# Patient Record
Sex: MALE | Race: WHITE | NOT HISPANIC OR LATINO
[De-identification: names, ages, dates, MRNs, and addresses within clinical notes are randomized per-mention and may not be internally consistent; named-entity substitution may affect disease eponyms.]

---

## 2018-04-06 PROBLEM — Z00.00 ENCOUNTER FOR PREVENTIVE HEALTH EXAMINATION: Status: ACTIVE | Noted: 2018-04-06

## 2018-04-16 ENCOUNTER — APPOINTMENT (OUTPATIENT)
Dept: ORTHOPEDIC SURGERY | Facility: CLINIC | Age: 83
End: 2018-04-16
Payer: MEDICARE

## 2018-04-16 VITALS
DIASTOLIC BLOOD PRESSURE: 91 MMHG | WEIGHT: 175 LBS | SYSTOLIC BLOOD PRESSURE: 190 MMHG | BODY MASS INDEX: 29.88 KG/M2 | HEIGHT: 64 IN | HEART RATE: 58 BPM

## 2018-04-16 PROCEDURE — 99203 OFFICE O/P NEW LOW 30 MIN: CPT

## 2018-04-16 PROCEDURE — 73564 X-RAY EXAM KNEE 4 OR MORE: CPT | Mod: RT

## 2019-04-09 ENCOUNTER — APPOINTMENT (OUTPATIENT)
Dept: ORTHOPEDIC SURGERY | Facility: CLINIC | Age: 84
End: 2019-04-09
Payer: MEDICARE

## 2019-04-09 VITALS — WEIGHT: 168 LBS | HEIGHT: 63 IN | BODY MASS INDEX: 29.77 KG/M2

## 2019-04-09 VITALS — DIASTOLIC BLOOD PRESSURE: 76 MMHG | HEART RATE: 50 BPM | SYSTOLIC BLOOD PRESSURE: 182 MMHG

## 2019-04-09 DIAGNOSIS — Z78.9 OTHER SPECIFIED HEALTH STATUS: ICD-10-CM

## 2019-04-09 DIAGNOSIS — M17.0 BILATERAL PRIMARY OSTEOARTHRITIS OF KNEE: ICD-10-CM

## 2019-04-09 PROCEDURE — 99214 OFFICE O/P EST MOD 30 MIN: CPT

## 2019-04-09 PROCEDURE — 73562 X-RAY EXAM OF KNEE 3: CPT | Mod: RT

## 2019-04-09 RX ORDER — ATENOLOL 50 MG/1
TABLET ORAL
Refills: 0 | Status: ACTIVE | COMMUNITY

## 2019-05-02 ENCOUNTER — OUTPATIENT (OUTPATIENT)
Dept: OUTPATIENT SERVICES | Facility: HOSPITAL | Age: 84
LOS: 1 days | End: 2019-05-02
Payer: COMMERCIAL

## 2019-05-02 VITALS
TEMPERATURE: 98 F | DIASTOLIC BLOOD PRESSURE: 83 MMHG | HEART RATE: 54 BPM | WEIGHT: 167.99 LBS | HEIGHT: 63 IN | OXYGEN SATURATION: 98 % | SYSTOLIC BLOOD PRESSURE: 163 MMHG | RESPIRATION RATE: 16 BRPM

## 2019-05-02 DIAGNOSIS — M17.12 UNILATERAL PRIMARY OSTEOARTHRITIS, LEFT KNEE: ICD-10-CM

## 2019-05-02 DIAGNOSIS — Z01.818 ENCOUNTER FOR OTHER PREPROCEDURAL EXAMINATION: ICD-10-CM

## 2019-05-02 DIAGNOSIS — Z98.890 OTHER SPECIFIED POSTPROCEDURAL STATES: Chronic | ICD-10-CM

## 2019-05-02 DIAGNOSIS — M17.10 UNILATERAL PRIMARY OSTEOARTHRITIS, UNSPECIFIED KNEE: ICD-10-CM

## 2019-05-02 LAB
ALBUMIN SERPL ELPH-MCNC: 4.1 G/DL — SIGNIFICANT CHANGE UP (ref 3.3–5)
ALP SERPL-CCNC: 75 U/L — SIGNIFICANT CHANGE UP (ref 30–120)
ALT FLD-CCNC: 36 U/L DA — SIGNIFICANT CHANGE UP (ref 10–60)
ANION GAP SERPL CALC-SCNC: 6 MMOL/L — SIGNIFICANT CHANGE UP (ref 5–17)
APTT BLD: 35.8 SEC — SIGNIFICANT CHANGE UP (ref 28.5–37)
AST SERPL-CCNC: 27 U/L — SIGNIFICANT CHANGE UP (ref 10–40)
BILIRUB SERPL-MCNC: 0.3 MG/DL — SIGNIFICANT CHANGE UP (ref 0.2–1.2)
BLD GP AB SCN SERPL QL: SIGNIFICANT CHANGE UP
BUN SERPL-MCNC: 21 MG/DL — SIGNIFICANT CHANGE UP (ref 7–23)
CALCIUM SERPL-MCNC: 9.3 MG/DL — SIGNIFICANT CHANGE UP (ref 8.4–10.5)
CHLORIDE SERPL-SCNC: 105 MMOL/L — SIGNIFICANT CHANGE UP (ref 96–108)
CO2 SERPL-SCNC: 32 MMOL/L — HIGH (ref 22–31)
CREAT SERPL-MCNC: 1.07 MG/DL — SIGNIFICANT CHANGE UP (ref 0.5–1.3)
GLUCOSE SERPL-MCNC: 90 MG/DL — SIGNIFICANT CHANGE UP (ref 70–99)
HCT VFR BLD CALC: 46.7 % — SIGNIFICANT CHANGE UP (ref 39–50)
HGB BLD-MCNC: 16 G/DL — SIGNIFICANT CHANGE UP (ref 13–17)
INR BLD: 1.16 RATIO — SIGNIFICANT CHANGE UP (ref 0.88–1.16)
MCHC RBC-ENTMCNC: 33.3 PG — SIGNIFICANT CHANGE UP (ref 27–34)
MCHC RBC-ENTMCNC: 34.3 GM/DL — SIGNIFICANT CHANGE UP (ref 32–36)
MCV RBC AUTO: 97.1 FL — SIGNIFICANT CHANGE UP (ref 80–100)
MRSA PCR RESULT.: SIGNIFICANT CHANGE UP
NRBC # BLD: 0 /100 WBCS — SIGNIFICANT CHANGE UP (ref 0–0)
PLATELET # BLD AUTO: 154 K/UL — SIGNIFICANT CHANGE UP (ref 150–400)
POTASSIUM SERPL-MCNC: 4.8 MMOL/L — SIGNIFICANT CHANGE UP (ref 3.5–5.3)
POTASSIUM SERPL-SCNC: 4.8 MMOL/L — SIGNIFICANT CHANGE UP (ref 3.5–5.3)
PROT SERPL-MCNC: 7.5 G/DL — SIGNIFICANT CHANGE UP (ref 6–8.3)
PROTHROM AB SERPL-ACNC: 12.7 SEC — SIGNIFICANT CHANGE UP (ref 10–12.9)
RBC # BLD: 4.81 M/UL — SIGNIFICANT CHANGE UP (ref 4.2–5.8)
RBC # FLD: 13.2 % — SIGNIFICANT CHANGE UP (ref 10.3–14.5)
S AUREUS DNA NOSE QL NAA+PROBE: DETECTED
SODIUM SERPL-SCNC: 143 MMOL/L — SIGNIFICANT CHANGE UP (ref 135–145)
WBC # BLD: 10.22 K/UL — SIGNIFICANT CHANGE UP (ref 3.8–10.5)
WBC # FLD AUTO: 10.22 K/UL — SIGNIFICANT CHANGE UP (ref 3.8–10.5)

## 2019-05-02 PROCEDURE — 36415 COLL VENOUS BLD VENIPUNCTURE: CPT

## 2019-05-02 PROCEDURE — 93005 ELECTROCARDIOGRAM TRACING: CPT

## 2019-05-02 PROCEDURE — 86900 BLOOD TYPING SEROLOGIC ABO: CPT

## 2019-05-02 PROCEDURE — G0463: CPT

## 2019-05-02 PROCEDURE — 80053 COMPREHEN METABOLIC PANEL: CPT

## 2019-05-02 PROCEDURE — 85730 THROMBOPLASTIN TIME PARTIAL: CPT

## 2019-05-02 PROCEDURE — 86850 RBC ANTIBODY SCREEN: CPT

## 2019-05-02 PROCEDURE — 85610 PROTHROMBIN TIME: CPT

## 2019-05-02 PROCEDURE — 86901 BLOOD TYPING SEROLOGIC RH(D): CPT

## 2019-05-02 PROCEDURE — 85027 COMPLETE CBC AUTOMATED: CPT

## 2019-05-02 PROCEDURE — 87640 STAPH A DNA AMP PROBE: CPT

## 2019-05-02 PROCEDURE — 87641 MR-STAPH DNA AMP PROBE: CPT

## 2019-05-02 PROCEDURE — 93010 ELECTROCARDIOGRAM REPORT: CPT

## 2019-05-02 NOTE — H&P PST ADULT - RS GEN PE MLT RESP DETAILS PC
respirations non-labored/normal/airway patent/good air movement/breath sounds equal/clear to auscultation bilaterally

## 2019-05-02 NOTE — H&P PST ADULT - HISTORY OF PRESENT ILLNESS
85 y/o male with left knee pain. Chronic pain with Flare ups and OTC meds not helping at present. Here for PST in NAD

## 2019-05-02 NOTE — H&P PST ADULT - NSANTHOSAYNRD_GEN_A_CORE
No. ABEL screening performed.  STOP BANG Legend: 0-2 = LOW Risk; 3-4 = INTERMEDIATE Risk; 5-8 = HIGH Risk

## 2019-05-03 RX ORDER — MUPIROCIN 20 MG/G
1 OINTMENT TOPICAL
Qty: 1 | Refills: 0 | OUTPATIENT
Start: 2019-05-03 | End: 2019-05-07

## 2019-05-03 RX ORDER — MUPIROCIN 20 MG/G
1 OINTMENT TOPICAL
Qty: 1 | Refills: 0
Start: 2019-05-03 | End: 2019-05-07

## 2019-05-20 RX ORDER — SODIUM CHLORIDE 9 MG/ML
1000 INJECTION, SOLUTION INTRAVENOUS
Refills: 0 | Status: DISCONTINUED | OUTPATIENT
Start: 2019-05-22 | End: 2019-05-25

## 2019-05-21 PROBLEM — I10 ESSENTIAL (PRIMARY) HYPERTENSION: Chronic | Status: ACTIVE | Noted: 2019-05-01

## 2019-05-21 PROBLEM — I34.1 NONRHEUMATIC MITRAL (VALVE) PROLAPSE: Chronic | Status: ACTIVE | Noted: 2019-05-01

## 2019-05-21 PROBLEM — E78.00 PURE HYPERCHOLESTEROLEMIA, UNSPECIFIED: Chronic | Status: ACTIVE | Noted: 2019-05-01

## 2019-05-21 NOTE — PATIENT PROFILE ADULT - NSPROMEDSADMININFO_GEN_A_NUR
Following rx was faxed to pharmacy with confirmation received:      LORazepam (ATIVAN) 0.5 mg tablet [[id number]]   Order Details   Dose, Route, Frequency: As Directed    Dispense Quantity:  20 Tab Refills:  0 Fills Remaining:  --           Sig: TAKE ONE TABLET BY MOUTH NIGHTLY AS NEEDED FOR ANXIETY
no concerns

## 2019-05-22 ENCOUNTER — RESULT REVIEW (OUTPATIENT)
Age: 84
End: 2019-05-22

## 2019-05-22 ENCOUNTER — APPOINTMENT (OUTPATIENT)
Dept: ORTHOPEDIC SURGERY | Facility: HOSPITAL | Age: 84
End: 2019-05-22

## 2019-05-22 ENCOUNTER — INPATIENT (INPATIENT)
Facility: HOSPITAL | Age: 84
LOS: 2 days | Discharge: HOME | DRG: 470 | End: 2019-05-25
Attending: ORTHOPAEDIC SURGERY | Admitting: ORTHOPAEDIC SURGERY
Payer: COMMERCIAL

## 2019-05-22 VITALS
RESPIRATION RATE: 20 BRPM | DIASTOLIC BLOOD PRESSURE: 85 MMHG | OXYGEN SATURATION: 99 % | HEIGHT: 63 IN | HEART RATE: 53 BPM | SYSTOLIC BLOOD PRESSURE: 174 MMHG | TEMPERATURE: 98 F | WEIGHT: 167.99 LBS

## 2019-05-22 DIAGNOSIS — Z98.890 OTHER SPECIFIED POSTPROCEDURAL STATES: Chronic | ICD-10-CM

## 2019-05-22 DIAGNOSIS — M17.12 UNILATERAL PRIMARY OSTEOARTHRITIS, LEFT KNEE: ICD-10-CM

## 2019-05-22 DIAGNOSIS — Z01.818 ENCOUNTER FOR OTHER PREPROCEDURAL EXAMINATION: ICD-10-CM

## 2019-05-22 LAB
ANION GAP SERPL CALC-SCNC: 8 MMOL/L — SIGNIFICANT CHANGE UP (ref 5–17)
CHLORIDE SERPL-SCNC: 103 MMOL/L — SIGNIFICANT CHANGE UP (ref 96–108)
CO2 SERPL-SCNC: 28 MMOL/L — SIGNIFICANT CHANGE UP (ref 22–31)
HCT VFR BLD CALC: 42.4 % — SIGNIFICANT CHANGE UP (ref 39–50)
HGB BLD-MCNC: 14.2 G/DL — SIGNIFICANT CHANGE UP (ref 13–17)
POTASSIUM SERPL-MCNC: 4.2 MMOL/L — SIGNIFICANT CHANGE UP (ref 3.5–5.3)
POTASSIUM SERPL-SCNC: 4.2 MMOL/L — SIGNIFICANT CHANGE UP (ref 3.5–5.3)
SODIUM SERPL-SCNC: 139 MMOL/L — SIGNIFICANT CHANGE UP (ref 135–145)

## 2019-05-22 PROCEDURE — 27447 TOTAL KNEE ARTHROPLASTY: CPT | Mod: LT

## 2019-05-22 PROCEDURE — 99223 1ST HOSP IP/OBS HIGH 75: CPT

## 2019-05-22 PROCEDURE — 88305 TISSUE EXAM BY PATHOLOGIST: CPT | Mod: 26

## 2019-05-22 PROCEDURE — 73562 X-RAY EXAM OF KNEE 3: CPT | Mod: 26,LT

## 2019-05-22 PROCEDURE — 88311 DECALCIFY TISSUE: CPT | Mod: 26

## 2019-05-22 RX ORDER — CELECOXIB 200 MG/1
200 CAPSULE ORAL EVERY 12 HOURS
Refills: 0 | Status: DISCONTINUED | OUTPATIENT
Start: 2019-05-23 | End: 2019-05-25

## 2019-05-22 RX ORDER — MAGNESIUM HYDROXIDE 400 MG/1
30 TABLET, CHEWABLE ORAL DAILY
Refills: 0 | Status: DISCONTINUED | OUTPATIENT
Start: 2019-05-22 | End: 2019-05-25

## 2019-05-22 RX ORDER — ATORVASTATIN CALCIUM 80 MG/1
40 TABLET, FILM COATED ORAL AT BEDTIME
Refills: 0 | Status: DISCONTINUED | OUTPATIENT
Start: 2019-05-22 | End: 2019-05-23

## 2019-05-22 RX ORDER — TRANEXAMIC ACID 100 MG/ML
1000 INJECTION, SOLUTION INTRAVENOUS ONCE
Refills: 0 | Status: COMPLETED | OUTPATIENT
Start: 2019-05-22 | End: 2019-05-22

## 2019-05-22 RX ORDER — CHLORHEXIDINE GLUCONATE 213 G/1000ML
1 SOLUTION TOPICAL ONCE
Refills: 0 | Status: COMPLETED | OUTPATIENT
Start: 2019-05-22 | End: 2019-05-22

## 2019-05-22 RX ORDER — OXYCODONE HYDROCHLORIDE 5 MG/1
5 TABLET ORAL
Refills: 0 | Status: DISCONTINUED | OUTPATIENT
Start: 2019-05-22 | End: 2019-05-25

## 2019-05-22 RX ORDER — CEFAZOLIN SODIUM 1 G
2000 VIAL (EA) INJECTION EVERY 8 HOURS
Refills: 0 | Status: COMPLETED | OUTPATIENT
Start: 2019-05-22 | End: 2019-05-23

## 2019-05-22 RX ORDER — PANTOPRAZOLE SODIUM 20 MG/1
40 TABLET, DELAYED RELEASE ORAL
Refills: 0 | Status: DISCONTINUED | OUTPATIENT
Start: 2019-05-22 | End: 2019-05-25

## 2019-05-22 RX ORDER — POLYETHYLENE GLYCOL 3350 17 G/17G
17 POWDER, FOR SOLUTION ORAL DAILY
Refills: 0 | Status: DISCONTINUED | OUTPATIENT
Start: 2019-05-22 | End: 2019-05-25

## 2019-05-22 RX ORDER — ONDANSETRON 8 MG/1
4 TABLET, FILM COATED ORAL ONCE
Refills: 0 | Status: DISCONTINUED | OUTPATIENT
Start: 2019-05-22 | End: 2019-05-22

## 2019-05-22 RX ORDER — OXYCODONE HYDROCHLORIDE 5 MG/1
10 TABLET ORAL
Refills: 0 | Status: DISCONTINUED | OUTPATIENT
Start: 2019-05-22 | End: 2019-05-25

## 2019-05-22 RX ORDER — ASPIRIN/CALCIUM CARB/MAGNESIUM 324 MG
81 TABLET ORAL EVERY 12 HOURS
Refills: 0 | Status: DISCONTINUED | OUTPATIENT
Start: 2019-05-23 | End: 2019-05-25

## 2019-05-22 RX ORDER — DOCUSATE SODIUM 100 MG
100 CAPSULE ORAL THREE TIMES A DAY
Refills: 0 | Status: DISCONTINUED | OUTPATIENT
Start: 2019-05-22 | End: 2019-05-25

## 2019-05-22 RX ORDER — APREPITANT 80 MG/1
40 CAPSULE ORAL ONCE
Refills: 0 | Status: COMPLETED | OUTPATIENT
Start: 2019-05-22 | End: 2019-05-22

## 2019-05-22 RX ORDER — HYDROMORPHONE HYDROCHLORIDE 2 MG/ML
0.5 INJECTION INTRAMUSCULAR; INTRAVENOUS; SUBCUTANEOUS
Refills: 0 | Status: DISCONTINUED | OUTPATIENT
Start: 2019-05-22 | End: 2019-05-25

## 2019-05-22 RX ORDER — ACETAMINOPHEN 500 MG
1000 TABLET ORAL EVERY 6 HOURS
Refills: 0 | Status: COMPLETED | OUTPATIENT
Start: 2019-05-22 | End: 2019-05-23

## 2019-05-22 RX ORDER — ATENOLOL 25 MG/1
50 TABLET ORAL DAILY
Refills: 0 | Status: DISCONTINUED | OUTPATIENT
Start: 2019-05-22 | End: 2019-05-25

## 2019-05-22 RX ORDER — ACETAMINOPHEN 500 MG
1000 TABLET ORAL ONCE
Refills: 0 | Status: COMPLETED | OUTPATIENT
Start: 2019-05-22 | End: 2019-05-22

## 2019-05-22 RX ORDER — SODIUM CHLORIDE 9 MG/ML
1000 INJECTION, SOLUTION INTRAVENOUS
Refills: 0 | Status: DISCONTINUED | OUTPATIENT
Start: 2019-05-22 | End: 2019-05-22

## 2019-05-22 RX ORDER — CEFAZOLIN SODIUM 1 G
2000 VIAL (EA) INJECTION ONCE
Refills: 0 | Status: COMPLETED | OUTPATIENT
Start: 2019-05-22 | End: 2019-05-22

## 2019-05-22 RX ORDER — ACETAMINOPHEN 500 MG
1000 TABLET ORAL EVERY 8 HOURS
Refills: 0 | Status: DISCONTINUED | OUTPATIENT
Start: 2019-05-23 | End: 2019-05-25

## 2019-05-22 RX ORDER — SENNA PLUS 8.6 MG/1
2 TABLET ORAL AT BEDTIME
Refills: 0 | Status: DISCONTINUED | OUTPATIENT
Start: 2019-05-22 | End: 2019-05-25

## 2019-05-22 RX ORDER — ONDANSETRON 8 MG/1
4 TABLET, FILM COATED ORAL EVERY 6 HOURS
Refills: 0 | Status: DISCONTINUED | OUTPATIENT
Start: 2019-05-22 | End: 2019-05-25

## 2019-05-22 RX ORDER — ATENOLOL 25 MG/1
50 TABLET ORAL DAILY
Refills: 0 | Status: DISCONTINUED | OUTPATIENT
Start: 2019-05-22 | End: 2019-05-22

## 2019-05-22 RX ORDER — SODIUM CHLORIDE 9 MG/ML
1000 INJECTION, SOLUTION INTRAVENOUS
Refills: 0 | Status: DISCONTINUED | OUTPATIENT
Start: 2019-05-22 | End: 2019-05-25

## 2019-05-22 RX ORDER — FAMOTIDINE 10 MG/ML
1 INJECTION INTRAVENOUS
Qty: 0 | Refills: 0 | DISCHARGE

## 2019-05-22 RX ORDER — HYDROMORPHONE HYDROCHLORIDE 2 MG/ML
0.5 INJECTION INTRAMUSCULAR; INTRAVENOUS; SUBCUTANEOUS
Refills: 0 | Status: DISCONTINUED | OUTPATIENT
Start: 2019-05-22 | End: 2019-05-22

## 2019-05-22 RX ADMIN — Medication 100 MILLIGRAM(S): at 19:31

## 2019-05-22 RX ADMIN — Medication 100 MILLIGRAM(S): at 21:13

## 2019-05-22 RX ADMIN — CHLORHEXIDINE GLUCONATE 1 APPLICATION(S): 213 SOLUTION TOPICAL at 09:08

## 2019-05-22 RX ADMIN — SODIUM CHLORIDE 120 MILLILITER(S): 9 INJECTION, SOLUTION INTRAVENOUS at 17:59

## 2019-05-22 RX ADMIN — Medication 1000 MILLIGRAM(S): at 18:16

## 2019-05-22 RX ADMIN — SENNA PLUS 2 TABLET(S): 8.6 TABLET ORAL at 21:14

## 2019-05-22 RX ADMIN — Medication 400 MILLIGRAM(S): at 17:58

## 2019-05-22 RX ADMIN — APREPITANT 40 MILLIGRAM(S): 80 CAPSULE ORAL at 09:07

## 2019-05-22 RX ADMIN — SODIUM CHLORIDE 75 MILLILITER(S): 9 INJECTION, SOLUTION INTRAVENOUS at 14:12

## 2019-05-22 RX ADMIN — SODIUM CHLORIDE 75 MILLILITER(S): 9 INJECTION, SOLUTION INTRAVENOUS at 09:08

## 2019-05-22 NOTE — CONSULT NOTE ADULT - ASSESSMENT
Aftercare following Left TKR 5/22/19    pain meds as per anesthesia.  PT/OT.  DVT prophylaxis.  DVT ppx: [ ]ASA81 [ ] [id number] [ ] Lovenox [ ] Coumadin   [ ] Eliquis [  ] Heparin  Dispo:     Home [ ]     Acute Rehab [ ]     WILD [ ]     TBD [x ]      HTN  atenolol with parameter.    Dyslipidemia      Plan of care was discuss with patient, all questions were answered, seems understand and in agreement. Aftercare following Left TKR 5/22/19    pain meds as per anesthesia.  PT/OT.  DVT prophylaxis.  DVT ppx: [x ]ASA81 [ ] [id number] [ ] Lovenox [ ] Coumadin   [ ] Eliquis [  ] Heparin  Dispo:     Home [ ]     Acute Rehab [ ]     WILD [ ]     TBD [x ]      HTN  atenolol with parameter.    Dyslipidemia  simvastatin    Plan of care was discuss with patient, all questions were answered, seems understand and in agreement. Aftercare following Left TKR 5/22/19    pain meds as per anesthesia.  PT/OT.  DVT prophylaxis.  DVT ppx: [x ]ASA81 [ ] [id number] [ ] Lovenox [ ] Coumadin   [ ] Eliquis [  ] Heparin  Dispo:     Home [ ]     Acute Rehab [ ]     WILD [x ]     TBD [ ]      HTN  atenolol with parameter.    Dyslipidemia  simvastatin    Plan of care was discuss with patient, all questions were answered, seems understand and in agreement.

## 2019-05-22 NOTE — PROGRESS NOTE ADULT - SUBJECTIVE AND OBJECTIVE BOX
Ortho PA Post Op Check    Procedure: LEft TKR  Surgeon: Shana GRACIA    Pt comfortable without complaints, pain controlled ( 0/10); Denies CP, SOB, N/V; Still with some numbness/tingling to both feet and weak motion - Spinal still partially active bilat.  Took Po fluids & aviva. well.    Still in PACU; No Pain Rx needed/given yet.  Pain Rx:  acetaminophen  IVPB .. 1000 milliGRAM(s) IV Intermittent every 6 hours  HYDROmorphone  Injectable 0.5 milliGRAM(s) IV Push every 10 minutes PRN  ondansetron Injectable 4 milliGRAM(s) IV Push once PRN      General Exam:  Vital Signs Last 24 Hrs  T(C): 36.6 (05-22-19 @ 13:48), Max: 36.6 (05-22-19 @ 13:48)  T(F): 97.8 (05-22-19 @ 13:48), Max: 97.8 (05-22-19 @ 13:48)  HR: 51 (05-22-19 @ 16:30) (49 - 61)  BP: 134/64 (05-22-19 @ 16:30) (116/57 - 135/62)  RR: 16 (05-22-19 @ 16:30) (14 - 22)  SpO2: 98% (05-22-19 @ 16:30) (97% - 99%) On O2 N/C    General: Pt Alert and oriented, NAD, controlled pain.  Ext(Knee): Left Knee Judge Dressing clean, dry, & intact; Not constricting @ Prox. & Distal ends.   Neuro/Vasc: Feet toes warm, pink. DP = 2+. No calf tenderness bilat..  Has Min. sensation over feet & toes bilat. Weak AROM bilat feet & toes. EHL = 2-3/5 Bilat.  Urine Out [11P-7A] = Not voided yet  VTEP: On Venodynes Bilat.;  BID Ecotrin to start in AM  Hospitalist input noted.    A/P: 84yMale POD #0 S/P Left TKR  - Stable fro Ortho standpoint; Spinal only partially emerged yet  - Pain Control - stable  - DVT ppx: ordered  - Post op abx: ordered  - PT eval pending improvement in Sensation/Motion in LE  - Weight bearing status: Full

## 2019-05-22 NOTE — PHYSICAL THERAPY INITIAL EVALUATION ADULT - LEVEL OF INDEPENDENCE: GAIT, REHAB EVAL
patient stated he is unable to shift weight on left LE at this point , as he feels his knee is giving away./unable to perform

## 2019-05-22 NOTE — PHYSICAL THERAPY INITIAL EVALUATION ADULT - GENERAL OBSERVATIONS, REHAB EVAL
Patient was received supine in bed in PACU with +IV, tele, B/L SCD, ace wrap left knee C/D/I, NAD, agreeable to PT, family at bed side.

## 2019-05-22 NOTE — CONSULT NOTE ADULT - SUBJECTIVE AND OBJECTIVE BOX
Patient is a 84y old  Male who presents with a chief complaint of     HPI:  Patient is seen and examined.    PAST MEDICAL & SURGICAL HISTORY:  Mitral prolapse  Hypercholesteremia  Hypertension  History of hernia repair      MEDICATIONS  (STANDING):  ATENolol  Tablet 50 milliGRAM(s) Oral daily  atorvastatin 40 milliGRAM(s) Oral at bedtime  lactated ringers. 1000 milliLiter(s) (75 mL/Hr) IV Continuous <Continuous>  lactated ringers. 1000 milliLiter(s) (75 mL/Hr) IV Continuous <Continuous>    MEDICATIONS  (PRN):  HYDROmorphone  Injectable 0.5 milliGRAM(s) IV Push every 10 minutes PRN Moderate Pain (4 - 6)  ondansetron Injectable 4 milliGRAM(s) IV Push once PRN Nausea and/or Vomiting      Allergies    No Known Allergies    Intolerances    SOCIAL HISTORY:  Smoker:  YES / NO        PACK YEARS:                         WHEN QUIT?  ETOH use:  YES / NO               FREQUENCY / QUANTITY:  Ilicit Drug use:  YES / NO  Occupation:  Assisted device use (Cane / Walker):  Live with:      FAMILY HISTORY:      Vital Signs Last 24 Hrs  T(C): 36.6 (22 May 2019 13:48), Max: 36.6 (22 May 2019 08:57)  T(F): 97.8 (22 May 2019 13:48), Max: 97.9 (22 May 2019 08:57)  HR: 52 (22 May 2019 14:15) (49 - 61)  BP: 120/60 (22 May 2019 14:15) (116/57 - 174/85)  BP(mean): --  RR: 16 (22 May 2019 14:15) (14 - 20)  SpO2: 98% (22 May 2019 14:15) (97% - 99%) Patient is a 84y old  Male who presents with a chief complaint of   c/o left knee pain long lasting agravated with walking, minimal relief with pain meds.  HPI:  Patient is seen and examined.  Pain is controlled.  Patient is sleepy.    PAST MEDICAL & SURGICAL HISTORY:  Mitral prolapse  Hypercholesteremia  Hypertension  History of hernia repair      MEDICATIONS  (STANDING):  ATENolol  Tablet 50 milliGRAM(s) Oral daily  atorvastatin 40 milliGRAM(s) Oral at bedtime  lactated ringers. 1000 milliLiter(s) (75 mL/Hr) IV Continuous <Continuous>  lactated ringers. 1000 milliLiter(s) (75 mL/Hr) IV Continuous <Continuous>    MEDICATIONS  (PRN):  HYDROmorphone  Injectable 0.5 milliGRAM(s) IV Push every 10 minutes PRN Moderate Pain (4 - 6)  ondansetron Injectable 4 milliGRAM(s) IV Push once PRN Nausea and/or Vomiting      Allergies    No Known Allergies    Intolerances    SOCIAL HISTORY:  Smoker:   NO        PACK YEARS:                         WHEN QUIT?  ETOH use:   NO               FREQUENCY / QUANTITY:  Ilicit Drug use:   NO    Vital Signs Last 24 Hrs  T(C): 36.6 (22 May 2019 13:48), Max: 36.6 (22 May 2019 08:57)  T(F): 97.8 (22 May 2019 13:48), Max: 97.9 (22 May 2019 08:57)  HR: 52 (22 May 2019 14:15) (49 - 61)  BP: 120/60 (22 May 2019 14:15) (116/57 - 174/85)  BP(mean): --  RR: 16 (22 May 2019 14:15) (14 - 20)  SpO2: 98% (22 May 2019 14:15) (97% - 99%)

## 2019-05-22 NOTE — PHYSICAL THERAPY INITIAL EVALUATION ADULT - ADDITIONAL COMMENTS
As per patient he was independent with transfers, ambulation, stair negotiation, ADLs, used RW as needed.

## 2019-05-23 ENCOUNTER — TRANSCRIPTION ENCOUNTER (OUTPATIENT)
Age: 84
End: 2019-05-23

## 2019-05-23 LAB
ANION GAP SERPL CALC-SCNC: 6 MMOL/L — SIGNIFICANT CHANGE UP (ref 5–17)
BUN SERPL-MCNC: 16 MG/DL — SIGNIFICANT CHANGE UP (ref 7–23)
CALCIUM SERPL-MCNC: 9 MG/DL — SIGNIFICANT CHANGE UP (ref 8.4–10.5)
CHLORIDE SERPL-SCNC: 105 MMOL/L — SIGNIFICANT CHANGE UP (ref 96–108)
CO2 SERPL-SCNC: 29 MMOL/L — SIGNIFICANT CHANGE UP (ref 22–31)
CREAT SERPL-MCNC: 0.92 MG/DL — SIGNIFICANT CHANGE UP (ref 0.5–1.3)
GLUCOSE SERPL-MCNC: 123 MG/DL — HIGH (ref 70–99)
HCT VFR BLD CALC: 38.3 % — LOW (ref 39–50)
HGB BLD-MCNC: 13.1 G/DL — SIGNIFICANT CHANGE UP (ref 13–17)
MCHC RBC-ENTMCNC: 32.9 PG — SIGNIFICANT CHANGE UP (ref 27–34)
MCHC RBC-ENTMCNC: 34.2 GM/DL — SIGNIFICANT CHANGE UP (ref 32–36)
MCV RBC AUTO: 96.2 FL — SIGNIFICANT CHANGE UP (ref 80–100)
NRBC # BLD: 0 /100 WBCS — SIGNIFICANT CHANGE UP (ref 0–0)
PLATELET # BLD AUTO: 136 K/UL — LOW (ref 150–400)
POTASSIUM SERPL-MCNC: 4.9 MMOL/L — SIGNIFICANT CHANGE UP (ref 3.5–5.3)
POTASSIUM SERPL-SCNC: 4.9 MMOL/L — SIGNIFICANT CHANGE UP (ref 3.5–5.3)
RBC # BLD: 3.98 M/UL — LOW (ref 4.2–5.8)
RBC # FLD: 12.4 % — SIGNIFICANT CHANGE UP (ref 10.3–14.5)
SODIUM SERPL-SCNC: 140 MMOL/L — SIGNIFICANT CHANGE UP (ref 135–145)
WBC # BLD: 20.64 K/UL — HIGH (ref 3.8–10.5)
WBC # FLD AUTO: 20.64 K/UL — HIGH (ref 3.8–10.5)

## 2019-05-23 PROCEDURE — 99233 SBSQ HOSP IP/OBS HIGH 50: CPT

## 2019-05-23 RX ORDER — CELECOXIB 200 MG/1
1 CAPSULE ORAL
Qty: 38 | Refills: 0
Start: 2019-05-23 | End: 2019-06-10

## 2019-05-23 RX ORDER — ASPIRIN/CALCIUM CARB/MAGNESIUM 324 MG
1 TABLET ORAL
Qty: 80 | Refills: 0
Start: 2019-05-23 | End: 2019-07-01

## 2019-05-23 RX ORDER — PANTOPRAZOLE SODIUM 20 MG/1
1 TABLET, DELAYED RELEASE ORAL
Qty: 30 | Refills: 1
Start: 2019-05-23 | End: 2019-07-21

## 2019-05-23 RX ORDER — ROSUVASTATIN CALCIUM 5 MG/1
10 TABLET ORAL AT BEDTIME
Refills: 0 | Status: DISCONTINUED | OUTPATIENT
Start: 2019-05-23 | End: 2019-05-25

## 2019-05-23 RX ADMIN — CELECOXIB 200 MILLIGRAM(S): 200 CAPSULE ORAL at 10:16

## 2019-05-23 RX ADMIN — PANTOPRAZOLE SODIUM 40 MILLIGRAM(S): 20 TABLET, DELAYED RELEASE ORAL at 05:07

## 2019-05-23 RX ADMIN — Medication 400 MILLIGRAM(S): at 00:00

## 2019-05-23 RX ADMIN — Medication 100 MILLIGRAM(S): at 03:19

## 2019-05-23 RX ADMIN — Medication 100 MILLIGRAM(S): at 12:09

## 2019-05-23 RX ADMIN — ROSUVASTATIN CALCIUM 10 MILLIGRAM(S): 5 TABLET ORAL at 20:47

## 2019-05-23 RX ADMIN — Medication 1000 MILLIGRAM(S): at 12:17

## 2019-05-23 RX ADMIN — Medication 1000 MILLIGRAM(S): at 06:54

## 2019-05-23 RX ADMIN — Medication 1000 MILLIGRAM(S): at 00:30

## 2019-05-23 RX ADMIN — Medication 100 MILLIGRAM(S): at 05:07

## 2019-05-23 RX ADMIN — Medication 1000 MILLIGRAM(S): at 12:09

## 2019-05-23 RX ADMIN — Medication 81 MILLIGRAM(S): at 18:10

## 2019-05-23 RX ADMIN — Medication 81 MILLIGRAM(S): at 05:07

## 2019-05-23 RX ADMIN — Medication 1000 MILLIGRAM(S): at 22:07

## 2019-05-23 RX ADMIN — Medication 100 MILLIGRAM(S): at 20:48

## 2019-05-23 RX ADMIN — CELECOXIB 200 MILLIGRAM(S): 200 CAPSULE ORAL at 20:49

## 2019-05-23 RX ADMIN — Medication 1000 MILLIGRAM(S): at 20:48

## 2019-05-23 RX ADMIN — CELECOXIB 200 MILLIGRAM(S): 200 CAPSULE ORAL at 20:48

## 2019-05-23 RX ADMIN — SENNA PLUS 2 TABLET(S): 8.6 TABLET ORAL at 20:49

## 2019-05-23 RX ADMIN — Medication 400 MILLIGRAM(S): at 06:05

## 2019-05-23 NOTE — DISCHARGE NOTE PROVIDER - NSDCCPCAREPLAN_GEN_ALL_CORE_FT
PRINCIPAL DISCHARGE DIAGNOSIS  Diagnosis: Primary localized osteoarthritis of left knee  Assessment and Plan of Treatment: Physical Therapy/Occupational Therapy for ambulation, transfers, stairs, ADL's, Range of Motion Exercises, Isometrics.  Full weight bearing as tolerated with rolling walker  Range of Motion Goals: Flexion 120 degrees; Extension 0 degrees  Keep incision clean and dry.  Suture/prineo dressing removal 14 days after surgery at rehab facility or Surgeon's office  May shower post-op day #5 if no drainage from incision  - Call your doctor if you experience:  • An increase in pain not controlled by pain medication or change in activity or  position.  • Temperature greater than 101° F.  • Redness, increased swelling or foul smelling drainage from or around the  incision.  • Numbness, tingling or a change in color or temperature of the operative leg.  • Call your doctor immediately if you experience chest pain, shortness of breath or calf pain.

## 2019-05-23 NOTE — DISCHARGE NOTE NURSING/CASE MANAGEMENT/SOCIAL WORK - NSDCDPATPORTLINK_GEN_ALL_CORE
You can access the [facility], offered by [facility], by registering with the following website: [email or web address], [facility]

## 2019-05-23 NOTE — DISCHARGE NOTE PROVIDER - INSTRUCTIONS
For Constipation :   • Increase your water intake. Drink at least 8 glasses of water daily.  • Try adding fiber to your diet by eating fruits, vegetables and foods that are rich in grains.  • If you do experience constipation, you may take an over-the-counter stool softener/laxative such as Kanchan Colace, Senekot or  Milk of Magnesia.

## 2019-05-23 NOTE — PROGRESS NOTE ADULT - SUBJECTIVE AND OBJECTIVE BOX
Patient is a 84y old  Male who presents with a chief complaint of Left TKR (23 May 2019 14:26)    INTERVAL HPI/OVERNIGHT EVENTS: no complaints, pain controlled.  feeling well.       MEDICATIONS  (STANDING):  acetaminophen   Tablet .. 1000 milliGRAM(s) Oral every 8 hours  aspirin enteric coated 81 milliGRAM(s) Oral every 12 hours  ATENolol  Tablet 50 milliGRAM(s) Oral daily  celecoxib 200 milliGRAM(s) Oral every 12 hours  docusate sodium 100 milliGRAM(s) Oral three times a day  lactated ringers. 1000 milliLiter(s) (120 mL/Hr) IV Continuous <Continuous>  lactated ringers. 1000 milliLiter(s) (75 mL/Hr) IV Continuous <Continuous>  pantoprazole    Tablet 40 milliGRAM(s) Oral before breakfast  rosuvastatin 10 milliGRAM(s) Oral at bedtime  senna 2 Tablet(s) Oral at bedtime    MEDICATIONS  (PRN):  aluminum hydroxide/magnesium hydroxide/simethicone Suspension 30 milliLiter(s) Oral four times a day PRN Indigestion  HYDROmorphone  Injectable 0.5 milliGRAM(s) IV Push every 3 hours PRN Severe Pain (7 - 10)  magnesium hydroxide Suspension 30 milliLiter(s) Oral daily PRN Constipation  ondansetron Injectable 4 milliGRAM(s) IV Push every 6 hours PRN Nausea and/or Vomiting  oxyCODONE    IR 5 milliGRAM(s) Oral every 3 hours PRN Mild Pain (1 - 3)  oxyCODONE    IR 10 milliGRAM(s) Oral every 3 hours PRN Moderate Pain (4 - 6)  polyethylene glycol 3350 17 Gram(s) Oral daily PRN Constipation      Allergies  No Known Allergies    REVIEW OF SYSTEMS:  CONSTITUTIONAL: No fever, weight loss, or fatigue  EYES: No eye pain, visual disturbances, or discharge  ENMT:  No difficulty hearing, tinnitus, vertigo; No sinus or throat pain  NECK: No pain or stiffness  BREASTS: No pain, masses, or nipple discharge  RESPIRATORY: No cough, wheezing, chills or hemoptysis; No shortness of breath  CARDIOVASCULAR: No chest pain, palpitations, or lightheadedness  GASTROINTESTINAL: No abdominal or epigastric pain. No nausea, vomiting, or hematemesis; No diarrhea or constipation. No melena or hematochezia.  GENITOURINARY: No dysuria, frequency, hematuria, or incontinence  NEUROLOGICAL: No headaches, vertigo, memory loss, loss of strength, numbness, or tremors  SKIN: No itching, burning, rashes, or lesions   LYMPH NODES: No enlarged glands  ENDOCRINE: No heat or cold intolerance; No hair loss; No polydipsia or polyuria  MUSCULOSKELETAL: No back pain  PSYCHIATRIC: No depression, anxiety, or mood swings  HEME/LYMPH: No easy bruising, or bleeding gums  ALLERGY AND IMMUNOLOGIC: No hives or eczema    Vital Signs Last 24 Hrs  T(C): 36.4 (23 May 2019 11:46), Max: 36.6 (22 May 2019 17:36)  T(F): 97.6 (23 May 2019 11:46), Max: 97.9 (22 May 2019 17:36)  HR: 59 (23 May 2019 11:46) (51 - 74)  BP: 120/63 (23 May 2019 11:46) (117/64 - 151/58)  BP(mean): --  RR: 17 (23 May 2019 11:46) (12 - 18)  SpO2: 95% (23 May 2019 11:46) (95% - 99%)    PHYSICAL EXAM:  GENERAL: NAD, well-groomed, well-developed  HEAD:  Atraumatic, Normocephalic  EYES: conjunctiva and sclera clear  ENMT: Moist mucous membranes  NECK: Supple, No JVDd  NERVOUS SYSTEM:  Alert & Oriented X3, Good concentration; Bilateral LE mobile, sensation to light touch intact  CHEST/LUNG: Clear to auscultation bilaterally; No rales, rhonchi, wheezing, or rubs  HEART: Regular rate and rhythm; No murmurs, rubs, or gallops  ABDOMEN: Soft, Nontender, Nondistended; Bowel sounds present  EXTREMITIES:  2+ Peripheral Pulses, No clubbing or cyanosis  LYMPH: No lymphadenopathy noted  SKIN: No rashes or lesions  INCISION:  Dressing dry and intact    LABS:                        13.1   20.64 )-----------( 136      ( 23 May 2019 07:15 )             38.3     23 May 2019 07:15    140    |  105    |  16     ----------------------------<  123    4.9     |  29     |  0.92     Ca    9.0        23 May 2019 07:15          CAPILLARY BLOOD GLUCOSE          RADIOLOGY & ADDITIONAL TESTS:    Imaging Personally Reviewed:      [ ] Consultant(s) Notes Reviewed  [x] Care Discussed with Consultants/Other Providers:  Ortho PA- plan of care

## 2019-05-23 NOTE — DISCHARGE NOTE PROVIDER - CARE PROVIDER_API CALL
Anthony Saldana)  Orthopaedic Surgery  [address], [address]  [place] [address]  Phone: [phone]  Fax: [phone]  Follow Up Time:

## 2019-05-23 NOTE — DISCHARGE NOTE NURSING/CASE MANAGEMENT/SOCIAL WORK - NSSCNAMETXT_GEN_ALL_CORE
[facility] - [phone]   Please contact the home care agency at the above phone number if you have not heard from them by 12 noon on the day after your hospital discharge.

## 2019-05-23 NOTE — DISCHARGE NOTE PROVIDER - HOSPITAL COURSE
This patient was admitted to [facility] with a history of severe degenerative joint disease of the left knee.  Patient went to Pre-Surgical Testing at [facility] and was medically cleared to undergo elective procedure. left TKR performed on 5/22/19 by Dr. Saldana. No operative or marcos-operative complications arose during patients hospital course.  Patient received antibiotic according to SCIP guidelines for infection prevention.  Ecotrin was given for DVT prophylaxis.  Anesthesia, Medical Hospitalist, Physical Therapy and Occupational Therapy were consulted. Patient is stable for discharge with a good prognosis.  Appropriate discharge instructions and medications are provided in this document.

## 2019-05-24 LAB
ANION GAP SERPL CALC-SCNC: 4 MMOL/L — LOW (ref 5–17)
BUN SERPL-MCNC: 25 MG/DL — HIGH (ref 7–23)
CALCIUM SERPL-MCNC: 8.6 MG/DL — SIGNIFICANT CHANGE UP (ref 8.4–10.5)
CHLORIDE SERPL-SCNC: 105 MMOL/L — SIGNIFICANT CHANGE UP (ref 96–108)
CO2 SERPL-SCNC: 30 MMOL/L — SIGNIFICANT CHANGE UP (ref 22–31)
CREAT SERPL-MCNC: 1.1 MG/DL — SIGNIFICANT CHANGE UP (ref 0.5–1.3)
GLUCOSE SERPL-MCNC: 93 MG/DL — SIGNIFICANT CHANGE UP (ref 70–99)
HCT VFR BLD CALC: 33.8 % — LOW (ref 39–50)
HGB BLD-MCNC: 11.2 G/DL — LOW (ref 13–17)
MCHC RBC-ENTMCNC: 32.4 PG — SIGNIFICANT CHANGE UP (ref 27–34)
MCHC RBC-ENTMCNC: 33.1 GM/DL — SIGNIFICANT CHANGE UP (ref 32–36)
MCV RBC AUTO: 97.7 FL — SIGNIFICANT CHANGE UP (ref 80–100)
NRBC # BLD: 0 /100 WBCS — SIGNIFICANT CHANGE UP (ref 0–0)
PLATELET # BLD AUTO: 125 K/UL — LOW (ref 150–400)
POTASSIUM SERPL-MCNC: 4.2 MMOL/L — SIGNIFICANT CHANGE UP (ref 3.5–5.3)
POTASSIUM SERPL-SCNC: 4.2 MMOL/L — SIGNIFICANT CHANGE UP (ref 3.5–5.3)
RBC # BLD: 3.46 M/UL — LOW (ref 4.2–5.8)
RBC # FLD: 13 % — SIGNIFICANT CHANGE UP (ref 10.3–14.5)
SODIUM SERPL-SCNC: 139 MMOL/L — SIGNIFICANT CHANGE UP (ref 135–145)
WBC # BLD: 12.77 K/UL — HIGH (ref 3.8–10.5)
WBC # FLD AUTO: 12.77 K/UL — HIGH (ref 3.8–10.5)

## 2019-05-24 PROCEDURE — 99232 SBSQ HOSP IP/OBS MODERATE 35: CPT

## 2019-05-24 RX ORDER — ASPIRIN/CAFFEINE 400MG-32MG
1 TABLET ORAL
Qty: 0 | Refills: 0 | DISCHARGE

## 2019-05-24 RX ORDER — ACETAMINOPHEN 500 MG
2 TABLET ORAL
Qty: 0 | Refills: 0 | DISCHARGE
Start: 2019-05-24 | End: 2019-06-05

## 2019-05-24 RX ORDER — POLYETHYLENE GLYCOL 3350 17 G/17G
17 POWDER, FOR SOLUTION ORAL
Qty: 0 | Refills: 0 | DISCHARGE
Start: 2019-05-24

## 2019-05-24 RX ORDER — SENNA PLUS 8.6 MG/1
2 TABLET ORAL
Qty: 0 | Refills: 0 | DISCHARGE
Start: 2019-05-24

## 2019-05-24 RX ORDER — OXYCODONE HYDROCHLORIDE 5 MG/1
1 TABLET ORAL
Qty: 56 | Refills: 0
Start: 2019-05-24 | End: 2019-05-30

## 2019-05-24 RX ORDER — DOCUSATE SODIUM 100 MG
1 CAPSULE ORAL
Qty: 0 | Refills: 0 | DISCHARGE
Start: 2019-05-24

## 2019-05-24 RX ADMIN — Medication 100 MILLIGRAM(S): at 17:13

## 2019-05-24 RX ADMIN — CELECOXIB 200 MILLIGRAM(S): 200 CAPSULE ORAL at 10:08

## 2019-05-24 RX ADMIN — Medication 1000 MILLIGRAM(S): at 05:50

## 2019-05-24 RX ADMIN — Medication 81 MILLIGRAM(S): at 05:50

## 2019-05-24 RX ADMIN — Medication 81 MILLIGRAM(S): at 17:13

## 2019-05-24 RX ADMIN — CELECOXIB 200 MILLIGRAM(S): 200 CAPSULE ORAL at 20:21

## 2019-05-24 RX ADMIN — Medication 1000 MILLIGRAM(S): at 17:14

## 2019-05-24 RX ADMIN — ROSUVASTATIN CALCIUM 10 MILLIGRAM(S): 5 TABLET ORAL at 20:22

## 2019-05-24 RX ADMIN — Medication 1000 MILLIGRAM(S): at 17:12

## 2019-05-24 RX ADMIN — Medication 1000 MILLIGRAM(S): at 05:52

## 2019-05-24 RX ADMIN — OXYCODONE HYDROCHLORIDE 5 MILLIGRAM(S): 5 TABLET ORAL at 11:59

## 2019-05-24 RX ADMIN — Medication 100 MILLIGRAM(S): at 20:21

## 2019-05-24 RX ADMIN — PANTOPRAZOLE SODIUM 40 MILLIGRAM(S): 20 TABLET, DELAYED RELEASE ORAL at 06:07

## 2019-05-24 RX ADMIN — CELECOXIB 200 MILLIGRAM(S): 200 CAPSULE ORAL at 20:22

## 2019-05-24 RX ADMIN — OXYCODONE HYDROCHLORIDE 5 MILLIGRAM(S): 5 TABLET ORAL at 12:45

## 2019-05-24 RX ADMIN — Medication 1000 MILLIGRAM(S): at 21:08

## 2019-05-24 RX ADMIN — SENNA PLUS 2 TABLET(S): 8.6 TABLET ORAL at 20:21

## 2019-05-24 RX ADMIN — ATENOLOL 50 MILLIGRAM(S): 25 TABLET ORAL at 05:50

## 2019-05-24 RX ADMIN — Medication 100 MILLIGRAM(S): at 05:50

## 2019-05-24 RX ADMIN — CELECOXIB 200 MILLIGRAM(S): 200 CAPSULE ORAL at 10:07

## 2019-05-24 RX ADMIN — Medication 1000 MILLIGRAM(S): at 21:09

## 2019-05-24 NOTE — PROGRESS NOTE ADULT - SUBJECTIVE AND OBJECTIVE BOX
ORTHOPEDIC PA PROGRESS NOTE  MARCOS AGUILAR      84y Male                                                                                                                               POD #2       STATUS POST:               Pre-Op Dx: Left knee DJD    Post-Op Dx:  Left knee DJD    Procedure: Left total knee replacement                                              Patient comfortable pain controlled.  Voiding urine passing gas and tolerating p.o diet. No complaints  Pain (0-10):   Current Pain Management:  [ ] PCA   [x ] Po Analgesics [x ] IM /IV Anagesics     T(F): 97.8  HR: 48  BP: 107/59  RR: 17  SpO2: 95%                        11.2   12.77 )-----------( 125      ( 24 May 2019 07:47 )             33.8                     05-24    139  |  105  |  25<H>  ----------------------------<  93  4.2   |  30  |  1.10    Ca    8.6      24 May 2019 07:47      Physical Exam :    -   Dressing changed site clean and dry prineo in place no errythema discharge or fluctulance  -   Distal Neurvascular status intact grossly.   -   Warm well perfused; capillary refill <3 seconds   -   (+)EHL/FHL 5/5 dorsi/plantar flexion intact  -   (+) Sensation to light touch  -   (-) Calf tenderness Bilaterally    A/P: 84y Male s/p Left knee DJD    -   Ortho Stable  -   Pain control   -   Medicine to follow  -   DVT ppx:     [x ]SCDs     [ x] ASA     [ ] Eliquis     [ ] Lovenox  -   Weight bearing status:  WBAT [x ]        PWB    [ ]     TTWB  [ ]      NWB  [ ]   -  Dispo:     Home [x ]     Acute Rehab [ ]     WILD [ ]     TBD [ ]

## 2019-05-24 NOTE — PROGRESS NOTE ADULT - SUBJECTIVE AND OBJECTIVE BOX
Discharge medication calendar:  Aspirin EC 81mg q12h x 6 weeks  APAP 1000mg q12h x 2-3 weeks  Celecoxib 200mg q12h x 2-3 weeks  Pantoprazole 40mg QAM x 6 weeks  Narcotic PRN  Docusate 100mg TID while taking narcotic  Miralax, Senna, or Bisacodyl PRN for treatment of constipation Discharge medication calendar:  (ASA 81mg Qday preop)  Aspirin EC 81mg q12h x 6 weeks then resume ASA 81mg Qday  APAP 1000mg q12h x 2-3 weeks  Celecoxib 200mg q12h x 2-3 weeks  Pantoprazole 40mg QAM x 6 weeks  Narcotic PRN  Docusate 100mg TID while taking narcotic  Miralax, Senna, or Bisacodyl PRN for treatment of constipation

## 2019-05-24 NOTE — PROGRESS NOTE ADULT - SUBJECTIVE AND OBJECTIVE BOX
Patient is a 84y old  Male who presents with a chief complaint of Left TKR (23 May 2019 14:26)      INTERVAL HPI/OVERNIGHT EVENTS:    Pain Location & Control:     MEDICATIONS  (STANDING):  acetaminophen   Tablet .. 1000 milliGRAM(s) Oral every 8 hours  aspirin enteric coated 81 milliGRAM(s) Oral every 12 hours  ATENolol  Tablet 50 milliGRAM(s) Oral daily  celecoxib 200 milliGRAM(s) Oral every 12 hours  docusate sodium 100 milliGRAM(s) Oral three times a day  lactated ringers. 1000 milliLiter(s) (120 mL/Hr) IV Continuous <Continuous>  lactated ringers. 1000 milliLiter(s) (75 mL/Hr) IV Continuous <Continuous>  pantoprazole    Tablet 40 milliGRAM(s) Oral before breakfast  rosuvastatin 10 milliGRAM(s) Oral at bedtime  senna 2 Tablet(s) Oral at bedtime    MEDICATIONS  (PRN):  aluminum hydroxide/magnesium hydroxide/simethicone Suspension 30 milliLiter(s) Oral four times a day PRN Indigestion  bisacodyl Suppository 10 milliGRAM(s) Rectal daily PRN If no bowel movement by postoperative day #2  HYDROmorphone  Injectable 0.5 milliGRAM(s) IV Push every 3 hours PRN Severe Pain (7 - 10)  magnesium hydroxide Suspension 30 milliLiter(s) Oral daily PRN Constipation  ondansetron Injectable 4 milliGRAM(s) IV Push every 6 hours PRN Nausea and/or Vomiting  oxyCODONE    IR 5 milliGRAM(s) Oral every 3 hours PRN Mild Pain (1 - 3)  oxyCODONE    IR 10 milliGRAM(s) Oral every 3 hours PRN Moderate Pain (4 - 6)  polyethylene glycol 3350 17 Gram(s) Oral daily PRN Constipation      Allergies    No Known Allergies    Intolerances        REVIEW OF SYSTEMS:  CONSTITUTIONAL: No fever, weight loss, or fatigue  EYES: No eye pain, visual disturbances, or discharge  ENMT:  No difficulty hearing, tinnitus, vertigo; No sinus or throat pain  NECK: No pain or stiffness  BREASTS: No pain, masses, or nipple discharge  RESPIRATORY: No cough, wheezing, chills or hemoptysis; No shortness of breath  CARDIOVASCULAR: No chest pain, palpitations, or lightheadedness  GASTROINTESTINAL: No abdominal or epigastric pain. No nausea, vomiting, or hematemesis; No diarrhea or constipation. No melena or hematochezia.  GENITOURINARY: No dysuria, frequency, hematuria, or incontinence  NEUROLOGICAL: No headaches, vertigo, memory loss, loss of strength, numbness, or tremors  SKIN: No itching, burning, rashes, or lesions   LYMPH NODES: No enlarged glands  ENDOCRINE: No heat or cold intolerance; No hair loss; No polydipsia or polyuria  MUSCULOSKELETAL: No back pain  PSYCHIATRIC: No depression, anxiety, or mood swings  HEME/LYMPH: No easy bruising, or bleeding gums  ALLERGY AND IMMUNOLOGIC: No hives or eczema    Vital Signs Last 24 Hrs  T(C): 36.6 (24 May 2019 07:54), Max: 37.3 (24 May 2019 03:53)  T(F): 97.8 (24 May 2019 07:54), Max: 99.2 (24 May 2019 03:53)  HR: 48 (24 May 2019 07:54) (48 - 68)  BP: 107/59 (24 May 2019 07:54) (107/59 - 138/69)  BP(mean): --  RR: 17 (24 May 2019 07:54) (15 - 17)  SpO2: 95% (24 May 2019 07:54) (95% - 98%)    PHYSICAL EXAM:  GENERAL: NAD, well-groomed, well-developed  HEAD:  Atraumatic, Normocephalic  EYES: EOMI, PERRLA, conjunctiva and sclera clear  ENMT: Moist mucous membranes, Good dentition, No lesions; No tonsillar erythema, exudates, or enlargement   NECK: Supple, No JVD, Normal thyroid  NERVOUS SYSTEM:  Alert & Oriented X3, Good concentration; Bilateral LE mobile, sensation to light touch intact  CHEST/LUNG: Clear to auscultation bilaterally; No rales, rhonchi, wheezing, or rubs  HEART: Regular rate and rhythm; No murmurs, rubs, or gallops  ABDOMEN: Soft, Nontender, Nondistended; Bowel sounds present  EXTREMITIES:  2+ Peripheral Pulses, No clubbing or cyanosis  LYMPH: No lymphadenopathy noted  SKIN: No rashes or lesions  INCISION:  Dressing dry and intact    LABS:                        11.2   12.77 )-----------( 125      ( 24 May 2019 07:47 )             33.8     24 May 2019 07:47    139    |  105    |  25     ----------------------------<  93     4.2     |  30     |  1.10     Ca    8.6        24 May 2019 07:47          CAPILLARY BLOOD GLUCOSE          RADIOLOGY & ADDITIONAL TESTS:    Imaging Personally Reviewed:      [ ] Consultant(s) Notes Reviewed  [x] Care Discussed with Consultants/Other Providers:  Ortho PA- plan of care Patient is a 84y old  Male who presents with a chief complaint of Left TKR (23 May 2019 14:26)    INTERVAL HPI/OVERNIGHT EVENTS: felt a little lightheaded when he first got out of bed, but since then has been feeling well.    MEDICATIONS  (STANDING):  acetaminophen   Tablet .. 1000 milliGRAM(s) Oral every 8 hours  aspirin enteric coated 81 milliGRAM(s) Oral every 12 hours  ATENolol  Tablet 50 milliGRAM(s) Oral daily  celecoxib 200 milliGRAM(s) Oral every 12 hours  docusate sodium 100 milliGRAM(s) Oral three times a day  lactated ringers. 1000 milliLiter(s) (120 mL/Hr) IV Continuous <Continuous>  lactated ringers. 1000 milliLiter(s) (75 mL/Hr) IV Continuous <Continuous>  pantoprazole    Tablet 40 milliGRAM(s) Oral before breakfast  rosuvastatin 10 milliGRAM(s) Oral at bedtime  senna 2 Tablet(s) Oral at bedtime    MEDICATIONS  (PRN):  aluminum hydroxide/magnesium hydroxide/simethicone Suspension 30 milliLiter(s) Oral four times a day PRN Indigestion  bisacodyl Suppository 10 milliGRAM(s) Rectal daily PRN If no bowel movement by postoperative day #2  HYDROmorphone  Injectable 0.5 milliGRAM(s) IV Push every 3 hours PRN Severe Pain (7 - 10)  magnesium hydroxide Suspension 30 milliLiter(s) Oral daily PRN Constipation  ondansetron Injectable 4 milliGRAM(s) IV Push every 6 hours PRN Nausea and/or Vomiting  oxyCODONE    IR 5 milliGRAM(s) Oral every 3 hours PRN Mild Pain (1 - 3)  oxyCODONE    IR 10 milliGRAM(s) Oral every 3 hours PRN Moderate Pain (4 - 6)  polyethylene glycol 3350 17 Gram(s) Oral daily PRN Constipation      Allergies  No Known Allergies      REVIEW OF SYSTEMS:  CONSTITUTIONAL: No fever, weight loss, or fatigue  EYES: No eye pain, visual disturbances, or discharge  ENMT:  No difficulty hearing, tinnitus, vertigo; No sinus or throat pain  NECK: No pain or stiffness  BREASTS: No pain, masses, or nipple discharge  RESPIRATORY: No cough, wheezing, chills or hemoptysis; No shortness of breath  CARDIOVASCULAR: No chest pain, palpitations, or lightheadedness  GASTROINTESTINAL: No abdominal or epigastric pain. No nausea, vomiting, or hematemesis; No diarrhea or constipation. No melena or hematochezia.  GENITOURINARY: No dysuria, frequency, hematuria, or incontinence  NEUROLOGICAL: No headaches, vertigo, memory loss, loss of strength, numbness, or tremors  SKIN: No itching, burning, rashes, or lesions   LYMPH NODES: No enlarged glands  ENDOCRINE: No heat or cold intolerance; No hair loss; No polydipsia or polyuria  MUSCULOSKELETAL: No back pain  PSYCHIATRIC: No depression, anxiety, or mood swings  HEME/LYMPH: No easy bruising, or bleeding gums  ALLERGY AND IMMUNOLOGIC: No hives or eczema    Vital Signs Last 24 Hrs  T(C): 36.6 (24 May 2019 07:54), Max: 37.3 (24 May 2019 03:53)  T(F): 97.8 (24 May 2019 07:54), Max: 99.2 (24 May 2019 03:53)  HR: 48 (24 May 2019 07:54) (48 - 68)  BP: 107/59 (24 May 2019 07:54) (107/59 - 138/69)  BP(mean): --  RR: 17 (24 May 2019 07:54) (15 - 17)  SpO2: 95% (24 May 2019 07:54) (95% - 98%)    PHYSICAL EXAM:  GENERAL: NAD, well-groomed, well-developed  HEAD:  Atraumatic, Normocephalic  EYES: EOMI, PERRLA, conjunctiva and sclera clear  ENMT: Moist mucous membranes  NECK: Supple, No JVD  NERVOUS SYSTEM:  Alert & Oriented X3, Good concentration; Bilateral LE mobile, sensation to light touch intact  CHEST/LUNG: Clear to auscultation bilaterally  HEART: Regular rate and rhythm;   ABDOMEN: Soft, Nontender, Nondistended; Bowel sounds present  EXTREMITIES:  2+ Peripheral Pulses, No clubbing or cyanosis  LYMPH: No lymphadenopathy noted  SKIN: No rashes or lesions  INCISION:  Dressing dry and intact    LABS:                        11.2   12.77 )-----------( 125      ( 24 May 2019 07:47 )             33.8     24 May 2019 07:47    139    |  105    |  25     ----------------------------<  93     4.2     |  30     |  1.10     Ca    8.6        24 May 2019 07:47          CAPILLARY BLOOD GLUCOSE          RADIOLOGY & ADDITIONAL TESTS:    Imaging Personally Reviewed:      [ ] Consultant(s) Notes Reviewed  [x] Care Discussed with Consultants/Other Providers:  Ortho PA- plan of care

## 2019-05-25 VITALS
OXYGEN SATURATION: 97 % | DIASTOLIC BLOOD PRESSURE: 55 MMHG | RESPIRATION RATE: 18 BRPM | TEMPERATURE: 98 F | HEART RATE: 50 BPM | SYSTOLIC BLOOD PRESSURE: 138 MMHG

## 2019-05-25 LAB
ANION GAP SERPL CALC-SCNC: 3 MMOL/L — LOW (ref 5–17)
BUN SERPL-MCNC: 17 MG/DL — SIGNIFICANT CHANGE UP (ref 7–23)
CALCIUM SERPL-MCNC: 9 MG/DL — SIGNIFICANT CHANGE UP (ref 8.4–10.5)
CHLORIDE SERPL-SCNC: 104 MMOL/L — SIGNIFICANT CHANGE UP (ref 96–108)
CO2 SERPL-SCNC: 32 MMOL/L — HIGH (ref 22–31)
CREAT SERPL-MCNC: 0.84 MG/DL — SIGNIFICANT CHANGE UP (ref 0.5–1.3)
GLUCOSE SERPL-MCNC: 93 MG/DL — SIGNIFICANT CHANGE UP (ref 70–99)
HCT VFR BLD CALC: 31.5 % — LOW (ref 39–50)
HGB BLD-MCNC: 10.7 G/DL — LOW (ref 13–17)
MCHC RBC-ENTMCNC: 33.3 PG — SIGNIFICANT CHANGE UP (ref 27–34)
MCHC RBC-ENTMCNC: 34 GM/DL — SIGNIFICANT CHANGE UP (ref 32–36)
MCV RBC AUTO: 98.1 FL — SIGNIFICANT CHANGE UP (ref 80–100)
NRBC # BLD: 0 /100 WBCS — SIGNIFICANT CHANGE UP (ref 0–0)
PLATELET # BLD AUTO: 119 K/UL — LOW (ref 150–400)
POTASSIUM SERPL-MCNC: 4.6 MMOL/L — SIGNIFICANT CHANGE UP (ref 3.5–5.3)
POTASSIUM SERPL-SCNC: 4.6 MMOL/L — SIGNIFICANT CHANGE UP (ref 3.5–5.3)
RBC # BLD: 3.21 M/UL — LOW (ref 4.2–5.8)
RBC # FLD: 12.8 % — SIGNIFICANT CHANGE UP (ref 10.3–14.5)
SODIUM SERPL-SCNC: 139 MMOL/L — SIGNIFICANT CHANGE UP (ref 135–145)
WBC # BLD: 10.33 K/UL — SIGNIFICANT CHANGE UP (ref 3.8–10.5)
WBC # FLD AUTO: 10.33 K/UL — SIGNIFICANT CHANGE UP (ref 3.8–10.5)

## 2019-05-25 PROCEDURE — 85018 HEMOGLOBIN: CPT

## 2019-05-25 PROCEDURE — 85027 COMPLETE CBC AUTOMATED: CPT

## 2019-05-25 PROCEDURE — 73562 X-RAY EXAM OF KNEE 3: CPT

## 2019-05-25 PROCEDURE — 97116 GAIT TRAINING THERAPY: CPT

## 2019-05-25 PROCEDURE — 97530 THERAPEUTIC ACTIVITIES: CPT

## 2019-05-25 PROCEDURE — C1713: CPT

## 2019-05-25 PROCEDURE — 80048 BASIC METABOLIC PNL TOTAL CA: CPT

## 2019-05-25 PROCEDURE — 97110 THERAPEUTIC EXERCISES: CPT

## 2019-05-25 PROCEDURE — C1889: CPT

## 2019-05-25 PROCEDURE — C1776: CPT

## 2019-05-25 PROCEDURE — 97535 SELF CARE MNGMENT TRAINING: CPT

## 2019-05-25 PROCEDURE — 85014 HEMATOCRIT: CPT

## 2019-05-25 PROCEDURE — 80051 ELECTROLYTE PANEL: CPT

## 2019-05-25 PROCEDURE — 97161 PT EVAL LOW COMPLEX 20 MIN: CPT

## 2019-05-25 PROCEDURE — 97165 OT EVAL LOW COMPLEX 30 MIN: CPT

## 2019-05-25 PROCEDURE — 99232 SBSQ HOSP IP/OBS MODERATE 35: CPT

## 2019-05-25 PROCEDURE — 36415 COLL VENOUS BLD VENIPUNCTURE: CPT

## 2019-05-25 RX ADMIN — Medication 1000 MILLIGRAM(S): at 05:33

## 2019-05-25 RX ADMIN — Medication 81 MILLIGRAM(S): at 05:33

## 2019-05-25 RX ADMIN — CELECOXIB 200 MILLIGRAM(S): 200 CAPSULE ORAL at 09:21

## 2019-05-25 RX ADMIN — ATENOLOL 50 MILLIGRAM(S): 25 TABLET ORAL at 05:32

## 2019-05-25 RX ADMIN — PANTOPRAZOLE SODIUM 40 MILLIGRAM(S): 20 TABLET, DELAYED RELEASE ORAL at 06:42

## 2019-05-25 RX ADMIN — Medication 100 MILLIGRAM(S): at 05:32

## 2019-05-25 RX ADMIN — Medication 100 MILLIGRAM(S): at 13:11

## 2019-05-25 RX ADMIN — Medication 1000 MILLIGRAM(S): at 13:26

## 2019-05-25 RX ADMIN — CELECOXIB 200 MILLIGRAM(S): 200 CAPSULE ORAL at 09:23

## 2019-05-25 RX ADMIN — Medication 1000 MILLIGRAM(S): at 13:11

## 2019-05-25 NOTE — PROGRESS NOTE ADULT - SUBJECTIVE AND OBJECTIVE BOX
INTERVAL HPI/OVERNIGHT EVENTS:   Patient seen and examined.    MEDICATIONS  (STANDING):  acetaminophen   Tablet .. 1000 milliGRAM(s) Oral every 8 hours  aspirin enteric coated 81 milliGRAM(s) Oral every 12 hours  ATENolol  Tablet 50 milliGRAM(s) Oral daily  celecoxib 200 milliGRAM(s) Oral every 12 hours  docusate sodium 100 milliGRAM(s) Oral three times a day  lactated ringers. 1000 milliLiter(s) (120 mL/Hr) IV Continuous <Continuous>  lactated ringers. 1000 milliLiter(s) (75 mL/Hr) IV Continuous <Continuous>  pantoprazole    Tablet 40 milliGRAM(s) Oral before breakfast  rosuvastatin 10 milliGRAM(s) Oral at bedtime  senna 2 Tablet(s) Oral at bedtime    MEDICATIONS  (PRN):  aluminum hydroxide/magnesium hydroxide/simethicone Suspension 30 milliLiter(s) Oral four times a day PRN Indigestion  bisacodyl Suppository 10 milliGRAM(s) Rectal daily PRN If no bowel movement by postoperative day #2  HYDROmorphone  Injectable 0.5 milliGRAM(s) IV Push every 3 hours PRN Severe Pain (7 - 10)  magnesium hydroxide Suspension 30 milliLiter(s) Oral daily PRN Constipation  ondansetron Injectable 4 milliGRAM(s) IV Push every 6 hours PRN Nausea and/or Vomiting  oxyCODONE    IR 5 milliGRAM(s) Oral every 3 hours PRN Mild Pain (1 - 3)  oxyCODONE    IR 10 milliGRAM(s) Oral every 3 hours PRN Moderate Pain (4 - 6)  polyethylene glycol 3350 17 Gram(s) Oral daily PRN Constipation      REVIEW OF SYSTEMS:  See HPI,  all others negative    PHYSICAL EXAM:  Vital Signs Last 24 Hrs  T(C): 36.7 (25 May 2019 08:18), Max: 37.1 (24 May 2019 15:02)  T(F): 98.1 (25 May 2019 08:18), Max: 98.7 (24 May 2019 15:02)  HR: 50 (25 May 2019 08:18) (50 - 62)  BP: 138/55 (25 May 2019 08:18) (104/57 - 147/76)  BP(mean): --  RR: 18 (25 May 2019 08:18) (14 - 18)  SpO2: 97% (25 May 2019 08:18) (96% - 100%)    GENERAL: NAD, well-groomed, well-developed, awake, alert, oriented x 3, fluent and coherent speech  HEAD:  Atraumatic, Normocephalic  EYES: EOMI, PERRLA, conjunctiva and sclera clear  ENMT: No tonsillar erythema, exudates, or enlargement; Moist mucous membranes, Good dentition, No lesions  NECK: Supple, No JVD, No Cervical LAD, No thyromegaly, No thyroid nodules felt  NERVOUS SYSTEM:  Good concentration; Moving all 4 extremities; No gross sensory deficits, No facial droop  CHEST WALL: No masses  CHEST/LUNG: Clear to auscultation bilaterally; No rales, rhonchi, wheezing, or rubs  HEART: Regular rate and rhythm; No murmurs, rubs, or gallops  ABDOMEN: Soft, Nontender, Nondistended, Bowel sounds present, No palpable masses or organomegaly, No bruits  EXTREMITIES: lef tknee dec rom  LYMPH: No lymphadenopathy  SKIN: No rashes or lesions    LABS:                        10.7   10.33 )-----------( 119      ( 25 May 2019 07:06 )             31.5     25 May 2019 07:06    139    |  104    |  17     ----------------------------<  93     4.6     |  32     |  0.84     Ca    9.0        25 May 2019 07:06             RADIOLOGY & ADDITIONAL TESTS:

## 2019-05-25 NOTE — PROGRESS NOTE ADULT - SUBJECTIVE AND OBJECTIVE BOX
ORTHOPEDIC PA PROGRESS NOTE  MARCOS AGUILAR      84y Male                                                                                                                               POD #    3    STATUS POST:               Pre-Op Dx: Left knee DJD    Post-Op Dx:  Left knee DJD    Procedure: Left total knee replacement                                                Pain (0-10): 3  Current Pain Management:  [ ] PCA   [x ] Po Analgesics [ ] IM /IV Anagesics     T(F): 98.1  HR: 50  BP: 138/55  RR: 18  SpO2: 97%                        10.7   10.33 )-----------( 119      ( 25 May 2019 07:06 )             31.5                     05-25    139  |  104  |  17  ----------------------------<  93  4.6   |  32<H>  |  0.84    Ca    9.0      25 May 2019 07:06      Physical Exam :    -   Dressing changed sterile.   -   Wound C/D/I.   -   Distal Neurvascular status intact grossly.   -   Warm well perfused; capillary refill <3 seconds   -   (+)EHL/FHL 5/5  -   (+) Sensation to light touch  -   (-) Calf tenderness Bilaterally    A/P: 84y Male s/p Left knee DJD    -   Ortho Stable  -   Pain control   -   Medicine to follow  -   DVT ppx:     [ x]SCDs     [x ] ASA     [ ] Eliquis     [ ] Lovenox  -   Weight bearing status:  WBAT [x ]        PWB    [ ]     TTWB  [ ]      NWB  [ ]   -  Dispo:     Home [x ]     Acute Rehab [ ]     WILD [ ]     TBD [ ]

## 2019-05-25 NOTE — PROGRESS NOTE ADULT - ASSESSMENT
84M s/p  Left TKR 5/22/19    OA of left knee  pain meds as ordered - tylenol/celebrex/ oxycodone prn  PT/OT.  DVT prophylaxis.  DVT ppx: [x ]ASA81 [ ] [id number] [ ] Lovenox [ ] Coumadin   [ ] Eliquis [  ] Heparin  Dispo:     Home with home care [x ]          HTN  atenolol with parameter.    Dyslipidemia  Patient wants to take own Crestor     Plan of care was discuss with patient, wife and daughter in law at bedside all questions were answered, seems understand and in agreement.
84M s/p  Left TKR 5/22/19    OA of left knee  pain meds as ordered - tylenol/celebrex/ oxycodone prn  PT/OT.  DVT prophylaxis.  DVT ppx: [x ]ASA81 [ ] [id number] [ ] Lovenox [ ] Coumadin   [ ] Eliquis [  ] Heparin  Dispo:     home      HTN  atenolol with parameter.    Dyslipidemia  Patient wants to take own Crestor     Plan of care was discuss with patient all questions were answered, seems understand and in agreement.
84M s/p  Left TKR 5/22/19    OA of left knee  pain meds as ordered - tylenol/celebrex/ oxycodone prn  PT/OT.  DVT prophylaxis.  DVT ppx: [x ]ASA81 [ ] [id number] [ ] Lovenox [ ] Coumadin   [ ] Eliquis [  ] Heparin  Dispo:     Home with home care [x ]    -when cleared by PT/OT      HTN  atenolol with parameter.    Dyslipidemia  Patient wants to take own Crestor     Plan of care was discuss with patient all questions were answered, seems understand and in agreement.

## 2019-06-10 ENCOUNTER — APPOINTMENT (OUTPATIENT)
Dept: ORTHOPEDIC SURGERY | Facility: CLINIC | Age: 84
End: 2019-06-10
Payer: MEDICARE

## 2019-06-10 ENCOUNTER — RX RENEWAL (OUTPATIENT)
Age: 84
End: 2019-06-10

## 2019-06-10 PROCEDURE — 99024 POSTOP FOLLOW-UP VISIT: CPT

## 2019-06-10 PROCEDURE — 73562 X-RAY EXAM OF KNEE 3: CPT | Mod: LT

## 2019-06-20 NOTE — OCCUPATIONAL THERAPY INITIAL EVALUATION ADULT - SOCIAL CONCERNS
"     [facility]   [address], [address]  [place] [address]  Phone: [phone]  Fax: [phone]              Dani Sarmiento  1962    Encounter Date: 6/20/2019    Anh Prince M.D.          PROGRESS NOTE:  Chief Complaint   Patient presents with   • New Patient     Neuropathy     Patient is referred by Dr. Anh Hall for initial consult.    History of present illness:  Dani Sarmiento 57 y.o. male presents today for sensations in the lower extremities.   History is obtained from patient and significant other.  and Patient is accompanied by wife Shruti. . Patient is a scattered and limited historian.  Him and his wife tend to disagree at times during history taking.    Duration/timing: as below  Context:   Neuropathy: he has seen Dr. Gayle ~ 2018, now retiring. He has seen Dr. Damon in [place]. Onset of symptoms 2016, started at the bottom of the toes like pins and needles, stable in location until now. He used to drinks 4 beers a day for at least 10 years.    TIA/CVA: 8/2016, he was suffering with vertigo for uncertain amount. Patient was having double vision (one on top of the other, binocular) felt like shade was coming down on his eye (saw ophthalmologist, cleared) lasting for 3 minutes about the same time, about 4 episodes a week, without triggers, can improve with \"stretching his eyes\". He was at PT experiencing some confusion (uncertain about things) lasting for 1 hour.  Diplopia not  fatigable.  Location: Bilateral lower extremity; underneath the toes  Quality: As above  Severity: Mild neuropathy, mild to moderate stroke  Modifying factors: None  Associated signs/symptoms: additional sensations in the bilateral arms and legs described as \"sensitivity to cold\" like things that shouldn't be cold will feel cold. When he touches his sheets, he feels uncomfortable. Onset about 2017, gradual onset, started in the feet and legs and arms and bottom of the neck at the same time, " "sparing his face. Present all the time. Hands feel like cold all the time. He cannot get an erection for 2 years but he can ejaculate. +\"Swallowing difficulties\" bringing up gooey mucous, needing to chew his food more?. He is in bed 20 hours a day because he doesn't feel well - tingling in his arms and legs keeps him there. LOC once walking to bathroom 2017.  Denies: bladder incontinence, bowel incontinence, dizziness, headaches, head trauma , weakness, other numbness/tingling, speech disturbance, incoordination, hearing loss, hallucinations, seizures, falls, family hx seizure, febrile seizure as child, snoring/apnea during sleep and HIV or syphilis risk factors, inability to sweat, pain    Patient has tried:  -Gabapentin - 400mg BID, started in 2018, improved by 80%    Past medical history:   Past Medical History:   Diagnosis Date   • Anxiety    • GERD (gastroesophageal reflux disease)    • Hyperlipidemia      Past surgical history:   History reviewed. No pertinent surgical history.    Family history:   Family History   Problem Relation Age of Onset   • Hyperlipidemia Mother    • Rheumatologic Disease Father        Social history:   Social History   • Marital status:      Social History Main Topics   • Smoking status: Never Smoker   • Smokeless tobacco: Never Used   • Alcohol use Yes      Comment: hx of EtOH abuse - 2018   • Drug use: No   • Sexual activity: Yes     Partners: Female     Current medications:   Current Outpatient Prescriptions   Medication   • gabapentin (NEURONTIN) 400 MG Cap   • omeprazole (PRILOSEC) 20 MG delayed-release capsule   • aspirin EC (ECOTRIN) 325 MG Tablet Delayed Response   • atorvastatin (LIPITOR) 80 MG tablet     No current facility-administered medications for this visit.        Medication Allergy:  Allergies   Allergen Reactions   • Penicillins        Review of Systems   Constitutional: Positive for weight loss. Negative for fever.        20 lbs in 2 months   HENT: Negative " for sore throat.    Eyes: Negative for discharge.   Respiratory: Negative for shortness of breath.    Cardiovascular: Negative for leg swelling.   Gastrointestinal: Negative for abdominal pain.   Genitourinary: Negative for dysuria.   Musculoskeletal: Negative for falls.   Skin: Negative for rash.   Neurological:        As per HPI   Endo/Heme/Allergies: Does not bruise/bleed easily.   Psychiatric/Behavioral: Negative for hallucinations.       Physical examination:   Vitals:    06/20/19 1303   BP: 102/58   BP Location: Left arm   Patient Position: Sitting   BP Cuff Size: Adult   Pulse: 100   Resp: 16   Temp: 36.2 °C (97.1 °F)   TempSrc: Temporal   SpO2: 93%   Weight: 104.3 kg (230 lb)   Height: 1.829 m (6')     General: Patient in well nourished in no apparent distress.  Eyes: Ophthalmoscopic examination performed but discs cannot be visualized well enough to characterize bilaterally.  HENT: Normocephalic, atraumatic. Mallapatic score 3  Cardiovascular: No lower extremity edema.  Respiratory: Normal respiratory effort.   Skin: No appreciable signs of acute rashes or bruising.   Musculoskeletal: No signs of joint or muscle swelling.   Psychiatric: Pleasant.     NEUROLOGICAL EXAM:   Mental status: Awake, alert and fully oriented to person, place, time and situation. Normal attention, concentration and fund of knowledge for education level.   Speech and language: Speech is fluent without errors and clear.  Cranial nerve exam:  II: Pupils are equally round and reactive to light. Visual fields are intact by confrontation.  III, IV, VI: EOMI, no diplopia, no ptosis.  V: Sensation to light touch is normal over V1-3 distributions bilaterally.  .  VII: Facial movements are symmetrical. There is no facial droop. .  VIII: Hearing intact to soft speech and finger rub bilaterally  IX: Palate elevates symmetrically, uvula is midline. Dysarthria is not present.  XI: Shoulder shrug are symmetrical and strong.   XII: Tongue protrudes  midline.    Motor exam:  Muscle tone is normal in all 4 limbs. and No abnormal movements appreciated.    Muscle strength:     Right  Left  Deltoid   5/5  5/5      Biceps   5/5  5/5  Triceps  5/5  5/5   Wrist extensors 5/5  5/5  Wrist flexors  5/5  5/5     5/5  5/5  Interossei  5/5  5/5  Thenar (APB)  NT/5  NT/5   Hip flexors  5/5  5/5  Quadriceps  5/5  5/5    Hamstrings  5/5  5/5  Dorsiflexors  5/5  5/5  Plantarflexors  5/5  5/5  Toe extension  NT/5  NT/5  NT = not tested    Sensory exam:  Intact to Light touch, Pinprick, Temperature, Vibration and Proprioception in bilateral upper and lower extremity.    Deep tendon reflexes:       Right  Left  Biceps   1/4  1/4  Triceps  1/4  1/4  Brachioradialis 1/4  1/4  Knee jerk  3/4  3/4  Ankle jerk  0/4  0/4   bilateral toes are downgoing to plantar stimulation..    Coordination: shows a normal finger-nose-finger and heel to shin bilaterally.   Gait: Casual gait is normal., Heel walk is normal. and Toe walk is normal.      ANCILLARY DATA REVIEWED:   Labs:  EMG/NCS results were requested and received from Dr. Gayle: Bilateral lower extremities and left upper extremity were evaluated.  Bilateral sural sensory responses were unobtainable.  Otherwise fairly symmetric common peroneal responses and posterior tibial responses.  Left upper extremity NCS of the median motor nerves were unremarkable.  EMG was unremarkable.    I have personally reviewed the patient's waveforms and values.      Imaging:   MRI C-spine without contrast dated October 2018 performed for radiculopathy: Mild diffuse disc bulge at C5-C6 and C6-C7.  Congenitally small central canal with superimposed acquired degenerative changes resulting in slight further encroachment of the central canal at C5-C6.    MRI brain with and without contrast dated August 2016: Performed for ataxia, diplopia, amaurosis fugax, episodic confusion; small focus of diffusion restriction within the right corona radiata raising  concern for small acute lacunar infarct.  Mild supratentorial parenchymal atrophy with moderate hyperintense subcortical and periventricular white matter foci most consistent with chronic micro vascular ischemic change.  Mild parous nasal sinus disease    Carotid bilateral ultrasound August 2016: No significant carotid stenosis    CT head without contrast August 2016, normal    Records reviewed: Referral note personally reviewed.  Patient referred by Dr. Anh Padron.  He is having abnormal sensations in his bilateral lower extremities associated with possible diplopia.  He feels unsteady.  B12 in 2016 was 297.  Rheumatology referral notes also reviewed.  Patient had a recent EMG.  There is a history of seronegative rheumatoid arthritis, methotrexate was recommended but declined.  He has been reportedly bedbound for over a year due to not feeling good.  Patient is a recovering alcoholic.  Possible history of TIA and stroke.        ASSESSMENT AND PLAN:    1. Sensory disturbance: Uncertain etiology.  MRI brain in August 2016 2016 moderate periventricular white matter disease, small stroke; MRI C-spine October 2018 with mild diffuse disc bulge at C5-C7 without spinal cord compromise; normal EMG/NCS April 2018.  Clinical history is not suspicious for allodynia or small fiber neuropathy and seems separate from problem #2.  Neck and brain imaging does not explain his symptoms.  Symptoms not localized to the T-spine.  It is unusual that he states this is the reason keeping him in bed for 20 hours a day.  Cannot rule out somatization. I am his third neurologist - for multiple reasons.  Low suspicion for seizure given the persistent symptoms.  -Repeat EMG/NCS as below to evaluate for possible progression  -Likely monitor long term    2. Neuropathy (HCC): Distal, bottom of the toes bilaterally, suspicious for small fiber neuropathy given his symptoms and physical exam.  Given his chronic history of heavy alcohol use this is  likely the etiology however will complete neuropathy work-up as below for reversible causes.  Patient reportedly had an EMG/NCS performed by Dr. Gayle which was unremarkable in April 2018.  MRI brain with without contrast in August 2016 performed for multiple symptoms such as ataxia diplopia episodic infusions showed a small area of restricted diffusion in the right corona radiata which may indicate a small infarct however would not account for all of his symptoms as described in HPI.  He has not also otherwise unremarkable neurological exam.  No clear red flag symptoms reported.  See problem #1.  - Immunoelectrophoresis, Serum (Immunofixation); Future  - HIV AG/AB COMBO ASSAY SCREENING; Future  - VITAMIN B12; Future  - METHYLMALONIC ACID, SERUM; Future  - REFERRAL TO NEURODIAGNOSTICS (EEG,EP,EMG/NCS/DBS) Modality Requested: EMG/NCS-Comment Extremities  - WESTERGREN SED RATE; Future  - HUY IGG PRASANNA W/RFLX TO HUY IGG IFA; Future  - CRP QUANTITIVE (NON-CARDIAC); Future  -Continue gabapentin (NEURONTIN) 400 MG Cap; Take 400 mg by mouth 2 times a day.    3. History of stroke: Punctate infarct in the right corona radiata.  Reportedly worked up prior.  -Continue aspirin, atorvastatin    4. Diplopia, reportedly binocular: Not consistent with neuromuscular junction disorder.  No strabismus.  Already seen ophthalmology.  Monitor.    5. History of alcohol abuse: Abstinent since 2018.  Encouragement provided.    6. Seronegative rheumatoid arthritis (HCC): Per report, pending rheumatology evaluation, reportedly soren high CRP and ESR and intermittent joint pain that would improve with prednisone which is nonspecific response.      FOLLOW-UP: Return for after EMG.  EDUCATION AND COUNSELING:  -I personally discussed the following with the patient:   Risks/benefits/side effects/alternatives of medication including but not limited to drowsiness, sedation, dizziness, increased risk for falls, weight changes, renal dysfunction,  increased risk for depression, anxiety, suicide, psychosis and mood changes and avoid abrupt cessation of medication., Diagnosis, prognosis, and treatment options discussed with patient at length.  , Recommend regular exercise, proper hydration, healthy diet and stress reduction. , The above procedure risks/benefits/side effects/alternatives., Usefulness of anti-platelets in secondary stroke prevention was discussed. The side effects, including increased risk for bleeding (both traumatic and spontaneous) were reviewed with the patient at length. , Recommended goal for LDL is 70 or less. Side effects of statin, including idiosyncratic reactions as well as more common myalgias, and liver injury  were discussed. Monitoring of LFT's will be deferred to the patient's primary care physician. , Secondary stroke prevention education was provided, including; lifestyle modification with healthy diet, and exercise, as well as recommendations for optimal control of risk factors. , Compliance with medications was discussed in detail.  and Differential diagnosis, possible psychiatric evaluation.  Red flag symptoms were personally discussed with patient and wife, and when to seek emergency care and evaluation.    The patient understands and agrees that due to the complexity of his/her diagnosis, results of any testing and further recommendations will typically be discussed/made during a face to face encounter in my office. The patient and/or family further understands it is their responsibility to keep proper follow up.     Disclaimer  This dictation was created using voice recognition software. I have made every reasonable attempt to avoid dictation errors, but this document may contain an error not identified before finalizing. If the error changes the accuracy of the document, I would appreciate it being brought to my attention. Thank you very much.     Anh Prince MD  Neurology, Neurophysiology  [facility]      No  Recipients               None

## 2019-06-26 ENCOUNTER — APPOINTMENT (OUTPATIENT)
Dept: ORTHOPEDIC SURGERY | Facility: CLINIC | Age: 84
End: 2019-06-26
Payer: MEDICARE

## 2019-06-26 VITALS — DIASTOLIC BLOOD PRESSURE: 84 MMHG | HEART RATE: 69 BPM | SYSTOLIC BLOOD PRESSURE: 165 MMHG | TEMPERATURE: 97.6 F

## 2019-06-26 DIAGNOSIS — L08.9 LOCAL INFECTION OF THE SKIN AND SUBCUTANEOUS TISSUE, UNSPECIFIED: ICD-10-CM

## 2019-06-26 PROCEDURE — 99024 POSTOP FOLLOW-UP VISIT: CPT

## 2019-06-26 RX ORDER — OXYCODONE 5 MG/1
5 TABLET ORAL
Qty: 40 | Refills: 0 | Status: ACTIVE | COMMUNITY
Start: 2019-06-26 | End: 1900-01-01

## 2019-07-16 ENCOUNTER — APPOINTMENT (OUTPATIENT)
Dept: ORTHOPEDIC SURGERY | Facility: CLINIC | Age: 84
End: 2019-07-16
Payer: MEDICARE

## 2019-07-16 VITALS
HEART RATE: 55 BPM | HEIGHT: 63 IN | DIASTOLIC BLOOD PRESSURE: 68 MMHG | TEMPERATURE: 97.5 F | WEIGHT: 168 LBS | SYSTOLIC BLOOD PRESSURE: 158 MMHG | BODY MASS INDEX: 29.77 KG/M2

## 2019-07-16 PROCEDURE — 99024 POSTOP FOLLOW-UP VISIT: CPT

## 2019-07-16 PROCEDURE — 73560 X-RAY EXAM OF KNEE 1 OR 2: CPT | Mod: LT

## 2019-07-23 ENCOUNTER — APPOINTMENT (OUTPATIENT)
Dept: ORTHOPEDIC SURGERY | Facility: CLINIC | Age: 84
End: 2019-07-23

## 2019-08-01 ENCOUNTER — APPOINTMENT (OUTPATIENT)
Dept: ORTHOPEDIC SURGERY | Facility: CLINIC | Age: 84
End: 2019-08-01
Payer: MEDICARE

## 2019-08-01 VITALS
HEIGHT: 63 IN | SYSTOLIC BLOOD PRESSURE: 157 MMHG | HEART RATE: 54 BPM | BODY MASS INDEX: 29.77 KG/M2 | WEIGHT: 168 LBS | DIASTOLIC BLOOD PRESSURE: 81 MMHG

## 2019-08-01 PROCEDURE — 99024 POSTOP FOLLOW-UP VISIT: CPT

## 2019-08-01 PROCEDURE — 73562 X-RAY EXAM OF KNEE 3: CPT | Mod: LT

## 2019-08-05 ENCOUNTER — CHART COPY (OUTPATIENT)
Age: 84
End: 2019-08-05

## 2020-10-20 ENCOUNTER — APPOINTMENT (OUTPATIENT)
Dept: ORTHOPEDIC SURGERY | Facility: CLINIC | Age: 85
End: 2020-10-20

## 2020-11-17 ENCOUNTER — APPOINTMENT (OUTPATIENT)
Dept: ORTHOPEDIC SURGERY | Facility: CLINIC | Age: 85
End: 2020-11-17
Payer: MEDICARE

## 2020-11-17 VITALS
SYSTOLIC BLOOD PRESSURE: 154 MMHG | DIASTOLIC BLOOD PRESSURE: 101 MMHG | TEMPERATURE: 96.8 F | HEART RATE: 64 BPM | HEIGHT: 63 IN

## 2020-11-17 DIAGNOSIS — Z96.652 PRESENCE OF LEFT ARTIFICIAL KNEE JOINT: ICD-10-CM

## 2020-11-17 PROCEDURE — 99214 OFFICE O/P EST MOD 30 MIN: CPT

## 2020-11-17 PROCEDURE — 73562 X-RAY EXAM OF KNEE 3: CPT | Mod: LT

## 2020-11-18 ENCOUNTER — INPATIENT (INPATIENT)
Facility: HOSPITAL | Age: 85
LOS: 17 days | Discharge: HOME | DRG: 233 | End: 2020-12-06
Attending: THORACIC SURGERY (CARDIOTHORACIC VASCULAR SURGERY) | Admitting: GENERAL PRACTICE
Payer: COMMERCIAL

## 2020-11-18 VITALS
SYSTOLIC BLOOD PRESSURE: 155 MMHG | RESPIRATION RATE: 18 BRPM | TEMPERATURE: 98 F | HEART RATE: 63 BPM | OXYGEN SATURATION: 96 % | HEIGHT: 63 IN | WEIGHT: 164.91 LBS | DIASTOLIC BLOOD PRESSURE: 92 MMHG

## 2020-11-18 DIAGNOSIS — I10 ESSENTIAL (PRIMARY) HYPERTENSION: ICD-10-CM

## 2020-11-18 DIAGNOSIS — I50.9 HEART FAILURE, UNSPECIFIED: ICD-10-CM

## 2020-11-18 DIAGNOSIS — I21.4 NON-ST ELEVATION (NSTEMI) MYOCARDIAL INFARCTION: ICD-10-CM

## 2020-11-18 DIAGNOSIS — Z29.9 ENCOUNTER FOR PROPHYLACTIC MEASURES, UNSPECIFIED: ICD-10-CM

## 2020-11-18 DIAGNOSIS — E78.00 PURE HYPERCHOLESTEROLEMIA, UNSPECIFIED: ICD-10-CM

## 2020-11-18 DIAGNOSIS — Z98.890 OTHER SPECIFIED POSTPROCEDURAL STATES: Chronic | ICD-10-CM

## 2020-11-18 DIAGNOSIS — I87.8 OTHER SPECIFIED DISORDERS OF VEINS: ICD-10-CM

## 2020-11-18 LAB
ALBUMIN SERPL ELPH-MCNC: 4.3 G/DL — SIGNIFICANT CHANGE UP (ref 3.3–5)
ALP SERPL-CCNC: 71 U/L — SIGNIFICANT CHANGE UP (ref 40–120)
ALT FLD-CCNC: 25 U/L — SIGNIFICANT CHANGE UP (ref 10–45)
ANION GAP SERPL CALC-SCNC: 10 MMOL/L — SIGNIFICANT CHANGE UP (ref 5–17)
APTT BLD: 157.1 SEC — CRITICAL HIGH (ref 27.5–35.5)
APTT BLD: 39.1 SEC — HIGH (ref 27.5–35.5)
AST SERPL-CCNC: 29 U/L — SIGNIFICANT CHANGE UP (ref 10–40)
BASOPHILS # BLD AUTO: 0.03 K/UL — SIGNIFICANT CHANGE UP (ref 0–0.2)
BASOPHILS NFR BLD AUTO: 0.3 % — SIGNIFICANT CHANGE UP (ref 0–2)
BILIRUB SERPL-MCNC: 0.8 MG/DL — SIGNIFICANT CHANGE UP (ref 0.2–1.2)
BUN SERPL-MCNC: 24 MG/DL — HIGH (ref 7–23)
CALCIUM SERPL-MCNC: 9.4 MG/DL — SIGNIFICANT CHANGE UP (ref 8.4–10.5)
CHLORIDE SERPL-SCNC: 103 MMOL/L — SIGNIFICANT CHANGE UP (ref 96–108)
CO2 SERPL-SCNC: 25 MMOL/L — SIGNIFICANT CHANGE UP (ref 22–31)
CREAT SERPL-MCNC: 0.88 MG/DL — SIGNIFICANT CHANGE UP (ref 0.5–1.3)
EOSINOPHIL # BLD AUTO: 0.02 K/UL — SIGNIFICANT CHANGE UP (ref 0–0.5)
EOSINOPHIL NFR BLD AUTO: 0.2 % — SIGNIFICANT CHANGE UP (ref 0–6)
GAS PNL BLDV: SIGNIFICANT CHANGE UP
GLUCOSE SERPL-MCNC: 94 MG/DL — SIGNIFICANT CHANGE UP (ref 70–99)
HCT VFR BLD CALC: 40.9 % — SIGNIFICANT CHANGE UP (ref 39–50)
HCT VFR BLD CALC: 41.1 % — SIGNIFICANT CHANGE UP (ref 39–50)
HGB BLD-MCNC: 13.2 G/DL — SIGNIFICANT CHANGE UP (ref 13–17)
HGB BLD-MCNC: 13.3 G/DL — SIGNIFICANT CHANGE UP (ref 13–17)
IMM GRANULOCYTES NFR BLD AUTO: 0.5 % — SIGNIFICANT CHANGE UP (ref 0–1.5)
INR BLD: 1.27 RATIO — HIGH (ref 0.88–1.16)
LYMPHOCYTES # BLD AUTO: 0.85 K/UL — LOW (ref 1–3.3)
LYMPHOCYTES # BLD AUTO: 9.2 % — LOW (ref 13–44)
MCHC RBC-ENTMCNC: 32.3 GM/DL — SIGNIFICANT CHANGE UP (ref 32–36)
MCHC RBC-ENTMCNC: 32.4 GM/DL — SIGNIFICANT CHANGE UP (ref 32–36)
MCHC RBC-ENTMCNC: 32.9 PG — SIGNIFICANT CHANGE UP (ref 27–34)
MCHC RBC-ENTMCNC: 33.2 PG — SIGNIFICANT CHANGE UP (ref 27–34)
MCV RBC AUTO: 101.7 FL — HIGH (ref 80–100)
MCV RBC AUTO: 103 FL — HIGH (ref 80–100)
MONOCYTES # BLD AUTO: 0.76 K/UL — SIGNIFICANT CHANGE UP (ref 0–0.9)
MONOCYTES NFR BLD AUTO: 8.2 % — SIGNIFICANT CHANGE UP (ref 2–14)
NEUTROPHILS # BLD AUTO: 7.54 K/UL — HIGH (ref 1.8–7.4)
NEUTROPHILS NFR BLD AUTO: 81.6 % — HIGH (ref 43–77)
NRBC # BLD: 0 /100 WBCS — SIGNIFICANT CHANGE UP (ref 0–0)
NRBC # BLD: 0 /100 WBCS — SIGNIFICANT CHANGE UP (ref 0–0)
NT-PROBNP SERPL-SCNC: HIGH PG/ML (ref 0–300)
PLATELET # BLD AUTO: 165 K/UL — SIGNIFICANT CHANGE UP (ref 150–400)
PLATELET # BLD AUTO: 179 K/UL — SIGNIFICANT CHANGE UP (ref 150–400)
POTASSIUM SERPL-MCNC: 4.4 MMOL/L — SIGNIFICANT CHANGE UP (ref 3.5–5.3)
POTASSIUM SERPL-SCNC: 4.4 MMOL/L — SIGNIFICANT CHANGE UP (ref 3.5–5.3)
PROT SERPL-MCNC: 6.6 G/DL — SIGNIFICANT CHANGE UP (ref 6–8.3)
PROTHROM AB SERPL-ACNC: 15.1 SEC — HIGH (ref 10.6–13.6)
RBC # BLD: 3.97 M/UL — LOW (ref 4.2–5.8)
RBC # BLD: 4.04 M/UL — LOW (ref 4.2–5.8)
RBC # FLD: 15.2 % — HIGH (ref 10.3–14.5)
RBC # FLD: 15.3 % — HIGH (ref 10.3–14.5)
SARS-COV-2 RNA SPEC QL NAA+PROBE: SIGNIFICANT CHANGE UP
SODIUM SERPL-SCNC: 138 MMOL/L — SIGNIFICANT CHANGE UP (ref 135–145)
TROPONIN T, HIGH SENSITIVITY RESULT: 134 NG/L — HIGH (ref 0–51)
TROPONIN T, HIGH SENSITIVITY RESULT: 140 NG/L — HIGH (ref 0–51)
WBC # BLD: 9.25 K/UL — SIGNIFICANT CHANGE UP (ref 3.8–10.5)
WBC # BLD: 9.39 K/UL — SIGNIFICANT CHANGE UP (ref 3.8–10.5)
WBC # FLD AUTO: 9.25 K/UL — SIGNIFICANT CHANGE UP (ref 3.8–10.5)
WBC # FLD AUTO: 9.39 K/UL — SIGNIFICANT CHANGE UP (ref 3.8–10.5)

## 2020-11-18 PROCEDURE — 93926 LOWER EXTREMITY STUDY: CPT | Mod: 26,LT

## 2020-11-18 PROCEDURE — 93970 EXTREMITY STUDY: CPT | Mod: 26

## 2020-11-18 PROCEDURE — 71045 X-RAY EXAM CHEST 1 VIEW: CPT | Mod: 26

## 2020-11-18 PROCEDURE — 93010 ELECTROCARDIOGRAM REPORT: CPT

## 2020-11-18 PROCEDURE — 99291 CRITICAL CARE FIRST HOUR: CPT

## 2020-11-18 RX ORDER — HEPARIN SODIUM 5000 [USP'U]/ML
4000 INJECTION INTRAVENOUS; SUBCUTANEOUS EVERY 6 HOURS
Refills: 0 | Status: DISCONTINUED | OUTPATIENT
Start: 2020-11-18 | End: 2020-11-18

## 2020-11-18 RX ORDER — ROSUVASTATIN CALCIUM 5 MG/1
1 TABLET ORAL
Qty: 0 | Refills: 0 | DISCHARGE

## 2020-11-18 RX ORDER — EZETIMIBE 10 MG/1
1 TABLET ORAL
Qty: 0 | Refills: 0 | DISCHARGE

## 2020-11-18 RX ORDER — HEPARIN SODIUM 5000 [USP'U]/ML
INJECTION INTRAVENOUS; SUBCUTANEOUS
Qty: 25000 | Refills: 0 | Status: DISCONTINUED | OUTPATIENT
Start: 2020-11-18 | End: 2020-11-19

## 2020-11-18 RX ORDER — HEPARIN SODIUM 5000 [USP'U]/ML
INJECTION INTRAVENOUS; SUBCUTANEOUS
Qty: 25000 | Refills: 0 | Status: DISCONTINUED | OUTPATIENT
Start: 2020-11-18 | End: 2020-11-18

## 2020-11-18 RX ORDER — ASPIRIN/CALCIUM CARB/MAGNESIUM 324 MG
81 TABLET ORAL DAILY
Refills: 0 | Status: DISCONTINUED | OUTPATIENT
Start: 2020-11-18 | End: 2020-11-25

## 2020-11-18 RX ORDER — FUROSEMIDE 40 MG
20 TABLET ORAL ONCE
Refills: 0 | Status: COMPLETED | OUTPATIENT
Start: 2020-11-18 | End: 2020-11-18

## 2020-11-18 RX ORDER — ASCORBIC ACID 60 MG
1 TABLET,CHEWABLE ORAL
Qty: 0 | Refills: 0 | DISCHARGE

## 2020-11-18 RX ORDER — HEPARIN SODIUM 5000 [USP'U]/ML
4000 INJECTION INTRAVENOUS; SUBCUTANEOUS ONCE
Refills: 0 | Status: DISCONTINUED | OUTPATIENT
Start: 2020-11-18 | End: 2020-11-18

## 2020-11-18 RX ORDER — ACETAMINOPHEN 500 MG
1 TABLET ORAL
Qty: 0 | Refills: 0 | DISCHARGE

## 2020-11-18 RX ORDER — FUROSEMIDE 40 MG
20 TABLET ORAL EVERY 12 HOURS
Refills: 0 | Status: DISCONTINUED | OUTPATIENT
Start: 2020-11-18 | End: 2020-11-21

## 2020-11-18 RX ORDER — CHOLECALCIFEROL (VITAMIN D3) 125 MCG
1 CAPSULE ORAL
Qty: 0 | Refills: 0 | DISCHARGE

## 2020-11-18 RX ORDER — PANTOPRAZOLE SODIUM 20 MG/1
40 TABLET, DELAYED RELEASE ORAL
Refills: 0 | Status: DISCONTINUED | OUTPATIENT
Start: 2020-11-18 | End: 2020-11-25

## 2020-11-18 RX ORDER — VANCOMYCIN HCL 1 G
1000 VIAL (EA) INTRAVENOUS EVERY 12 HOURS
Refills: 0 | Status: DISCONTINUED | OUTPATIENT
Start: 2020-11-18 | End: 2020-11-19

## 2020-11-18 RX ORDER — HEPARIN SODIUM 5000 [USP'U]/ML
4000 INJECTION INTRAVENOUS; SUBCUTANEOUS EVERY 6 HOURS
Refills: 0 | Status: DISCONTINUED | OUTPATIENT
Start: 2020-11-18 | End: 2020-11-19

## 2020-11-18 RX ORDER — CARVEDILOL PHOSPHATE 80 MG/1
3.12 CAPSULE, EXTENDED RELEASE ORAL EVERY 12 HOURS
Refills: 0 | Status: DISCONTINUED | OUTPATIENT
Start: 2020-11-18 | End: 2020-11-19

## 2020-11-18 RX ORDER — NIACINAMIDE 500 MG
1 TABLET ORAL
Qty: 0 | Refills: 0 | DISCHARGE

## 2020-11-18 RX ORDER — ATORVASTATIN CALCIUM 80 MG/1
80 TABLET, FILM COATED ORAL AT BEDTIME
Refills: 0 | Status: DISCONTINUED | OUTPATIENT
Start: 2020-11-18 | End: 2020-11-19

## 2020-11-18 RX ORDER — OMEPRAZOLE 10 MG/1
1 CAPSULE, DELAYED RELEASE ORAL
Qty: 0 | Refills: 0 | DISCHARGE

## 2020-11-18 RX ORDER — LISINOPRIL 2.5 MG/1
10 TABLET ORAL DAILY
Refills: 0 | Status: DISCONTINUED | OUTPATIENT
Start: 2020-11-18 | End: 2020-11-19

## 2020-11-18 RX ORDER — SOD,AMMONIUM,POTASSIUM LACTATE
1 CREAM (GRAM) TOPICAL
Qty: 0 | Refills: 0 | DISCHARGE

## 2020-11-18 RX ORDER — HEPARIN SODIUM 5000 [USP'U]/ML
4700 INJECTION INTRAVENOUS; SUBCUTANEOUS ONCE
Refills: 0 | Status: COMPLETED | OUTPATIENT
Start: 2020-11-18 | End: 2020-11-18

## 2020-11-18 RX ORDER — DOCUSATE SODIUM 100 MG
1 CAPSULE ORAL
Qty: 0 | Refills: 0 | DISCHARGE

## 2020-11-18 RX ORDER — MULTIVIT-MIN/FERROUS GLUCONATE 9 MG/15 ML
2 LIQUID (ML) ORAL
Qty: 0 | Refills: 0 | DISCHARGE

## 2020-11-18 RX ADMIN — Medication 250 MILLIGRAM(S): at 23:23

## 2020-11-18 RX ADMIN — Medication 81 MILLIGRAM(S): at 17:41

## 2020-11-18 RX ADMIN — HEPARIN SODIUM 950 UNIT(S)/HR: 5000 INJECTION INTRAVENOUS; SUBCUTANEOUS at 16:20

## 2020-11-18 RX ADMIN — HEPARIN SODIUM 700 UNIT(S)/HR: 5000 INJECTION INTRAVENOUS; SUBCUTANEOUS at 23:43

## 2020-11-18 RX ADMIN — LISINOPRIL 10 MILLIGRAM(S): 2.5 TABLET ORAL at 23:42

## 2020-11-18 RX ADMIN — HEPARIN SODIUM 0 UNIT(S)/HR: 5000 INJECTION INTRAVENOUS; SUBCUTANEOUS at 22:32

## 2020-11-18 RX ADMIN — Medication 1 TABLET(S): at 17:41

## 2020-11-18 RX ADMIN — Medication 20 MILLIGRAM(S): at 16:20

## 2020-11-18 RX ADMIN — CARVEDILOL PHOSPHATE 3.12 MILLIGRAM(S): 80 CAPSULE, EXTENDED RELEASE ORAL at 17:41

## 2020-11-18 RX ADMIN — HEPARIN SODIUM 4700 UNIT(S): 5000 INJECTION INTRAVENOUS; SUBCUTANEOUS at 16:19

## 2020-11-18 RX ADMIN — ATORVASTATIN CALCIUM 80 MILLIGRAM(S): 80 TABLET, FILM COATED ORAL at 22:32

## 2020-11-18 RX ADMIN — Medication 20 MILLIGRAM(S): at 17:41

## 2020-11-18 NOTE — ED PROVIDER NOTE - CLINICAL SUMMARY MEDICAL DECISION MAKING FREE TEXT BOX
85M htn, hld presenting with 3+ bilateral lower extremity pitting edema with erythema and vascular changes to LLE with erythema extending to knee, significantly short of breath with attempt to ambulate from bedside table to stretcher. No dx CHF or other heart disease. M/p diagnosis given clinical exam and history overload with vascular insufficiency. Will obtain labs with trop, pro-bnp, CXR, b/l venous duplex and arterial duplex of left LE, TBA

## 2020-11-18 NOTE — ED ADULT NURSE NOTE - NSIMPLEMENTINTERV_GEN_ALL_ED
Implemented All Fall with Harm Risk Interventions:  [place] to call system. Call bell, personal items and telephone within reach. Instruct patient to call for assistance. Room bathroom lighting operational. Non-slip footwear when patient is off stretcher. Physically safe environment: no spills, clutter or unnecessary equipment. Stretcher in lowest position, wheels locked, appropriate side rails in place. Provide visual cue, wrist band, yellow gown, etc. Monitor gait and stability. Monitor for mental status changes and reorient to person, place, and time. Review medications for side effects contributing to fall risk. Reinforce activity limits and safety measures with patient and family. Provide visual clues: red socks.

## 2020-11-18 NOTE — CONSULT NOTE ADULT - ASSESSMENT
pt is an 86 y/o man with pmhx of htn, hld, hiatal hernia / GERD, OA post Lt TKR x1 year ago presenting with concern for increasing redness of left leg and swelling for 2 months.   Patient reports that he has been having increasing swelling of both of his legs for approximately 2-3 months; however, the left leg has been more swollen and has been getting increasingly more red. He states that he went to his podiatrist when he noted some rash on the lower portion of his left just above his foot, was given a 'cream of some kind, and feels that the redness and swelling got worse after starting the cream.'   He states that he was having chest pain approximately 1 mo ago, went to his PMD and was diagnosed with a hiatal hernia with an xray and prescribed omeprazole.   Patient reports that he has been becoming increasingly more short of breath for approximately 2 months as well. Feels that when he takes just a few steps he has to stop and catch his breath, and is so short of breath that he cannot speak in complete sentences without resting for a period of time first.   pt also states was given amoxicillin a few days ago but only took one dose  He denies any fevers, chills, cough, nausea, vomiting, sweating, chest pain  pt admitted for likely acute CHF with elevated troponin / NSTEMI   chf acute on ?chronic  lasix iv  tele  echo  cardiac enzymes  asa daily  needs ischemia work up  tsh  lipid/ start on Lipitor 80 mg qhs  coreg , add ace or ARB  fu renal function pt is an 86 y/o man with pmhx of htn, hld, hiatal hernia / GERD, OA post Lt TKR x1 year ago presenting with concern for increasing redness of left leg and swelling for 2 months.   Patient reports that he has been having increasing swelling of both of his legs for approximately 2-3 months; however, the left leg has been more swollen and has been getting increasingly more red. He states that he went to his podiatrist when he noted some rash on the lower portion of his left just above his foot, was given a 'cream of some kind, and feels that the redness and swelling got worse after starting the cream.'   He states that he was having chest pain approximately 1 mo ago, went to his PMD and was diagnosed with a hiatal hernia with an xray and prescribed omeprazole.   Patient reports that he has been becoming increasingly more short of breath for approximately 2 months as well. Feels that when he takes just a few steps he has to stop and catch his breath, and is so short of breath that he cannot speak in complete sentences without resting for a period of time first.   pt also states was given amoxicillin a few days ago but only took one dose  He denies any fevers, chills, cough, nausea, vomiting, sweating, chest pain  pt admitted for likely acute CHF with elevated troponin / NSTEMI   chf acute on ?chronic  lasix iv  tele  echo  cardiac enzymes  asa daily  needs ischemia work up  tsh  lipid/ start on Lipitor 80 mg qhs  coreg , add ace or ARB  fu renal function  ?severe cellulitis of the LLE / mild RLe, iv abx ?id eval, check cultures  check doppler

## 2020-11-18 NOTE — ED PROVIDER NOTE - PHYSICAL EXAMINATION
GEN: Well Appearing, Nontoxic, NAD  HEENT: NC/AT, Symm Facies. PERRL, EOMI, MMM, posterior pharynx clear  CV: No JVD/Bruits or stridor;  +S1S2, RRR w/o m/g/r  RESP: CTAB w/o w/r/r  ABD: Soft, nt/nd, +BS. No guarding/rebound. No RUQ tender, no CVAT  EXT/MSK: 3+ pitting edema b/l LE with significant erythema and vascular changes to LLE With violaceous appearance of foot with no change in sensation as compared to R. Faintly palpable pulses. FROMx4  SKIN: No erythema, lesions or rash  Neuro: Grossly intact, AOX3 with normal speech, CN II-XII intact; Sensation intact, motor 5/5 throughout. Gait normal GEN: Well Appearing, Nontoxic, NAD  HEENT: NC/AT, Symm Facies. PERRL, EOMI, MMM, posterior pharynx clear  CV: No JVD/Bruits or stridor;  +S1S2, RRR w/o m/g/r  RESP: minimal rales to bases bilaterally  ABD: Soft, nt/nd, +BS. No guarding/rebound. No RUQ tender, no CVAT  EXT/MSK: 3+ pitting edema b/l LE with significant erythema and vascular changes to LLE With violaceous appearance of foot with no change in sensation as compared to R. Faintly palpable pulses. FROMx4  SKIN: No erythema, lesions or rash  Neuro: Grossly intact, AOX3 with normal speech, CN II-XII intact; Sensation intact, motor 5/5 throughout. Gait normal

## 2020-11-18 NOTE — ED ADULT NURSE NOTE - OBJECTIVE STATEMENT
84 y/o male with pmhx of high cholesterol and htn c/o LLE erythema with worsening sob over the past 2 months.  per pt, he went to his pmd who dx him with hiatial hernia and prescribed omeprazole but symptoms have not subsided.  pt denies any cp, dizziness, fever, chills, numbness or tingling at this time.  pt is awake, alert and responsive to all stimuli.  no respiratory distress noted at this time.  skin is warm, dry and intact.  no pallor or cyanosis noted.  BLE +2 pitting edema noted.  LLE erythema noted with both extremities being cool to touch.  pt denies any loss of sensation to either extremity.  vss.  safety precautions in place.  will continue to monitor.

## 2020-11-18 NOTE — ED PROVIDER NOTE - ATTENDING CONTRIBUTION TO CARE
Patient is an 86 yo M with history of HTN, hyperlipidemia, hiatal hernia, hx of LKR 2 yo, here for evaluation of increased redness to left leg over the past 2 months. Patient reports he was being treated for a fungal infection and he thinks it spread to his leg. Denies any antibiotic treatment. Denies fevers, chills, nausea, vomiting. Of note, patient is visibly short of breath after moving from standing to laying in the bed. When questioned, he states this has been going on for 2 months, saw his pcp 2 months ago and was told it's because he has a hiatal hernia. He has been taking omeprazole. His legs are visibly swollen and these started to get swelling 2 months ago as well. He admits to CRUZ. He reports urinating normally.     VS noted  Gen. no acute distress, Non toxic   HEENT: EOMI, mmm,   Lungs: CTAB/L no C/ W /R   CVS: RRR   Abd; Soft non tender, non distended   Ext: b/l pitting edema to   Skin: no rash  Neuro AAOx3 non focal clear speech  - Minna GRACIA Patient is an 86 yo M with history of HTN, hyperlipidemia, hiatal hernia, hx of LKR 2 yo, here for evaluation of increased redness to left leg over the past 2 months. Patient reports he was being treated for a fungal infection and he thinks it spread to his leg. Denies any antibiotic treatment. Denies fevers, chills, nausea, vomiting. Of note, patient is visibly short of breath after moving from standing to laying in the bed. When questioned, he states this has been going on for 2 months, saw his pcp 2 months ago and was told it's because he has a hiatal hernia. He has been taking omeprazole. His legs are visibly swollen and these started to get swelling 2 months ago as well. He admits to CRUZ. He reports urinating normally.     VS noted  Gen. no acute distress, Non toxic   HEENT: EOMI, mmm  Lungs: CTAB/L no C/ W /R   CVS: RRR   Abd; Soft non tender, non distended   Ext: b/l pitting edema to b/l LE, left leg with erythema, no warmth or ttp, left foot with violaceous appearance, similar temp to touch compared to right  Skin: as above  Neuro AAOx3 non focal clear speech  a/p: leg erythema - no warmth or ttp, SOB/CRUZ - concern for new CHF. plan for ekg, labs, CXR and admission. Plan to r/o DVT, get arterial duplex of LLE.   - Minna GRACIA

## 2020-11-18 NOTE — ED ADULT NURSE REASSESSMENT NOTE - NS ED NURSE REASSESS COMMENT FT1
Received report from SUJATA Cox. Patient currently at vascular, pending EKG & weight when patient returns to unit. Patient admitted to tele service, not to be clicked RTM until heparin is initiated.

## 2020-11-18 NOTE — ED PROVIDER NOTE - OBJECTIVE STATEMENT
84 y/o F pmhx htn, hld, hiatal hernia, L TKR x1 year ago presenting with concern for increasing redness of left leg and swelling for 2 months. Patient reports that he has been having increasing swelling of both of his legs for approximately 2-3 months; however, the left leg has been more swollen and has been getting increasingly more red. He states that he went to his podiatrist when he noted some rash on the lower portion of his left just above his foot, was given a 'cream of some kind, and feels that the redness and swelling got worse after starting the cream.' He states that he was having chest pain approximately 1 mo ago, went to his PMD and was diagnosed with a hiatal hernia with an xray and prescribed omeprazole. States that an EKG was performed and was WNL so no further testing was done. Patient reports that he has been becoming increasingly more short of breath for approximately 2 months as well. Feels that when he takes just a few steps he has to stop and catch his breath, and is so short of breath that he cannot speak in complete sentences without resting for a period of time first. He denies any fevers, chills, cough, nausea, vomiting, sweating, chest pain.

## 2020-11-18 NOTE — ED PROVIDER NOTE - ST/T WAVE
no st elevation, no st depression, q waves in 3, aVF no st elevation, < 1 mm st depression in 1, aVL, q waves in 3, aVF

## 2020-11-18 NOTE — H&P ADULT - HISTORY OF PRESENT ILLNESS
>>>>>>Medical Attending Initial / Admission note<<<<<<  -------------------------------------------------------------------------------  CHIEF COMPLAINT & HISTORY OF PRESENT ILLNESS:      pt is an 86 y/o man with pmhx of htn, hld, hiatal hernia / GERD, OA post Lt TKR x1 year ago presenting with concern for increasing redness of left leg and swelling for 2 months.   Patient reports that he has been having increasing swelling of both of his legs for approximately 2-3 months; however, the left leg has been more swollen and has been getting increasingly more red. He states that he went to his podiatrist when he noted some rash on the lower portion of his left just above his foot, was given a 'cream of some kind, and feels that the redness and swelling got worse after starting the cream.'   He states that he was having chest pain approximately 1 mo ago, went to his PMD and was diagnosed with a hiatal hernia with an xray and prescribed omeprazole.   Patient reports that he has been becoming increasingly more short of breath for approximately 2 months as well. Feels that when he takes just a few steps he has to stop and catch his breath, and is so short of breath that he cannot speak in complete sentences without resting for a period of time first.   pt also states was given amoxicillin a few days ago but only took one dose  He denies any fevers, chills, cough, nausea, vomiting, sweating, chest pain  pt admitted for likely acute CHF with elevated troponin / NSTEMI     --------------------------------------------------------------------------------  PAST MEDICAL HISTORY:    HTN  HLD  GERD / Hiatal hernia   OA    --------------------------------------------------------------------------------  PAST SURGICAL HISTORY:    left knee replacement   inguinal hernia repair    --------------------------------------------------------------------------------  FAMILY HISTORY:     sister: breast CA   Mother: gastric cancer   no known heart disease    --------------------------------------------------------------------------------    SOCIAL HISTORY:  Alcohol: None reported  Smoking: None reported     --------------------------------------------------------------------------------  ALLERGIES:     listed to have allergies to statins , valsartan, naprosyn ( none with rash or swelling )      --------------------------------------------------------------------------------  MEDICATIONS:   [As silvana, александр]    --------------------------------------------------------------------------------  REVIEW OF SYSTEM:    GEN: no fever, no chills, no pain  RESP: + SOB with minimal exertion , no cough, no sputum  CVS: no chest pain, no palpitations, ++ edema with redness of left leg as above   GI: no abdominal pain, no nausea, no vomiting, no constipation, no diarrhea  : no dysuria, no frequency, no hematuria  Neuro: no headache, no dizziness  PSYCH: no anxiety, no depression  Derm : no itching, no rash, redness as above     --------------------------------------------------------------------------------  VITAL SIGNS:     T(C): 36.4 (11-18-20 @ 10:32), Max: 36.4 (11-18-20 @ 10:32)  HR: 55 (11-18-20 @ 16:23) (55 - 63)  BP: 167/76 (11-18-20 @ 16:23) (155/92 - 167/76)  RR: 18 (11-18-20 @ 16:23) (18 - 18)  SpO2: 100% (11-18-20 @ 16:23) (96% - 100%)     --------------------------------------------------------------------------------  PHYSICAL EXAM:    GEN: A&O X 3 , NAD , comfortable, pleasant   HEENT: NCAT, PERRL, MMM, hearing intact  Neck: supple , mild JVD  CVS: S1S2 , regular , No M/R/G appreciated  PULM: CTA B/L,  decreased BS B/L   ABD.: soft. non tender, non distended,  bowel sounds present  Extrem: intact pulses , ++ B/L LE 3+ edema  with chronic stasis changes of left leg   Derm: No rash , no ecchymoses, changes as above in left leg   PSYCH : normal mood,  no delusion not anxious    --------------------------------------------------------------------------------    LAB AND IMAGING:   [As silvana, reviewed]    --------------------------------------------------------------------------------  ASSESSMENT AND PLAN:   [As silvana]    --------------------  Case discussed with pt, wife, ER, cardio  ___________________________  HSonal Phipps D.O.  Pager: [phone]

## 2020-11-18 NOTE — PROVIDER CONTACT NOTE (CRITICAL VALUE NOTIFICATION) - ACTION/TREATMENT ORDERED:
NP made aware. Ordered to hold heparin gtt for 1 hour and decrease rate to 7 after delay. Will continue to monitor.

## 2020-11-18 NOTE — ED PROVIDER NOTE - CARE PLAN
Principal Discharge DX:	CHF (congestive heart failure)  Secondary Diagnosis:	NSTEMI (non-ST elevated myocardial infarction)

## 2020-11-18 NOTE — ED ADULT NURSE REASSESSMENT NOTE - NS ED NURSE REASSESS COMMENT FT1
Patient started on heparin infusion. Patient educated on risks of attempting to get OOB without assistance. Patient provided with urinal at bedside.

## 2020-11-18 NOTE — ED ADULT TRIAGE NOTE - NS ED NURSE BANDS TYPE
HPI  Patient is here for follow up on diabetes and hypertension. She states that she was in the hospital recently with chest pain. She had a CT coronary angiogram which was negative for stenosis or coronary calcium.     Review of Systems     Constitutional:  Negative for fever, chills and fatigue.   HENT:  Negative for sinus congestion.    Eyes:  Negative for decreased vision.   Respiratory:   Negative for cough and dyspnea on exertion.    Cardiovascular:  Positive for chest pain. Negative for dyspnea on exertion and claudication.   Musculoskeletal:  Negative for back pain.   Skin:  Negative for itching and scarring.   Endo/Heme:  Negative for anemia, swollen glands and bruises/bleeds easily.   Psychiatric/Behavioral:  Negative for depression, decreased concentration, mood swings and panic attacks.    Endocrine:  Negative for altered temperature regulation, polyphagia, polydipsia, unwanted hair growth and change in facial hair.    Current Outpatient Medications   Medication     aspirin 81 MG tablet     blood glucose monitoring (ACCU-CHEK COMPACT CARE KIT) meter device kit     blood glucose monitoring (ACCU-CHEK SMARTVIEW) test strip     Blood Glucose Monitoring Suppl (adsquare CONTOUR NEXT MONITOR) W/DEVICE KIT     Calcium-Vitamin D (CALCIUM + D PO)     Cetirizine HCl (ZYRTEC PO)     losartan (COZAAR) 100 MG tablet     metFORMIN (GLUCOPHAGE-XR) 500 MG 24 hr tablet     simvastatin (ZOCOR) 20 MG tablet     VITAMIN D, CHOLECALCIFEROL, PO     YUVAFEM 10 MCG TABS vaginal tablet     No current facility-administered medications for this visit.      Vitals:    02/20/19 1005 02/20/19 1014 02/20/19 1015 02/20/19 1016   BP: (!) 146/91 (!) 144/94 (!) 146/93 (!) 145/93   Pulse: 73 73 73 73   Weight: 123.2 kg (271 lb 8 oz)          Physical Exam   Constitutional: She is oriented to person, place, and time. She appears well-developed and well-nourished.   HENT:   Head: Normocephalic and atraumatic.   Eyes: EOM are normal. Pupils are  equal, round, and reactive to light.   Cardiovascular: Normal rate.   Pulmonary/Chest: Effort normal.   Neurological: She is alert and oriented to person, place, and time.   Psychiatric: She has a normal mood and affect. Her behavior is normal. Judgment and thought content normal.   Vitals reviewed.    Assessment and Plan:  Sola was seen today for recheck.    Diagnoses and all orders for this visit:    Essential hypertension. Blood pressure is elevated today. Recommend adding hydrochlorothiazide to the treatment regimen. Patient will follow up in 2 weeks for blood pressure check.   -     hydrochlorothiazide (HYDRODIURIL) 12.5 MG tablet; Take 1 tablet (12.5 mg) by mouth daily    Type 2 diabetes mellitus without complication, with long-term current use of insulin (H). Patient was advised to check Hgb A1C at the next visit. Will discuss glycemic control in follow up.   -     Basic Metabolic Panel; Future  -     Hgb A1c; Future    BRCA2 gene mutation positive in female  -     ; Future      Total time spent 25 minutes.  More than 50% of the time spent with Ms. Silverman on counseling / coordinating her care    Alda Santamaria MD           Name band;

## 2020-11-18 NOTE — CONSULT NOTE ADULT - SUBJECTIVE AND OBJECTIVE BOX
CHIEF COMPLAINT:Patient is a 85y old  Male who presents with a chief complaint of edema (18 Nov 2020 16:09)    CHIEF COMPLAINT & HISTORY OF PRESENT ILLNESS:      pt is an 84 y/o man with pmhx of htn, hld, hiatal hernia / GERD, OA post Lt TKR x1 year ago presenting with concern for increasing redness of left leg and swelling for 2 months.   Patient reports that he has been having increasing swelling of both of his legs for approximately 2-3 months; however, the left leg has been more swollen and has been getting increasingly more red. He states that he went to his podiatrist when he noted some rash on the lower portion of his left just above his foot, was given a 'cream of some kind, and feels that the redness and swelling got worse after starting the cream.'   He states that he was having chest pain approximately 1 mo ago, went to his PMD and was diagnosed with a hiatal hernia with an xray and prescribed omeprazole.   Patient reports that he has been becoming increasingly more short of breath for approximately 2 months as well. Feels that when he takes just a few steps he has to stop and catch his breath, and is so short of breath that he cannot speak in complete sentences without resting for a period of time first.   pt also states was given amoxicillin a few days ago but only took one dose  He denies any fevers, chills, cough, nausea, vomiting, sweating, chest pain  pt admitted for likely acute CHF with elevated troponin / NSTEMI     PAST MEDICAL & SURGICAL HISTORY:  Mitral prolapse    Hypercholesteremia    Hypertension    History of hernia repair        MEDICATIONS  (STANDING):  aspirin enteric coated 81 milliGRAM(s) Oral daily  carvedilol 3.125 milliGRAM(s) Oral every 12 hours  furosemide   Injectable 20 milliGRAM(s) IV Push every 12 hours  heparin  Infusion.  Unit(s)/Hr (9.5 mL/Hr) IV Continuous <Continuous>  multivitamin 1 Tablet(s) Oral daily  pantoprazole    Tablet 40 milliGRAM(s) Oral before breakfast    MEDICATIONS  (PRN):  heparin   Injectable 4000 Unit(s) IV Push every 6 hours PRN For aPTT less than 40      FAMILY HISTORY:      SOCIAL HISTORY:    [ ] Non-smoker  [ ] Smoker  [ ] Alcohol    Allergies    No Known Allergies    Intolerances    	    REVIEW OF SYSTEMS:  CONSTITUTIONAL: No fever, weight loss, or fatigue  EYES: No eye pain, visual disturbances, or discharge  ENT:  No difficulty hearing, tinnitus, vertigo; No sinus or throat pain  NECK: No pain or stiffness  RESPIRATORY: No cough, wheezing, chills or hemoptysis; +Shortness of Breath  CARDIOVASCULAR: No chest pain, palpitations, passing out, dizziness, +leg swelling  GASTROINTESTINAL: No abdominal or epigastric pain. No nausea, vomiting, or hematemesis; No diarrhea or constipation. No melena or hematochezia.  GENITOURINARY: No dysuria, frequency, hematuria, or incontinence  NEUROLOGICAL: No headaches, memory loss, loss of strength, numbness, or tremors  SKIN: No itching, burning, rashes, or lesions   LYMPH Nodes: No enlarged glands  ENDOCRINE: No heat or cold intolerance; No hair loss  MUSCULOSKELETAL: No joint pain or swelling; No muscle, back, or extremity pain  PSYCHIATRIC: No depression, anxiety, mood swings, or difficulty sleeping  HEME/LYMPH: No easy bruising, or bleeding gums  ALLERGY AND IMMUNOLOGIC: No hives or eczema	    [ ] All others negative	  [ ] Unable to obtain    PHYSICAL EXAM:  T(C): 36.7 (11-18-20 @ 16:36), Max: 36.7 (11-18-20 @ 16:36)  HR: 60 (11-18-20 @ 16:36) (55 - 63)  BP: 165/77 (11-18-20 @ 16:36) (155/92 - 167/76)  RR: 18 (11-18-20 @ 16:36) (18 - 18)  SpO2: 98% (11-18-20 @ 16:36) (96% - 100%)  Wt(kg): --  I&O's Summary    18 Nov 2020 07:01  -  18 Nov 2020 18:33  --------------------------------------------------------  IN: 0 mL / OUT: 400 mL / NET: -400 mL        Appearance: Normal	  HEENT:   Normal oral mucosa, PERRL, EOMI	  Lymphatic: No lymphadenopathy  Cardiovascular: Normal S1 S2, No JVD, + murmurs, + edema  Respiratory: rhonchi  Psychiatry: A & O x 3, Mood & affect appropriate  Gastrointestinal:  Soft, Non-tender, + BS	  Skin: No rashes, No ecchymoses, No cyanosis	  Neurologic: Non-focal  Extremities: Normal range of motion, No clubbing, cyanosis . +  edema  Vascular: Peripheral pulses palpable 2+ bilaterally    TELEMETRY: 	    ECG:  	  RADIOLOGY:  OTHER: 	  	  LABS:	 	    CARDIAC MARKERS:                              13.2   9.25  )-----------( 179      ( 18 Nov 2020 12:45 )             40.9     11-18    138  |  103  |  24<H>  ----------------------------<  94  4.4   |  25  |  0.88    Ca    9.4      18 Nov 2020 12:45    TPro  6.6  /  Alb  4.3  /  TBili  0.8  /  DBili  x   /  AST  29  /  ALT  25  /  AlkPhos  71  11-18    proBNP: Serum Pro-Brain Natriuretic Peptide: 92897 pg/mL (11-18 @ 12:45)    Lipid Profile:   HgA1c:   TSH:   PT/INR - ( 18 Nov 2020 12:45 )   PT: 15.1 sec;   INR: 1.27 ratio         PTT - ( 18 Nov 2020 12:45 )  PTT:39.1 sec    PREVIOUS DIAGNOSTIC TESTING:    < from: 12 Lead ECG (05.02.19 @ 12:19) >  Diagnosis Line Sinus bradycardia with short MS  Left axis deviation  Right bundle branch block  Abnormal ECG  No previous ECGs available    < from: Xray Chest 1 View- PORTABLE-Urgent (Xray Chest 1 View- PORTABLE-Urgent .) (11.18.20 @ 12:14) >  The heart is enlarged. Bilateral pleural effusion. Pulmonary vascular congestion. Calcified aortic knob. No pneumothorax. No acute bony pathology could be identified.               CHIEF COMPLAINT:Patient is a 85y old  Male who presents with a chief complaint of edema (18 Nov 2020 16:09)    CHIEF COMPLAINT & HISTORY OF PRESENT ILLNESS:      pt is an 86 y/o man with pmhx of htn, hld, hiatal hernia / GERD, OA post Lt TKR x1 year ago presenting with concern for increasing redness of left leg and swelling for 2 months.   Patient reports that he has been having increasing swelling of both of his legs for approximately 2-3 months; however, the left leg has been more swollen and has been getting increasingly more red. He states that he went to his podiatrist when he noted some rash on the lower portion of his left just above his foot, was given a 'cream of some kind, and feels that the redness and swelling got worse after starting the cream.'   He states that he was having chest pain approximately 1 mo ago, went to his PMD and was diagnosed with a hiatal hernia with an xray and prescribed omeprazole.   Patient reports that he has been becoming increasingly more short of breath for approximately 2 months as well. Feels that when he takes just a few steps he has to stop and catch his breath, and is so short of breath that he cannot speak in complete sentences without resting for a period of time first.   pt also states was given amoxicillin a few days ago but only took one dose  He denies any fevers, chills, cough, nausea, vomiting, sweating, chest pain  pt admitted for likely acute CHF with elevated troponin / NSTEMI     PAST MEDICAL & SURGICAL HISTORY:  Mitral prolapse    Hypercholesteremia    Hypertension    History of hernia repair        MEDICATIONS  (STANDING):  aspirin enteric coated 81 milliGRAM(s) Oral daily  carvedilol 3.125 milliGRAM(s) Oral every 12 hours  furosemide   Injectable 20 milliGRAM(s) IV Push every 12 hours  heparin  Infusion.  Unit(s)/Hr (9.5 mL/Hr) IV Continuous <Continuous>  multivitamin 1 Tablet(s) Oral daily  pantoprazole    Tablet 40 milliGRAM(s) Oral before breakfast    MEDICATIONS  (PRN):  heparin   Injectable 4000 Unit(s) IV Push every 6 hours PRN For aPTT less than 40      FAMILY HISTORY:      SOCIAL HISTORY:    [ ] Non-smoker  [ ] Smoker  [ ] Alcohol    Allergies    No Known Allergies    Intolerances    	    REVIEW OF SYSTEMS:  CONSTITUTIONAL: No fever, weight loss, or fatigue  EYES: No eye pain, visual disturbances, or discharge  ENT:  No difficulty hearing, tinnitus, vertigo; No sinus or throat pain  NECK: No pain or stiffness  RESPIRATORY: No cough, wheezing, chills or hemoptysis; +Shortness of Breath  CARDIOVASCULAR: No chest pain, palpitations, passing out, dizziness, +leg swelling  GASTROINTESTINAL: No abdominal or epigastric pain. No nausea, vomiting, or hematemesis; No diarrhea or constipation. No melena or hematochezia.  GENITOURINARY: No dysuria, frequency, hematuria, or incontinence  NEUROLOGICAL: No headaches, memory loss, loss of strength, numbness, or tremors  SKIN: No itching, burning, rashes, or lesions   LYMPH Nodes: No enlarged glands  ENDOCRINE: No heat or cold intolerance; No hair loss  MUSCULOSKELETAL: No joint pain or swelling; No muscle, back, or extremity pain  PSYCHIATRIC: No depression, anxiety, mood swings, or difficulty sleeping  HEME/LYMPH: No easy bruising, or bleeding gums  ALLERGY AND IMMUNOLOGIC: No hives or eczema	    [ ] All others negative	  [ ] Unable to obtain    PHYSICAL EXAM:  T(C): 36.7 (11-18-20 @ 16:36), Max: 36.7 (11-18-20 @ 16:36)  HR: 60 (11-18-20 @ 16:36) (55 - 63)  BP: 165/77 (11-18-20 @ 16:36) (155/92 - 167/76)  RR: 18 (11-18-20 @ 16:36) (18 - 18)  SpO2: 98% (11-18-20 @ 16:36) (96% - 100%)  Wt(kg): --  I&O's Summary    18 Nov 2020 07:01  -  18 Nov 2020 18:33  --------------------------------------------------------  IN: 0 mL / OUT: 400 mL / NET: -400 mL        Appearance: Normal	  HEENT:   Normal oral mucosa, PERRL, EOMI	  Lymphatic: No lymphadenopathy  Cardiovascular: Normal S1 S2, No JVD, + murmurs, + edema  Respiratory: rhonchi  Psychiatry: A & O x 3, Mood & affect appropriate  Gastrointestinal:  Soft, Non-tender, + BS	  Skin: No rashes, No ecchymoses, No cyanosis	  Neurologic: Non-focal  Extremities: Normal range of motion, No clubbing, cyanosis . +  edema, lle sig erythema and warm to touch  Vascular: Peripheral pulses palpable 2+ bilaterally    TELEMETRY: 	    ECG:  	  RADIOLOGY:  OTHER: 	  	  LABS:	 	    CARDIAC MARKERS:                              13.2   9.25  )-----------( 179      ( 18 Nov 2020 12:45 )             40.9     11-18    138  |  103  |  24<H>  ----------------------------<  94  4.4   |  25  |  0.88    Ca    9.4      18 Nov 2020 12:45    TPro  6.6  /  Alb  4.3  /  TBili  0.8  /  DBili  x   /  AST  29  /  ALT  25  /  AlkPhos  71  11-18    proBNP: Serum Pro-Brain Natriuretic Peptide: 77380 pg/mL (11-18 @ 12:45)    Lipid Profile:   HgA1c:   TSH:   PT/INR - ( 18 Nov 2020 12:45 )   PT: 15.1 sec;   INR: 1.27 ratio         PTT - ( 18 Nov 2020 12:45 )  PTT:39.1 sec    PREVIOUS DIAGNOSTIC TESTING:    < from: 12 Lead ECG (05.02.19 @ 12:19) >  Diagnosis Line Sinus bradycardia with short TX  Left axis deviation  Right bundle branch block  Abnormal ECG  No previous ECGs available    < from: Xray Chest 1 View- PORTABLE-Urgent (Xray Chest 1 View- PORTABLE-Urgent .) (11.18.20 @ 12:14) >  The heart is enlarged. Bilateral pleural effusion. Pulmonary vascular congestion. Calcified aortic knob. No pneumothorax. No acute bony pathology could be identified.

## 2020-11-18 NOTE — H&P ADULT - PROBLEM SELECTOR PLAN 1
pt with bilateral edema, pleural effusion, pulmonary edema, and elevated pro-BNP    likely recent MI and acute onset CHF  pt started on heparin drip in ER  pt post lasix IV X1 >> continue as pt volume overloaded  Echo  Cardiology consulted   will need ischemic workup when more stabilized  monitor vitals, labs, I/O closely

## 2020-11-18 NOTE — H&P ADULT - PROBLEM SELECTOR PLAN 2
elevated troponin with CHF picture  as above  heparin drip for now  echo  cardio to aníbal gordon on tele  aspirin

## 2020-11-18 NOTE — PATIENT PROFILE ADULT - NSPROPASSIVESMOKEEXPOSURE_GEN_A_NUR
Date of Service: 04/30/2019    CHIEF COMPLAINT:  Preoperative exam.    SUBJECTIVE:  I have been asked to see the patient at the request of Dr. Arteaga for preoperative evaluation for clearance for anesthesia.    HISTORY OF PRESENT ILLNESS:   The patient is a 21-year-old male who presented as a new patient to myself back on 02/27/2019.  He had a 3-month history of reflux. He tried Omeprazole, which helped.  He then had symptoms getting worse.  We did increase his Omeprazole 20 mg twice a day.  We did put in a referral for GI.  On 03/23/2019, the patient went out to the emergency room with heartburn.  They did call GI and they were able to get him in sooner and he will now be going in for an EGD.    PAST SURGICAL HISTORY:  Tonsillectomy.    HOSPITALIZATIONS:  None.    ALLERGIES:  Environmental.    MEDICATIONS:  Omeprazole 20 mg twice a day. Reglan 10 mg 3 times a day as needed.  He takes an occasional Ibuprofen.    TOBACCO:  None.    ALCOHOL:  None.    DRUGS:  None.    SOCIAL HISTORY:  He is engaged.  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  He is working on line to get his college degree.  He is on his feet 10-12 hours a day and does lifting.    FAMILY HISTORY:  Father in his 50s.  Mother in her early 50s, both in good health.  Negative bleeding disorders, problems with anesthesia or blood clots.    REVIEW OF SYSTEMS:  GENERAL:  Denies weight loss, weight gain, fevers, chills, fatigue.   EARS:  Denies ringing in the ears or decreased hearing.    EYES:  Denies blurred vision, double vision, spots before the eyes, eye pain, loss of vision.   NOSE:  Denies sinus problems, bloody noses, runny nose.    THROAT:  Denies sore throat, difficulty swallowing, change in voice.   RESPIRATORY:  Positive for cough. Denies shortness of breath, wheezing, asthma, COPD/emphysema.    CARDIAC:  Denies chest pain, palpitations, heart attack, angina, heart murmur, swelling feet/ankles.   GASTROINTESTINAL:  Positive for  nausea, vomiting, abdominal pain, heartburn.  Denies constipation, diarrhea, hepatitis, jaundice, black or tarry stools, change in bowel habits, blood with bowel movements.   GENITOURINARY:  Denies burning on urination, blood in urine, kidney stones, urinary frequency, leaking of urine, waking up at night to urinate.   GENITAL:  Denies sexual difficulties.    MUSCULOSKELETAL:  Denies muscle ache, joint pains, joint stiffness, joint swelling, arthritis, gout.   ENDOCRINE:  Denies difficulty tolerating heat or cold, increased thirst, urinary frequency.   PSYCHOLOGIC:  Denies anxiety, depression, feeling blue, psychiatric problems, difficulty with sleep.  Denies feeling threatened at home or in any relationship.    NEUROLOGIC:  Positive for headache, lightheadedness. Denies migraines, dizziness, blackout spells.  Denies seizures, history of strokes, difficulty with memory.    ALLERGENS:  Denies hives, asthma, allergies.   HEMATOLOGIC:  Denies blood clots, easy bruising or bleeding, bleeding gums.   INTEGUMENT:  Denies rashes, moles that have changed, sores that will not heal.     PHYSICAL EXAMINATION:  VITAL SIGNS:  Blood pressure 130/70, pulse 60, respirations 16, temperature 97, O2 sat 97%.  Weight 129.5 kilograms, height 6 feet 1 inch.  GENERAL:  He appears alert, in no acute distress.  HEENT:  Normocephalic.  TMs nonerythematous.  Canals are clear.  PERRLA, EOMI. Conjunctivae anicteric.  Oropharynx nonerythematous.  Tongue and mucous membranes are pink and moist.  Lips noncyanotic.    NECK:  Trachea midline and mobile.  No thyromegaly is noted.  No cervical or supraclavicular adenopathy.  LUNGS:  Clear to auscultation and percussion, respirations unlabored.  CARDIAC:  Regular rate and rhythm, no murmur.  ABDOMEN:  Soft, nontender, no mass or hepatosplenomegaly.  Bowel sounds are normal, no bruits. Femoral pulses are 2+ bilaterally.  No inguinal adenopathy.  EXTREMITIES:  Calves nontender, no dependent edema.   Hands, no clubbing or cyanosis of fingernails noted bilaterally.  Radial pulses are 2+ bilaterally.  SKIN:  Warm and dry.    NEUROLOGIC:  Cranial nerves 2-12 grossly intact.  Deep tendon reflexes 1+ bilateral patellar tendons. He is able to get up and off the exam table on his own power.    ASSESSMENT:  1.  Preoperative exam.  2.  Heartburn.    PLAN:  At this point, he is undergoing a low risk procedure.  There are no contraindications to surgery.  I have asked him to hold his medications on the morning of surgery.      Dictated By: Jluis Olea MD  Signing Provider: Jluis Olea MD    DT/lanie ([id number])  DD: 04/30/2019 10:59:00 TD: 05/01/2019 09:01:24    Copy Sent To:     cc: Henry Arteaga MD   No

## 2020-11-18 NOTE — H&P ADULT - PROBLEM SELECTOR PLAN 5
no DVT on duplex  likely venous stasis changes on left leg and less likely cellulitis  elevation  diuresis  monitor for now

## 2020-11-18 NOTE — ED PROVIDER NOTE - NS ED ROS FT
Constitutional: No fever or chills  Eyes: No visual changes, eye pain or redness  HEENT: No throat pain, ear pain, nasal pain. No nose bleeding.  CV: No chest pain  ++LE Edema  Resp: +SOB  no cough  GI: No abd pain. No nausea or vomiting. No diarrhea. No constipation.   : No dysuria, hematuria.   MSK: No musculoskeletal pain  Skin: +rash to L leg with redness  Neuro: No headache. No numbness or tingling. No weakness.

## 2020-11-18 NOTE — CHART NOTE - NSCHARTNOTEFT_GEN_A_CORE
Notified by RN, aPTT 157.1. On heparin drip for NSTEMI  Currently, patient is alert and oriented x3, NAD, vss, no overt signs of bleeding  Per nomogram, hold heparin drip x1 hr and restart at lower rate, 7ml/hr  trend aPTT and CBC per protocol  bleeding precautions  monitor VS closely  f/u primary team    Cariot Lopez, NP  medicine  [id number]

## 2020-11-19 LAB
ALBUMIN SERPL ELPH-MCNC: 4 G/DL — SIGNIFICANT CHANGE UP (ref 3.3–5)
ALP SERPL-CCNC: 66 U/L — SIGNIFICANT CHANGE UP (ref 40–120)
ALT FLD-CCNC: 25 U/L — SIGNIFICANT CHANGE UP (ref 10–45)
ANION GAP SERPL CALC-SCNC: 12 MMOL/L — SIGNIFICANT CHANGE UP (ref 5–17)
APTT BLD: 68 SEC — HIGH (ref 27.5–35.5)
AST SERPL-CCNC: 27 U/L — SIGNIFICANT CHANGE UP (ref 10–40)
BASOPHILS # BLD AUTO: 0.04 K/UL — SIGNIFICANT CHANGE UP (ref 0–0.2)
BASOPHILS NFR BLD AUTO: 0.5 % — SIGNIFICANT CHANGE UP (ref 0–2)
BILIRUB SERPL-MCNC: 0.9 MG/DL — SIGNIFICANT CHANGE UP (ref 0.2–1.2)
BUN SERPL-MCNC: 25 MG/DL — HIGH (ref 7–23)
CALCIUM SERPL-MCNC: 9.5 MG/DL — SIGNIFICANT CHANGE UP (ref 8.4–10.5)
CHLORIDE SERPL-SCNC: 102 MMOL/L — SIGNIFICANT CHANGE UP (ref 96–108)
CHOLEST SERPL-MCNC: 106 MG/DL — SIGNIFICANT CHANGE UP
CK MB BLD-MCNC: 7.1 % — HIGH (ref 0–3.5)
CK MB CFR SERPL CALC: 8.4 NG/ML — HIGH (ref 0–6.7)
CK SERPL-CCNC: 118 U/L — SIGNIFICANT CHANGE UP (ref 30–200)
CO2 SERPL-SCNC: 25 MMOL/L — SIGNIFICANT CHANGE UP (ref 22–31)
CREAT SERPL-MCNC: 0.89 MG/DL — SIGNIFICANT CHANGE UP (ref 0.5–1.3)
EOSINOPHIL # BLD AUTO: 0.04 K/UL — SIGNIFICANT CHANGE UP (ref 0–0.5)
EOSINOPHIL NFR BLD AUTO: 0.5 % — SIGNIFICANT CHANGE UP (ref 0–6)
GLUCOSE SERPL-MCNC: 100 MG/DL — HIGH (ref 70–99)
HCT VFR BLD CALC: 40.5 % — SIGNIFICANT CHANGE UP (ref 39–50)
HDLC SERPL-MCNC: 40 MG/DL — LOW
HGB BLD-MCNC: 13.4 G/DL — SIGNIFICANT CHANGE UP (ref 13–17)
IMM GRANULOCYTES NFR BLD AUTO: 0.4 % — SIGNIFICANT CHANGE UP (ref 0–1.5)
LACTATE SERPL-SCNC: 1.4 MMOL/L — SIGNIFICANT CHANGE UP (ref 0.7–2)
LIPID PNL WITH DIRECT LDL SERPL: 51 MG/DL — SIGNIFICANT CHANGE UP
LYMPHOCYTES # BLD AUTO: 0.88 K/UL — LOW (ref 1–3.3)
LYMPHOCYTES # BLD AUTO: 10.7 % — LOW (ref 13–44)
MAGNESIUM SERPL-MCNC: 2 MG/DL — SIGNIFICANT CHANGE UP (ref 1.6–2.6)
MCHC RBC-ENTMCNC: 33.1 GM/DL — SIGNIFICANT CHANGE UP (ref 32–36)
MCHC RBC-ENTMCNC: 33.3 PG — SIGNIFICANT CHANGE UP (ref 27–34)
MCV RBC AUTO: 100.5 FL — HIGH (ref 80–100)
MONOCYTES # BLD AUTO: 0.79 K/UL — SIGNIFICANT CHANGE UP (ref 0–0.9)
MONOCYTES NFR BLD AUTO: 9.6 % — SIGNIFICANT CHANGE UP (ref 2–14)
NEUTROPHILS # BLD AUTO: 6.43 K/UL — SIGNIFICANT CHANGE UP (ref 1.8–7.4)
NEUTROPHILS NFR BLD AUTO: 78.3 % — HIGH (ref 43–77)
NON HDL CHOLESTEROL: 66 MG/DL — SIGNIFICANT CHANGE UP
NRBC # BLD: 0 /100 WBCS — SIGNIFICANT CHANGE UP (ref 0–0)
PLATELET # BLD AUTO: 160 K/UL — SIGNIFICANT CHANGE UP (ref 150–400)
POTASSIUM SERPL-MCNC: 4.1 MMOL/L — SIGNIFICANT CHANGE UP (ref 3.5–5.3)
POTASSIUM SERPL-SCNC: 4.1 MMOL/L — SIGNIFICANT CHANGE UP (ref 3.5–5.3)
PREALB SERPL-MCNC: 17 MG/DL — LOW (ref 20–40)
PROT SERPL-MCNC: 6.2 G/DL — SIGNIFICANT CHANGE UP (ref 6–8.3)
RBC # BLD: 4.03 M/UL — LOW (ref 4.2–5.8)
RBC # FLD: 15 % — HIGH (ref 10.3–14.5)
SODIUM SERPL-SCNC: 139 MMOL/L — SIGNIFICANT CHANGE UP (ref 135–145)
TRIGL SERPL-MCNC: 73 MG/DL — SIGNIFICANT CHANGE UP
TROPONIN T, HIGH SENSITIVITY RESULT: 144 NG/L — HIGH (ref 0–51)
TSH SERPL-MCNC: 1.46 UIU/ML — SIGNIFICANT CHANGE UP (ref 0.27–4.2)
TSH SERPL-MCNC: 1.51 UIU/ML — SIGNIFICANT CHANGE UP (ref 0.27–4.2)
VIT B12 SERPL-MCNC: 605 PG/ML — SIGNIFICANT CHANGE UP (ref 232–1245)
WBC # BLD: 8.21 K/UL — SIGNIFICANT CHANGE UP (ref 3.8–10.5)
WBC # FLD AUTO: 8.21 K/UL — SIGNIFICANT CHANGE UP (ref 3.8–10.5)

## 2020-11-19 PROCEDURE — 93306 TTE W/DOPPLER COMPLETE: CPT | Mod: 26

## 2020-11-19 PROCEDURE — 76376 3D RENDER W/INTRP POSTPROCES: CPT | Mod: 26

## 2020-11-19 RX ORDER — LISINOPRIL 2.5 MG/1
10 TABLET ORAL EVERY 12 HOURS
Refills: 0 | Status: DISCONTINUED | OUTPATIENT
Start: 2020-11-19 | End: 2020-11-24

## 2020-11-19 RX ORDER — METOPROLOL TARTRATE 50 MG
25 TABLET ORAL DAILY
Refills: 0 | Status: DISCONTINUED | OUTPATIENT
Start: 2020-11-19 | End: 2020-11-25

## 2020-11-19 RX ORDER — EZETIMIBE 10 MG/1
10 TABLET ORAL AT BEDTIME
Refills: 0 | Status: DISCONTINUED | OUTPATIENT
Start: 2020-11-19 | End: 2020-11-20

## 2020-11-19 RX ORDER — VANCOMYCIN HCL 1 G
1000 VIAL (EA) INTRAVENOUS EVERY 24 HOURS
Refills: 0 | Status: DISCONTINUED | OUTPATIENT
Start: 2020-11-20 | End: 2020-11-21

## 2020-11-19 RX ORDER — HEPARIN SODIUM 5000 [USP'U]/ML
5000 INJECTION INTRAVENOUS; SUBCUTANEOUS EVERY 12 HOURS
Refills: 0 | Status: DISCONTINUED | OUTPATIENT
Start: 2020-11-19 | End: 2020-11-23

## 2020-11-19 RX ADMIN — PANTOPRAZOLE SODIUM 40 MILLIGRAM(S): 20 TABLET, DELAYED RELEASE ORAL at 05:37

## 2020-11-19 RX ADMIN — LISINOPRIL 10 MILLIGRAM(S): 2.5 TABLET ORAL at 05:37

## 2020-11-19 RX ADMIN — CARVEDILOL PHOSPHATE 3.12 MILLIGRAM(S): 80 CAPSULE, EXTENDED RELEASE ORAL at 05:38

## 2020-11-19 RX ADMIN — Medication 250 MILLIGRAM(S): at 05:38

## 2020-11-19 RX ADMIN — EZETIMIBE 10 MILLIGRAM(S): 10 TABLET ORAL at 21:34

## 2020-11-19 RX ADMIN — LISINOPRIL 10 MILLIGRAM(S): 2.5 TABLET ORAL at 17:05

## 2020-11-19 RX ADMIN — Medication 81 MILLIGRAM(S): at 11:06

## 2020-11-19 RX ADMIN — Medication 20 MILLIGRAM(S): at 05:38

## 2020-11-19 RX ADMIN — HEPARIN SODIUM 5000 UNIT(S): 5000 INJECTION INTRAVENOUS; SUBCUTANEOUS at 17:05

## 2020-11-19 RX ADMIN — HEPARIN SODIUM 700 UNIT(S)/HR: 5000 INJECTION INTRAVENOUS; SUBCUTANEOUS at 07:04

## 2020-11-19 RX ADMIN — Medication 1 TABLET(S): at 11:06

## 2020-11-19 RX ADMIN — Medication 20 MILLIGRAM(S): at 17:05

## 2020-11-19 NOTE — CONSULT NOTE ADULT - ASSESSMENT
85y male hx htn, hld, hiatal hernia / GERD, OA post Lt TKR x1 year ago presenting with concern for increasing redness of left leg and swelling for 2 months. He had increased swelling of both legs for approximately 2-3 months, with  left leg getting increasingly red. He also reports increasing dyspnea for approximately 2 months as well, mostly on exertion. He was given amoxicillin a few days ago but only took one dose. He had no fevers, chills, no sick contact. He was started on vancomycin  IVPB 1000 milliGRAM(s) IV Intermittent every 12 hours, cultures are pending    PLAN:  f/u cultures  ortho eval  check procalcitonin, ESR and CRP  empiric Vanc, monitor levels 85y male hx htn, hld, hiatal hernia / GERD, OA post Lt TKR x1 year ago presenting with concern for increasing redness of left leg and swelling for 2 months. He had increased swelling of both legs for approximately 2-3 months, with  left leg getting increasingly red. He also reports increasing dyspnea for approximately 2 months as well, mostly on exertion. He was given amoxicillin a few days ago but only took one dose. He had no fevers, chills, no sick contact. He was started on vancomycin  IVPB 1000 milliGRAM(s) IV Intermittent every 12 hours, cultures are pending  He also put some cream and redness appear after cream  possible cellulitis, but no systemic signs or symptoms of infection. Allergic contact dermatitis is in differential as well  Prosthetic joint infection L TKR very unlikely, since he has no pain and good ROM and ambulates without difficulties     PLAN:  f/u cultures  check procalcitonin, ESR and CRP  empiric Vanc for now, monitor levels  Keep legs elevated, diuresis

## 2020-11-19 NOTE — CHART NOTE - NSCHARTNOTEFT_GEN_A_CORE
As per patient and patient's wife, patient has intolerance of Lipitor with symptoms of "Severe constipation and Dizziness". Indication for Lipitor explained by myself and Pharmacist but patient's wife is still adamantly refusing Lipitor for the side effect.  Cards made aware. Discussed with Dr. Phipps, switch back to home med Zetia for now. Will discuss with Cards re: HLD med options for better cardiac management. HD stable    Floresita Harrison PA-C

## 2020-11-19 NOTE — CONSULT NOTE ADULT - SUBJECTIVE AND OBJECTIVE BOX
85y male hx htn, hld, hiatal hernia / GERD, OA post Lt TKR x1 year ago presenting with concern for increasing redness of left leg and swelling for 2 months. He had increased swelling of both legs for approximately 2-3 months, with  left leg getting increasingly red. He also reports increasing dyspnea for approximately 2 months as well, mostly on exertion. He was given amoxicillin a few days ago but only took one dose. He had no fevers, chills, no sick contact. He was started on vancomycin  IVPB 1000 milliGRAM(s) IV Intermittent every 12 hours, cultures are pending      REVIEW OF SYSTEMS:  All other review of systems negative (Comprehensive ROS)    PAST MEDICAL & SURGICAL HISTORY:  Mitral prolapse    Hypercholesteremia    Hypertension    History of hernia repair        Allergies    No Known Allergies    Intolerances        Antimicrobials Day #    vancomycin  IVPB 1000 milliGRAM(s) IV Intermittent every 12 hours    Other Medications:  aspirin enteric coated 81 milliGRAM(s) Oral daily  atorvastatin 80 milliGRAM(s) Oral at bedtime  furosemide   Injectable 20 milliGRAM(s) IV Push every 12 hours  heparin   Injectable 5000 Unit(s) SubCutaneous every 12 hours  lisinopril 10 milliGRAM(s) Oral every 12 hours  metoprolol succinate ER 25 milliGRAM(s) Oral daily  multivitamin 1 Tablet(s) Oral daily  pantoprazole    Tablet 40 milliGRAM(s) Oral before breakfast      FAMILY HISTORY:      SOCIAL HISTORY:  Smoking:     ETOH:     Drug Use:     Single     T(F): 97.6 (11-19-20 @ 11:43), Max: 98.1 (11-18-20 @ 16:36)  HR: 55 (11-19-20 @ 11:43)  BP: 123/74 (11-19-20 @ 11:43)  RR: 16 (11-19-20 @ 11:43)  SpO2: 97% (11-19-20 @ 11:43)  Wt(kg): --    PHYSICAL EXAM:  General: alert, no acute distress  Eyes:  anicteric, no conjunctival injection, no discharge  Oropharynx: no lesions or injection 	  Neck: supple, without adenopathy  Lungs: clear to auscultation  Heart: regular rate and rhythm; no murmur, rubs or gallops  Abdomen: soft, nondistended, nontender, without mass or organomegaly  Skin: no lesions  Extremities: no clubbing, cyanosis, or edema  Neurologic: alert, oriented, moves all extremities    LAB RESULTS:                        13.4   8.21  )-----------( 160      ( 19 Nov 2020 05:42 )             40.5     11-19    139  |  102  |  25<H>  ----------------------------<  100<H>  4.1   |  25  |  0.89    Ca    9.5      19 Nov 2020 05:42  Mg     2.0     11-19    TPro  6.2  /  Alb  4.0  /  TBili  0.9  /  DBili  x   /  AST  27  /  ALT  25  /  AlkPhos  66  11-19    LIVER FUNCTIONS - ( 19 Nov 2020 05:42 )  Alb: 4.0 g/dL / Pro: 6.2 g/dL / ALK PHOS: 66 U/L / ALT: 25 U/L / AST: 27 U/L / GGT: x               MICROBIOLOGY REVIEWED:    RADIOLOGY REVIEWED:  < from: VA Duplex Low Ext Arterial, Ltd, Left (11.18.20 @ 15:04) >  Impression:    Discrete left lower extremity arterial vascular disease is not identified.    < end of copied text >  < from: VA Duplex Lower Ext Vein Scan, Bilat (11.18.20 @ 15:01) >    IMPRESSION:  No evidence of deep venous thrombosis in either lower extremity.    < end of copied text >  < from: Xray Chest 1 View- PORTABLE-Urgent (Xray Chest 1 View- PORTABLE-Urgent .) (11.18.20 @ 12:14) >  Impression:    The heart is enlarged. Bilateral pleural effusion. Pulmonary vascular congestion. Calcified aortic knob. No pneumothorax. No acute bony pathology could be identified.    < end of copied text >  < from: Xray Knee 3 Views, Left (05.22.19 @ 13:47) >  A total left knee replacement is identified, in place. Vascular   calcifications present, posterior joint compartment. Suggest follow up   left knee radiographs when clinically warranted.    < end of copied text >   85y male hx htn, hld, hiatal hernia / GERD, OA post Lt TKR x1 year ago presenting with concern for increasing redness of left leg and swelling for 2 months. He had increased swelling of both legs for approximately 2-3 months, with  left leg getting increasingly red. He also reports increasing dyspnea for approximately 2 months as well, mostly on exertion. He was given amoxicillin a few days ago but only took one dose. He had no fevers, chills, no sick contact. He was started on vancomycin  IVPB 1000 milliGRAM(s) IV Intermittent every 12 hours, cultures are pending. Pt also states that he put some cream on a leg and then rash appeared. He is able to ambulate and has no pain in his L knee      REVIEW OF SYSTEMS:  All other review of systems negative (Comprehensive ROS)    PAST MEDICAL & SURGICAL HISTORY:  Mitral prolapse    Hypercholesteremia    Hypertension    History of hernia repair        Allergies    No Known Allergies    Intolerances        Antimicrobials Day #    vancomycin  IVPB 1000 milliGRAM(s) IV Intermittent every 12 hours    Other Medications:  aspirin enteric coated 81 milliGRAM(s) Oral daily  atorvastatin 80 milliGRAM(s) Oral at bedtime  furosemide   Injectable 20 milliGRAM(s) IV Push every 12 hours  heparin   Injectable 5000 Unit(s) SubCutaneous every 12 hours  lisinopril 10 milliGRAM(s) Oral every 12 hours  metoprolol succinate ER 25 milliGRAM(s) Oral daily  multivitamin 1 Tablet(s) Oral daily  pantoprazole    Tablet 40 milliGRAM(s) Oral before breakfast      FAMILY HISTORY:      SOCIAL HISTORY:  Smoking:     ETOH:     Drug Use:     Single     T(F): 97.6 (11-19-20 @ 11:43), Max: 98.1 (11-18-20 @ 16:36)  HR: 55 (11-19-20 @ 11:43)  BP: 123/74 (11-19-20 @ 11:43)  RR: 16 (11-19-20 @ 11:43)  SpO2: 97% (11-19-20 @ 11:43)  Wt(kg): --    PHYSICAL EXAM:  General: alert, no acute distress  Eyes:  anicteric, no conjunctival injection, no discharge  Oropharynx: no lesions or injection 	  Neck: supple, without adenopathy  Lungs: clear to auscultation  Heart: regular rate and rhythm; no murmur, rubs or gallops  Abdomen: soft, nondistended, nontender, without mass or organomegaly  Skin: no lesions  Extremities: b/l LE edema, Left leg with irregular borders with some sharp demarkation  good ROM in L TKR, no tenderness  Neurologic: alert, oriented, moves all extremities    LAB RESULTS:                        13.4   8.21  )-----------( 160      ( 19 Nov 2020 05:42 )             40.5     11-19    139  |  102  |  25<H>  ----------------------------<  100<H>  4.1   |  25  |  0.89    Ca    9.5      19 Nov 2020 05:42  Mg     2.0     11-19    TPro  6.2  /  Alb  4.0  /  TBili  0.9  /  DBili  x   /  AST  27  /  ALT  25  /  AlkPhos  66  11-19    LIVER FUNCTIONS - ( 19 Nov 2020 05:42 )  Alb: 4.0 g/dL / Pro: 6.2 g/dL / ALK PHOS: 66 U/L / ALT: 25 U/L / AST: 27 U/L / GGT: x               MICROBIOLOGY REVIEWED:    RADIOLOGY REVIEWED:  < from: VA Duplex Low Ext Arterial, Ltd, Left (11.18.20 @ 15:04) >  Impression:    Discrete left lower extremity arterial vascular disease is not identified.    < end of copied text >  < from: VA Duplex Lower Ext Vein Scan, Bilat (11.18.20 @ 15:01) >    IMPRESSION:  No evidence of deep venous thrombosis in either lower extremity.    < end of copied text >  < from: Xray Chest 1 View- PORTABLE-Urgent (Xray Chest 1 View- PORTABLE-Urgent .) (11.18.20 @ 12:14) >  Impression:    The heart is enlarged. Bilateral pleural effusion. Pulmonary vascular congestion. Calcified aortic knob. No pneumothorax. No acute bony pathology could be identified.    < end of copied text >  < from: Xray Knee 3 Views, Left (05.22.19 @ 13:47) >  A total left knee replacement is identified, in place. Vascular   calcifications present, posterior joint compartment. Suggest follow up   left knee radiographs when clinically warranted.    < end of copied text >

## 2020-11-19 NOTE — PROGRESS NOTE ADULT - ASSESSMENT
_________________________________________________________________________________________  ========>>  M E D I C A L   A T T E N D I N G    F O L L O W  U P  N O T E  <<=========  -----------------------------------------------------------------------------------------------------    - Patient seen and examined by me earlier today. ( late entry note)   - In summary,  MARCOS AGUILAR is a 85y year old man who originally presented with edema   - Patient today overall doing ok, comfortable, eating OK.  edema and SOB much improved     ==================>> REVIEW OF SYSTEM <<=================    GEN: no fever, no chills, no pain  RESP: no SOB, no cough, no sputum  CVS: no chest pain, no palpitations, edema better   GI: no abdominal pain, no nausea, no constipation, no diarrhea  : no dysuria, no frequency, no hematuria  Neuro: no headache, no dizziness  Derm : no itching, no rash    ==================>> PHYSICAL EXAM <<=================    GEN: A&O X 3 , NAD , comfortable  HEENT: NCAT, PERRL, MMM, hearing intact  Neck: supple , no JVD appreciated  CVS: S1S2 , regular , No M/R/G appreciated  PULM: CTA B/L,  no W/R/R appreciated  ABD.: soft. non tender, non distended,  bowel sounds present  Extrem: intact pulses , edema and erythema ( stasis changes) of b/L LE improved   PSYCH : normal mood,  not anxious      ==================>> MEDICATIONS <<====================    MEDICATIONS  (STANDING):  aspirin enteric coated 81 milliGRAM(s) Oral daily  ezetimibe 10 milliGRAM(s) Oral at bedtime  furosemide   Injectable 20 milliGRAM(s) IV Push every 12 hours  heparin   Injectable 5000 Unit(s) SubCutaneous every 12 hours  lisinopril 10 milliGRAM(s) Oral every 12 hours  metoprolol succinate ER 25 milliGRAM(s) Oral daily  multivitamin 1 Tablet(s) Oral daily  pantoprazole    Tablet 40 milliGRAM(s) Oral before breakfast    __________  Active diet:  Diet, Regular:   DASH/TLC Sodium & Cholesterol Restricted (DASH)  Low Sodium  ___________________    ==================>> VITAL SIGNS <<==================    T(C): 36.8 (11-19-20 @ 19:55), Max: 36.8 (11-19-20 @ 19:55)  HR: 65 (11-19-20 @ 19:55) (55 - 80)  BP: 153/78 (11-19-20 @ 19:55) (121/71 - 166/70)  RR: 18 (11-19-20 @ 19:55) (16 - 18)  SpO2: 98% (11-19-20 @ 19:55) (96% - 98%)     I&O's Summary    18 Nov 2020 07:01  -  19 Nov 2020 07:00  --------------------------------------------------------  IN: 0 mL / OUT: 1620 mL / NET: -1620 mL    19 Nov 2020 07:01  -  19 Nov 2020 20:59  --------------------------------------------------------  IN: 600 mL / OUT: 1050 mL / NET: -450 mL       ==================>> LAB AND IMAGING <<==================                        13.4   8.21  )-----------( 160      ( 19 Nov 2020 05:42 )             40.5        11-19    139  |  102  |  25<H>  ----------------------------<  100<H>  4.1   |  25  |  0.89    Ca    9.5      19 Nov 2020 05:42  Mg     2.0     11-19    TPro  6.2  /  Alb  4.0  /  TBili  0.9  /  DBili  x   /  AST  27  /  ALT  25  /  AlkPhos  66  11-19    PT/INR - ( 18 Nov 2020 12:45 )   PT: 15.1 sec;   INR: 1.27 ratio    PTT - ( 19 Nov 2020 06:01 )  PTT:68.0 sec              ~~ High Sensitivity Troponin  ~~  144  <<--, 140  <<--, 134  <<--    TSH:      1.46   (11-19-20)       ,     1.51   (11-19-20)           Lipid profile:  (11-19-20)     Total: 106     LDL  : (p)     HDL  :40     TG   :73       < from: TTE with Doppler (w/3D Echo) (Transthoracic Echocardiogram) (11.19.20 @ 08:27) >  Conclusions:  Normal left ventricular systolic function. No segmental  wall motion abnormalities.  Mild-moderate aortic stenosis.  Left ventricular filling pressure is severely elevated.  Severe pulmonary hypertension.  ------------------------------------------------------------------------  Confirmed on  11/19/2020 - 10:35:23 by Tulio Merrill MD,  [facility]  < end of copied text >    ___________________________________________________________________________________  ===============>>  A S S E S S M E N T   A N D   P L A N <<===============  ------------------------------------------------------------------------------------------      pt is an 84 y/o man with pmhx of htn, hld, hiatal hernia / GERD, OA post Lt TKR x1 year ago presenting with concern for increasing redness of left leg and swelling for 2 months.     Problem/Plan - 1:  ·  Problem: pt likely with acute exacerbation of chronic diastolic congestive heart failure     pt with bilateral edema, pleural effusion, pulmonary edema, and elevated pro-BNP     pt with also severe pulmonary HTN and aortic stenosis  above     ? recent MI   pt started on heparin drip in ER  pt on lasix   Echo as above  further med optimization and ischemic workup asper cardio   Cardiology appreciated   monitor vitals, labs, I/O closely.   leg elevation     Problem/Plan - 2:  ·  Problem: NSTEMI     elevated troponin with CHF picture  as above  monitor on tele  aspirin.   otherwise as above     Problem/Plan - 3:  ·  Problem: HTN / HLD  Continue Current medications otherwise and monitor.   cardio f/u. and further optimization  pt and wife adamant against statins due to intolorance ( no allergic reaction ) >  pharmacy added Zetia !     Problem/Plan - 4:  ·  Problem: Venous stasis.    no DVT on duplex  likely venous stasis changes on left leg and less likely cellulitis  pt started on empiric abx for now   elevation  diuresis  ID consulted and appreciated     GI / DVT PPX     --------------------------------------------  Case discussed with Pt, NP, ..   Education given on findings and plan of care  ___________________________  H. INEZ Phipps.  Pager: [phone]

## 2020-11-19 NOTE — PROGRESS NOTE ADULT - SUBJECTIVE AND OBJECTIVE BOX
CARDIOLOGY     PROGRESS  NOTE   ________________________________________________    CHIEF COMPLAINT:Patient is a 85y old  Male who presents with a chief complaint of edema (18 Nov 2020 18:32)  no complain.  	  REVIEW OF SYSTEMS:  CONSTITUTIONAL: No fever, weight loss, or fatigue  EYES: No eye pain, visual disturbances, or discharge  ENT:  No difficulty hearing, tinnitus, vertigo; No sinus or throat pain  NECK: No pain or stiffness  RESPIRATORY: No cough, wheezing, chills or hemoptysis; No Shortness of Breath  CARDIOVASCULAR: No chest pain, palpitations, passing out, dizziness, or leg swelling  GASTROINTESTINAL: No abdominal or epigastric pain. No nausea, vomiting, or hematemesis; No diarrhea or constipation. No melena or hematochezia.  GENITOURINARY: No dysuria, frequency, hematuria, or incontinence  NEUROLOGICAL: No headaches, memory loss, loss of strength, numbness, or tremors  SKIN: No itching, burning, rashes, or lesions   LYMPH Nodes: No enlarged glands  ENDOCRINE: No heat or cold intolerance; No hair loss  MUSCULOSKELETAL: No joint pain or swelling; No muscle, back, or extremity pain  PSYCHIATRIC: No depression, anxiety, mood swings, or difficulty sleeping  HEME/LYMPH: No easy bruising, or bleeding gums  ALLERGY AND IMMUNOLOGIC: No hives or eczema	    [ ] All others negative	  [ ] Unable to obtain    PHYSICAL EXAM:  T(C): 36.6 (11-19-20 @ 05:21), Max: 36.7 (11-18-20 @ 16:36)  HR: 80 (11-19-20 @ 05:21) (55 - 80)  BP: 143/78 (11-19-20 @ 05:21) (143/78 - 167/76)  RR: 16 (11-19-20 @ 05:21) (16 - 18)  SpO2: 96% (11-19-20 @ 05:21) (96% - 100%)  Wt(kg): --  I&O's Summary    18 Nov 2020 07:01  -  19 Nov 2020 07:00  --------------------------------------------------------  IN: 0 mL / OUT: 1620 mL / NET: -1620 mL        Appearance: Normal	  HEENT:   Normal oral mucosa, PERRL, EOMI	  Lymphatic: No lymphadenopathy  Cardiovascular: Normal S1 S2, No JVD, + murmurs, No edema  Respiratory: Lungs clear to auscultation	  Psychiatry: A & O x 3, Mood & affect appropriate  Gastrointestinal:  Soft, Non-tender, + BS	  Skin: No rashes, No ecchymoses, No cyanosis	  Neurologic: Non-focal  Extremities: Normal range of motion, No clubbing, cyanosis. +  Vascular: Peripheral pulses palpable 2+ bilaterally    MEDICATIONS  (STANDING):  aspirin enteric coated 81 milliGRAM(s) Oral daily  atorvastatin 80 milliGRAM(s) Oral at bedtime  carvedilol 3.125 milliGRAM(s) Oral every 12 hours  furosemide   Injectable 20 milliGRAM(s) IV Push every 12 hours  heparin  Infusion.  Unit(s)/Hr (9.5 mL/Hr) IV Continuous <Continuous>  lisinopril 10 milliGRAM(s) Oral daily  multivitamin 1 Tablet(s) Oral daily  pantoprazole    Tablet 40 milliGRAM(s) Oral before breakfast  vancomycin  IVPB 1000 milliGRAM(s) IV Intermittent every 12 hours      TELEMETRY: 	    ECG:  	  RADIOLOGY:  OTHER: 	  	  LABS:	 	    CARDIAC MARKERS:  CARDIAC MARKERS ( 19 Nov 2020 05:42 )  x     / x     / 118 U/L / x     / 8.4 ng/mL                                13.4   8.21  )-----------( 160      ( 19 Nov 2020 05:42 )             40.5     11-19    139  |  102  |  25<H>  ----------------------------<  100<H>  4.1   |  25  |  0.89    Ca    9.5      19 Nov 2020 05:42  Mg     2.0     11-19    TPro  6.2  /  Alb  4.0  /  TBili  0.9  /  DBili  x   /  AST  27  /  ALT  25  /  AlkPhos  66  11-19    proBNP: Serum Pro-Brain Natriuretic Peptide: 90596 pg/mL (11-18 @ 12:45)    Lipid Profile:   HgA1c:   TSH:   PT/INR - ( 18 Nov 2020 12:45 )   PT: 15.1 sec;   INR: 1.27 ratio         PTT - ( 19 Nov 2020 06:01 )  PTT:68.0 sec      < from: VA Duplex Lower Ext Vein Scan, Bilat (11.18.20 @ 15:01) >  No evidence of deep venous thrombosis in either lower extremity.      Assessment and plan  ---------------------------  pt is an 86 y/o man with pmhx of htn, hld, hiatal hernia / GERD, OA post Lt TKR x1 year ago presenting with concern for increasing redness of left leg and swelling for 2 months.   Patient reports that he has been having increasing swelling of both of his legs for approximately 2-3 months; however, the left leg has been more swollen and has been getting increasingly more red. He states that he went to his podiatrist when he noted some rash on the lower portion of his left just above his foot, was given a 'cream of some kind, and feels that the redness and swelling got worse after starting the cream.'   He states that he was having chest pain approximately 1 mo ago, went to his PMD and was diagnosed with a hiatal hernia with an xray and prescribed omeprazole.   Patient reports that he has been becoming increasingly more short of breath for approximately 2 months as well. Feels that when he takes just a few steps he has to stop and catch his breath, and is so short of breath that he cannot speak in complete sentences without resting for a period of time first.   pt also states was given amoxicillin a few days ago but only took one dose  He denies any fevers, chills, cough, nausea, vomiting, sweating, chest pain  pt admitted for likely acute CHF with elevated troponin / NSTEMI   chf acute on ?chronic  lasix iv  tele  echo  cardiac enzymes  asa daily  needs ischemia work up  tsh  lipid/ start on Lipitor 80 mg qhs  coreg , add ace or ARB  fu renal function  ?severe cellulitis of the LLE / mild RLe, iv abx ?id eval, check cultures  check doppler  may consider ortho eval s/p l knee replacement on the same leg last year  bradycardia, dc coreg start metoprolol er 25 mg daily

## 2020-11-20 LAB
CRP SERPL-MCNC: 0.38 MG/DL — SIGNIFICANT CHANGE UP (ref 0–0.4)
ERYTHROCYTE [SEDIMENTATION RATE] IN BLOOD: 3 MM/HR — SIGNIFICANT CHANGE UP (ref 0–20)
HCT VFR BLD CALC: 41.5 % — SIGNIFICANT CHANGE UP (ref 39–50)
HGB BLD-MCNC: 13.5 G/DL — SIGNIFICANT CHANGE UP (ref 13–17)
MCHC RBC-ENTMCNC: 32.5 GM/DL — SIGNIFICANT CHANGE UP (ref 32–36)
MCHC RBC-ENTMCNC: 33.1 PG — SIGNIFICANT CHANGE UP (ref 27–34)
MCV RBC AUTO: 101.7 FL — HIGH (ref 80–100)
NRBC # BLD: 0 /100 WBCS — SIGNIFICANT CHANGE UP (ref 0–0)
PLATELET # BLD AUTO: 123 K/UL — LOW (ref 150–400)
PROCALCITONIN SERPL-MCNC: 0.07 NG/ML — SIGNIFICANT CHANGE UP (ref 0.02–0.1)
RBC # BLD: 4.08 M/UL — LOW (ref 4.2–5.8)
RBC # FLD: 14.8 % — HIGH (ref 10.3–14.5)
WBC # BLD: 8.66 K/UL — SIGNIFICANT CHANGE UP (ref 3.8–10.5)
WBC # FLD AUTO: 8.66 K/UL — SIGNIFICANT CHANGE UP (ref 3.8–10.5)

## 2020-11-20 PROCEDURE — 71275 CT ANGIOGRAPHY CHEST: CPT | Mod: 26

## 2020-11-20 RX ORDER — ROSUVASTATIN CALCIUM 5 MG/1
20 TABLET ORAL AT BEDTIME
Refills: 0 | Status: DISCONTINUED | OUTPATIENT
Start: 2020-11-20 | End: 2020-11-25

## 2020-11-20 RX ADMIN — PANTOPRAZOLE SODIUM 40 MILLIGRAM(S): 20 TABLET, DELAYED RELEASE ORAL at 06:47

## 2020-11-20 RX ADMIN — HEPARIN SODIUM 5000 UNIT(S): 5000 INJECTION INTRAVENOUS; SUBCUTANEOUS at 06:47

## 2020-11-20 RX ADMIN — Medication 250 MILLIGRAM(S): at 06:47

## 2020-11-20 RX ADMIN — HEPARIN SODIUM 5000 UNIT(S): 5000 INJECTION INTRAVENOUS; SUBCUTANEOUS at 18:09

## 2020-11-20 RX ADMIN — Medication 1 TABLET(S): at 18:10

## 2020-11-20 RX ADMIN — LISINOPRIL 10 MILLIGRAM(S): 2.5 TABLET ORAL at 18:10

## 2020-11-20 RX ADMIN — ROSUVASTATIN CALCIUM 20 MILLIGRAM(S): 5 TABLET ORAL at 21:46

## 2020-11-20 RX ADMIN — Medication 20 MILLIGRAM(S): at 18:10

## 2020-11-20 RX ADMIN — LISINOPRIL 10 MILLIGRAM(S): 2.5 TABLET ORAL at 06:47

## 2020-11-20 RX ADMIN — Medication 20 MILLIGRAM(S): at 06:47

## 2020-11-20 RX ADMIN — Medication 81 MILLIGRAM(S): at 18:10

## 2020-11-20 RX ADMIN — Medication 25 MILLIGRAM(S): at 06:47

## 2020-11-20 NOTE — PROGRESS NOTE ADULT - SUBJECTIVE AND OBJECTIVE BOX
CARDIOLOGY     PROGRESS  NOTE   ________________________________________________    CHIEF COMPLAINT:Patient is a 85y old  Male who presents with a chief complaint of edema (19 Nov 2020 20:58)  doing better.  	  REVIEW OF SYSTEMS:  CONSTITUTIONAL: No fever, weight loss, or fatigue  EYES: No eye pain, visual disturbances, or discharge  ENT:  No difficulty hearing, tinnitus, vertigo; No sinus or throat pain  NECK: No pain or stiffness  RESPIRATORY: No cough, wheezing, chills or hemoptysis; No Shortness of Breath  CARDIOVASCULAR: No chest pain, palpitations, passing out, dizziness, or leg swelling  GASTROINTESTINAL: No abdominal or epigastric pain. No nausea, vomiting, or hematemesis; No diarrhea or constipation. No melena or hematochezia.  GENITOURINARY: No dysuria, frequency, hematuria, or incontinence  NEUROLOGICAL: No headaches, memory loss, loss of strength, numbness, or tremors  SKIN: No itching, burning, rashes, or lesions   LYMPH Nodes: No enlarged glands  ENDOCRINE: No heat or cold intolerance; No hair loss  MUSCULOSKELETAL: No joint pain or swelling; No muscle, back, or extremity pain  PSYCHIATRIC: No depression, anxiety, mood swings, or difficulty sleeping  HEME/LYMPH: No easy bruising, or bleeding gums  ALLERGY AND IMMUNOLOGIC: No hives or eczema	    [ ] All others negative	  [ ] Unable to obtain    PHYSICAL EXAM:  T(C): 36.5 (11-20-20 @ 05:01), Max: 36.8 (11-19-20 @ 19:55)  HR: 60 (11-20-20 @ 06:44) (55 - 65)  BP: 145/81 (11-20-20 @ 06:44) (121/71 - 153/78)  RR: 18 (11-20-20 @ 06:44) (16 - 18)  SpO2: 98% (11-20-20 @ 06:44) (97% - 100%)  Wt(kg): --  I&O's Summary    19 Nov 2020 07:01  -  20 Nov 2020 07:00  --------------------------------------------------------  IN: 600 mL / OUT: 1650 mL / NET: -1050 mL    20 Nov 2020 07:01  -  20 Nov 2020 07:51  --------------------------------------------------------  IN: 0 mL / OUT: 350 mL / NET: -350 mL        Appearance: Normal	  HEENT:   Normal oral mucosa, PERRL, EOMI	  Lymphatic: No lymphadenopathy  Cardiovascular: Normal S1 S2, No JVD, + murmurs, No edema  Respiratory: Lungs clear to auscultation	  Psychiatry: A & O x 3, Mood & affect appropriate  Gastrointestinal:  Soft, Non-tender, + BS	  Skin: No rashes, No ecchymoses, No cyanosis	  Neurologic: Non-focal  Extremities: Normal range of motion, No clubbing, cyanosis or edema  Vascular: Peripheral pulses palpable 2+ bilaterally    MEDICATIONS  (STANDING):  aspirin enteric coated 81 milliGRAM(s) Oral daily  ezetimibe 10 milliGRAM(s) Oral at bedtime  furosemide   Injectable 20 milliGRAM(s) IV Push every 12 hours  heparin   Injectable 5000 Unit(s) SubCutaneous every 12 hours  lisinopril 10 milliGRAM(s) Oral every 12 hours  metoprolol succinate ER 25 milliGRAM(s) Oral daily  multivitamin 1 Tablet(s) Oral daily  pantoprazole    Tablet 40 milliGRAM(s) Oral before breakfast  vancomycin  IVPB 1000 milliGRAM(s) IV Intermittent every 24 hours      TELEMETRY: 	    ECG:  	  RADIOLOGY:  OTHER: 	  	  LABS:	 	    CARDIAC MARKERS:  CARDIAC MARKERS ( 19 Nov 2020 05:42 )  x     / x     / 118 U/L / x     / 8.4 ng/mL                                13.5   8.66  )-----------( 123      ( 20 Nov 2020 06:41 )             41.5     11-19    139  |  102  |  25<H>  ----------------------------<  100<H>  4.1   |  25  |  0.89    Ca    9.5      19 Nov 2020 05:42  Mg     2.0     11-19    TPro  6.2  /  Alb  4.0  /  TBili  0.9  /  DBili  x   /  AST  27  /  ALT  25  /  AlkPhos  66  11-19    proBNP: Serum Pro-Brain Natriuretic Peptide: 04659 pg/mL (11-18 @ 12:45)    Lipid Profile: Cholesterol 106  LDL --  HDL 40  TG 73    HgA1c:   TSH: Thyroid Stimulating Hormone, Serum: 1.46 uIU/mL (11-19 @ 09:24)  Thyroid Stimulating Hormone, Serum: 1.51 uIU/mL (11-19 @ 08:41)    PT/INR - ( 18 Nov 2020 12:45 )   PT: 15.1 sec;   INR: 1.27 ratio         PTT - ( 19 Nov 2020 06:01 )  PTT:68.0 sec    < from: TTE with Doppler (w/3D Echo) (Transthoracic Echocardiogram) (11.19.20 @ 08:27) >  Mitral Valve: Mitral annular, leaflet, and chordal  calcification.  Mild-moderate mitral regurgitation directed  posterolaterally.  Aortic Valve/Aorta: Mild-moderate aortic stenosis.  --LVOTd 2.1 cm. LVOT TVI 24.8 cm. DI = 0.39.  --Mean aortic valve gradient 16.3 mmHg. Aortic valve TVI  63.2 cm.  --Aortic valve area by continuity 1.4 cm2.  Normal aortic root size.  Left Atrium: Severely dilated left atrium.  Left Ventricle: Normal left ventricular internal dimensions  and wall thicknesses.  Normal left ventricular systolic function. No segmental  wall motion abnormalities.  There is a false tendon in the  LV cavity(normal variant).  Left ventricular filling pressure is severely elevated.  Right Heart: Normal right atrium. Normal right ventricular  size and function.  Normal tricuspid valve. Mild tricuspid regurgitation.  Normal pulmonic valve. Mild pulmonic regurgitation.  Pericardium/Pleura: Normal pericardium with trace  pericardial effusion.  Bilateral pleural effusions.  Hemodynamic: Estimated right atrial pressure is severely  elevated.  Severe pulmonary hypertension. Estimated PASP 85 mmHg.  ------------------------------------------------------------------------  Conclusions:  Normal left ventricular systolic function. No segmental  wall motion abnormalities.  Mild-moderate aortic stenosis.  Left ventricular filling pressure is severely elevated.  Severe pulmonary hypertension.    f/u cultures  check procalcitonin, ESR and CRP  empiric Vanc for now, monitor levels  Keep legs elevated, diuresis    Assessment and plan  ---------------------------  pt is an 84 y/o man with pmhx of htn, hld, hiatal hernia / GERD, OA post Lt TKR x1 year ago presenting with concern for increasing redness of left leg and swelling for 2 months.   Patient reports that he has been having increasing swelling of both of his legs for approximately 2-3 months; however, the left leg has been more swollen and has been getting increasingly more red. He states that he went to his podiatrist when he noted some rash on the lower portion of his left just above his foot, was given a 'cream of some kind, and feels that the redness and swelling got worse after starting the cream.'   He states that he was having chest pain approximately 1 mo ago, went to his PMD and was diagnosed with a hiatal hernia with an xray and prescribed omeprazole.   Patient reports that he has been becoming increasingly more short of breath for approximately 2 months as well. Feels that when he takes just a few steps he has to stop and catch his breath, and is so short of breath that he cannot speak in complete sentences without resting for a period of time first.   pt also states was given amoxicillin a few days ago but only took one dose  He denies any fevers, chills, cough, nausea, vomiting, sweating, chest pain  pt admitted for likely acute CHF with elevated troponin / NSTEMI   chf acute on ?chronic  lasix iv  tele  echo noted severe pulmonary HTN  ?etiology  check cta of chest r/o PE  asa daily  needs ischemia work up  tsh  lipid/ start on Lipitor 80 mg qhs  coreg , add ace or ARB  fu renal function  ?severe cellulitis of the LLE / mild RLe, iv abx ?id eval noted, check cultures  may consider ortho eval s/p l knee replacement on the same leg last year  bradycardia, dc coreg start metoprolol er 25 mg daily  ?adding calcium channel blocker  abx as per ID

## 2020-11-20 NOTE — PROGRESS NOTE ADULT - ASSESSMENT
_________________________________________________________________________________________  ========>>  M E D I C A L   A T T E N D I N G    F O L L O W  U P  N O T E  <<=========  -----------------------------------------------------------------------------------------------------    - Patient seen and examined by me earlier today.   - In summary,  MARCOS AGUILAR is a 85y year old man who originally presented with edema   - Patient today overall doing ok, comfortable, eating OK.  edema and SOB much improved     ==================>> REVIEW OF SYSTEM <<=================    GEN: no fever, no chills, no pain  RESP: no SOB, no cough, no sputum  CVS: no chest pain, no palpitations, edema better   GI: no abdominal pain, no nausea, no constipation, no diarrhea  : no dysuria, no frequency, no hematuria  Neuro: no headache, no dizziness  Derm : no itching, no rash    ==================>> PHYSICAL EXAM <<=================    GEN: A&O X 3 , NAD , comfortable  HEENT: NCAT, PERRL, MMM, hearing intact  Neck: supple , no JVD appreciated  CVS: S1S2 , regular , No M/R/G appreciated  PULM: CTA B/L,  no W/R/R appreciated  ABD.: soft. non tender, non distended,  bowel sounds present  Extrem: intact pulses , edema and erythema ( stasis changes) of b/L LE improved Lt>Rt  PSYCH : normal mood,  not anxious      ==================>> MEDICATIONS <<====================    aspirin enteric coated 81 milliGRAM(s) Oral daily  furosemide   Injectable 20 milliGRAM(s) IV Push every 12 hours  heparin   Injectable 5000 Unit(s) SubCutaneous every 12 hours  lisinopril 10 milliGRAM(s) Oral every 12 hours  metoprolol succinate ER 25 milliGRAM(s) Oral daily  multivitamin 1 Tablet(s) Oral daily  pantoprazole    Tablet 40 milliGRAM(s) Oral before breakfast  rosuvastatin 20 milliGRAM(s) Oral at bedtime  vancomycin  IVPB 1000 milliGRAM(s) IV Intermittent every 24 hours    ___________  Active diet:  Diet, Regular:   DASH/TLC Sodium & Cholesterol Restricted (DASH)  Low Sodium  ___________________    ==================>> VITAL SIGNS <<==================    Height (cm): 160  Weight (kg): 78.6  BMI (kg/m2): 30.7  Vital Signs Last 24 HrsT(C): 36.4 (11-20-20 @ 13:59)  T(F): 97.5 (11-20-20 @ 13:59), Max: 98.2 (11-19-20 @ 19:55)  HR: 59 (11-20-20 @ 13:59) (57 - 65)  BP: 120/74 (11-20-20 @ 13:59)  RR: 17 (11-20-20 @ 13:59) (17 - 18)  SpO2: 99% (11-20-20 @ 13:59) (98% - 100%)       ==================>> LAB AND IMAGING <<==================                        13.5   8.66  )-----------( 123      ( 20 Nov 2020 06:41 )             41.5        11-19    139  |  102  |  25<H>  ----------------------------<  100<H>  4.1   |  25  |  0.89    Ca    9.5      19 Nov 2020 05:42  Mg     2.0     11-19    TPro  6.2  /  Alb  4.0  /  TBili  0.9  /  DBili  x   /  AST  27  /  ALT  25  /  AlkPhos  66  11-19    WBC count:   8.66 <<== ,  8.21 <<== ,  9.39 <<== ,  9.25 <<==   Hemoglobin:   13.5 <<==,  13.4 <<==,  13.3 <<==,  13.2 <<==  platelets:  123 <==, 160 <==, 165 <==, 179 <==    Creatinine:  0.89  <<==, 0.88  <<==  Sodium:   139  <==, 138  <==       AST:          27 <== , 29 <==      ALT:        25  <== , 25  <==      AP:        66  <=, 71  <=     Bili:        0.9  <=, 0.8  <=       < from: TTE with Doppler (w/3D Echo) (Transthoracic Echocardiogram) (11.19.20 @ 08:27) >  Conclusions:  Normal left ventricular systolic function. No segmental  wall motion abnormalities.  Mild-moderate aortic stenosis.  Left ventricular filling pressure is severely elevated.  Severe pulmonary hypertension.  ------------------------------------------------------------------------  Confirmed on  11/19/2020 - 10:35:23 by Tulio Merrill MD,  TON[facility]  < end of copied text >    ___________________________________________________________________________________  ===============>>  A S S E S S M E N T   A N D   P L A N <<===============  ------------------------------------------------------------------------------------------    pt is an 86 y/o man with pmhx of htn, hld, hiatal hernia / GERD, OA post Lt TKR x1 year ago presenting with concern for increasing redness of left leg and swelling for 2 months.     Problem/Plan - 1:  ·  Problem: pt likely with acute exacerbation of chronic diastolic congestive heart failure     pt with bilateral edema, pleural effusion, pulmonary edema, and elevated pro-BNP     pt with also severe pulmonary HTN and aortic stenosis  above   pt started on heparin drip in ER  pt on lasix   Echo as above  further med optimization and ischemic workup asper cardio   Cardiology appreciated   monitor vitals, labs, I/O closely.   leg elevation     Problem/Plan - 2:  ·  Problem: NSTEMI     elevated troponin with CHF picture  as above  monitor on tele  aspirin.   otherwise as above     Problem/Plan - 3:  ·  Problem: HTN / HLD  Continue Current medications otherwise and monitor.   cardio f/u. and further optimization  pt and wife adamant against statins due to intolerance ( no allergic reaction ) >  pharmacy added Zetia !     Problem/Plan - 4:  ·  Problem: Venous stasis.    no DVT on duplex  likely venous stasis changes on left leg and possible cellulitis  pt started on empiric abx for now   elevation  diuresis  ID appreciated : not likely to have prosthetic joint infection     GI / DVT PPX     --------------------------------------------  Case discussed with Pt,   Education given on findings and plan of care  ___________________________  H. INEZ Phipps.  Pager: [phone]

## 2020-11-20 NOTE — PHARMACOTHERAPY INTERVENTION NOTE - COMMENTS
Based on patient's weight, age, and renal function, would recommend dosing vancomycin as 1g IV Q24H.    Allen Holloway, PharmD  PGY-1 Pharmacy Resident  [id number]
Patient is receiving vancomycin 1g every 24 hours for possible cellulitis. Recommended ordering vancomycin trough prior to 3rd dose tomorrow.    Opal Tinajero, PharmD, BCPS  [phone]

## 2020-11-20 NOTE — PROGRESS NOTE ADULT - SUBJECTIVE AND OBJECTIVE BOX
CC: f/u for possible LLE celllulitis    Patient reports:improvement since admission, no fever or leg pain    REVIEW OF SYSTEMS:  All other review of systems negative (Comprehensive ROS)    Antimicrobials Day #  :day 2  vancomycin  IVPB 1000 milliGRAM(s) IV Intermittent every 24 hours    Other Medications Reviewed    T(F): 97.5 (11-20-20 @ 13:59), Max: 98.2 (11-19-20 @ 19:55)  HR: 59 (11-20-20 @ 13:59)  BP: 120/74 (11-20-20 @ 13:59)  RR: 17 (11-20-20 @ 13:59)  SpO2: 99% (11-20-20 @ 13:59)  Wt(kg): --    PHYSICAL EXAM:  General: alert, no acute distress  Eyes:  anicteric, no conjunctival injection, no discharge  Oropharynx: no lesions or injection 	  Neck: supple, without adenopathy  Lungs: clear to auscultation  Heart: regular rate and rhythm; no murmur, rubs or gallops  Abdomen: soft, nondistended, nontender, without mass or organomegaly  Skin: erythema left shin and calf  Extremities: no clubbing, cyanosis, + edema  Neurologic: alert, oriented, moves all extremities  No left knee pain  LAB RESULTS:                        13.5   8.66  )-----------( 123      ( 20 Nov 2020 06:41 )             41.5     11-19    139  |  102  |  25<H>  ----------------------------<  100<H>  4.1   |  25  |  0.89    Ca    9.5      19 Nov 2020 05:42  Mg     2.0     11-19    TPro  6.2  /  Alb  4.0  /  TBili  0.9  /  DBili  x   /  AST  27  /  ALT  25  /  AlkPhos  66  11-19    LIVER FUNCTIONS - ( 19 Nov 2020 05:42 )  Alb: 4.0 g/dL / Pro: 6.2 g/dL / ALK PHOS: 66 U/L / ALT: 25 U/L / AST: 27 U/L / GGT: x             MICROBIOLOGY:  RECENT CULTURES:      RADIOLOGY REVIEWED:    < from: VA Duplex Low Ext Arterial, Ltd, Left (11.18.20 @ 15:04) >    Impression:    Discrete left lower extremity arterial vascular disease is not identified.    < end of copied text >  < from: VA Duplex Lower Ext Vein Scan, Bilat (11.18.20 @ 15:01) >  IMPRESSION:  No evidence of deep venous thrombosis in either lower extremity.      < end of copied text >  < from: Xray Chest 1 View- PORTABLE-Urgent (Xray Chest 1 View- PORTABLE-Urgent .) (11.18.20 @ 12:14) >    Impression:    The heart is enlarged. Bilateral pleural effusion. Pulmonary vascular congestion. Calcified aortic knob. No pneumothorax. No acute bony pathology could be identified.    < end of copied text >

## 2020-11-20 NOTE — PROGRESS NOTE ADULT - ASSESSMENT
85y male hx htn, hld, hiatal hernia / GERD, OA post Lt TKR x1 year ago presenting with concern for increasing redness of left leg and swelling for 2 months. He had increased swelling of both legs for approximately 2-3 months, with  left leg getting increasingly red. He also reports increasing dyspnea for approximately 2 months as well, mostly on exertion. He was given amoxicillin a few days ago but only took one dose. He had no fevers, chills, no sick contact. He was started on vancomycin  IVPB 1000 milliGRAM(s) IV Intermittent every 12 hours, cultures are pending  He also put some cream and redness appear after cream(ammonia lactate)  possible cellulitis, but no systemic signs or symptoms of infection. Allergic contact dermatitis is in differential as well  Prosthetic joint infection L TKR very unlikely, since he has no pain and good ROM and ambulates without difficulties   ESR of 3, CRP .38, and PC .07 all speak against any serious infection  PLAN:  1. Await cultures  2.Anticipate a limited course of vanco  3.appreciate ASP intervention, will check a vanco trough in AM, although a low trough will be acceptable.

## 2020-11-21 LAB
ANION GAP SERPL CALC-SCNC: 11 MMOL/L — SIGNIFICANT CHANGE UP (ref 5–17)
BUN SERPL-MCNC: 19 MG/DL — SIGNIFICANT CHANGE UP (ref 7–23)
CALCIUM SERPL-MCNC: 9.1 MG/DL — SIGNIFICANT CHANGE UP (ref 8.4–10.5)
CHLORIDE SERPL-SCNC: 101 MMOL/L — SIGNIFICANT CHANGE UP (ref 96–108)
CO2 SERPL-SCNC: 28 MMOL/L — SIGNIFICANT CHANGE UP (ref 22–31)
CREAT SERPL-MCNC: 0.96 MG/DL — SIGNIFICANT CHANGE UP (ref 0.5–1.3)
GLUCOSE SERPL-MCNC: 85 MG/DL — SIGNIFICANT CHANGE UP (ref 70–99)
HCT VFR BLD CALC: 41.3 % — SIGNIFICANT CHANGE UP (ref 39–50)
HGB BLD-MCNC: 13.3 G/DL — SIGNIFICANT CHANGE UP (ref 13–17)
MCHC RBC-ENTMCNC: 32.2 GM/DL — SIGNIFICANT CHANGE UP (ref 32–36)
MCHC RBC-ENTMCNC: 32.6 PG — SIGNIFICANT CHANGE UP (ref 27–34)
MCV RBC AUTO: 101.2 FL — HIGH (ref 80–100)
NRBC # BLD: 0 /100 WBCS — SIGNIFICANT CHANGE UP (ref 0–0)
PLATELET # BLD AUTO: 176 K/UL — SIGNIFICANT CHANGE UP (ref 150–400)
POTASSIUM SERPL-MCNC: 4.2 MMOL/L — SIGNIFICANT CHANGE UP (ref 3.5–5.3)
POTASSIUM SERPL-SCNC: 4.2 MMOL/L — SIGNIFICANT CHANGE UP (ref 3.5–5.3)
RBC # BLD: 4.08 M/UL — LOW (ref 4.2–5.8)
RBC # FLD: 14.7 % — HIGH (ref 10.3–14.5)
SODIUM SERPL-SCNC: 140 MMOL/L — SIGNIFICANT CHANGE UP (ref 135–145)
VANCOMYCIN FLD-MCNC: 7.7 UG/ML — SIGNIFICANT CHANGE UP
WBC # BLD: 8.01 K/UL — SIGNIFICANT CHANGE UP (ref 3.8–10.5)
WBC # FLD AUTO: 8.01 K/UL — SIGNIFICANT CHANGE UP (ref 3.8–10.5)

## 2020-11-21 RX ORDER — MINOCYCLINE HYDROCHLORIDE 45 MG/1
100 TABLET, EXTENDED RELEASE ORAL
Refills: 0 | Status: COMPLETED | OUTPATIENT
Start: 2020-11-21 | End: 2020-11-24

## 2020-11-21 RX ORDER — SPIRONOLACTONE 25 MG/1
25 TABLET, FILM COATED ORAL DAILY
Refills: 0 | Status: DISCONTINUED | OUTPATIENT
Start: 2020-11-21 | End: 2020-11-24

## 2020-11-21 RX ORDER — FUROSEMIDE 40 MG
20 TABLET ORAL THREE TIMES A DAY
Refills: 0 | Status: DISCONTINUED | OUTPATIENT
Start: 2020-11-21 | End: 2020-11-23

## 2020-11-21 RX ADMIN — HEPARIN SODIUM 5000 UNIT(S): 5000 INJECTION INTRAVENOUS; SUBCUTANEOUS at 17:48

## 2020-11-21 RX ADMIN — ROSUVASTATIN CALCIUM 20 MILLIGRAM(S): 5 TABLET ORAL at 21:54

## 2020-11-21 RX ADMIN — Medication 20 MILLIGRAM(S): at 06:15

## 2020-11-21 RX ADMIN — SPIRONOLACTONE 25 MILLIGRAM(S): 25 TABLET, FILM COATED ORAL at 09:15

## 2020-11-21 RX ADMIN — Medication 25 MILLIGRAM(S): at 09:15

## 2020-11-21 RX ADMIN — Medication 20 MILLIGRAM(S): at 13:35

## 2020-11-21 RX ADMIN — Medication 20 MILLIGRAM(S): at 21:54

## 2020-11-21 RX ADMIN — Medication 81 MILLIGRAM(S): at 13:34

## 2020-11-21 RX ADMIN — PANTOPRAZOLE SODIUM 40 MILLIGRAM(S): 20 TABLET, DELAYED RELEASE ORAL at 06:15

## 2020-11-21 RX ADMIN — LISINOPRIL 10 MILLIGRAM(S): 2.5 TABLET ORAL at 17:48

## 2020-11-21 RX ADMIN — HEPARIN SODIUM 5000 UNIT(S): 5000 INJECTION INTRAVENOUS; SUBCUTANEOUS at 06:15

## 2020-11-21 RX ADMIN — LISINOPRIL 10 MILLIGRAM(S): 2.5 TABLET ORAL at 06:15

## 2020-11-21 RX ADMIN — MINOCYCLINE HYDROCHLORIDE 100 MILLIGRAM(S): 45 TABLET, EXTENDED RELEASE ORAL at 20:03

## 2020-11-21 RX ADMIN — Medication 1 TABLET(S): at 13:34

## 2020-11-21 RX ADMIN — Medication 250 MILLIGRAM(S): at 06:17

## 2020-11-21 NOTE — PROGRESS NOTE ADULT - ASSESSMENT
85y male hx htn, hld, hiatal hernia / GERD, OA post Lt TKR x1 year ago presenting with concern for increasing redness of left leg and swelling for 2 months. He had increased swelling of both legs for approximately 2-3 months, with  left leg getting increasingly red. He also reports increasing dyspnea for approximately 2 months as well, mostly on exertion. He was given amoxicillin a few days ago but only took one dose. He had no fevers, chills, no sick contact. He was started on vancomycin  IVPB 1000 milliGRAM(s) IV Intermittent every 12 hours  He also put some cream and redness appear after cream(ammonia lactate)  possible cellulitis, but no systemic signs or symptoms of infection. Allergic contact dermatitis is in differential as well  Prosthetic joint infection L TKR very unlikely, since he has no pain and good ROM and ambulates without difficulties   ESR of 3, CRP .38, and PC .07 all speak against any serious infection  Vanc level 7.7  PLAN:  Would dc IV Vanc after today's dose and switch to oral Minocycline 100 bid for another 2-3 days  No evidence of systemic infection at present  diuresis and keep legs elevated 85y male hx htn, hld, hiatal hernia / GERD, OA post Lt TKR x1 year ago presenting with concern for increasing redness of left leg and swelling for 2 months. He had increased swelling of both legs for approximately 2-3 months, with  left leg getting increasingly red. He also reports increasing dyspnea for approximately 2 months as well, mostly on exertion. He was given amoxicillin a few days ago but only took one dose. He had no fevers, chills, no sick contact. He was started on vancomycin  IVPB 1000 milliGRAM(s) IV Intermittent every 12 hours  He also put some cream and redness appear after cream(ammonia lactate)  possible cellulitis, but no systemic signs or symptoms of infection. Allergic contact dermatitis is in differential as well  Prosthetic joint infection L TKR very unlikely, since he has no pain and good ROM and ambulates without difficulties   ESR of 3, CRP .38, and PC .07 all speak against any serious infection  Vanc level 7.7  PLAN:  Switch to oral Minocycline 100 bid for another 2-3 days  No evidence of systemic infection at present  diuresis and keep legs elevated

## 2020-11-21 NOTE — PROGRESS NOTE ADULT - SUBJECTIVE AND OBJECTIVE BOX
CC: f/u for possible LLE celllulitis    Patient reports no fever or leg pain    REVIEW OF SYSTEMS:  All other review of systems negative (Comprehensive ROS)    Antimicrobials Day #  :day 3  vancomycin  IVPB 1000 milliGRAM(s) IV Intermittent every 24 hours      Other Medications Reviewed    Vital Signs Last 24 Hrs  T(C): 36.4 (21 Nov 2020 05:11), Max: 36.6 (20 Nov 2020 20:44)  T(F): 97.6 (21 Nov 2020 05:11), Max: 97.8 (20 Nov 2020 20:44)  HR: 53 (21 Nov 2020 05:11) (53 - 59)  BP: 142/75 (21 Nov 2020 05:11) (120/74 - 142/75)  BP(mean): --  RR: 18 (21 Nov 2020 05:11) (16 - 18)  SpO2: 99% (21 Nov 2020 05:11) (97% - 100%)    PHYSICAL EXAM:  General: alert, no acute distress  Eyes:  anicteric, no conjunctival injection, no discharge  Oropharynx: no lesions or injection 	  Neck: supple, without adenopathy  Lungs: clear to auscultation  Heart: regular rate and rhythm; no murmur, rubs or gallops  Abdomen: soft, nondistended, nontender, without mass or organomegaly  Skin: erythema left shin and calf  Extremities: no clubbing, cyanosis, + edema, no pain, good ROM   Neurologic: alert, oriented, moves all extremities    LAB RESULTS:                                   13.3   8.01  )-----------( 176      ( 21 Nov 2020 06:16 )             41.3   11-21    140  |  101  |  19  ----------------------------<  85  4.2   |  28  |  0.96    Ca    9.1      21 Nov 2020 06:14        MICROBIOLOGY:  RECENT CULTURES:  no cult    RADIOLOGY REVIEWED:  < from: CT Angio Chest w/ IV Cont (11.20.20 @ 17:06) >  Impression: No pulmonary embolus is noted within the main, right and left main and lobar pulmonary arteries bilaterally. Evaluation of the segmental and subsegmental pulmonary arteries is limited.    Small to moderate size pleural effusions bilaterally, right more than left.    < end of copied text >    < from: VA Duplex Low Ext Arterial, Ltd, Left (11.18.20 @ 15:04) >    Impression:    Discrete left lower extremity arterial vascular disease is not identified.    < end of copied text >  < from: VA Duplex Lower Ext Vein Scan, Bilat (11.18.20 @ 15:01) >  IMPRESSION:  No evidence of deep venous thrombosis in either lower extremity.      < end of copied text >  < from: Xray Chest 1 View- PORTABLE-Urgent (Xray Chest 1 View- PORTABLE-Urgent .) (11.18.20 @ 12:14) >    Impression:    The heart is enlarged. Bilateral pleural effusion. Pulmonary vascular congestion. Calcified aortic knob. No pneumothorax. No acute bony pathology could be identified.    < end of copied text >

## 2020-11-21 NOTE — PROGRESS NOTE ADULT - SUBJECTIVE AND OBJECTIVE BOX
Patient is a 85y old  Male who presents with a chief complaint of edema (21 Nov 2020 08:55)    pt. seen and examined , NAD    INTERVAL HPI/OVERNIGHT EVENTS:  T(C): 36.6 (11-21-20 @ 13:13), Max: 36.6 (11-20-20 @ 20:44)  HR: 62 (11-21-20 @ 13:13) (53 - 62)  BP: 120/77 (11-21-20 @ 13:13) (120/77 - 142/75)  RR: 16 (11-21-20 @ 13:13) (16 - 18)  SpO2: 97% (11-21-20 @ 13:13) (97% - 99%)  Wt(kg): --  I&O's Summary    20 Nov 2020 07:01  -  21 Nov 2020 07:00  --------------------------------------------------------  IN: 1080 mL / OUT: 3450 mL / NET: -2370 mL    21 Nov 2020 07:01  -  21 Nov 2020 18:59  --------------------------------------------------------  IN: 840 mL / OUT: 1960 mL / NET: -1120 mL        PAST MEDICAL & SURGICAL HISTORY:  Mitral prolapse    Hypercholesteremia    Hypertension    History of hernia repair        SOCIAL HISTORY  Alcohol:  Tobacco:  Illicit substance use:    FAMILY HISTORY:    REVIEW OF SYSTEMS:  CONSTITUTIONAL: No fever, weight loss, or fatigue  EYES: No eye pain, visual disturbances, or discharge  ENMT:  No difficulty hearing, tinnitus, vertigo; No sinus or throat pain  NECK: No pain or stiffness  RESPIRATORY: No cough, wheezing, chills or hemoptysis; No shortness of breath  CARDIOVASCULAR: No chest pain, palpitations, dizziness, or leg swelling  GASTROINTESTINAL: No abdominal or epigastric pain. No nausea, vomiting, or hematemesis; No diarrhea or constipation. No melena or hematochezia.  GENITOURINARY: No dysuria, frequency, hematuria, or incontinence  NEUROLOGICAL: No headaches, memory loss, loss of strength, numbness, or tremors  SKIN: No itching, burning, rashes, or lesions   LYMPH NODES: No enlarged glands  ENDOCRINE: No heat or cold intolerance; No hair loss  MUSCULOSKELETAL: No joint pain or swelling; No muscle, back, or extremity pain  PSYCHIATRIC: No depression, anxiety, mood swings, or difficulty sleeping  HEME/LYMPH: No easy bruising, or bleeding gums  ALLERY AND IMMUNOLOGIC: No hives or eczema    RADIOLOGY & ADDITIONAL TESTS:    Imaging Personally Reviewed:  [ ] YES  [ ] NO    Consultant(s) Notes Reviewed:  [ ] YES  [ ] NO    PHYSICAL EXAM:  GENERAL: NAD, well-groomed, well-developed  HEAD:  Atraumatic, Normocephalic  EYES: EOMI, PERRLA, conjunctiva and sclera clear  ENMT: No tonsillar erythema, exudates, or enlargement; Moist mucous membranes, Good dentition, No lesions  NECK: Supple, No JVD, Normal thyroid  NERVOUS SYSTEM:  Alert & Oriented X3, Good concentration; Motor Strength 5/5 B/L upper and lower extremities; DTRs 2+ intact and symmetric  CHEST/LUNG: Clear to percussion bilaterally; No rales, rhonchi, wheezing, or rubs  HEART: Regular rate and rhythm; No murmurs, rubs, or gallops  ABDOMEN: Soft, Nontender, Nondistended; Bowel sounds present  EXTREMITIES:  2+ Peripheral Pulses, No clubbing, cyanosis, or edema  LYMPH: No lymphadenopathy noted  SKIN: No rashes or lesions    LABS:                        13.3   8.01  )-----------( 176      ( 21 Nov 2020 06:16 )             41.3     11-21    140  |  101  |  19  ----------------------------<  85  4.2   |  28  |  0.96    Ca    9.1      21 Nov 2020 06:14          CAPILLARY BLOOD GLUCOSE                MEDICATIONS  (STANDING):  aspirin enteric coated 81 milliGRAM(s) Oral daily  furosemide   Injectable 20 milliGRAM(s) IV Push three times a day  heparin   Injectable 5000 Unit(s) SubCutaneous every 12 hours  lisinopril 10 milliGRAM(s) Oral every 12 hours  metoprolol succinate ER 25 milliGRAM(s) Oral daily  minocycline 100 milliGRAM(s) Oral two times a day  multivitamin 1 Tablet(s) Oral daily  pantoprazole    Tablet 40 milliGRAM(s) Oral before breakfast  rosuvastatin 20 milliGRAM(s) Oral at bedtime  spironolactone 25 milliGRAM(s) Oral daily    MEDICATIONS  (PRN):      Care Discussed with Consultants/Other Providers [ ] YES  [ ] NO

## 2020-11-21 NOTE — PROGRESS NOTE ADULT - SUBJECTIVE AND OBJECTIVE BOX
CARDIOLOGY     PROGRESS  NOTE   ________________________________________________    CHIEF COMPLAINT:Patient is a 85y old  Male who presents with a chief complaint of edema (21 Nov 2020 08:28)  doing better.  	  REVIEW OF SYSTEMS:  CONSTITUTIONAL: No fever, weight loss, or fatigue  EYES: No eye pain, visual disturbances, or discharge  ENT:  No difficulty hearing, tinnitus, vertigo; No sinus or throat pain  NECK: No pain or stiffness  RESPIRATORY: No cough, wheezing, chills or hemoptysis; No Shortness of Breath  CARDIOVASCULAR: No chest pain, palpitations, passing out, dizziness, or leg swelling  GASTROINTESTINAL: No abdominal or epigastric pain. No nausea, vomiting, or hematemesis; No diarrhea or constipation. No melena or hematochezia.  GENITOURINARY: No dysuria, frequency, hematuria, or incontinence  NEUROLOGICAL: No headaches, memory loss, loss of strength, numbness, or tremors  SKIN: No itching, burning, rashes, or lesions   LYMPH Nodes: No enlarged glands  ENDOCRINE: No heat or cold intolerance; No hair loss  MUSCULOSKELETAL: No joint pain or swelling; No muscle, back, or extremity pain  PSYCHIATRIC: No depression, anxiety, mood swings, or difficulty sleeping  HEME/LYMPH: No easy bruising, or bleeding gums  ALLERGY AND IMMUNOLOGIC: No hives or eczema	    [ ] All others negative	  [ ] Unable to obtain    PHYSICAL EXAM:  T(C): 36.4 (11-21-20 @ 05:11), Max: 36.6 (11-20-20 @ 20:44)  HR: 53 (11-21-20 @ 05:11) (53 - 59)  BP: 142/75 (11-21-20 @ 05:11) (120/74 - 142/75)  RR: 18 (11-21-20 @ 05:11) (16 - 18)  SpO2: 99% (11-21-20 @ 05:11) (97% - 100%)  Wt(kg): --  I&O's Summary    20 Nov 2020 07:01  -  21 Nov 2020 07:00  --------------------------------------------------------  IN: 1080 mL / OUT: 3450 mL / NET: -2370 mL        Appearance: Normal	  HEENT:   Normal oral mucosa, PERRL, EOMI	  Lymphatic: No lymphadenopathy  Cardiovascular: Normal S1 S2, No JVD, + murmurs, decrease  edema  Respiratory: Lungs clear to auscultation	  Psychiatry: A & O x 3, Mood & affect appropriate  Gastrointestinal:  Soft, Non-tender, + BS	  Skin: No rashes, No ecchymoses, No cyanosis	  Neurologic: Non-focal  Extremities: Normal range of motion, No clubbing, cyanosis . decrease  edema, erythenma  Vascular: Peripheral pulses palpable 2+ bilaterally    MEDICATIONS  (STANDING):  aspirin enteric coated 81 milliGRAM(s) Oral daily  furosemide   Injectable 20 milliGRAM(s) IV Push every 12 hours  heparin   Injectable 5000 Unit(s) SubCutaneous every 12 hours  lisinopril 10 milliGRAM(s) Oral every 12 hours  metoprolol succinate ER 25 milliGRAM(s) Oral daily  multivitamin 1 Tablet(s) Oral daily  pantoprazole    Tablet 40 milliGRAM(s) Oral before breakfast  rosuvastatin 20 milliGRAM(s) Oral at bedtime  vancomycin  IVPB 1000 milliGRAM(s) IV Intermittent every 24 hours      TELEMETRY: 	    ECG:  	  RADIOLOGY:  OTHER: 	  	  LABS:	 	    CARDIAC MARKERS:                                13.3   8.01  )-----------( 176      ( 21 Nov 2020 06:16 )             41.3     11-21    140  |  101  |  19  ----------------------------<  85  4.2   |  28  |  0.96    Ca    9.1      21 Nov 2020 06:14      proBNP: Serum Pro-Brain Natriuretic Peptide: 94614 pg/mL (11-18 @ 12:45)    Lipid Profile: Cholesterol 106  LDL --  HDL 40  TG 73    HgA1c:   TSH: Thyroid Stimulating Hormone, Serum: 1.46 uIU/mL (11-19 @ 09:24)  Thyroid Stimulating Hormone, Serum: 1.51 uIU/mL (11-19 @ 08:41)    < from: TTE with Doppler (w/3D Echo) (Transthoracic Echocardiogram) (11.19.20 @ 08:27) >  Mitral Valve: Mitral annular, leaflet, and chordal  calcification.  Mild-moderate mitral regurgitation directed  posterolaterally.  Aortic Valve/Aorta: Mild-moderate aortic stenosis.  --LVOTd 2.1 cm. LVOT TVI 24.8 cm. DI = 0.39.  --Mean aortic valve gradient 16.3 mmHg. Aortic valve TVI  63.2 cm.  --Aortic valve area by continuity 1.4 cm2.  Normal aortic root size.  Left Atrium: Severely dilated left atrium.  Left Ventricle: Normal left ventricular internal dimensions  and wall thicknesses.  Normal left ventricular systolic function. No segmental  wall motion abnormalities.  There is a false tendon in the  LV cavity(normal variant).  Left ventricular filling pressure is severely elevated.  Right Heart: Normal right atrium. Normal right ventricular  size and function.  Normal tricuspid valve. Mild tricuspid regurgitation.  Normal pulmonic valve. Mild pulmonic regurgitation.  Pericardium/Pleura: Normal pericardium with trace  pericardial effusion.  Bilateral pleural effusions.  Hemodynamic: Estimated right atrial pressure is severely  elevated.  Severe pulmonary hypertension. Estimated PASP 85 mmHg.  ------------------------------------------------------------------------  Conclusions:  Normal left ventricular systolic function. No segmental  wall motion abnormalities.  Mild-moderate aortic stenosis.  Left ventricular filling pressure is severely elevated.  Severe pulmonary hypertension.      < from: CT Angio Chest w/ IV Cont (11.20.20 @ 17:06) >  Impression: No pulmonary embolus is noted within the main, right and left main and lobar pulmonary arteries bilaterally. Evaluation of the segmental and subsegmental pulmonary arteries is limited.    Small to moderate size pleural effusions bilaterally, right more than left      Would dc IV Vanc after today's dose and switch to oral Minocycline 100 bid for another 2-3 days  No evidence of systemic infection at present  diuresis and keep legs elevated    Assessment and plan  ---------------------------  pt is an 84 y/o man with pmhx of htn, hld, hiatal hernia / GERD, OA post Lt TKR x1 year ago presenting with concern for increasing redness of left leg and swelling for 2 months.   Patient reports that he has been having increasing swelling of both of his legs for approximately 2-3 months; however, the left leg has been more swollen and has been getting increasingly more red. He states that he went to his podiatrist when he noted some rash on the lower portion of his left just above his foot, was given a 'cream of some kind, and feels that the redness and swelling got worse after starting the cream.'   He states that he was having chest pain approximately 1 mo ago, went to his PMD and was diagnosed with a hiatal hernia with an xray and prescribed omeprazole.   Patient reports that he has been becoming increasingly more short of breath for approximately 2 months as well. Feels that when he takes just a few steps he has to stop and catch his breath, and is so short of breath that he cannot speak in complete sentences without resting for a period of time first.   pt also states was given amoxicillin a few days ago but only took one dose  He denies any fevers, chills, cough, nausea, vomiting, sweating, chest pain  pt admitted for likely acute CHF with elevated troponin / NSTEMI   chf acute on ?chronic  lasix iv  tele  echo noted severe pulmonary HTN  ?etiology  check cta of chest r/o PE  asa daily  needs ischemia work up  tsh  lipid/ start on Lipitor 80 mg qhs  coreg , add ace or ARB  fu renal function  ?severe cellulitis of the LLE / mild RLe, iv abx ?id eval noted, check cultures  may consider ortho eval s/p l knee replacement on the same leg last year  bradycardia, dc coreg start metoprolol er 25 mg daily  ?adding calcium channel blocker  abx as per ID  echo noter/ cta note  will schedule for nuclear stress test tomorrow

## 2020-11-21 NOTE — PROGRESS NOTE ADULT - ASSESSMENT
A/P  pt is an 84 y/o man with pmhx of htn, hld, hiatal hernia / GERD, OA post Lt TKR x1 year ago presenting with concern for increasing redness of left leg and swelling for 2 months.     Problem/Plan - 1:  ·  Problem: pt likely with acute exacerbation of chronic diastolic congestive heart failure     pt with bilateral edema, pleural effusion, pulmonary edema, and elevated pro-BNP     pt with also severe pulmonary HTN and aortic stenosis  above   -on heparin sq   -cardio eval done   -TTE     Problem/Plan - 2:  ·  Problem: NSTEMI     elevated troponin with CHF picture  as above  monitor on tele  aspirin.   otherwise as above     Problem/Plan - 3:  ·  Problem: HTN / HLD  Continue Current medications otherwise and monitor.   cardio f/u. and further optimization  pt and wife adamant against statins due to intolerance ( no allergic reaction ) >  pharmacy added Zetia !     Problem/Plan - 4:  ·  Problem: Venous stasis.    no DVT on duplex  likely venous stasis changes on left leg and possible cellulitis  pt started on empiric abx for now   elevation  diuresis  ID appreciated : not likely to have prosthetic joint infection     GI / DVT PPX     igncaia barber MD

## 2020-11-22 DIAGNOSIS — L03.90 CELLULITIS, UNSPECIFIED: ICD-10-CM

## 2020-11-22 LAB
ANION GAP SERPL CALC-SCNC: 11 MMOL/L — SIGNIFICANT CHANGE UP (ref 5–17)
BUN SERPL-MCNC: 22 MG/DL — SIGNIFICANT CHANGE UP (ref 7–23)
CALCIUM SERPL-MCNC: 9.8 MG/DL — SIGNIFICANT CHANGE UP (ref 8.4–10.5)
CHLORIDE SERPL-SCNC: 98 MMOL/L — SIGNIFICANT CHANGE UP (ref 96–108)
CO2 SERPL-SCNC: 31 MMOL/L — SIGNIFICANT CHANGE UP (ref 22–31)
CREAT SERPL-MCNC: 1.02 MG/DL — SIGNIFICANT CHANGE UP (ref 0.5–1.3)
GLUCOSE SERPL-MCNC: 89 MG/DL — SIGNIFICANT CHANGE UP (ref 70–99)
HCT VFR BLD CALC: 43.5 % — SIGNIFICANT CHANGE UP (ref 39–50)
HGB BLD-MCNC: 13.8 G/DL — SIGNIFICANT CHANGE UP (ref 13–17)
MCHC RBC-ENTMCNC: 31.7 GM/DL — LOW (ref 32–36)
MCHC RBC-ENTMCNC: 32.6 PG — SIGNIFICANT CHANGE UP (ref 27–34)
MCV RBC AUTO: 102.8 FL — HIGH (ref 80–100)
NRBC # BLD: 0 /100 WBCS — SIGNIFICANT CHANGE UP (ref 0–0)
PLATELET # BLD AUTO: 179 K/UL — SIGNIFICANT CHANGE UP (ref 150–400)
POTASSIUM SERPL-MCNC: 4.1 MMOL/L — SIGNIFICANT CHANGE UP (ref 3.5–5.3)
POTASSIUM SERPL-SCNC: 4.1 MMOL/L — SIGNIFICANT CHANGE UP (ref 3.5–5.3)
RBC # BLD: 4.23 M/UL — SIGNIFICANT CHANGE UP (ref 4.2–5.8)
RBC # FLD: 14.9 % — HIGH (ref 10.3–14.5)
SODIUM SERPL-SCNC: 140 MMOL/L — SIGNIFICANT CHANGE UP (ref 135–145)
WBC # BLD: 9.34 K/UL — SIGNIFICANT CHANGE UP (ref 3.8–10.5)
WBC # FLD AUTO: 9.34 K/UL — SIGNIFICANT CHANGE UP (ref 3.8–10.5)

## 2020-11-22 PROCEDURE — 93016 CV STRESS TEST SUPVJ ONLY: CPT

## 2020-11-22 PROCEDURE — 78452 HT MUSCLE IMAGE SPECT MULT: CPT | Mod: 26

## 2020-11-22 PROCEDURE — 93018 CV STRESS TEST I&R ONLY: CPT

## 2020-11-22 RX ADMIN — MINOCYCLINE HYDROCHLORIDE 100 MILLIGRAM(S): 45 TABLET, EXTENDED RELEASE ORAL at 09:02

## 2020-11-22 RX ADMIN — Medication 20 MILLIGRAM(S): at 21:57

## 2020-11-22 RX ADMIN — HEPARIN SODIUM 5000 UNIT(S): 5000 INJECTION INTRAVENOUS; SUBCUTANEOUS at 18:00

## 2020-11-22 RX ADMIN — MINOCYCLINE HYDROCHLORIDE 100 MILLIGRAM(S): 45 TABLET, EXTENDED RELEASE ORAL at 21:57

## 2020-11-22 RX ADMIN — HEPARIN SODIUM 5000 UNIT(S): 5000 INJECTION INTRAVENOUS; SUBCUTANEOUS at 04:38

## 2020-11-22 RX ADMIN — LISINOPRIL 10 MILLIGRAM(S): 2.5 TABLET ORAL at 17:59

## 2020-11-22 RX ADMIN — Medication 20 MILLIGRAM(S): at 04:38

## 2020-11-22 RX ADMIN — ROSUVASTATIN CALCIUM 20 MILLIGRAM(S): 5 TABLET ORAL at 21:57

## 2020-11-22 RX ADMIN — Medication 1 TABLET(S): at 11:54

## 2020-11-22 RX ADMIN — Medication 25 MILLIGRAM(S): at 09:02

## 2020-11-22 RX ADMIN — Medication 20 MILLIGRAM(S): at 14:58

## 2020-11-22 RX ADMIN — LISINOPRIL 10 MILLIGRAM(S): 2.5 TABLET ORAL at 04:38

## 2020-11-22 RX ADMIN — Medication 81 MILLIGRAM(S): at 11:54

## 2020-11-22 RX ADMIN — SPIRONOLACTONE 25 MILLIGRAM(S): 25 TABLET, FILM COATED ORAL at 04:41

## 2020-11-22 RX ADMIN — PANTOPRAZOLE SODIUM 40 MILLIGRAM(S): 20 TABLET, DELAYED RELEASE ORAL at 04:38

## 2020-11-22 NOTE — PROGRESS NOTE ADULT - ASSESSMENT
_________________________________________________________________________________________  ========>>  M E D I C A L   A T T E N D I N G    F O L L O W  U P  N O T E  <<=========  -----------------------------------------------------------------------------------------------------    - Patient seen and examined by me earlier today.   - In summary,  MARCOS AGUILAR is a 85y year old man who originally presented with edema   - Patient today overall doing ok, comfortable, eating OK.  edema and SOB much improved, CP free    ==================>> REVIEW OF SYSTEM <<=================    GEN: no fever, no chills, no pain  RESP: no SOB, no cough, no sputum  CVS: no chest pain, no palpitations, edema better   GI: no abdominal pain, no nausea, no constipation, no diarrhea  : no dysuria, no frequency, no hematuria  Neuro: no headache, no dizziness  Derm : no itching, no rash    ==================>> PHYSICAL EXAM <<=================    GEN: A&O X 3 , NAD , comfortable  HEENT: NCAT, PERRL, MMM, hearing intact  Neck: supple , no JVD appreciated  CVS: S1S2 , regular , No M/R/G appreciated  PULM: CTA B/L,  no W/R/R appreciated  ABD.: soft. non tender, non distended,  bowel sounds present  Extrem: intact pulses , edema and erythema ( stasis changes) of b/L LE improved Lt>Rt  PSYCH : normal mood,  not anxious      ==================>> MEDICATIONS <<====================    aspirin enteric coated 81 milliGRAM(s) Oral daily  furosemide   Injectable 20 milliGRAM(s) IV Push three times a day  heparin   Injectable 5000 Unit(s) SubCutaneous every 12 hours  lisinopril 10 milliGRAM(s) Oral every 12 hours  metoprolol succinate ER 25 milliGRAM(s) Oral daily  minocycline 100 milliGRAM(s) Oral two times a day  multivitamin 1 Tablet(s) Oral daily  pantoprazole    Tablet 40 milliGRAM(s) Oral before breakfast  rosuvastatin 20 milliGRAM(s) Oral at bedtime  spironolactone 25 milliGRAM(s) Oral daily    ___________  Active diet:  Diet, Regular:   DASH/TLC Sodium & Cholesterol Restricted (DASH)  Low Sodium  ___________________    ==================>> VITAL SIGNS <<==================      Weight (kg): 73  Vital Signs Last 24 HrsT(C): 36.4 (11-22-20 @ 04:35)  T(F): 97.5 (11-22-20 @ 04:35), Max: 97.5 (11-22-20 @ 04:35)  HR: 59 (11-22-20 @ 08:45) (55 - 59)  BP: 113/61 (11-22-20 @ 08:45)  RR: 16 (11-22-20 @ 04:35) (16 - 16)  SpO2: 98% (11-22-20 @ 04:35) (98% - 98%)       ==================>> LAB AND IMAGING <<==================                        13.8   9.34  )-----------( 179      ( 22 Nov 2020 05:39 )             43.5        11-22    140  |  98  |  22  ----------------------------<  89  4.1   |  31  |  1.02    Ca    9.8      22 Nov 2020 05:39      WBC count:   9.34 <<== ,  8.01 <<== ,  8.66 <<== ,  8.21 <<== ,  9.39 <<== ,  9.25 <<==   Hemoglobin:   13.8 <<==,  13.3 <<==,  13.5 <<==,  13.4 <<==,  13.3 <<==,  13.2 <<==  platelets:  179 <==, 176 <==, 123 <==, 160 <==, 165 <==, 179 <==    Creatinine:  1.02  <<==, 0.96  <<==, 0.89  <<==, 0.88  <<==  Sodium:   140  <==, 140  <==, 139  <==, 138  <==       AST:          27 <== , 29 <==      ALT:        25  <== , 25  <==      AP:        66  <=, 71  <=     Bili:        0.9  <=, 0.8  <=     < from: Nuclear Stress Test-Pharmacologic (Nuclear Stress Test-Pharmacologic .) (11.22.20 @ 10:58) >  IMPRESSIONS:Abnormal Study  * Chest Pain: No chest pain with administration of  Regadenoson.  * Symptom: No Symptom.  * HR Response: Appropriate.  * BP Response: Appropriate.  * Heart Rhythm: Sinus Bradycardia - 58 BPM.  * Conduction defects: incomplete right bundle brach block.  * Q Waves: Cannot Rule Out Inferior wall MI.  * Baseline ECG: T wave abnormality I, AVL.  * ECG Changes: ST Depression: 1 mm horizontal in leads V5,  V6 started at 02:00 min of post infusion at HR of 77 and  persisted 05:00 min into post infusion.  * Arrhythmia: Rare VPDs occurred during stress and  recovery.  * The left ventricle was enlarged. There are medium sized,  moderate to severe defects in anterior and anterolateral,  anteroseptal, apical, distal inferolateral walls that are  predominantly reversible consistent with  ischemia.Transient ischemic dilation of the LV was noted  with a ratio of: 1.31  * Post-stress gated wall motion analysis was performed  (LVEF = 39 %;LVEDV = 165 ml.), revealing hypokinesis in  anterior and anterolateral, anteroseptal, apical, distal  inferolateral walls.  ------------------------------------------------------------------------  Confirmed on11/22/2020 - 14:26:58 by Shan Mercado M.D.  < end of copied text >      < from: TTE with Doppler (w/3D Echo) (Transthoracic Echocardiogram) (11.19.20 @ 08:27) >  Conclusions:  Normal left ventricular systolic function. No segmental  wall motion abnormalities.  Mild-moderate aortic stenosis.  Left ventricular filling pressure is severely elevated.  Severe pulmonary hypertension.  ------------------------------------------------------------------------  Confirmed on  11/19/2020 - 10:35:23 by Tulio Merrill MD,  TONA  < end of copied text >    ___________________________________________________________________________________  ===============>>  A S S E S S M E N T   A N D   P L A N <<===============  ------------------------------------------------------------------------------------------    pt is an 86 y/o man with pmhx of htn, hld, hiatal hernia / GERD, OA post Lt TKR x1 year ago presenting with concern for increasing redness of left leg and swelling for 2 months.     Problem/Plan - 1:  ·  Problem: pt likely with acute exacerbation of chronic diastolic congestive heart failure     pt with bilateral edema, pleural effusion, pulmonary edema, and elevated pro-BNP     pt with also severe pulmonary HTN and aortic stenosis  above   pt started on heparin drip in ER> now off   pt on lasix   Echo as above  stress as above  likely need cath   further med optimization and ischemic workup asper cardio   Cardiology following   monitor vitals, labs, I/O closely.   leg elevation     Problem/Plan - 2:  ·  Problem: NSTEMI     elevated troponin with CHF picture  as above  monitor on tele  aspirin.   otherwise as above     Problem/Plan - 3:  ·  Problem: HTN / HLD  Continue Current medications otherwise and monitor.   cardio f/u. and further optimization  pt and wife adamant against statins due to intolerance ( no allergic reaction ) >  pharmacy added Zetia !     Problem/Plan - 4:  ·  Problem: Venous stasis.    no DVT on duplex  likely venous stasis changes on left leg and possible cellulitis  pt started on empiric abx for now   elevation  diuresis  ID appreciated : not likely to have prosthetic joint infection     GI / DVT PPX     --------------------------------------------  Case discussed with Pt, NP, cardio   Education given on findings and plan of care  ___________________________  HSonal Phipps D.O.  Pager: [phone]

## 2020-11-22 NOTE — PROGRESS NOTE ADULT - SUBJECTIVE AND OBJECTIVE BOX
CARDIOLOGY     PROGRESS  NOTE   ________________________________________________    CHIEF COMPLAINT:Patient is a 85y old  Male who presents with a chief complaint of edema (21 Nov 2020 18:59)  doing much better, decrease sob.  	  REVIEW OF SYSTEMS:  CONSTITUTIONAL: No fever, weight loss, or fatigue  EYES: No eye pain, visual disturbances, or discharge  ENT:  No difficulty hearing, tinnitus, vertigo; No sinus or throat pain  NECK: No pain or stiffness  RESPIRATORY: No cough, wheezing, chills or hemoptysis; No Shortness of Breath  CARDIOVASCULAR: No chest pain, palpitations, passing out, dizziness, or leg swelling  GASTROINTESTINAL: No abdominal or epigastric pain. No nausea, vomiting, or hematemesis; No diarrhea or constipation. No melena or hematochezia.  GENITOURINARY: No dysuria, frequency, hematuria, or incontinence  NEUROLOGICAL: No headaches, memory loss, loss of strength, numbness, or tremors  SKIN: No itching, burning, rashes, or lesions   LYMPH Nodes: No enlarged glands  ENDOCRINE: No heat or cold intolerance; No hair loss  MUSCULOSKELETAL: No joint pain or swelling; No muscle, back, or extremity pain  PSYCHIATRIC: No depression, anxiety, mood swings, or difficulty sleeping  HEME/LYMPH: No easy bruising, or bleeding gums  ALLERGY AND IMMUNOLOGIC: No hives or eczema	    [ ] All others negative	  [ ] Unable to obtain    PHYSICAL EXAM:  T(C): 36.4 (11-22-20 @ 04:35), Max: 36.6 (11-21-20 @ 13:13)  HR: 58 (11-22-20 @ 04:35) (55 - 62)  BP: 130/69 (11-22-20 @ 04:35) (110/62 - 131/71)  RR: 16 (11-22-20 @ 04:35) (16 - 16)  SpO2: 98% (11-22-20 @ 04:35) (97% - 98%)  Wt(kg): --  I&O's Summary    21 Nov 2020 07:01  -  22 Nov 2020 07:00  --------------------------------------------------------  IN: 840 mL / OUT: 2760 mL / NET: -1920 mL        Appearance: Normal	  HEENT:   Normal oral mucosa, PERRL, EOMI	  Lymphatic: No lymphadenopathy  Cardiovascular: Normal S1 S2, No JVD, + murmurs, No edema  Respiratory: Lungs clear to auscultation	  Psychiatry: A & O x 3, Mood & affect appropriate  Gastrointestinal:  Soft, Non-tender, + BS	  Skin: No rashes, No ecchymoses, No cyanosis	  Neurologic: Non-focal  Extremities: Normal range of motion, No clubbing, cyanosis or edema  Vascular: Peripheral pulses palpable 2+ bilaterally    MEDICATIONS  (STANDING):  aspirin enteric coated 81 milliGRAM(s) Oral daily  furosemide   Injectable 20 milliGRAM(s) IV Push three times a day  heparin   Injectable 5000 Unit(s) SubCutaneous every 12 hours  lisinopril 10 milliGRAM(s) Oral every 12 hours  metoprolol succinate ER 25 milliGRAM(s) Oral daily  minocycline 100 milliGRAM(s) Oral two times a day  multivitamin 1 Tablet(s) Oral daily  pantoprazole    Tablet 40 milliGRAM(s) Oral before breakfast  rosuvastatin 20 milliGRAM(s) Oral at bedtime  spironolactone 25 milliGRAM(s) Oral daily      TELEMETRY: 	    ECG:  	  RADIOLOGY:  OTHER: 	  	  LABS:	 	    CARDIAC MARKERS:                                13.8   9.34  )-----------( 179      ( 22 Nov 2020 05:39 )             43.5     11-22    140  |  98  |  22  ----------------------------<  89  4.1   |  31  |  1.02    Ca    9.8      22 Nov 2020 05:39      proBNP: Serum Pro-Brain Natriuretic Peptide: 49926 pg/mL (11-18 @ 12:45)    Lipid Profile: Cholesterol 106  LDL --  HDL 40  TG 73    HgA1c:   TSH: Thyroid Stimulating Hormone, Serum: 1.46 uIU/mL (11-19 @ 09:24)  Thyroid Stimulating Hormone, Serum: 1.51 uIU/mL (11-19 @ 08:41)          Assessment and plan  ---------------------------  pt is an 84 y/o man with pmhx of htn, hld, hiatal hernia / GERD, OA post Lt TKR x1 year ago presenting with concern for increasing redness of left leg and swelling for 2 months.   Patient reports that he has been having increasing swelling of both of his legs for approximately 2-3 months; however, the left leg has been more swollen and has been getting increasingly more red. He states that he went to his podiatrist when he noted some rash on the lower portion of his left just above his foot, was given a 'cream of some kind, and feels that the redness and swelling got worse after starting the cream.'   He states that he was having chest pain approximately 1 mo ago, went to his PMD and was diagnosed with a hiatal hernia with an xray and prescribed omeprazole.   Patient reports that he has been becoming increasingly more short of breath for approximately 2 months as well. Feels that when he takes just a few steps he has to stop and catch his breath, and is so short of breath that he cannot speak in complete sentences without resting for a period of time first.   pt also states was given amoxicillin a few days ago but only took one dose  He denies any fevers, chills, cough, nausea, vomiting, sweating, chest pain  pt admitted for likely acute CHF with elevated troponin / NSTEMI   chf acute on ?chronic  lasix iv  tele nsr  echo noted severe pulmonary HTN  ?etiology ??sleep apnea  check cta of chest r/o PE  asa daily  needs ischemia work up, awaiting stress test  tsh  lipid/ start on Lipitor 80 mg qhs  coreg , add ace or ARB  fu renal function  ?severe cellulitis of the LLE / mild RLe, iv abx ?id eval noted, check cultures on po abx now  bradycardia, dc coreg start metoprolol er 25 mg daily  abx as per ID  echo noter/ cta note  will schedule for nuclear stress test  fu lytes/check pro bnp  chest x ray in am

## 2020-11-22 NOTE — CHART NOTE - NSCHARTNOTEFT_GEN_A_CORE
65 yrs old male abnormal stress test  PRESSIONS:Abnormal Study  * Chest Pain: No chest pain with administration of  Regadenoson.  * Symptom: No Symptom.  * HR Response: Appropriate.  * BP Response: Appropriate.  * Heart Rhythm: Sinus Bradycardia - 58 BPM.  * Conduction defects: incomplete right bundle brach block.  * Q Waves: Cannot Rule Out Inferior wall MI.  * Baseline ECG: T wave abnormality I, AVL.  * ECG Changes: ST Depression: 1 mm horizontal in leads V5,  V6 started at 02:00 min of post infusion at HR of 77 and  persisted 05:00 min into post infusion.  * Arrhythmia: Rare VPDs occurred during stress and  recovery.  * The left ventricle was enlarged. There are medium sized,  moderate to severe defects in anterior and anterolateral,  anteroseptal, apical, distal inferolateral walls that are  predominantly reversible consistent with  ischemia.Transient ischemic dilation of the LV was noted  with a ratio of: 1.31  * Post-stress gated wall motion analysis was performed  (LVEF = 39 %;LVEDV = 165 ml.), revealing hypokinesis in  anterior and anterolateral, anteroseptal, apical, distal  inferolateral walls.  ------------------------------------------------------------------------  Confirmed on  11/22/2020 - 14:26:58 by Shan Mercado M.D.  above finding discussed with Dr De La Vega and Dr Desire goyal in [facility]

## 2020-11-23 ENCOUNTER — TRANSCRIPTION ENCOUNTER (OUTPATIENT)
Age: 85
End: 2020-11-23

## 2020-11-23 DIAGNOSIS — I25.10 ATHEROSCLEROTIC HEART DISEASE OF NATIVE CORONARY ARTERY WITHOUT ANGINA PECTORIS: ICD-10-CM

## 2020-11-23 LAB
ANION GAP SERPL CALC-SCNC: 12 MMOL/L — SIGNIFICANT CHANGE UP (ref 5–17)
APPEARANCE UR: CLEAR — SIGNIFICANT CHANGE UP
APTT BLD: 44.1 SEC — HIGH (ref 27.5–35.5)
BILIRUB UR-MCNC: NEGATIVE — SIGNIFICANT CHANGE UP
BUN SERPL-MCNC: 21 MG/DL — SIGNIFICANT CHANGE UP (ref 7–23)
CALCIUM SERPL-MCNC: 10.1 MG/DL — SIGNIFICANT CHANGE UP (ref 8.4–10.5)
CHLORIDE SERPL-SCNC: 97 MMOL/L — SIGNIFICANT CHANGE UP (ref 96–108)
CO2 SERPL-SCNC: 32 MMOL/L — HIGH (ref 22–31)
COLOR SPEC: SIGNIFICANT CHANGE UP
CREAT SERPL-MCNC: 0.98 MG/DL — SIGNIFICANT CHANGE UP (ref 0.5–1.3)
DIFF PNL FLD: NEGATIVE — SIGNIFICANT CHANGE UP
FIBRINOGEN PPP-MCNC: 507 MG/DL — SIGNIFICANT CHANGE UP (ref 290–520)
GLUCOSE SERPL-MCNC: 89 MG/DL — SIGNIFICANT CHANGE UP (ref 70–99)
GLUCOSE UR QL: NEGATIVE — SIGNIFICANT CHANGE UP
HCT VFR BLD CALC: 42.4 % — SIGNIFICANT CHANGE UP (ref 39–50)
HGB BLD-MCNC: 13.9 G/DL — SIGNIFICANT CHANGE UP (ref 13–17)
INR BLD: 1.24 RATIO — HIGH (ref 0.88–1.16)
KETONES UR-MCNC: NEGATIVE — SIGNIFICANT CHANGE UP
LEUKOCYTE ESTERASE UR-ACNC: NEGATIVE — SIGNIFICANT CHANGE UP
MCHC RBC-ENTMCNC: 32.8 GM/DL — SIGNIFICANT CHANGE UP (ref 32–36)
MCHC RBC-ENTMCNC: 33 PG — SIGNIFICANT CHANGE UP (ref 27–34)
MCV RBC AUTO: 100.7 FL — HIGH (ref 80–100)
NITRITE UR-MCNC: NEGATIVE — SIGNIFICANT CHANGE UP
NRBC # BLD: 0 /100 WBCS — SIGNIFICANT CHANGE UP (ref 0–0)
PH UR: 7 — SIGNIFICANT CHANGE UP (ref 5–8)
PLATELET # BLD AUTO: 174 K/UL — SIGNIFICANT CHANGE UP (ref 150–400)
POTASSIUM SERPL-MCNC: 4.7 MMOL/L — SIGNIFICANT CHANGE UP (ref 3.5–5.3)
POTASSIUM SERPL-SCNC: 4.7 MMOL/L — SIGNIFICANT CHANGE UP (ref 3.5–5.3)
PROT UR-MCNC: NEGATIVE — SIGNIFICANT CHANGE UP
PROTHROM AB SERPL-ACNC: 14.7 SEC — HIGH (ref 10.6–13.6)
RBC # BLD: 4.21 M/UL — SIGNIFICANT CHANGE UP (ref 4.2–5.8)
RBC # FLD: 14.7 % — HIGH (ref 10.3–14.5)
SODIUM SERPL-SCNC: 141 MMOL/L — SIGNIFICANT CHANGE UP (ref 135–145)
SP GR SPEC: 1.01 — SIGNIFICANT CHANGE UP (ref 1.01–1.02)
UROBILINOGEN FLD QL: NEGATIVE — SIGNIFICANT CHANGE UP
VIT B12 SERPL-MCNC: 658 PG/ML — SIGNIFICANT CHANGE UP (ref 232–1245)
WBC # BLD: 7.9 K/UL — SIGNIFICANT CHANGE UP (ref 3.8–10.5)
WBC # FLD AUTO: 7.9 K/UL — SIGNIFICANT CHANGE UP (ref 3.8–10.5)

## 2020-11-23 PROCEDURE — 99152 MOD SED SAME PHYS/QHP 5/>YRS: CPT

## 2020-11-23 PROCEDURE — 99223 1ST HOSP IP/OBS HIGH 75: CPT

## 2020-11-23 PROCEDURE — 93454 CORONARY ARTERY ANGIO S&I: CPT | Mod: 26

## 2020-11-23 PROCEDURE — 93880 EXTRACRANIAL BILAT STUDY: CPT | Mod: 26

## 2020-11-23 RX ORDER — ENOXAPARIN SODIUM 100 MG/ML
70 INJECTION SUBCUTANEOUS
Refills: 0 | Status: DISCONTINUED | OUTPATIENT
Start: 2020-11-23 | End: 2020-11-24

## 2020-11-23 RX ORDER — FUROSEMIDE 40 MG
40 TABLET ORAL DAILY
Refills: 0 | Status: DISCONTINUED | OUTPATIENT
Start: 2020-11-23 | End: 2020-11-24

## 2020-11-23 RX ADMIN — LISINOPRIL 10 MILLIGRAM(S): 2.5 TABLET ORAL at 18:10

## 2020-11-23 RX ADMIN — Medication 81 MILLIGRAM(S): at 11:34

## 2020-11-23 RX ADMIN — LISINOPRIL 10 MILLIGRAM(S): 2.5 TABLET ORAL at 05:36

## 2020-11-23 RX ADMIN — SPIRONOLACTONE 25 MILLIGRAM(S): 25 TABLET, FILM COATED ORAL at 05:36

## 2020-11-23 RX ADMIN — Medication 20 MILLIGRAM(S): at 05:36

## 2020-11-23 RX ADMIN — PANTOPRAZOLE SODIUM 40 MILLIGRAM(S): 20 TABLET, DELAYED RELEASE ORAL at 05:36

## 2020-11-23 RX ADMIN — ENOXAPARIN SODIUM 70 MILLIGRAM(S): 100 INJECTION SUBCUTANEOUS at 18:10

## 2020-11-23 RX ADMIN — MINOCYCLINE HYDROCHLORIDE 100 MILLIGRAM(S): 45 TABLET, EXTENDED RELEASE ORAL at 09:44

## 2020-11-23 RX ADMIN — MINOCYCLINE HYDROCHLORIDE 100 MILLIGRAM(S): 45 TABLET, EXTENDED RELEASE ORAL at 22:33

## 2020-11-23 RX ADMIN — ROSUVASTATIN CALCIUM 20 MILLIGRAM(S): 5 TABLET ORAL at 23:31

## 2020-11-23 RX ADMIN — Medication 25 MILLIGRAM(S): at 11:34

## 2020-11-23 RX ADMIN — Medication 40 MILLIGRAM(S): at 09:44

## 2020-11-23 RX ADMIN — HEPARIN SODIUM 5000 UNIT(S): 5000 INJECTION INTRAVENOUS; SUBCUTANEOUS at 05:36

## 2020-11-23 RX ADMIN — Medication 1 TABLET(S): at 11:34

## 2020-11-23 NOTE — DISCHARGE NOTE PROVIDER - NSDCCPCAREPLAN_GEN_ALL_CORE_FT
PRINCIPAL DISCHARGE DIAGNOSIS  Diagnosis: CHF (congestive heart failure)  Assessment and Plan of Treatment: Weigh yourself daily.  If you gain 3lbs in 3 days, or 5lbs in a week call your Health Care Provider.  Do not eat or drink foods containing more than 2000mg of salt (sodium) in your diet every day.  Call your Health Care Provider if you have any swelling or increased swelling in your feet, ankles, and/or stomach.  Take all of your medication as directed.  If you become dizzy call your Health Care Provider.        SECONDARY DISCHARGE DIAGNOSES  Diagnosis: HTN (hypertension)  Assessment and Plan of Treatment: Low salt diet  Activity as tolerated.  Take all medication as prescribed.  Follow up with your medical doctor for routine blood pressure monitoring at your next visit.  Notify your doctor if you have any of the following symptoms:   Dizziness, Lightheadedness, Blurry vision, Headache, Chest pain, Shortness of breath      Diagnosis: Venous stasis dermatitis  Assessment and Plan of Treatment: no DVT on duplex.  likely venous stasis changes on left leg and possible cellulitis  started on empiric minocycline.   conintue to enriqueta NGUYEN appreciated : not likely to have prosthetic joint infection       Diagnosis: Cellulitis  Assessment and Plan of Treatment: Take all of your antibiotics as ordered.  Call your Health Care Provider within two days of arriving home to make a follow up appointment within one week.  If the affected cellulitic area increases in redness, warmth, pain or swelling call your Health Care Provider.  If you develop fever, chills, and/or malaise, call your Health Care Provider.      Diagnosis: NSTEMI (non-ST elevated myocardial infarction)  Assessment and Plan of Treatment: elevated cardiac enzyme.   s/p Echo showed pulmonary hypertension and moderate aortic stenosis.  s/p nuclear stress test, showed reversible ischemia.        PRINCIPAL DISCHARGE DIAGNOSIS  Diagnosis: S/P CABG x 3  Assessment and Plan of Treatment: Refer to your Cardiac Surgery Do's & Dont's Fact Sheet  weigh yourself daily - & record - report weight gain > 2.5 pounds overnight to your MD  take medications as prescribed  shower daily & wash incisions with mild soap and water  ambulate 4-5 times a day  follow up appt with DR. Kevin Blanca in 2 weeks - call [phone] on Monday to schedule your 2 week follow up appt  follow up appt with Dr. De La Vega on Monday December 14th - call his office tomorrow to schedule your appt. time.

## 2020-11-23 NOTE — CONSULT NOTE ADULT - PROBLEM SELECTOR RECOMMENDATION 9
Continue asa 81 daily, ToprolXL 25 daily, rosuvastatin 20 HS  Continue pre-op cardiac surgery workup  Continue diuresis on lasix  Check US Carotids/PFTs/ repeat TTE in AM to evaluate RV  Start BID therapeutic Lovenox per Dr. Blanca - last dose 11/24 evening  Plan for CABG Wednesday 11/25 with Dr. Blanca. Continue asa 81 daily, ToprolXL 25 daily, rosuvastatin 20 HS  Continue pre-op cardiac surgery workup  Continue diuresis on lasix and aldactone  Check US Carotids/PFTs/ repeat TTE in AM to evaluate RV  Start BID therapeutic Lovenox per Dr. Blanca - last dose 11/24 evening  Plan for CABG Wednesday 11/25 with Dr. Blanca.

## 2020-11-23 NOTE — CHART NOTE - NSCHARTNOTEFT_GEN_A_CORE
MEDICINE NP- EPISODIC NOTE    Patient with PATs up to 170s for 11 seconds, VSS, pt asymptomatic. Back at baseline HR 50s. MEDICINE NP- EPISODIC NOTE    Patient with PATs up to 170s for 11 seconds, VSS, pt asymptomatic. Back at baseline HR 50s. C/w Toprol XL 25mg QD. Dr. De La Vega notified.    Johanna Cheng, ALFONZO  [id number]

## 2020-11-23 NOTE — PROGRESS NOTE ADULT - SUBJECTIVE AND OBJECTIVE BOX
CARDIOLOGY     PROGRESS  NOTE   ________________________________________________    CHIEF COMPLAINT:Patient is a 85y old  Male who presents with a chief complaint of edema (22 Nov 2020 14:38)  no complain.  	  REVIEW OF SYSTEMS:  CONSTITUTIONAL: No fever, weight loss, or fatigue  EYES: No eye pain, visual disturbances, or discharge  ENT:  No difficulty hearing, tinnitus, vertigo; No sinus or throat pain  NECK: No pain or stiffness  RESPIRATORY: No cough, wheezing, chills or hemoptysis; No Shortness of Breath  CARDIOVASCULAR: No chest pain, palpitations, passing out, dizziness, or leg swelling  GASTROINTESTINAL: No abdominal or epigastric pain. No nausea, vomiting, or hematemesis; No diarrhea or constipation. No melena or hematochezia.  GENITOURINARY: No dysuria, frequency, hematuria, or incontinence  NEUROLOGICAL: No headaches, memory loss, loss of strength, numbness, or tremors  SKIN: No itching, burning, rashes, or lesions   LYMPH Nodes: No enlarged glands  ENDOCRINE: No heat or cold intolerance; No hair loss  MUSCULOSKELETAL: No joint pain or swelling; No muscle, back, or extremity pain  PSYCHIATRIC: No depression, anxiety, mood swings, or difficulty sleeping  HEME/LYMPH: No easy bruising, or bleeding gums  ALLERGY AND IMMUNOLOGIC: No hives or eczema	    [ ] All others negative	  [ ] Unable to obtain    PHYSICAL EXAM:  T(C): 36.4 (11-23-20 @ 04:50), Max: 36.5 (11-22-20 @ 21:53)  HR: 60 (11-23-20 @ 04:50) (56 - 63)  BP: 122/76 (11-23-20 @ 04:50) (109/56 - 122/76)  RR: 17 (11-23-20 @ 04:50) (16 - 17)  SpO2: 97% (11-23-20 @ 04:50) (97% - 97%)  Wt(kg): --  I&O's Summary    22 Nov 2020 07:01  -  23 Nov 2020 07:00  --------------------------------------------------------  IN: 240 mL / OUT: 1900 mL / NET: -1660 mL        Appearance: Normal	  HEENT:   Normal oral mucosa, PERRL, EOMI	  Lymphatic: No lymphadenopathy  Cardiovascular: Normal S1 S2, No JVD, + murmurs, No edema  Respiratory: Lungs clear to auscultation	  Psychiatry: A & O x 3, Mood & affect appropriate  Gastrointestinal:  Soft, Non-tender, + BS	  Skin: No rashes, No ecchymoses, No cyanosis	  Neurologic: Non-focal  Extremities: Normal range of motion, No clubbing, cyanosis or edema  Vascular: Peripheral pulses palpable 2+ bilaterally    MEDICATIONS  (STANDING):  aspirin enteric coated 81 milliGRAM(s) Oral daily  furosemide   Injectable 20 milliGRAM(s) IV Push three times a day  heparin   Injectable 5000 Unit(s) SubCutaneous every 12 hours  lisinopril 10 milliGRAM(s) Oral every 12 hours  metoprolol succinate ER 25 milliGRAM(s) Oral daily  minocycline 100 milliGRAM(s) Oral two times a day  multivitamin 1 Tablet(s) Oral daily  pantoprazole    Tablet 40 milliGRAM(s) Oral before breakfast  rosuvastatin 20 milliGRAM(s) Oral at bedtime  spironolactone 25 milliGRAM(s) Oral daily      TELEMETRY: 	    ECG:  	  RADIOLOGY:  OTHER: 	  	  LABS:	 	    CARDIAC MARKERS:                                13.9   7.90  )-----------( 174      ( 23 Nov 2020 06:37 )             42.4     11-23    141  |  97  |  21  ----------------------------<  89  4.7   |  32<H>  |  0.98    Ca    10.1      23 Nov 2020 06:37      proBNP: Serum Pro-Brain Natriuretic Peptide: 07848 pg/mL (11-18 @ 12:45)    Lipid Profile: Cholesterol 106  LDL --  HDL 40  TG 73    HgA1c:   TSH: Thyroid Stimulating Hormone, Serum: 1.46 uIU/mL (11-19 @ 09:24)  Thyroid Stimulating Hormone, Serum: 1.51 uIU/mL (11-19 @ 08:41)      < from: Nuclear Stress Test-Pharmacologic (Nuclear Stress Test-Pharmacologic .) (11.22.20 @ 10:58) >  * Chest Pain: No chest pain with administration of  Regadenoson.  * Symptom: No Symptom.  * HR Response: Appropriate.  * BP Response: Appropriate.  * Heart Rhythm: Sinus Bradycardia - 58 BPM.  * Conduction defects: incomplete right bundle brach block.  * Q Waves: Cannot Rule Out Inferior wall MI.  * Baseline ECG: T wave abnormality I, AVL.  * ECG Changes: ST Depression: 1 mm horizontal in leads V5,  V6 started at 02:00 min of post infusion at HR of 77 and  persisted 05:00 min into post infusion.  * Arrhythmia: Rare VPDs occurred during stress and  recovery.  * The left ventricle was enlarged. There are medium sized,  moderate to severe defects in anterior and anterolateral,  anteroseptal, apical, distal inferolateral walls that are  predominantly reversible consistent with  ischemia.Transient ischemic dilation of the LV was noted  with a ratio of: 1.31  * Post-stress gated wall motion analysis was performed  (LVEF = 39 %;LVEDV = 165 ml.), revealing hypokinesis in  anterior and anterolateral, anteroseptal, apical, distal  inferolateral walls.    < end of copied text >      Assessment and plan  ---------------------------    pt is an 84 y/o man with pmhx of htn, hld, hiatal hernia / GERD, OA post Lt TKR x1 year ago presenting with concern for increasing redness of left leg and swelling for 2 months.   Patient reports that he has been having increasing swelling of both of his legs for approximately 2-3 months; however, the left leg has been more swollen and has been getting increasingly more red. He states that he went to his podiatrist when he noted some rash on the lower portion of his left just above his foot, was given a 'cream of some kind, and feels that the redness and swelling got worse after starting the cream.'   He states that he was having chest pain approximately 1 mo ago, went to his PMD and was diagnosed with a hiatal hernia with an xray and prescribed omeprazole.   Patient reports that he has been becoming increasingly more short of breath for approximately 2 months as well. Feels that when he takes just a few steps he has to stop and catch his breath, and is so short of breath that he cannot speak in complete sentences without resting for a period of time first.   pt also states was given amoxicillin a few days ago but only took one dose  He denies any fevers, chills, cough, nausea, vomiting, sweating, chest pain  pt admitted for likely acute CHF with elevated troponin / NSTEMI   chf acute on ?chronic  lasix iv  tele nsr  echo noted severe pulmonary HTN  ?etiology ??sleep apnea  check cta of chest r/o PE  asa daily  needs ischemia work up, awaiting stress test  tsh  lipid/ start on Lipitor 80 mg qhs  coreg , add ace or ARB  fu renal function  ?severe cellulitis of the LLE / mild RLe, iv abx ?id eval noted, check cultures on po abx now  bradycardia, dc coreg start metoprolol er 25 mg daily  abx as per ID  echo noter/ cta note  POS stress test cath today

## 2020-11-23 NOTE — CONSULT NOTE ADULT - ASSESSMENT
This is 86 y/o male with PMH of HTN, HLD, hiatal hernia, GERD, OA post Lt TKR x1 year ago presenting with concern for increasing redness of left leg and swelling for 2 months.   Patient reports that he has been having increasing swelling of both of his legs for approximately 2-3 months; however, the left leg has been more swollen and has been getting increasingly more red. He states that he went to his podiatrist when he noted some rash on the lower portion of his left just above his foot, was given a 'cream of some kind, and feels that the redness and swelling got worse after starting the cream.'   He states that he was having chest pain approximately 1 mo ago, went to his PMD and was diagnosed with a hiatal hernia with an xray and prescribed omeprazole.   Patient reports that he has been becoming increasingly more short of breath for approximately 2 months as well. Feels that when he takes just a few steps he has to stop and catch his breath, and is so short of breath that he cannot speak in complete sentences without resting for a period of time first.   pt also states was given amoxicillin a few days ago but only took one dose.

## 2020-11-23 NOTE — PROGRESS NOTE ADULT - ASSESSMENT
85y male hx htn, hld, hiatal hernia / GERD, OA post Lt TKR x1 year ago presenting with concern for increasing redness of left leg and swelling for 2 months. He had increased swelling of both legs for approximately 2-3 months, with  left leg getting increasingly red. He also reports increasing dyspnea for approximately 2 months as well, mostly on exertion. He was given amoxicillin a few days ago but only took one dose. He had no fevers, chills, no sick contact. He was started on vancomycin  IVPB 1000 milliGRAM(s) IV Intermittent every 12 hours  He also put some cream and redness appear after cream(ammonia lactate)  possible cellulitis, but no systemic signs or symptoms of infection. Allergic contact dermatitis is in differential as well  Prosthetic joint infection L TKR very unlikely, since he has no pain and good ROM and ambulates without difficulties   ESR of 3, CRP .38, and PC .07 all speak against any serious infection  Vanc level 7.7  PLAN:  complete last dose of Minocycline today  No evidence of systemic infection at present  diuresis and keep legs elevated  NO absolute objections for planned OHS from ID viewpoint

## 2020-11-23 NOTE — DISCHARGE NOTE PROVIDER - HOSPITAL COURSE
86 y/o man with pmhx of htn, hld, hiatal hernia / GERD, OA post Lt TKR x1 year ago presenting with concern for increasing redness of left leg and swelling for 2 months.  likely venous stasis changes on left leg and possible cellulitis. No DVT. seen by ID, less likely to have prosthetic joint infection. Started on empiric minocycline for now, continue to elevate LE.  acute on chronic diastolic chf  w/ elevated BNP. s/p Echo showed EF 65%, severe pulm HTNm moderate AS. seen by cardio, c/w diuresis. Elevated trop, s/p NST, abnormal.          86 y/o male with PMH of HTN, HLD, hiatal hernia, GERD, OA post Lt TKR x1 year ago presenting with concern for increasing redness of left leg and swelling for 2 months. Pt admitted for acute on chronic CHD with elevated troponins / NSTEMI. Cardiology following, Dr. De La Vega. Abnormal NST on , now s/p cardiac cath demonstrating multivessel CAD. CT Surgery consulted for CABG evaluation.  On  s/p CABG x 3 LIMA / LIMA to LAD, SVG to Diagonal, SVG to OM  Post op - Course:   Inotropic and pressor support required à weaned off.   Extubated POD # 1  ID following for prior LLE cellulitis àcompleted full course of Abx. Monitoring off Abx.     gtt weaned off. Started on Lopressor 25 mg PO BID.  Transferred to SDU; received patient Hypotensive SBP 70-90’s asymptomatic.  Lopressor decreased to 12.5 mg PO BID. Hypoglycemia BFG 58 à treated with 4 0z of apple juice; repeat .  Continue to monitor.      @ 2200 Rapid afib w/ -180. S/p IVP lopressor x3. S/p IV amio bolus x1, phenylephrine gtt started SBP 80-90. Remained persistent afib 140s after intervetions. Betablockers d/c'd. Intensivist at bedside, Pt transferred to CTU for further management.  Cardiology/eps following, increase lft ? 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 Cardizem IV for hr control- d/c , amio infusion,  Esmolol infusion-d/c  Prophylactic antibiotic coverage, ID following for LE cellulitis   Remained on primacor for perfusion  Amio loaded.  Lasix infusion for diuresis   s/p LISA DCCV - required pacing>80 for bp support/ maintain nsr  Lasix infusion d/c, lasix 20iv q8  Eliquis started for afib  Eliquis held to remove a line in am  12/3  Primacor d/c  Transferred to sdu  12/3 Dana out. Resume Eliquis  Lasix bid, ald qd, repeat bmp later today   VSS; diuretics decrease lasix 40 mg po qd / aldactone 25 mg po qd; hold eliquis this evening and in am for PW removal  tx floor today; ekg qtc 481; continue to monitor on amio 200qd; no bb due to hypotension postop; statin d/c due to elevated lft's- repeat in am  VSS: bifasicular block - ep following-Dr. De La Vega-  continue to monitor HR on amio 200 qd and low dose bb lop 12.5 bid; maintain pw; resume eliquis; possible PPM monday as per EP  d/c aldactone secondary to hyperkalmia this am  no statin due to statin intolerance    VSS tele stable overnight. SB 50 -60 - cleared for d/c home as per Dr. Blanca and Dr. De La Vega.  will d/c home on toprol xl 25mg po daily & f/u w/ Dr. De La Vega On Monday,  and dr. Blanca in 2 weeks.

## 2020-11-23 NOTE — CONSULT NOTE ADULT - SUBJECTIVE AND OBJECTIVE BOX
History of Present Illness:  Pt is an 84 y/o man with pmhx of htn, hld, hiatal hernia / GERD, OA post Lt TKR x1 year ago presenting with concern for increasing redness of left leg and swelling for 2 months.   Patient reports that he has been having increasing swelling of both of his legs for approximately 2-3 months; however, the left leg has been more swollen and has been getting increasingly more red. He states that he went to his podiatrist when he noted some rash on the lower portion of his left just above his foot, was given a 'cream of some kind, and feels that the redness and swelling got worse after starting the cream.'   He states that he was having chest pain approximately 1 mo ago, went to his PMD and was diagnosed with a hiatal hernia with an xray and prescribed omeprazole.   Patient reports that he has been becoming increasingly more short of breath for approximately 2 months as well. Feels that when he takes just a few steps he has to stop and catch his breath, and is so short of breath that he cannot speak in complete sentences without resting for a period of time first.   pt also states was given amoxicillin a few days ago but only took one dose  He denies any fevers, chills, cough, nausea, vomiting, sweating, chest pain  pt admitted for likely acute CHF with elevated troponin / NSTEMI    (18 Nov 2020 16:09)       Past Medical History  Mitral prolapse    Hypercholesteremia    Hypertension        Past Surgical History  History of hernia repair        MEDICATIONS  (STANDING):  aspirin enteric coated 81 milliGRAM(s) Oral daily  enoxaparin Injectable 70 milliGRAM(s) SubCutaneous two times a day  furosemide    Tablet 40 milliGRAM(s) Oral daily  lisinopril 10 milliGRAM(s) Oral every 12 hours  metoprolol succinate ER 25 milliGRAM(s) Oral daily  minocycline 100 milliGRAM(s) Oral two times a day  multivitamin 1 Tablet(s) Oral daily  pantoprazole    Tablet 40 milliGRAM(s) Oral before breakfast  rosuvastatin 20 milliGRAM(s) Oral at bedtime  spironolactone 25 milliGRAM(s) Oral daily      Vital Signs Last 24 Hrs  T(C): 36.5 (11-23-20 @ 14:15), Max: 36.9 (11-23-20 @ 11:20)  T(F): 97.7 (11-23-20 @ 14:15), Max: 98.5 (11-23-20 @ 11:20)  HR: 55 (11-23-20 @ 16:15) (52 - 65)  BP: 111/60 (11-23-20 @ 16:15) (104/58 - 126/60)  RR: 17 (11-23-20 @ 16:15) (16 - 18)  SpO2: 98% (11-23-20 @ 16:15) (94% - 100%)             Height (cm): 160   Weight (kg): 73    BMI (kg/m2): 28.5   (21 Nov 2020 19:30)      Allergies: ammonium lactate topical (Rash (Mod to Severe))  atorvastatin (Unknown)      SOCIAL HISTORY:  , lives with wife. Retired printer/  Smoker: [ ] Yes  [X] No                  Pt denies  ETOH use: [ ] Yes  [X] No               Pt denies  Ilicit Drug use:  [ ] Yes  [X] No        Pt denies        Review of Systems  GENERAL:  no weakness, fatigue, fevers or chills  NEURO: no dizziness, numbness, tingling or weakness  SKIN: no itching, burning, rashes, or lesions   HEENT: no visual changes;  no headache, no vertigo, no recent colds  RESPIRATORY: no shortness of breath, no cough, sputum, wheezing  CARDIOVASCULAR:  no chest pain,  or palpitations  GI: no abd pain. no N/V/D.  PERIPHERAL VASCULAR: no swelling, no tenderness, no erythema      PHYSICAL EXAM  General: Well nourished, well developed, NAD.                                              Neuro: Normal exam oriented to person/place & time with no focal motor or sensory  deficits.                    Eyes: Normal exam of conjunctiva & lids, pupils equally reactive.   ENT: Normal exam of nasal/oral mucosa with absence of cyanosis.   Neck: Normal exam of jugular veins, trachea & thyroid.   Chest: Normal lung exam with good air movement absence of wheezes, rales, or rhonchi.                                                                         CV:  Auscultation: normal S1S2, RRR   Carotids: No Bruits[X]  Abdominal Aorta: normal [X] nonpalpable[X]                                                                         GI: Normal exam of abdomen with no noted masses or tenderness. +[id number]                                                                                            Extremities: Normal no evidence of cyanosis or deformity, Edema: none  Lower Extremity Pulses: Right[+2DP] Left[+2DP] Varicosities[none]  SKIN : Normal exam to inspection & palpation. Right radial incision with radial band CDI                                                            LABS:                        13.9   7.90  )-----------( 174      ( 23 Nov 2020 06:37 )             42.4       141  |  97  |  21  ----------------------------<  89  4.7   |  32<H>  |  0.98      < from: TTE with Doppler (w/3D Echo) (Transthoracic Echocardiogram) (11.19.20 @ 08:27) >  Observations:  Mitral Valve: Mitral annular, leaflet, and chordal  calcification.  Mild-moderate mitral regurgitation directed  posterolaterally.  Aortic Valve/Aorta: Mild-moderate aortic stenosis.  --LVOTd 2.1 cm. LVOT TVI 24.8 cm. DI = 0.39.  --Mean aortic valve gradient 16.3 mmHg. Aortic valve TVI  63.2 cm.  --Aortic valve area by continuity 1.4 cm2.  Normal aortic root size.  Left Atrium: Severely dilated left atrium.  Left Ventricle: Normal left ventricular internal dimensions  and wall thicknesses. EF 65%  Normal left ventricular systolic function. No segmental  wall motion abnormalities.  There is a false tendon in the  LV cavity(normal variant).  Left ventricular filling pressure is severely elevated.  Right Heart: Normal right atrium. Normal right ventricular  size and function.  Normal tricuspid valve. Mild tricuspid regurgitation.  Normal pulmonic valve. Mild pulmonic regurgitation.  Pericardium/Pleura: Normal pericardium with trace  pericardial effusion.  Bilateral pleural effusions.  Hemodynamic: Estimated right atrial pressure is severely  elevated.  Severe pulmonary hypertension. Estimated PASP 85 mmHg.      Cardiac Cath 11/23 (prelim): oLAD 90%, ostial Cx and LM stenosis.

## 2020-11-23 NOTE — DISCHARGE NOTE PROVIDER - NSDCPNSUBOBJ_GEN_ALL_CORE
PHYSICAL EXAM:  Neurology: alert and oriented x 3, nonfocal, no gross deficits  CV : S1S2  Sternal Wound :  CDI , Stable  Lungs: CTa  Abdomen: soft, nontender, nondistended, positive bowel sounds, last bowel movement         Extremities:   + edema b/l no calf tenderness, LLE pinkened, blanching - improved- Right LE inc cdi

## 2020-11-23 NOTE — DISCHARGE NOTE PROVIDER - NSDCFUADDAPPT_GEN_ALL_CORE_FT
follow up appt with Dr. De La Vega in 1 week on December 14- call to schedule your appt time  follow up appt with Dr. Blanca on December 22, Tuesday - call [phone] in am to schedule your appt time

## 2020-11-23 NOTE — PROGRESS NOTE ADULT - ASSESSMENT
_________________________________________________________________________________________  ========>>  M E D I C A L   A T T E N D I N G    F O L L O W  U P  N O T E  <<=========  -----------------------------------------------------------------------------------------------------    - Patient seen and examined by me earlier today.  pt seen post Cath   - In summary,  MARCOS AGUILAR is a 85y year old man who originally presented with edema   - Patient today overall doing ok, comfortable, eating OK.  edema and SOB much improved, CP free    ==================>> REVIEW OF SYSTEM <<=================    GEN: no fever, no chills, no pain  RESP: no SOB, no cough, no sputum  CVS: no chest pain, no palpitations, edema better   GI: no abdominal pain, no nausea, no constipation, no diarrhea  : no dysuria, no frequency, no hematuria  Neuro: no headache, no dizziness  Derm : no itching, no rash    ==================>> PHYSICAL EXAM <<=================    GEN: A&O X 3 , NAD , comfortable  HEENT: NCAT, PERRL, MMM, hearing intact  Neck: supple , no JVD appreciated  CVS: S1S2 , regular , No M/R/G appreciated  PULM: CTA B/L,  no W/R/R appreciated  ABD.: soft. non tender, non distended,  bowel sounds present  Extrem: intact pulses , edema and erythema ( stasis changes) of b/L LE improved Lt>Rt  PSYCH : normal mood,  not anxious       ==================>> MEDICATIONS <<====================    aspirin enteric coated 81 milliGRAM(s) Oral daily  enoxaparin Injectable 70 milliGRAM(s) SubCutaneous two times a day  furosemide    Tablet 40 milliGRAM(s) Oral daily  lisinopril 10 milliGRAM(s) Oral every 12 hours  metoprolol succinate ER 25 milliGRAM(s) Oral daily  minocycline 100 milliGRAM(s) Oral two times a day  multivitamin 1 Tablet(s) Oral daily  pantoprazole    Tablet 40 milliGRAM(s) Oral before breakfast  rosuvastatin 20 milliGRAM(s) Oral at bedtime  spironolactone 25 milliGRAM(s) Oral daily    ___________  Active diet:  Diet, Regular:   DASH/TLC Sodium & Cholesterol Restricted (DASH)  Low Sodium  ___________________    ==================>> VITAL SIGNS <<==================      Weight (kg): 73  Vital Signs Last 24 HrsT(C): 36.4 (11-23-20 @ 16:47)  T(F): 97.6 (11-23-20 @ 16:47), Max: 98.5 (11-23-20 @ 11:20)  HR: 61 (11-23-20 @ 16:47) (52 - 65)  BP: 113/64 (11-23-20 @ 16:47)  RR: 18 (11-23-20 @ 16:47) (16 - 18)  SpO2: 99% (11-23-20 @ 16:47) (94% - 100%)         ==================>> LAB AND IMAGING <<==================                        13.9   7.90  )-----------( 174      ( 23 Nov 2020 06:37 )             42.4        11-23    141  |  97  |  21  ----------------------------<  89  4.7   |  32<H>  |  0.98    Ca    10.1      23 Nov 2020 06:37      WBC count:   7.90 <<== ,  9.34 <<== ,  8.01 <<== ,  8.66 <<== ,  8.21 <<== ,  9.39 <<==   Hemoglobin:   13.9 <<==,  13.8 <<==,  13.3 <<==,  13.5 <<==,  13.4 <<==,  13.3 <<==  platelets:  174 <==, 179 <==, 176 <==, 123 <==, 160 <==, 165 <==, 179 <==    Creatinine:  0.98  <<==, 1.02  <<==, 0.96  <<==, 0.89  <<==, 0.88  <<==  Sodium:   141  <==, 140  <==, 140  <==, 139  <==, 138  <==      < from: Nuclear Stress Test-Pharmacologic (Nuclear Stress Test-Pharmacologic .) (11.22.20 @ 10:58) >  IMPRESSIONS:Abnormal Study  * Chest Pain: No chest pain with administration of  Regadenoson.  * Symptom: No Symptom.  * HR Response: Appropriate.  * BP Response: Appropriate.  * Heart Rhythm: Sinus Bradycardia - 58 BPM.  * Conduction defects: incomplete right bundle brach block.  * Q Waves: Cannot Rule Out Inferior wall MI.  * Baseline ECG: T wave abnormality I, AVL.  * ECG Changes: ST Depression: 1 mm horizontal in leads V5,  V6 started at 02:00 min of post infusion at HR of 77 and  persisted 05:00 min into post infusion.  * Arrhythmia: Rare VPDs occurred during stress and  recovery.  * The left ventricle was enlarged. There are medium sized,  moderate to severe defects in anterior and anterolateral,  anteroseptal, apical, distal inferolateral walls that are  predominantly reversible consistent with  ischemia.Transient ischemic dilation of the LV was noted  with a ratio of: 1.31  * Post-stress gated wall motion analysis was performed  (LVEF = 39 %;LVEDV = 165 ml.), revealing hypokinesis in  anterior and anterolateral, anteroseptal, apical, distal  inferolateral walls.  ------------------------------------------------------------------------  Confirmed on11/22/2020 - 14:26:58 by Shan Mercado M.D.  < end of copied text >      < from: TTE with Doppler (w/3D Echo) (Transthoracic Echocardiogram) (11.19.20 @ 08:27) >  Conclusions:  Normal left ventricular systolic function. No segmental  wall motion abnormalities.  Mild-moderate aortic stenosis.  Left ventricular filling pressure is severely elevated.  Severe pulmonary hypertension.  ------------------------------------------------------------------------  Confirmed on  11/19/2020 - 10:35:23 by Tulio Merrill MD,  TONA  < end of copied text >    ___________________________________________________________________________________  ===============>>  A S S E S S M E N T   A N D   P L A N <<===============  ------------------------------------------------------------------------------------------    pt is an 86 y/o man with pmhx of htn, hld, hiatal hernia / GERD, OA post Lt TKR x1 year ago presenting with concern for increasing redness of left leg and swelling for 2 months.     Problem/Plan - 1:  ·  Problem: pt likely with acute exacerbation of chronic diastolic congestive heart failure     pt with bilateral edema, pleural effusion, pulmonary edema, and elevated pro-BNP     pt with also severe pulmonary HTN and aortic stenosis  above      pt with multivessel Coronary artery disease on Cath ( pending official)     apprecaited cardio and CTS notes  plan for CABG Wednesday  full dose Lovenox per CTS  diuresis with lasix   Echo as above  further med optimization and ischemic workup as per cardio   Cardiology following   monitor vitals, labs, I/O closely.   leg elevation     Problem/Plan - 2:  ·  Problem: NSTEMI in pt with significant CAD    elevated troponin with CHF picture  as above  monitor on tele  aspirin.   otherwise as above     Problem/Plan - 3:  ·  Problem: HTN / HLD  Continue Current medications otherwise and monitor.   cardio f/u. and further optimization  pt and wife adamant against statins due to intolerance ( no allergic reaction ) >  pharmacy added Zetia !     Problem/Plan - 4:  ·  Problem: Venous stasis.    no DVT on duplex  likely venous stasis changes on left leg and cellulitis  pt started on empiric abx for now  >> to finish today per ID   elevation  diuresis  ID appreciated     GI / DVT PPX     --------------------------------------------  Case discussed with Pt, wife, cardio ..   Education given on findings and plan of care  ___________________________  HSonal Phipps D.O.  Pager: [phone]

## 2020-11-23 NOTE — CONSULT NOTE ADULT - PROBLEM SELECTOR RECOMMENDATION 2
Continue PO minocycline for LLE cellulitis  Pending ID clearance, Dr. Belem Velez to proceed with OR  Vein mapping by CTS PA

## 2020-11-23 NOTE — DISCHARGE NOTE PROVIDER - NSDCMRMEDTOKEN_GEN_ALL_CORE_FT
ammonium lactate 12% topical cream: Apply topically to affected area once a day (in the evening)  atenolol 50 mg oral tablet: 1 tab(s) orally once a day  docusate sodium 100 mg oral capsule: 1 cap(s) orally , As Needed  omeprazole 20 mg oral delayed release capsule: 1 cap(s) orally once a day  PreserVision AREDS 2 oral capsule: 2 cap(s) orally 2 times a day  Tylenol Extra Strength 500 mg oral tablet: 1 tab(s) orally , As Needed - for headache  Vitamin C 1000 mg oral tablet: 1 tab(s) orally 2 times a day  Zetia 10 mg oral tablet: 1 tab(s) orally once a day   amiodarone 200 mg oral tablet: 1 tab(s) orally once a day  ammonium lactate 12% topical cream: Apply topically to affected area once a day (in the evening)  apixaban 5 mg oral tablet: 1 tab(s) orally 2 times a day  aspirin 81 mg oral delayed release tablet: 1 tab(s) orally once a day  docusate sodium 100 mg oral capsule: 1 cap(s) orally , As Needed  Lasix 20 mg oral tablet: 1 tab(s) orally once a day   Metoprolol Succinate ER 25 mg oral tablet, extended release: 1 tab(s) orally once a day   omeprazole 20 mg oral delayed release capsule: 1 cap(s) orally once a day  PreserVision AREDS 2 oral capsule: 2 cap(s) orally 2 times a day  Tylenol Extra Strength 500 mg oral tablet: 1 tab(s) orally , As Needed - for headache  Vitamin C 1000 mg oral tablet: 1 tab(s) orally 2 times a day  Zetia 10 mg oral tablet: 1 tab(s) orally once a day

## 2020-11-23 NOTE — PROGRESS NOTE ADULT - SUBJECTIVE AND OBJECTIVE BOX
CC: f/u for possible LLE celllulitis    Patient reports no fever or leg pain    REVIEW OF SYSTEMS:  All other review of systems negative (Comprehensive ROS)    Antimicrobials Day #    minocycline 100 milliGRAM(s) Oral two times a day        Other Medications Reviewed    Vital Signs Last 24 Hrs  T(C): 36.4 (23 Nov 2020 16:47), Max: 36.9 (23 Nov 2020 11:20)  T(F): 97.6 (23 Nov 2020 16:47), Max: 98.5 (23 Nov 2020 11:20)  HR: 61 (23 Nov 2020 16:47) (52 - 65)  BP: 113/64 (23 Nov 2020 16:47) (104/58 - 126/60)  BP(mean): --  RR: 18 (23 Nov 2020 16:47) (16 - 18)  SpO2: 99% (23 Nov 2020 16:47) (94% - 100%)    PHYSICAL EXAM:  General: alert, no acute distress  Eyes:  anicteric, no conjunctival injection, no discharge  Oropharynx: no lesions or injection 	  Neck: supple, without adenopathy  Lungs: clear to auscultation  Heart: regular rate and rhythm; no murmur, rubs or gallops  Abdomen: soft, nondistended, nontender, without mass or organomegaly  Skin: erythema left shin and calf  Extremities: no clubbing, cyanosis, + edema, no pain, good ROM   Neurologic: alert, oriented, moves all extremities    LAB RESULTS:                                                    13.9   7.90  )-----------( 174      ( 23 Nov 2020 06:37 )             42.4   11-23    141  |  97  |  21  ----------------------------<  89  4.7   |  32<H>  |  0.98    Ca    10.1      23 Nov 2020 06:37            MICROBIOLOGY:  RECENT CULTURES:  no cult    RADIOLOGY REVIEWED:      < from: CT Angio Chest w/ IV Cont (11.20.20 @ 17:06) >  Impression: No pulmonary embolus is noted within the main, right and left main and lobar pulmonary arteries bilaterally. Evaluation of the segmental and subsegmental pulmonary arteries is limited.    Small to moderate size pleural effusions bilaterally, right more than left.    < end of copied text >    < from: VA Duplex Low Ext Arterial, Ltd, Left (11.18.20 @ 15:04) >    Impression:    Discrete left lower extremity arterial vascular disease is not identified.    < end of copied text >  < from: VA Duplex Lower Ext Vein Scan, Bilat (11.18.20 @ 15:01) >  IMPRESSION:  No evidence of deep venous thrombosis in either lower extremity.      < end of copied text >  < from: Xray Chest 1 View- PORTABLE-Urgent (Xray Chest 1 View- PORTABLE-Urgent .) (11.18.20 @ 12:14) >    Impression:    The heart is enlarged. Bilateral pleural effusion. Pulmonary vascular congestion. Calcified aortic knob. No pneumothorax. No acute bony pathology could be identified.    < end of copied text >

## 2020-11-23 NOTE — DISCHARGE NOTE PROVIDER - CARE PROVIDER_API CALL
Kevin Blanca  THORACIC AND CARDIAC SURGERY  [address] [place] [address]  Phone: [phone]  Fax: [phone]  Follow Up Time:     Naga De La Vega  CARDIOVASCULAR DISEASE  [address] [place] [address], [facility] [address]  [place] [address]  Phone: [phone]  Fax: [phone]  Follow Up Time:

## 2020-11-24 LAB
A1C WITH ESTIMATED AVERAGE GLUCOSE RESULT: 5.4 % — SIGNIFICANT CHANGE UP (ref 4–5.6)
ANION GAP SERPL CALC-SCNC: 10 MMOL/L — SIGNIFICANT CHANGE UP (ref 5–17)
BLD GP AB SCN SERPL QL: NEGATIVE — SIGNIFICANT CHANGE UP
BUN SERPL-MCNC: 29 MG/DL — HIGH (ref 7–23)
CALCIUM SERPL-MCNC: 9.4 MG/DL — SIGNIFICANT CHANGE UP (ref 8.4–10.5)
CHLORIDE SERPL-SCNC: 101 MMOL/L — SIGNIFICANT CHANGE UP (ref 96–108)
CO2 SERPL-SCNC: 28 MMOL/L — SIGNIFICANT CHANGE UP (ref 22–31)
CREAT SERPL-MCNC: 1.11 MG/DL — SIGNIFICANT CHANGE UP (ref 0.5–1.3)
ESTIMATED AVERAGE GLUCOSE: 108 MG/DL — SIGNIFICANT CHANGE UP (ref 68–114)
GLUCOSE SERPL-MCNC: 91 MG/DL — SIGNIFICANT CHANGE UP (ref 70–99)
HCT VFR BLD CALC: 38.9 % — LOW (ref 39–50)
HGB BLD-MCNC: 12.8 G/DL — LOW (ref 13–17)
MAGNESIUM SERPL-MCNC: 2.1 MG/DL — SIGNIFICANT CHANGE UP (ref 1.6–2.6)
MCHC RBC-ENTMCNC: 32.9 GM/DL — SIGNIFICANT CHANGE UP (ref 32–36)
MCHC RBC-ENTMCNC: 33.2 PG — SIGNIFICANT CHANGE UP (ref 27–34)
MCV RBC AUTO: 100.8 FL — HIGH (ref 80–100)
MRSA PCR RESULT.: SIGNIFICANT CHANGE UP
NRBC # BLD: 0 /100 WBCS — SIGNIFICANT CHANGE UP (ref 0–0)
PHOSPHATE SERPL-MCNC: 4 MG/DL — SIGNIFICANT CHANGE UP (ref 2.5–4.5)
PLATELET # BLD AUTO: 174 K/UL — SIGNIFICANT CHANGE UP (ref 150–400)
POTASSIUM SERPL-MCNC: 4.3 MMOL/L — SIGNIFICANT CHANGE UP (ref 3.5–5.3)
POTASSIUM SERPL-SCNC: 4.3 MMOL/L — SIGNIFICANT CHANGE UP (ref 3.5–5.3)
RBC # BLD: 3.86 M/UL — LOW (ref 4.2–5.8)
RBC # FLD: 14.7 % — HIGH (ref 10.3–14.5)
RH IG SCN BLD-IMP: NEGATIVE — SIGNIFICANT CHANGE UP
S AUREUS DNA NOSE QL NAA+PROBE: SIGNIFICANT CHANGE UP
SARS-COV-2 RNA SPEC QL NAA+PROBE: SIGNIFICANT CHANGE UP
SODIUM SERPL-SCNC: 139 MMOL/L — SIGNIFICANT CHANGE UP (ref 135–145)
T3 SERPL-MCNC: 82 NG/DL — SIGNIFICANT CHANGE UP (ref 80–200)
T4 AB SER-ACNC: 6.1 UG/DL — SIGNIFICANT CHANGE UP (ref 4.6–12)
WBC # BLD: 8.63 K/UL — SIGNIFICANT CHANGE UP (ref 3.8–10.5)
WBC # FLD AUTO: 8.63 K/UL — SIGNIFICANT CHANGE UP (ref 3.8–10.5)

## 2020-11-24 PROCEDURE — 99232 SBSQ HOSP IP/OBS MODERATE 35: CPT

## 2020-11-24 PROCEDURE — 94010 BREATHING CAPACITY TEST: CPT | Mod: 26

## 2020-11-24 PROCEDURE — 93306 TTE W/DOPPLER COMPLETE: CPT | Mod: 26

## 2020-11-24 RX ORDER — CHLORHEXIDINE GLUCONATE 213 G/1000ML
30 SOLUTION TOPICAL ONCE
Refills: 0 | Status: DISCONTINUED | OUTPATIENT
Start: 2020-11-24 | End: 2020-11-25

## 2020-11-24 RX ORDER — CHLORHEXIDINE GLUCONATE 213 G/1000ML
1 SOLUTION TOPICAL ONCE
Refills: 0 | Status: COMPLETED | OUTPATIENT
Start: 2020-11-25 | End: 2020-11-25

## 2020-11-24 RX ORDER — FUROSEMIDE 40 MG
20 TABLET ORAL DAILY
Refills: 0 | Status: DISCONTINUED | OUTPATIENT
Start: 2020-11-25 | End: 2020-11-25

## 2020-11-24 RX ORDER — VANCOMYCIN HCL 1 G
1000 VIAL (EA) INTRAVENOUS ONCE
Refills: 0 | Status: COMPLETED | OUTPATIENT
Start: 2020-11-24 | End: 2020-11-24

## 2020-11-24 RX ORDER — CEFUROXIME AXETIL 250 MG
1500 TABLET ORAL ONCE
Refills: 0 | Status: DISCONTINUED | OUTPATIENT
Start: 2020-11-24 | End: 2020-11-25

## 2020-11-24 RX ORDER — CHLORHEXIDINE GLUCONATE 213 G/1000ML
1 SOLUTION TOPICAL ONCE
Refills: 0 | Status: COMPLETED | OUTPATIENT
Start: 2020-11-24 | End: 2020-11-24

## 2020-11-24 RX ORDER — CHLORHEXIDINE GLUCONATE 213 G/1000ML
1 SOLUTION TOPICAL
Refills: 0 | Status: DISCONTINUED | OUTPATIENT
Start: 2020-11-25 | End: 2020-11-25

## 2020-11-24 RX ADMIN — Medication 250 MILLIGRAM(S): at 22:27

## 2020-11-24 RX ADMIN — Medication 40 MILLIGRAM(S): at 05:46

## 2020-11-24 RX ADMIN — PANTOPRAZOLE SODIUM 40 MILLIGRAM(S): 20 TABLET, DELAYED RELEASE ORAL at 05:46

## 2020-11-24 RX ADMIN — SPIRONOLACTONE 25 MILLIGRAM(S): 25 TABLET, FILM COATED ORAL at 05:46

## 2020-11-24 RX ADMIN — Medication 81 MILLIGRAM(S): at 12:37

## 2020-11-24 RX ADMIN — ENOXAPARIN SODIUM 70 MILLIGRAM(S): 100 INJECTION SUBCUTANEOUS at 05:47

## 2020-11-24 RX ADMIN — LISINOPRIL 10 MILLIGRAM(S): 2.5 TABLET ORAL at 05:46

## 2020-11-24 RX ADMIN — ROSUVASTATIN CALCIUM 20 MILLIGRAM(S): 5 TABLET ORAL at 22:23

## 2020-11-24 RX ADMIN — MINOCYCLINE HYDROCHLORIDE 100 MILLIGRAM(S): 45 TABLET, EXTENDED RELEASE ORAL at 12:37

## 2020-11-24 RX ADMIN — Medication 25 MILLIGRAM(S): at 12:37

## 2020-11-24 RX ADMIN — Medication 1 TABLET(S): at 12:37

## 2020-11-24 RX ADMIN — CHLORHEXIDINE GLUCONATE 1 APPLICATION(S): 213 SOLUTION TOPICAL at 21:19

## 2020-11-24 NOTE — PROGRESS NOTE ADULT - ASSESSMENT
This is 84 y/o male with PMH of HTN, HLD, hiatal hernia, GERD, OA post Lt TKR x1 year ago presenting with concern for increasing redness of left leg and swelling for 2 months. Pt admitted for acute on chronic CHD with elevated troponins / NSTEMI. Cardiology following, Dr. De La Vega. Abnormal NST on 11/22, now s/p cardiac cath demonstrating multivessel CAD. CT Surgery consulted for CABG evaluation.

## 2020-11-24 NOTE — DIETITIAN INITIAL EVALUATION ADULT. - PERTINENT MEDS FT
MEDICATIONS  (STANDING):  aspirin enteric coated 81 milliGRAM(s) Oral daily  cefuroxime  IVPB 1500 milliGRAM(s) IV Intermittent once  cefuroxime  IVPB 1500 milliGRAM(s) IV Intermittent once  chlorhexidine 0.12% Liquid 30 milliLiter(s) Swish and Spit once  chlorhexidine 4% Liquid 1 Application(s) Topical once  chlorhexidine 4% Liquid 1 Application(s) Topical two times a day  metoprolol succinate ER 25 milliGRAM(s) Oral daily  minocycline 100 milliGRAM(s) Oral two times a day  multivitamin 1 Tablet(s) Oral daily  pantoprazole    Tablet 40 milliGRAM(s) Oral before breakfast  rosuvastatin 20 milliGRAM(s) Oral at bedtime  vancomycin  IVPB 1000 milliGRAM(s) IV Intermittent once

## 2020-11-24 NOTE — DIETITIAN INITIAL EVALUATION ADULT. - CHIEF COMPLAINT
85M PMH: HTN, HLD, Hiatal Hernia/GERD, OA post Lt TKR x1 year ago. P/w concern for increasing redness of left leg and swelling for 2 months. Admitted for likely acute CHF with elevated troponin / NSTEMI.   Pending CABG tomorrow

## 2020-11-24 NOTE — PROGRESS NOTE ADULT - PROBLEM SELECTOR PLAN 1
Continue asa 81 daily, ToprolXL 25 daily, rosuvastatin 20 HS  Continue diuresis on lasix and aldactone  Pending repeat TTE today to evaluate RV  SQ Lovenox dc'd, last dose this AM   NPO p MN, pre-op chlorhexidine shower  Plan for CABG Wednesday 11/25 with Dr. Blanca.

## 2020-11-24 NOTE — DIETITIAN INITIAL EVALUATION ADULT. - PERTINENT LABORATORY DATA
11-24 Na139 mmol/L Glu 91 mg/dL K+ 4.3 mmol/L Cr  1.11 mg/dL BUN 29 mg/dL<H> Phos 4.0 mg/dL Alb n/a   PAB n/a

## 2020-11-24 NOTE — DIETITIAN INITIAL EVALUATION ADULT. - EDUCATION DIETARY MODIFICATIONS
(2) meets goals/outcomes/verbalization/teach back/HF diet education; Consistent CHO, DASH diet indication s/p surgery

## 2020-11-24 NOTE — DIETITIAN INITIAL EVALUATION ADULT. - PHYSCIAL ASSESSMENT
other (specify) Skin per nursing flowsheets: cellulitis noted; no pressure injuries indicated at this time

## 2020-11-24 NOTE — DIETITIAN INITIAL EVALUATION ADULT. - OTHER INFO
Admits to good appetite/PO intake in-house, consuming 100% at meals. Denies N+V; notes constipation at times likely 2/2 medication - last BM documented 11/20, no bowel regimen ordered at this time. Denies difficulties chewing/swallowing.     Notes  pounds which has been stable aside from weight gain PTA 2/2 fluid retention. Admits to good appetite/PO intake in-house, consuming 100% at meals. Denies N+V; notes constipation at times likely 2/2 medication - last BM documented 11/20, no bowel regimen ordered at this time. Denies difficulties chewing/swallowing.     Notes  pounds which has been stable aside from weight gain PTA 2/2 fluid retention. Dosing weight on 11/18 - 173.2 pounds; standing weight today (11/24) - 153 pounds. Pt currently being diuresed in-house on lasix and also receiving aldactone.     Reviewed HF diet education. Discussed Na restriction, foods high in Na to avoid, tips for limiting Na in your diet. Reviewed taking frequent weights at home, Wt gain parameters to contact MD. Discussed Na intake in relation to fluid retention, edema and Wt gain. Pt also to be undergoing CABG tomorrow - discussed diet s/p surgery; recommendation for Consistent CHO, DASH diet after surgery, noted indication for Consistent CHO diet to keep BG controlled. Pt without any questions and made aware RD remains available.

## 2020-11-24 NOTE — PROGRESS NOTE ADULT - ASSESSMENT
_________________________________________________________________________________________  ========>>  M E D I C A L   A T T E N D I N G    F O L L O W  U P  N O T E  <<=========  -----------------------------------------------------------------------------------------------------    - Patient seen and examined by me earlier today.    - In summary,  MARCOS AGUILAR is a 85y year old man who originally presented with edema   - Patient today overall doing ok, comfortable, eating OK.  edema and SOB much improved, CP free     overall doing ok and awaiting CABG: all questions answered and pt reassured     ==================>> REVIEW OF SYSTEM <<=================    GEN: no fever, no chills, no pain  RESP: no SOB, no cough, no sputum  CVS: no chest pain, no palpitations, edema better   GI: no abdominal pain, no nausea  : no dysuria, no frequency, no hematuria  Neuro: no headache, no dizziness  Derm : no itching, no rash    ==================>> PHYSICAL EXAM <<=================    GEN: A&O X 3 , NAD , comfortable  HEENT: NCAT, PERRL, MMM, hearing intact  Neck: supple , no JVD appreciated  CVS: S1S2 , regular , No M/R/G appreciated  PULM: CTA B/L,  no W/R/R appreciated  ABD.: soft. non tender, non distended,  bowel sounds present  Extrem: intact pulses , edema and erythema ( stasis changes) of b/L LE improved Lt>Rt  PSYCH : normal mood,  not anxious      ==================>> MEDICATIONS <<====================    aspirin enteric coated 81 milliGRAM(s) Oral daily  cefuroxime  IVPB 1500 milliGRAM(s) IV Intermittent once  cefuroxime  IVPB 1500 milliGRAM(s) IV Intermittent once  chlorhexidine 0.12% Liquid 30 milliLiter(s) Swish and Spit once  chlorhexidine 4% Liquid 1 Application(s) Topical once  chlorhexidine 4% Liquid 1 Application(s) Topical two times a day  metoprolol succinate ER 25 milliGRAM(s) Oral daily  multivitamin 1 Tablet(s) Oral daily  pantoprazole    Tablet 40 milliGRAM(s) Oral before breakfast  rosuvastatin 20 milliGRAM(s) Oral at bedtime  vancomycin  IVPB 1000 milliGRAM(s) IV Intermittent once    ___________  Active diet:  Diet, NPO after Midnight:      NPO Start Date: 24-Nov-2020,   NPO Start Time: 23:59  Except Medications  Diet, Regular:   DASH/TLC Sodium & Cholesterol Restricted (DASH)  Low Sodium  ___________________    ==================>> VITAL SIGNS <<==================    Weight (kg): 73  Vital Signs Last 24 HrsT(C): 36.4 (11-24-20 @ 14:03)  T(F): 97.5 (11-24-20 @ 14:03), Max: 98.9 (11-23-20 @ 20:10)  HR: 62 (11-24-20 @ 14:03) (56 - 66)  BP: 120/71 (11-24-20 @ 14:03)  RR: 18 (11-24-20 @ 14:03) (18 - 18)  SpO2: 96% (11-24-20 @ 14:03) (96% - 99%)       ==================>> LAB AND IMAGING <<==================                        12.8   8.63  )-----------( 174      ( 24 Nov 2020 05:56 )             38.9        11-24    139  |  101  |  29<H>  ----------------------------<  91  4.3   |  28  |  1.11    Ca    9.4      24 Nov 2020 05:56  Phos  4.0     11-24  Mg     2.1     11-24    WBC count:   8.63 <<== ,  7.90 <<== ,  9.34 <<== ,  8.01 <<== ,  8.66 <<==   Hemoglobin:   12.8 <<==,  13.9 <<==,  13.8 <<==,  13.3 <<==,  13.5 <<==  platelets:  174 <==, 174 <==, 179 <==, 176 <==, 123 <==, 160 <==, 165 <==    Creatinine:  1.11  <<==, 0.98  <<==, 1.02  <<==, 0.96  <<==, 0.89  <<==  Sodium:   139  <==, 141  <==, 140  <==, 140  <==, 139  <==    < from: Cardiac Cath Lab - Adult (11.23.20 @ 13:07) >  CORONARY VESSELS: The coronary circulation is right dominant.  LM:   --  Distal left main: There was a 50 % stenosis.  LAD:   --  Ostial LAD: There was a 95 % stenosis.  CX:   --  Ostial circumflex: There was a 50 % stenosis.  --  OM1: There was a 70 % stenosis.  RCA:   --  RCA: Angiography showed minor luminal irregularities with no  flow limiting lesions.  COMPLICATIONS: There were no complications.  DIAGNOSTIC RECOMMENDATIONS: Severe multivessel disease, involving distal  LM, osital LAD, ostial Cx trifurcation with moderate calcification.  Reccomend CTS evaluation for CABG. Continue ASA, hold P2Y12 inhibitor.  INTERVENTIONAL RECOMMENDATIONS: Severe multivessel disease, involving  distal LM, osital LAD, ostial Cx trifurcation with moderate calcification.  Reccomend CTS evaluation for CABG. Continue ASA, hold P2Y12 inhibitor.  Prepared and signed by  Tyshawn Diaz M.D.  < end of copied text >      < from: Nuclear Stress Test-Pharmacologic (Nuclear Stress Test-Pharmacologic .) (11.22.20 @ 10:58) >  IMPRESSIONS:Abnormal Study  * Chest Pain: No chest pain with administration of  Regadenoson.  * Symptom: No Symptom.  * HR Response: Appropriate.  * BP Response: Appropriate.  * Heart Rhythm: Sinus Bradycardia - 58 BPM.  * Conduction defects: incomplete right bundle brach block.  * Q Waves: Cannot Rule Out Inferior wall MI.  * Baseline ECG: T wave abnormality I, AVL.  * ECG Changes: ST Depression: 1 mm horizontal in leads V5,  V6 started at 02:00 min of post infusion at HR of 77 and  persisted 05:00 min into post infusion.  * Arrhythmia: Rare VPDs occurred during stress and  recovery.  * The left ventricle was enlarged. There are medium sized,  moderate to severe defects in anterior and anterolateral,  anteroseptal, apical, distal inferolateral walls that are  predominantly reversible consistent with  ischemia.Transient ischemic dilation of the LV was noted  with a ratio of: 1.31  * Post-stress gated wall motion analysis was performed  (LVEF = 39 %;LVEDV = 165 ml.), revealing hypokinesis in  anterior and anterolateral, anteroseptal, apical, distal  inferolateral walls.  ------------------------------------------------------------------------  Confirmed on11/22/2020 - 14:26:58 by Shan Mercado M.D.  < end of copied text >      < from: TTE with Doppler (w/3D Echo) (Transthoracic Echocardiogram) (11.19.20 @ 08:27) >  Conclusions:  Normal left ventricular systolic function. No segmental  wall motion abnormalities.  Mild-moderate aortic stenosis.  Left ventricular filling pressure is severely elevated.  Severe pulmonary hypertension.  ------------------------------------------------------------------------  Confirmed on  11/19/2020 - 10:35:23 by Tulio Merrill MD,  TONA  < end of copied text >    ___________________________________________________________________________________  ===============>>  A S S E S S M E N T   A N D   P L A N <<===============  ------------------------------------------------------------------------------------------    pt is an 84 y/o man with pmhx of htn, hld, hiatal hernia / GERD, OA post Lt TKR x1 year ago presenting with concern for increasing redness of left leg and swelling for 2 months.     Problem/Plan - 1:  ·  Problem: pt likely with acute exacerbation of chronic diastolic congestive heart failure     pt with bilateral edema, pleural effusion, pulmonary edema, and elevated pro-BNP     pt with also severe pulmonary HTN and aortic stenosis  above      pt with multivessel Coronary artery disease on Cath   apprecaited cardio and CTS notes  plan for CABG tomorrow   Lovenox per CTS  lasix as needed  further med optimization and ischemic workup as per cardio   monitor vitals, labs, I/O closely.   leg elevation advised     Problem/Plan - 2:  ·  Problem: NSTEMI in pt with significant CAD    elevated troponin with CHF picture  as above  monitor on tele  aspirin.   otherwise as above     Problem/Plan - 3:  ·  Problem: HTN / HLD  Continue Current medications otherwise and monitor.   cardio f/u. and further optimization  pt and wife adamant against statins due to intolerance ( no allergic reaction ) >  pharmacy added Zetia !     Problem/Plan - 4:  ·  Problem: Venous stasis.    no DVT on duplex  likely venous stasis changes on left leg and cellulitis  pt started on empiric abx for now  >> finished today per ID   elevation  diuresis  ID appreciated     GI / DVT PPX     --------------------------------------------  Case discussed with Pt  Education given on findings and plan of care  ___________________________  H. INEZ Phipps.  Pager: [phone]

## 2020-11-24 NOTE — PROGRESS NOTE ADULT - SUBJECTIVE AND OBJECTIVE BOX
Cardiac Surgery Pre-op Note:     Surgeon: Dr. Blanca    Procedure: 2020 CABGx3    HPI:    >>>>>>Medical Attending Initial / Admission note<<<<<<  -------------------------------------------------------------------------------  CHIEF COMPLAINT & HISTORY OF PRESENT ILLNESS:      pt is an 86 y/o man with pmhx of htn, hld, hiatal hernia / GERD, OA post Lt TKR x1 year ago presenting with concern for increasing redness of left leg and swelling for 2 months.   Patient reports that he has been having increasing swelling of both of his legs for approximately 2-3 months; however, the left leg has been more swollen and has been getting increasingly more red. He states that he went to his podiatrist when he noted some rash on the lower portion of his left just above his foot, was given a 'cream of some kind, and feels that the redness and swelling got worse after starting the cream.'   He states that he was having chest pain approximately 1 mo ago, went to his PMD and was diagnosed with a hiatal hernia with an xray and prescribed omeprazole.   Patient reports that he has been becoming increasingly more short of breath for approximately 2 months as well. Feels that when he takes just a few steps he has to stop and catch his breath, and is so short of breath that he cannot speak in complete sentences without resting for a period of time first.   pt also states was given amoxicillin a few days ago but only took one dose  He denies any fevers, chills, cough, nausea, vomiting, sweating, chest pain  pt admitted for likely acute CHF with elevated troponin / NSTEMI     --------------------------------------------------------------------------------  PAST MEDICAL HISTORY:    HTN  HLD  GERD / Hiatal hernia   OA    --------------------------------------------------------------------------------  PAST SURGICAL HISTORY:    left knee replacement   inguinal hernia repair    --------------------------------------------------------------------------------  FAMILY HISTORY:     sister: breast CA   Mother: gastric cancer   no known heart disease    --------------------------------------------------------------------------------    SOCIAL HISTORY:  Alcohol: None reported  Smoking: None reported     --------------------------------------------------------------------------------  ALLERGIES:     listed to have allergies to statins , valsartan, naprosyn ( none with rash or swelling )      --------------------------------------------------------------------------------  MEDICATIONS:   [As silvana, александр]    --------------------------------------------------------------------------------  REVIEW OF SYSTEM:    GEN: no fever, no chills, no pain  RESP: + SOB with minimal exertion , no cough, no sputum  CVS: no chest pain, no palpitations, ++ edema with redness of left leg as above   GI: no abdominal pain, no nausea, no vomiting, no constipation, no diarrhea  : no dysuria, no frequency, no hematuria  Neuro: no headache, no dizziness  PSYCH: no anxiety, no depression  Derm : no itching, no rash, redness as above     --------------------------------------------------------------------------------  VITAL SIGNS:     T(C): 36.4 (20 @ 10:32), Max: 36.4 (20 @ 10:32)  HR: 55 (20 @ 16:23) (55 - 63)  BP: 167/76 (20 @ 16:23) (155/92 - 167/76)  RR: 18 (20 @ 16:23) (18 - 18)  SpO2: 100% (20 @ 16:23) (96% - 100%)     --------------------------------------------------------------------------------  PHYSICAL EXAM:    GEN: A&O X 3 , NAD , comfortable, pleasant   HEENT: NCAT, PERRL, MMM, hearing intact  Neck: supple , mild JVD  CVS: S1S2 , regular , No M/R/G appreciated  PULM: CTA B/L,  decreased BS B/L   ABD.: soft. non tender, non distended,  bowel sounds present  Extrem: intact pulses , ++ B/L LE 3+ edema  with chronic stasis changes of left leg   Derm: No rash , no ecchymoses, changes as above in left leg   PSYCH : normal mood,  no delusion not anxious    --------------------------------------------------------------------------------    LAB AND IMAGING:   [As silvana, reviewed]    --------------------------------------------------------------------------------  ASSESSMENT AND PLAN:   [As silvana]    --------------------  Case discussed with pt, wife, ER, cardio  ___________________________  H. INEZ Phipps.  Pager: [phone]    (2020 16:09)      PAST MEDICAL & SURGICAL HISTORY:  Mitral prolapse    Hypercholesteremia    Hypertension    History of hernia repair        MEDICATIONS  (STANDING):  aspirin enteric coated 81 milliGRAM(s) Oral daily  cefuroxime  IVPB 1500 milliGRAM(s) IV Intermittent once  cefuroxime  IVPB 1500 milliGRAM(s) IV Intermittent once  chlorhexidine 0.12% Liquid 30 milliLiter(s) Swish and Spit once  chlorhexidine 4% Liquid 1 Application(s) Topical once  chlorhexidine 4% Liquid 1 Application(s) Topical two times a day  metoprolol succinate ER 25 milliGRAM(s) Oral daily  minocycline 100 milliGRAM(s) Oral two times a day  multivitamin 1 Tablet(s) Oral daily  pantoprazole    Tablet 40 milliGRAM(s) Oral before breakfast  rosuvastatin 20 milliGRAM(s) Oral at bedtime  vancomycin  IVPB 1000 milliGRAM(s) IV Intermittent once      Vital Signs Last 24 Hrs  T(C): 36.7 (20 @ 05:39), Max: 37.2 (20 @ 20:10)  T(F): 98 (20 @ 05:39), Max: 98.9 (20 @ 20:10)  HR: 66 (20 @ 05:39) (52 - 66)  BP: 116/73 (20 @ 05:39) (90/42 - 126/60)  RR: 18 (20 @ 05:39) (16 - 18)  SpO2: 97% (20 @ 05:39) (94% - 99%)               Height (cm): 160   Weight (kg): 73    BMI (kg/m2): 28.5    (2020 19:30)      Labs:                        12.8   8.63  )-----------( 174      ( 2020 05:56 )             38.9     139  |  101  |  29<H>  ----------------------------<  91  4.3   |  28  |  1.11    Ca    9.4      2020 05:56  Phos  4.0       Mg     2.1           PT/INR/PTT - ( 2020 17:53 )   PT: 14.7 sec;   INR: 1.24 ratio  )  PTT:44.1 sec      Thyroid Panel:  @ 02:55  6.1/82   @ 09:24/1.46   @ 08:41/1.51      MRSA/ MSSA PCR Result.: NotDetec ( @ 02:53)    Urinalysis Basic - ( 2020 17:53 )  Color: Light Yellow / Appearance: Clear / S.012 / pH: x  Gluc: x / Ketone: Negative  / Bili: Negative / Urobili: Negative   Blood: x / Protein: Negative / Nitrite: Negative   Leuk Esterase: Negative / RBC: x / WBC x   Sq Epi: x / Non Sq Epi: x / Bacteria: x    < from: VA Duplex Carotid, Bilat (20 @ 19:22) >  IMPRESSION: No significant hemodynamic stenosis of either carotid artery.      < from: Xray Chest 1 View- PORTABLE-Urgent (Xray Chest 1 View- PORTABLE-Urgent .) (20 @ 12:14) >  Impression:  The heart is enlarged. Bilateral pleural effusion. Pulmonary vascular congestion. Calcified aortic knob. No pneumothorax. No acute bony pathology could be identified.      < from: CT Angio Chest w/ IV Cont (20 @ 17:06) >  Impression: No pulmonary embolus is noted within the main, right and left main and lobar pulmonary arteries bilaterally. Evaluation of the segmental and subsegmental pulmonary arteries is limited.  Small to moderate size pleural effusions bilaterally, right more than left.      PFT's: completed       < from: TTE with Doppler (w/3D Echo) (Transthoracic Echocardiogram) (20 @ 08:27) >  Observations:  Mitral Valve: Mitral annular, leaflet, and chordal  calcification.  Mild-moderate mitral regurgitation directed  posterolaterally.  Aortic Valve/Aorta: Mild-moderate aortic stenosis.  --LVOTd 2.1 cm. LVOT TVI 24.8 cm. DI = 0.39.  --Mean aortic valve gradient 16.3 mmHg. Aortic valve TVI  63.2 cm.  --Aortic valve area by continuity 1.4 cm2.  Normal aortic root size.  Left Atrium: Severely dilated left atrium.  Left Ventricle: Normal left ventricular internal dimensions  and wall thicknesses. EF 65%  Normal left ventricular systolic function. No segmental  wall motion abnormalities.  There is a false tendon in the  LV cavity(normal variant).  Left ventricular filling pressure is severely elevated.  Right Heart: Normal right atrium. Normal right ventricular  size and function.  Normal tricuspid valve. Mild tricuspid regurgitation.  Normal pulmonic valve. Mild pulmonic regurgitation.  Pericardium/Pleura: Normal pericardium with trace  pericardial effusion.  Bilateral pleural effusions.  Hemodynamic: Estimated right atrial pressure is severely  elevated.  Severe pulmonary hypertension. Estimated PASP 85 mmHg.        < from: Cardiac Cath Lab - Adult (20 @ 13:07) >  CORONARY VESSELS: The coronary circulation is right dominant.  LM:   --  Distal left main: There was a 50 % stenosis.  LAD:   --  Ostial LAD: There was a 95 % stenosis.  CX:   --  Ostial circumflex: There was a 50 % stenosis.  --  OM1: There was a 70 % stenosis.  RCA:   --  RCA: Angiography showed minor luminal irregularities with no  flow limiting lesions.  COMPLICATIONS: There were no complications.        PHYSICAL EXAM:  Neuro: A&Ox3, NAD  Pulm: B/L BS CTA  CV: RRR,+S1S2  Abd: Soft, NT/ND +[id number]  Ext: B/L LE no edema, +PP, no calf tenderness      Pt has AICD/PPM [ ] Yes  [x ] No           Pre-op Beta Blocker ordered within 24 hrs of surgery (CABG ONLY)?  [x ] Yes  [ ] No  If not, Why?  Type & Cross  [X] Yes  [ ] No  NPO after Midnight [x ] Yes  [ ] No  Pre-op ABX ordered, to be taped on chart:  [ x] Yes  [ ] No     Hibiclens/Peridex ordered [x ] Yes  [ ] No  Intraop on Hold: PRBCs, CXR, LISA [x ]   Consent obtained  [x ] Yes  [ ] No

## 2020-11-24 NOTE — PROGRESS NOTE ADULT - SUBJECTIVE AND OBJECTIVE BOX
CC: f/u for  cellulitis left leg  Patient reports leg much better, feels well    REVIEW OF SYSTEMS:  All other review of systems negative (Comprehensive ROS)    Antimicrobials Day #  :  cefuroxime  IVPB 1500 milliGRAM(s) IV Intermittent once  cefuroxime  IVPB 1500 milliGRAM(s) IV Intermittent once  vancomycin  IVPB 1000 milliGRAM(s) IV Intermittent once    Other Medications Reviewed    T(F): 98 (20 @ 05:39), Max: 98.9 (20 @ 20:10)  HR: 66 (20 @ 05:39)  BP: 116/73 (20 @ 05:39)  RR: 18 (20 @ 05:39)  SpO2: 97% (20 @ 05:39)  Wt(kg): --    PHYSICAL EXAM:  General: alert, no acute distress  Eyes:  anicteric, no conjunctival injection, no discharge  Oropharynx: no lesions or injection 	  Neck: supple, without adenopathy  Lungs: clear to auscultation  Heart: regular rate and rhythm; no murmur, rubs or gallops  Abdomen: soft, nondistended, nontender, without mass or organomegaly  Skin: no lesions  Extremities: no clubbing, cyanosis,. bilateral edema, left reddish, peeling skin  Neurologic: alert, oriented, moves all extremities    LAB RESULTS:                        12.8   8.63  )-----------( 174      ( 2020 05:56 )             38.9     11-    139  |  101  |  29<H>  ----------------------------<  91  4.3   |  28  |  1.11    Ca    9.4      2020 05:56  Phos  4.0     -  Mg     2.1     -        Urinalysis Basic - ( 2020 17:53 )    Color: Light Yellow / Appearance: Clear / S.012 / pH: x  Gluc: x / Ketone: Negative  / Bili: Negative / Urobili: Negative   Blood: x / Protein: Negative / Nitrite: Negative   Leuk Esterase: Negative / RBC: x / WBC x   Sq Epi: x / Non Sq Epi: x / Bacteria: x      MICROBIOLOGY:  RECENT CULTURES:      RADIOLOGY REVIEWED:      < from: CT Angio Chest w/ IV Cont (20 @ 17:06) >  EXAM:  CT ANGIO CHEST (W)AW IC                            PROCEDURE DATE:  2020            INTERPRETATION:  Clinical information: Evaluate for pulmonary embolus.    CT angiogram of the chest was obtained following administration of intravenous contrast. Approximately 85 cc of Omnipaque 350 was administered. Coronal, sagittal and MIP images were submitted for review.    No hilar and/or mediastinal adenopathy is noted.    Heart is enlarged in size. Calcification of the aortic valve and the coronary arteries is noted. No pericardial effusion is noted. No filling defects are noted within the main, right and left main and lobar pulmonary arteries bilaterally. Evaluation of the segmental and subsegmental pulmonary arteries is limited due tomotion artifact and poor opacification.    No endobronchial lesions are noted. Compressive atelectasis is noted involving portions of both lower lobes. This is secondary to small to moderate size pleural effusions bilaterally, right more than left.    Below the diaphragm, visualized portions of the abdomen demonstrate low-attenuation lesion in the right kidney which is incompletely imaged on this exam.    Degenerative changes of the spine are noted.    Impression: No pulmonary embolus is noted within the main, right and left main and lobar pulmonary arteries bilaterally. Evaluation of the segmental and subsegmental pulmonary arteries is limited.    Small to moderate size pleural effusions bilaterally, right more than left.      < end of copied text >      Assessment:  Patient with left leg redness, improved on a week of antibiotics, now to undergo cabg  Plan:  monitor off further ongoing antibiotics, for surgical site prophylaxis per protocol for cabg

## 2020-11-24 NOTE — DIETITIAN INITIAL EVALUATION ADULT. - ADD RECOMMEND
1. Recommend Consistent CHO, DASH diet s/p surgery. 2. Continue Multivitamin supplementation daily. 3. Diet education provided. Pt made aware RD remains available for additional information PRN. 4. Monitor PO intake/tolerance, weights, labs, hydration status, bowels, and skin integrity.

## 2020-11-24 NOTE — DIETITIAN INITIAL EVALUATION ADULT. - ORAL INTAKE PTA/DIET HISTORY
Good appetite/PO intake PTA, consumes small frequent, well balanced meals throughout the day; wife cooks and is health conscious, does not add salt to foods. NKFA. Micronutrient supplementation PTA includes Pressorvision for macular degeneration. Good appetite/PO intake PTA, consumes small frequent, well balanced meals throughout the day; wife cooks and is health conscious, does not add salt to foods. NKFA. Micronutrient supplementation PTA includes PreserVision for macular degeneration.

## 2020-11-24 NOTE — PROGRESS NOTE ADULT - PROBLEM SELECTOR PLAN 2
Vancomycin 1g x1 dose tonight ID following,  pt afebrile  Vancomycin 1g x1 dose tonight per Dr. Blanca

## 2020-11-25 ENCOUNTER — APPOINTMENT (OUTPATIENT)
Dept: CARDIOTHORACIC SURGERY | Facility: HOSPITAL | Age: 85
End: 2020-11-25

## 2020-11-25 LAB
ALBUMIN SERPL ELPH-MCNC: 3.1 G/DL — LOW (ref 3.3–5)
ALP SERPL-CCNC: 43 U/L — SIGNIFICANT CHANGE UP (ref 40–120)
ALT FLD-CCNC: 31 U/L — SIGNIFICANT CHANGE UP (ref 10–45)
ANION GAP SERPL CALC-SCNC: 11 MMOL/L — SIGNIFICANT CHANGE UP (ref 5–17)
ANION GAP SERPL CALC-SCNC: 13 MMOL/L — SIGNIFICANT CHANGE UP (ref 5–17)
APTT BLD: 39 SEC — HIGH (ref 27.5–35.5)
AST SERPL-CCNC: 96 U/L — HIGH (ref 10–40)
BASE EXCESS BLDMV CALC-SCNC: -0.8 MMOL/L — SIGNIFICANT CHANGE UP (ref -3–3)
BASE EXCESS BLDMV CALC-SCNC: 0.4 MMOL/L — SIGNIFICANT CHANGE UP (ref -3–3)
BASE EXCESS BLDMV CALC-SCNC: 1.1 MMOL/L — SIGNIFICANT CHANGE UP (ref -3–3)
BASE EXCESS BLDMV CALC-SCNC: 2 MMOL/L — SIGNIFICANT CHANGE UP (ref -3–3)
BASE EXCESS BLDMV CALC-SCNC: 2.5 MMOL/L — SIGNIFICANT CHANGE UP (ref -3–3)
BASE EXCESS BLDV CALC-SCNC: 3.9 MMOL/L — HIGH (ref -2–2)
BASE EXCESS BLDV CALC-SCNC: 5.8 MMOL/L — HIGH (ref -2–2)
BASE EXCESS BLDV CALC-SCNC: 5.8 MMOL/L — HIGH (ref -2–2)
BASE EXCESS BLDV CALC-SCNC: 6 MMOL/L — HIGH (ref -2–2)
BASE EXCESS BLDV CALC-SCNC: 7.6 MMOL/L — HIGH (ref -2–2)
BASOPHILS # BLD AUTO: 0.06 K/UL — SIGNIFICANT CHANGE UP (ref 0–0.2)
BASOPHILS NFR BLD AUTO: 0.4 % — SIGNIFICANT CHANGE UP (ref 0–2)
BILIRUB SERPL-MCNC: 1.2 MG/DL — SIGNIFICANT CHANGE UP (ref 0.2–1.2)
BLOOD GAS VENOUS - CREATININE: SIGNIFICANT CHANGE UP MG/DL (ref 0.5–1.3)
BUN SERPL-MCNC: 21 MG/DL — SIGNIFICANT CHANGE UP (ref 7–23)
BUN SERPL-MCNC: 25 MG/DL — HIGH (ref 7–23)
CA-I SERPL-SCNC: 1.11 MMOL/L — LOW (ref 1.12–1.3)
CA-I SERPL-SCNC: 1.13 MMOL/L — SIGNIFICANT CHANGE UP (ref 1.12–1.3)
CA-I SERPL-SCNC: 1.16 MMOL/L — SIGNIFICANT CHANGE UP (ref 1.12–1.3)
CA-I SERPL-SCNC: 1.18 MMOL/L — SIGNIFICANT CHANGE UP (ref 1.12–1.3)
CA-I SERPL-SCNC: 1.32 MMOL/L — HIGH (ref 1.12–1.3)
CALCIUM SERPL-MCNC: 8.8 MG/DL — SIGNIFICANT CHANGE UP (ref 8.4–10.5)
CALCIUM SERPL-MCNC: 9.6 MG/DL — SIGNIFICANT CHANGE UP (ref 8.4–10.5)
CHLORIDE BLDV-SCNC: SIGNIFICANT CHANGE UP MMOL/L (ref 96–108)
CHLORIDE SERPL-SCNC: 102 MMOL/L — SIGNIFICANT CHANGE UP (ref 96–108)
CHLORIDE SERPL-SCNC: 98 MMOL/L — SIGNIFICANT CHANGE UP (ref 96–108)
CK MB BLD-MCNC: 13.9 % — HIGH (ref 0–3.5)
CK MB CFR SERPL CALC: 102.8 NG/ML — HIGH (ref 0–6.7)
CK SERPL-CCNC: 742 U/L — HIGH (ref 30–200)
CO2 BLDMV-SCNC: 27 MMOL/L — SIGNIFICANT CHANGE UP (ref 21–29)
CO2 BLDMV-SCNC: 29 MMOL/L — SIGNIFICANT CHANGE UP (ref 21–29)
CO2 BLDMV-SCNC: 29 MMOL/L — SIGNIFICANT CHANGE UP (ref 21–29)
CO2 SERPL-SCNC: 25 MMOL/L — SIGNIFICANT CHANGE UP (ref 22–31)
CO2 SERPL-SCNC: 30 MMOL/L — SIGNIFICANT CHANGE UP (ref 22–31)
CREAT SERPL-MCNC: 0.81 MG/DL — SIGNIFICANT CHANGE UP (ref 0.5–1.3)
CREAT SERPL-MCNC: 0.99 MG/DL — SIGNIFICANT CHANGE UP (ref 0.5–1.3)
EOSINOPHIL # BLD AUTO: 0.04 K/UL — SIGNIFICANT CHANGE UP (ref 0–0.5)
EOSINOPHIL NFR BLD AUTO: 0.2 % — SIGNIFICANT CHANGE UP (ref 0–6)
FIBRINOGEN PPP-MCNC: 353 MG/DL — SIGNIFICANT CHANGE UP (ref 290–520)
GAS PNL BLDA: SIGNIFICANT CHANGE UP
GAS PNL BLDMV: SIGNIFICANT CHANGE UP
GAS PNL BLDV: 134 MMOL/L — LOW (ref 135–145)
GAS PNL BLDV: 134 MMOL/L — LOW (ref 135–145)
GAS PNL BLDV: 135 MMOL/L — SIGNIFICANT CHANGE UP (ref 135–145)
GAS PNL BLDV: 136 MMOL/L — SIGNIFICANT CHANGE UP (ref 135–145)
GAS PNL BLDV: 137 MMOL/L — SIGNIFICANT CHANGE UP (ref 135–145)
GAS PNL BLDV: SIGNIFICANT CHANGE UP
GLUCOSE BLDC GLUCOMTR-MCNC: 129 MG/DL — HIGH (ref 70–99)
GLUCOSE BLDC GLUCOMTR-MCNC: 134 MG/DL — HIGH (ref 70–99)
GLUCOSE BLDC GLUCOMTR-MCNC: 152 MG/DL — HIGH (ref 70–99)
GLUCOSE BLDC GLUCOMTR-MCNC: 166 MG/DL — HIGH (ref 70–99)
GLUCOSE BLDV-MCNC: 109 MG/DL — HIGH (ref 70–99)
GLUCOSE BLDV-MCNC: 110 MG/DL — HIGH (ref 70–99)
GLUCOSE BLDV-MCNC: 120 MG/DL — HIGH (ref 70–99)
GLUCOSE BLDV-MCNC: 127 MG/DL — HIGH (ref 70–99)
GLUCOSE BLDV-MCNC: 160 MG/DL — HIGH (ref 70–99)
GLUCOSE SERPL-MCNC: 165 MG/DL — HIGH (ref 70–99)
GLUCOSE SERPL-MCNC: 85 MG/DL — SIGNIFICANT CHANGE UP (ref 70–99)
HCO3 BLDMV-SCNC: 25 MMOL/L — SIGNIFICANT CHANGE UP (ref 20–28)
HCO3 BLDMV-SCNC: 26 MMOL/L — SIGNIFICANT CHANGE UP (ref 20–28)
HCO3 BLDMV-SCNC: 26 MMOL/L — SIGNIFICANT CHANGE UP (ref 20–28)
HCO3 BLDMV-SCNC: 28 MMOL/L — SIGNIFICANT CHANGE UP (ref 20–28)
HCO3 BLDMV-SCNC: 28 MMOL/L — SIGNIFICANT CHANGE UP (ref 20–28)
HCO3 BLDV-SCNC: 28 MMOL/L — SIGNIFICANT CHANGE UP (ref 21–29)
HCO3 BLDV-SCNC: 30 MMOL/L — HIGH (ref 21–29)
HCO3 BLDV-SCNC: 31 MMOL/L — HIGH (ref 21–29)
HCO3 BLDV-SCNC: 31 MMOL/L — HIGH (ref 21–29)
HCO3 BLDV-SCNC: 32 MMOL/L — HIGH (ref 21–29)
HCT VFR BLD CALC: 34.8 % — LOW (ref 39–50)
HCT VFR BLD CALC: 41.6 % — SIGNIFICANT CHANGE UP (ref 39–50)
HCT VFR BLDA CALC: 27 % — LOW (ref 39–50)
HCT VFR BLDA CALC: 28 % — LOW (ref 39–50)
HCT VFR BLDA CALC: 29 % — LOW (ref 39–50)
HCT VFR BLDA CALC: 29 % — LOW (ref 39–50)
HCT VFR BLDA CALC: 40 % — SIGNIFICANT CHANGE UP (ref 39–50)
HGB BLD CALC-MCNC: 12.9 G/DL — LOW (ref 13–17)
HGB BLD CALC-MCNC: 8.8 G/DL — LOW (ref 13–17)
HGB BLD CALC-MCNC: 9 G/DL — LOW (ref 13–17)
HGB BLD CALC-MCNC: 9.4 G/DL — LOW (ref 13–17)
HGB BLD CALC-MCNC: 9.4 G/DL — LOW (ref 13–17)
HGB BLD-MCNC: 11.5 G/DL — LOW (ref 13–17)
HGB BLD-MCNC: 13.6 G/DL — SIGNIFICANT CHANGE UP (ref 13–17)
HOROWITZ INDEX BLDMV+IHG-RTO: 100 — SIGNIFICANT CHANGE UP
HOROWITZ INDEX BLDMV+IHG-RTO: 40 — SIGNIFICANT CHANGE UP
HOROWITZ INDEX BLDMV+IHG-RTO: 40 — SIGNIFICANT CHANGE UP
HOROWITZ INDEX BLDMV+IHG-RTO: 50 — SIGNIFICANT CHANGE UP
HOROWITZ INDEX BLDMV+IHG-RTO: 50 — SIGNIFICANT CHANGE UP
HOROWITZ INDEX BLDV+IHG-RTO: 0 — SIGNIFICANT CHANGE UP
IMM GRANULOCYTES NFR BLD AUTO: 1 % — SIGNIFICANT CHANGE UP (ref 0–1.5)
INR BLD: 1.5 RATIO — HIGH (ref 0.88–1.16)
LACTATE BLDV-MCNC: 1.2 MMOL/L — SIGNIFICANT CHANGE UP (ref 0.7–2)
LACTATE BLDV-MCNC: 1.3 MMOL/L — SIGNIFICANT CHANGE UP (ref 0.7–2)
LACTATE BLDV-MCNC: 1.6 MMOL/L — SIGNIFICANT CHANGE UP (ref 0.7–2)
LYMPHOCYTES # BLD AUTO: 1.07 K/UL — SIGNIFICANT CHANGE UP (ref 1–3.3)
LYMPHOCYTES # BLD AUTO: 6.6 % — LOW (ref 13–44)
MCHC RBC-ENTMCNC: 32.7 GM/DL — SIGNIFICANT CHANGE UP (ref 32–36)
MCHC RBC-ENTMCNC: 33 GM/DL — SIGNIFICANT CHANGE UP (ref 32–36)
MCHC RBC-ENTMCNC: 33.4 PG — SIGNIFICANT CHANGE UP (ref 27–34)
MCHC RBC-ENTMCNC: 33.4 PG — SIGNIFICANT CHANGE UP (ref 27–34)
MCV RBC AUTO: 101.2 FL — HIGH (ref 80–100)
MCV RBC AUTO: 102.2 FL — HIGH (ref 80–100)
MONOCYTES # BLD AUTO: 1.12 K/UL — HIGH (ref 0–0.9)
MONOCYTES NFR BLD AUTO: 6.9 % — SIGNIFICANT CHANGE UP (ref 2–14)
NEUTROPHILS # BLD AUTO: 13.75 K/UL — HIGH (ref 1.8–7.4)
NEUTROPHILS NFR BLD AUTO: 84.9 % — HIGH (ref 43–77)
NRBC # BLD: 0 /100 WBCS — SIGNIFICANT CHANGE UP (ref 0–0)
NRBC # BLD: 0 /100 WBCS — SIGNIFICANT CHANGE UP (ref 0–0)
O2 CT VFR BLD CALC: 39 MMHG — SIGNIFICANT CHANGE UP (ref 30–65)
O2 CT VFR BLD CALC: 40 MMHG — SIGNIFICANT CHANGE UP (ref 30–65)
O2 CT VFR BLD CALC: 41 MMHG — SIGNIFICANT CHANGE UP (ref 30–65)
O2 CT VFR BLD CALC: 45 MMHG — SIGNIFICANT CHANGE UP (ref 30–65)
O2 CT VFR BLD CALC: 51 MMHG — SIGNIFICANT CHANGE UP (ref 30–65)
PCO2 BLDMV: 45 MMHG — SIGNIFICANT CHANGE UP (ref 30–65)
PCO2 BLDMV: 47 MMHG — SIGNIFICANT CHANGE UP (ref 30–65)
PCO2 BLDMV: 49 MMHG — SIGNIFICANT CHANGE UP (ref 30–65)
PCO2 BLDMV: 50 MMHG — SIGNIFICANT CHANGE UP (ref 30–65)
PCO2 BLDMV: 52 MMHG — SIGNIFICANT CHANGE UP (ref 30–65)
PCO2 BLDV: 42 MMHG — SIGNIFICANT CHANGE UP (ref 35–50)
PCO2 BLDV: 42 MMHG — SIGNIFICANT CHANGE UP (ref 35–50)
PCO2 BLDV: 43 MMHG — SIGNIFICANT CHANGE UP (ref 35–50)
PCO2 BLDV: 49 MMHG — SIGNIFICANT CHANGE UP (ref 35–50)
PCO2 BLDV: 50 MMHG — SIGNIFICANT CHANGE UP (ref 35–50)
PH BLDMV: 7.31 — LOW (ref 7.32–7.45)
PH BLDMV: 7.34 — SIGNIFICANT CHANGE UP (ref 7.32–7.45)
PH BLDMV: 7.36 — SIGNIFICANT CHANGE UP (ref 7.32–7.45)
PH BLDMV: 7.38 — SIGNIFICANT CHANGE UP (ref 7.32–7.45)
PH BLDMV: 7.39 — SIGNIFICANT CHANGE UP (ref 7.32–7.45)
PH BLDV: 7.41 — SIGNIFICANT CHANGE UP (ref 7.35–7.45)
PH BLDV: 7.42 — SIGNIFICANT CHANGE UP (ref 7.35–7.45)
PH BLDV: 7.44 — SIGNIFICANT CHANGE UP (ref 7.35–7.45)
PH BLDV: 7.46 — HIGH (ref 7.35–7.45)
PH BLDV: 7.48 — HIGH (ref 7.35–7.45)
PLATELET # BLD AUTO: 133 K/UL — LOW (ref 150–400)
PLATELET # BLD AUTO: 184 K/UL — SIGNIFICANT CHANGE UP (ref 150–400)
PO2 BLDV: 54 MMHG — HIGH (ref 25–45)
PO2 BLDV: 61 MMHG — HIGH (ref 25–45)
PO2 BLDV: 62 MMHG — HIGH (ref 25–45)
PO2 BLDV: 62 MMHG — HIGH (ref 25–45)
PO2 BLDV: 85 MMHG — HIGH (ref 25–45)
POTASSIUM BLDV-SCNC: 4.1 MMOL/L — SIGNIFICANT CHANGE UP (ref 3.5–5)
POTASSIUM BLDV-SCNC: 4.1 MMOL/L — SIGNIFICANT CHANGE UP (ref 3.5–5)
POTASSIUM BLDV-SCNC: 4.7 MMOL/L — SIGNIFICANT CHANGE UP (ref 3.5–5)
POTASSIUM BLDV-SCNC: 4.9 MMOL/L — SIGNIFICANT CHANGE UP (ref 3.5–5)
POTASSIUM BLDV-SCNC: 5.2 MMOL/L — HIGH (ref 3.5–5)
POTASSIUM SERPL-MCNC: 4.1 MMOL/L — SIGNIFICANT CHANGE UP (ref 3.5–5.3)
POTASSIUM SERPL-MCNC: 4.5 MMOL/L — SIGNIFICANT CHANGE UP (ref 3.5–5.3)
POTASSIUM SERPL-SCNC: 4.1 MMOL/L — SIGNIFICANT CHANGE UP (ref 3.5–5.3)
POTASSIUM SERPL-SCNC: 4.5 MMOL/L — SIGNIFICANT CHANGE UP (ref 3.5–5.3)
PROT SERPL-MCNC: 4.5 G/DL — LOW (ref 6–8.3)
PROTHROM AB SERPL-ACNC: 17.7 SEC — HIGH (ref 10.6–13.6)
RBC # BLD: 3.44 M/UL — LOW (ref 4.2–5.8)
RBC # BLD: 4.07 M/UL — LOW (ref 4.2–5.8)
RBC # FLD: 14.4 % — SIGNIFICANT CHANGE UP (ref 10.3–14.5)
RBC # FLD: 14.9 % — HIGH (ref 10.3–14.5)
SAO2 % BLDMV: 64 % — SIGNIFICANT CHANGE UP (ref 60–90)
SAO2 % BLDMV: 68 % — SIGNIFICANT CHANGE UP (ref 60–90)
SAO2 % BLDMV: 70 % — SIGNIFICANT CHANGE UP (ref 60–90)
SAO2 % BLDMV: 72 % — SIGNIFICANT CHANGE UP (ref 60–90)
SAO2 % BLDMV: 82 % — SIGNIFICANT CHANGE UP (ref 60–90)
SAO2 % BLDV: 89 % — HIGH (ref 67–88)
SAO2 % BLDV: 91 % — HIGH (ref 67–88)
SAO2 % BLDV: 92 % — HIGH (ref 67–88)
SAO2 % BLDV: 94 % — HIGH (ref 67–88)
SAO2 % BLDV: 97 % — HIGH (ref 67–88)
SODIUM SERPL-SCNC: 139 MMOL/L — SIGNIFICANT CHANGE UP (ref 135–145)
SODIUM SERPL-SCNC: 140 MMOL/L — SIGNIFICANT CHANGE UP (ref 135–145)
TROPONIN T, HIGH SENSITIVITY RESULT: 2298 NG/L — HIGH (ref 0–51)
WBC # BLD: 16.21 K/UL — HIGH (ref 3.8–10.5)
WBC # BLD: 8.84 K/UL — SIGNIFICANT CHANGE UP (ref 3.8–10.5)
WBC # FLD AUTO: 16.21 K/UL — HIGH (ref 3.8–10.5)
WBC # FLD AUTO: 8.84 K/UL — SIGNIFICANT CHANGE UP (ref 3.8–10.5)

## 2020-11-25 PROCEDURE — 71045 X-RAY EXAM CHEST 1 VIEW: CPT | Mod: 26

## 2020-11-25 PROCEDURE — 33508 ENDOSCOPIC VEIN HARVEST: CPT

## 2020-11-25 PROCEDURE — 99291 CRITICAL CARE FIRST HOUR: CPT

## 2020-11-25 PROCEDURE — 33533 CABG ARTERIAL SINGLE: CPT

## 2020-11-25 PROCEDURE — 36620 INSERTION CATHETER ARTERY: CPT

## 2020-11-25 PROCEDURE — 93010 ELECTROCARDIOGRAM REPORT: CPT

## 2020-11-25 PROCEDURE — 33518 CABG ARTERY-VEIN TWO: CPT

## 2020-11-25 RX ORDER — ALBUMIN HUMAN 25 %
250 VIAL (ML) INTRAVENOUS ONCE
Refills: 0 | Status: COMPLETED | OUTPATIENT
Start: 2020-11-25 | End: 2020-11-25

## 2020-11-25 RX ORDER — ASPIRIN/CALCIUM CARB/MAGNESIUM 324 MG
300 TABLET ORAL ONCE
Refills: 0 | Status: COMPLETED | OUTPATIENT
Start: 2020-11-25 | End: 2020-11-25

## 2020-11-25 RX ORDER — CEFUROXIME AXETIL 250 MG
1500 TABLET ORAL EVERY 8 HOURS
Refills: 0 | Status: DISCONTINUED | OUTPATIENT
Start: 2020-11-25 | End: 2020-11-25

## 2020-11-25 RX ORDER — SODIUM CHLORIDE 9 MG/ML
250 INJECTION, SOLUTION INTRAVENOUS ONCE
Refills: 0 | Status: COMPLETED | OUTPATIENT
Start: 2020-11-25 | End: 2020-11-25

## 2020-11-25 RX ORDER — FAMOTIDINE 10 MG/ML
20 INJECTION INTRAVENOUS EVERY 12 HOURS
Refills: 0 | Status: DISCONTINUED | OUTPATIENT
Start: 2020-11-25 | End: 2020-11-28

## 2020-11-25 RX ORDER — DEXTROSE 50 % IN WATER 50 %
25 SYRINGE (ML) INTRAVENOUS
Refills: 0 | Status: DISCONTINUED | OUTPATIENT
Start: 2020-11-25 | End: 2020-11-26

## 2020-11-25 RX ORDER — ACETAMINOPHEN 500 MG
1000 TABLET ORAL ONCE
Refills: 0 | Status: COMPLETED | OUTPATIENT
Start: 2020-11-25 | End: 2020-11-25

## 2020-11-25 RX ORDER — DEXTROSE 50 % IN WATER 50 %
50 SYRINGE (ML) INTRAVENOUS
Refills: 0 | Status: DISCONTINUED | OUTPATIENT
Start: 2020-11-25 | End: 2020-11-26

## 2020-11-25 RX ORDER — CHLORHEXIDINE GLUCONATE 213 G/1000ML
15 SOLUTION TOPICAL EVERY 12 HOURS
Refills: 0 | Status: DISCONTINUED | OUTPATIENT
Start: 2020-11-25 | End: 2020-11-26

## 2020-11-25 RX ORDER — CEFUROXIME AXETIL 250 MG
1500 TABLET ORAL EVERY 8 HOURS
Refills: 0 | Status: COMPLETED | OUTPATIENT
Start: 2020-11-25 | End: 2020-11-26

## 2020-11-25 RX ORDER — VASOPRESSIN 20 [USP'U]/ML
0.03 INJECTION INTRAVENOUS
Qty: 50 | Refills: 0 | Status: DISCONTINUED | OUTPATIENT
Start: 2020-11-25 | End: 2020-11-26

## 2020-11-25 RX ORDER — AMINOCAPROIC ACID 500 MG/1
5 TABLET ORAL ONCE
Refills: 0 | Status: COMPLETED | OUTPATIENT
Start: 2020-11-25 | End: 2020-11-25

## 2020-11-25 RX ORDER — CALCIUM GLUCONATE 100 MG/ML
1 VIAL (ML) INTRAVENOUS ONCE
Refills: 0 | Status: COMPLETED | OUTPATIENT
Start: 2020-11-25 | End: 2020-11-25

## 2020-11-25 RX ORDER — NOREPINEPHRINE BITARTRATE/D5W 8 MG/250ML
0.04 PLASTIC BAG, INJECTION (ML) INTRAVENOUS
Qty: 8 | Refills: 0 | Status: DISCONTINUED | OUTPATIENT
Start: 2020-11-25 | End: 2020-11-26

## 2020-11-25 RX ORDER — CHLORHEXIDINE GLUCONATE 213 G/1000ML
1 SOLUTION TOPICAL
Refills: 0 | Status: DISCONTINUED | OUTPATIENT
Start: 2020-11-25 | End: 2020-12-06

## 2020-11-25 RX ORDER — ASPIRIN/CALCIUM CARB/MAGNESIUM 324 MG
81 TABLET ORAL DAILY
Refills: 0 | Status: DISCONTINUED | OUTPATIENT
Start: 2020-11-25 | End: 2020-12-06

## 2020-11-25 RX ORDER — INSULIN HUMAN 100 [IU]/ML
3 INJECTION, SOLUTION SUBCUTANEOUS
Qty: 100 | Refills: 0 | Status: DISCONTINUED | OUTPATIENT
Start: 2020-11-25 | End: 2020-11-26

## 2020-11-25 RX ORDER — AMIODARONE HYDROCHLORIDE 400 MG/1
150 TABLET ORAL ONCE
Refills: 0 | Status: COMPLETED | OUTPATIENT
Start: 2020-11-25 | End: 2020-11-25

## 2020-11-25 RX ORDER — POLYETHYLENE GLYCOL 3350 17 G/17G
17 POWDER, FOR SOLUTION ORAL DAILY
Refills: 0 | Status: DISCONTINUED | OUTPATIENT
Start: 2020-11-25 | End: 2020-12-06

## 2020-11-25 RX ORDER — ASPIRIN/CALCIUM CARB/MAGNESIUM 324 MG
300 TABLET ORAL ONCE
Refills: 0 | Status: COMPLETED | OUTPATIENT
Start: 2020-11-25

## 2020-11-25 RX ORDER — SODIUM CHLORIDE 9 MG/ML
1000 INJECTION INTRAMUSCULAR; INTRAVENOUS; SUBCUTANEOUS
Refills: 0 | Status: DISCONTINUED | OUTPATIENT
Start: 2020-11-25 | End: 2020-11-30

## 2020-11-25 RX ORDER — DOBUTAMINE HCL 250MG/20ML
2.5 VIAL (ML) INTRAVENOUS
Qty: 500 | Refills: 0 | Status: DISCONTINUED | OUTPATIENT
Start: 2020-11-25 | End: 2020-11-27

## 2020-11-25 RX ADMIN — Medication 125 MILLILITER(S): at 19:40

## 2020-11-25 RX ADMIN — AMINOCAPROIC ACID 250 GRAM(S): 500 TABLET ORAL at 23:19

## 2020-11-25 RX ADMIN — CHLORHEXIDINE GLUCONATE 1 APPLICATION(S): 213 SOLUTION TOPICAL at 05:55

## 2020-11-25 RX ADMIN — SODIUM CHLORIDE 500 MILLILITER(S): 9 INJECTION, SOLUTION INTRAVENOUS at 16:00

## 2020-11-25 RX ADMIN — Medication 125 MILLILITER(S): at 19:39

## 2020-11-25 RX ADMIN — PANTOPRAZOLE SODIUM 40 MILLIGRAM(S): 20 TABLET, DELAYED RELEASE ORAL at 05:25

## 2020-11-25 RX ADMIN — AMIODARONE HYDROCHLORIDE 600 MILLIGRAM(S): 400 TABLET ORAL at 19:30

## 2020-11-25 RX ADMIN — Medication 100 MILLIGRAM(S): at 22:55

## 2020-11-25 RX ADMIN — Medication 100 GRAM(S): at 17:43

## 2020-11-25 RX ADMIN — Medication 1000 MILLIGRAM(S): at 17:15

## 2020-11-25 RX ADMIN — FAMOTIDINE 20 MILLIGRAM(S): 10 INJECTION INTRAVENOUS at 17:19

## 2020-11-25 RX ADMIN — Medication 20 MILLIGRAM(S): at 05:25

## 2020-11-25 RX ADMIN — Medication 300 MILLIGRAM(S): at 19:15

## 2020-11-25 RX ADMIN — CHLORHEXIDINE GLUCONATE 15 MILLILITER(S): 213 SOLUTION TOPICAL at 17:42

## 2020-11-25 RX ADMIN — Medication 400 MILLIGRAM(S): at 17:00

## 2020-11-25 RX ADMIN — Medication 100 MILLIGRAM(S): at 14:49

## 2020-11-25 RX ADMIN — SODIUM CHLORIDE 500 MILLILITER(S): 9 INJECTION, SOLUTION INTRAVENOUS at 17:30

## 2020-11-25 NOTE — PRE-ANESTHESIA EVALUATION ADULT - NSANTHPMHFT_GEN_ALL_CORE
H&P:  86 y/o man.  Pmhx htn/hld, hiatal hernia / GERD, OA post Lt TKR x1     TTE:  EF (Visual Estimate): 40-45 %  Doppler Peak Velocity (m/sec): AoV=2.6  ------------------------------------------------------------------------  Observations:  Mitral Valve: There is mild mitral annular calcification.  There is moderate(2+) mitral regurgitation.  Aortic Valve/Aorta: Calcified trileaflet aortic valve with  decreased opening. Peak transaortic valve gradient equals  27 mm Hg, mean transaortic valve gradient equals 16 mm Hg,  estimated aortic valve area equals 1.2 sqcm (by continuity  equation), aortic valve velocity time integral equals 60  cm,  the DI= 0.33, consistent with moderate aortic  stenosis. Mild aortic regurgitation.  Peak left ventricular  outflow tract gradient equals 3 mm Hg, mean gradient is  equal to 2 mm Hg, LVOT velocity time integral equals 20 cm.  Normal aortic root size. (Ao: 2.9 cm at the sinuses of  Valsalva).  Left Atrium: Severely dilated left atrium.  LA volume index  = 50 cc/m2.  Left Ventricle: The basal and mid anterior and apical  anterior walls are hypokinetic.  The rest of the walls  thicken normally.  The left ventriclar function is mildly  reduced.  The LVEF about 45%. Normal left ventricular size.  Right Heart: The right atrium is moderately dilated.  The right atrial area= 25cm2, the right atrial volume=  85ml, the right atrial volume index= 49ml/m2. Normal right  ventricular size and function. Normal tricuspid valve. Mild  tricuspid regurgitation. Normal pulmonic valve. Minimal  pulmonic regurgitation.  Pericardium/Pleura: Normal pericardium with no pericardial  effusion.  Hemodynamic: The estimated right atrial pressure is  elevated. Estimated right ventricular systolic pressure  equals 54 mm Hg, assuming right atrial pressure equals 13  mm Hg, consistent with moderate pulmonary hypertension.  ------------------------------------------------------------------------  Conclusions:  1. There is moderate(2+) mitral regurgitation.  2. Calcified trileaflet aortic valve with decreased  opening. Peak transaortic valve gradient equals 27 mm Hg,  mean transaortic valve gradient equals 16 mm Hg, estimated  aortic valve area equals 1.2 sqcm (by continuity equation),  aortic valve velocity time integral equals 60 cm,  the DI=  0.33, consistent with moderate aortic stenosis. Mild aortic  regurgitation.  3. Normal left ventricular size.  4. The basal and mid anterior and apical anterior walls are  hypokinetic.  The rest of the walls thicken normally.  The  left ventriclar function is mildly reduced.  The LVEF about  45%.  5. The estimated right atrial pressure is elevated.  6. Estimated right ventricular systolic pressure equals 54  mm Hg, assuming right atrial pressure equals 13 mm Hg,  consistent with moderate pulmonary hypertension.  *** Compared with echocardiogram of 11/19/2020, the  pulmonary pressures are lower now.  ------------------------------------------------------------------------  Confirmed on  11/24/2020 - 15:00:36 by Miri Little M.D.  ------------------------------------------------------------------------    Cath:  CORONARY VESSELS: The coronary circulation is right dominant.  LM:   --  Distal left main: There was a 50 % stenosis.  LAD:   --  Ostial LAD: There was a 95 % stenosis.  CX:   --  Ostial circumflex: There was a 50 % stenosis.  --  OM1: There was a 70 % stenosis.  RCA:   --  RCA: Angiography showed minor luminal irregularities with no  flow limiting lesions.    EKG  Incomplete RBBB  Nsr

## 2020-11-25 NOTE — BRIEF OPERATIVE NOTE - ADULT CT SURG CONDUIT HARVEST PERFORMED BY
Bam MAZARIEGOS, Evangelist GRACIA, 40cm greater saphenous vein harvested from R leg with Maquet scope and open

## 2020-11-25 NOTE — PRE-OP CHECKLIST - SELECT TESTS ORDERED
BMP/CBC/CMP/PT/PTT/INR/Type and Cross/Urinalysis/POCT Blood Glucose/Results in MD note/EKG/COVID/Type and Screen

## 2020-11-25 NOTE — PRE-ANESTHESIA EVALUATION ADULT - NSANTHRISKNONERD_GEN_ALL_CORE
Consent: The patient's consent was obtained including but not limited to risks of crusting, scabbing, blistering, scarring, darker or lighter pigmentary change, recurrence, incomplete removal and infection. Render Post-Care Instructions In Note?: yes Duration Of Freeze Thaw-Cycle (Seconds): 10 Number Of Freeze-Thaw Cycles: 1 freeze-thaw cycle Post-Care Instructions: I reviewed with the patient in detail post-care instructions. Patient is to wear sunprotection, and avoid picking at any of the treated lesions. Pt may apply Vaseline to crusted or scabbing areas. Detail Level: Simple Risk Alerts:

## 2020-11-25 NOTE — PROGRESS NOTE ADULT - SUBJECTIVE AND OBJECTIVE BOX
CARDIOLOGY     PROGRESS  NOTE   ________________________________________________    CHIEF COMPLAINT:Patient is a 85y old  Male who presents with a chief complaint of edema (24 Nov 2020 16:27)  no complain.  	  REVIEW OF SYSTEMS:  CONSTITUTIONAL: No fever, weight loss, or fatigue  EYES: No eye pain, visual disturbances, or discharge  ENT:  No difficulty hearing, tinnitus, vertigo; No sinus or throat pain  NECK: No pain or stiffness  RESPIRATORY: No cough, wheezing, chills or hemoptysis; No Shortness of Breath  CARDIOVASCULAR: No chest pain, palpitations, passing out, dizziness, or leg swelling  GASTROINTESTINAL: No abdominal or epigastric pain. No nausea, vomiting, or hematemesis; No diarrhea or constipation. No melena or hematochezia.  GENITOURINARY: No dysuria, frequency, hematuria, or incontinence  NEUROLOGICAL: No headaches, memory loss, loss of strength, numbness, or tremors  SKIN: No itching, burning, rashes, or lesions   LYMPH Nodes: No enlarged glands  ENDOCRINE: No heat or cold intolerance; No hair loss  MUSCULOSKELETAL: No joint pain or swelling; No muscle, back, or extremity pain  PSYCHIATRIC: No depression, anxiety, mood swings, or difficulty sleeping  HEME/LYMPH: No easy bruising, or bleeding gums  ALLERGY AND IMMUNOLOGIC: No hives or eczema	    [ ] All others negative	  [ ] Unable to obtain    PHYSICAL EXAM:  T(C): 36.5 (11-25-20 @ 06:35), Max: 36.7 (11-24-20 @ 19:47)  HR: 72 (11-25-20 @ 06:35) (60 - 72)  BP: 107/72 (11-25-20 @ 06:35) (95/60 - 120/71)  RR: 18 (11-25-20 @ 06:35) (17 - 18)  SpO2: 96% (11-25-20 @ 06:35) (96% - 99%)  Wt(kg): --  I&O's Summary    24 Nov 2020 07:01  -  25 Nov 2020 07:00  --------------------------------------------------------  IN: 960 mL / OUT: 2250 mL / NET: -1290 mL        Appearance: Normal	  HEENT:   Normal oral mucosa, PERRL, EOMI	  Lymphatic: No lymphadenopathy  Cardiovascular: Normal S1 S2, No JVD, + murmurs, No edema  Respiratory: Lungs clear to auscultation	  Psychiatry: A & O x 3, Mood & affect appropriate  Gastrointestinal:  Soft, Non-tender, + BS	  Skin: No rashes, No ecchymoses, No cyanosis	  Neurologic: Non-focal  Extremities: Normal range of motion, No clubbing, cyanosis or edema  Vascular: Peripheral pulses palpable 2+ bilaterally    MEDICATIONS  (STANDING):  aspirin enteric coated 81 milliGRAM(s) Oral daily  cefuroxime  IVPB 1500 milliGRAM(s) IV Intermittent once  cefuroxime  IVPB 1500 milliGRAM(s) IV Intermittent once  chlorhexidine 0.12% Liquid 30 milliLiter(s) Swish and Spit once  chlorhexidine 4% Liquid 1 Application(s) Topical two times a day  furosemide    Tablet 20 milliGRAM(s) Oral daily  metoprolol succinate ER 25 milliGRAM(s) Oral daily  multivitamin 1 Tablet(s) Oral daily  pantoprazole    Tablet 40 milliGRAM(s) Oral before breakfast  rosuvastatin 20 milliGRAM(s) Oral at bedtime      TELEMETRY: 	    ECG:  	  RADIOLOGY:  OTHER: 	  	  LABS:	 	    CARDIAC MARKERS:                                13.6   8.84  )-----------( 184      ( 25 Nov 2020 05:43 )             41.6     11-25    139  |  98  |  25<H>  ----------------------------<  85  4.1   |  30  |  0.99    Ca    9.6      25 Nov 2020 05:42  Phos  4.0     11-24  Mg     2.1     11-24      proBNP: Serum Pro-Brain Natriuretic Peptide: 67553 pg/mL (11-18 @ 12:45)    Lipid Profile: Cholesterol 106  LDL --  HDL 40  TG 73    HgA1c:   TSH: Thyroid Stimulating Hormone, Serum: 1.46 uIU/mL (11-19 @ 09:24)  Thyroid Stimulating Hormone, Serum: 1.51 uIU/mL (11-19 @ 08:41)    PT/INR - ( 23 Nov 2020 17:53 )   PT: 14.7 sec;   INR: 1.24 ratio         PTT - ( 23 Nov 2020 17:53 )  PTT:44.1 sec      < from: Transthoracic Echocardiogram (11.24.20 @ 12:43) >  1. Thereis moderate(2+) mitral regurgitation.  2. Calcified trileaflet aortic valve with decreased  opening. Peak transaortic valve gradient equals 27 mm Hg,  mean transaortic valve gradient equals 16 mm Hg, estimated  aortic valve area equals 1.2 sqcm (by continuity equation),  aortic valve velocity time integral equals 60 cm,  the DI=  0.33, consistent with moderate aortic stenosis. Mild aortic  regurgitation.  3. Normal left ventricular size.  4. The basal and mid anterior and apical anterior walls are  hypokinetic.  The rest of the walls thicken normally.  The  left ventriclar function is mildly reduced.  The LVEF about  45%.  5. The estimated right atrial pressure is elevated.  6. Estimated right ventricular systolic pressure equals 54  mm Hg, assuming right atrial pressure equals 13 mm Hg,  consistent with moderate pulmonary hypertension.    Assessment and plan  ---------------------------  pt is an 84 y/o man with pmhx of htn, hld, hiatal hernia / GERD, OA post Lt TKR x1 year ago presenting with concern for increasing redness of left leg and swelling for 2 months.   Patient reports that he has been having increasing swelling of both of his legs for approximately 2-3 months; however, the left leg has been more swollen and has been getting increasingly more red. He states that he went to his podiatrist when he noted some rash on the lower portion of his left just above his foot, was given a 'cream of some kind, and feels that the redness and swelling got worse after starting the cream.'   He states that he was having chest pain approximately 1 mo ago, went to his PMD and was diagnosed with a hiatal hernia with an xray and prescribed omeprazole.   Patient reports that he has been becoming increasingly more short of breath for approximately 2 months as well. Feels that when he takes just a few steps he has to stop and catch his breath, and is so short of breath that he cannot speak in complete sentences without resting for a period of time first.   pt also states was given amoxicillin a few days ago but only took one dose  He denies any fevers, chills, cough, nausea, vomiting, sweating, chest pain  pt admitted for likely acute CHF with elevated troponin / NSTEMI   chf acute on ?chronic  bradycardia, dc coreg start metoprolol er 25 mg daily  abx as per ID  echo noter/ cta note  cath noted  CABG today

## 2020-11-25 NOTE — PROGRESS NOTE ADULT - SUBJECTIVE AND OBJECTIVE BOX
MARCOS AGUILAR  MRN-[id number]  Patient is a 85y old  Male who presents with a chief complaint of edema (25 Nov 2020 11:45)    HPI:  Pt is an 86 y/o man with pmhx of htn, hld, hiatal hernia / GERD, OA post Lt TKR x1 year ago presenting with concern for increasing redness of left leg and swelling for 2 months.   Patient reports that he has been having increasing swelling of both of his legs for approximately 2-3 months; however, the left leg has been more swollen and has been getting increasingly more red. He states that he went to his podiatrist when he noted some rash on the lower portion of his left just above his foot, was given a 'cream of some kind, and feels that the redness and swelling got worse after starting the cream.'   He states that he was having chest pain approximately 1 mo ago, went to his PMD and was diagnosed with a hiatal hernia with an xray and prescribed omeprazole.   Patient reports that he has been becoming increasingly more short of breath for approximately 2 months as well. Feels that when he takes just a few steps he has to stop and catch his breath, and is so short of breath that he cannot speak in complete sentences without resting for a period of time first.   pt also states was given amoxicillin a few days ago but only took one dose  He denies any fevers, chills, cough, nausea, vomiting, sweating, chest pain  pt admitted for likely acute CHF with elevated troponin / NSTEMI    (18 Nov 2020 16:09)    Surgery/Hospital course:  11/18 Admitted to [facility]  11/25 CABG x3    Today:  S/p CABG x3    REVIEW OF SYSTEMS:  Unable to obtain as patient is intubated.    Physical Exam:  Vital Signs Last 24 Hrs  T(C): 36.9 (25 Nov 2020 20:00), Max: 37.4 (25 Nov 2020 18:00)  T(F): 98.4 (25 Nov 2020 20:00), Max: 99.3 (25 Nov 2020 18:00)  HR: 80 (25 Nov 2020 20:15) (61 - 90)  BP: 107/72 (25 Nov 2020 06:35) (107/72 - 109/70)  BP(mean): 84 (25 Nov 2020 06:35) (84 - 84)  RR: 17 (25 Nov 2020 20:15) (12 - 35)  SpO2: 100% (25 Nov 2020 20:15) (81% - 100%)  Gen:  NAD, intubated  CNS: non focal 	  Neck: no JVD  RES : clear , no wheezing    Chest:   + chest tubes                     CVS: Regular  rhythm. Normal S1/S2  Abd: Soft, non-distended. Bowel sounds present.  Skin: No rash.  Ext:  no edema, A Line  ============================I/O===========================   I&O's Detail    24 Nov 2020 07:01  -  25 Nov 2020 07:00  --------------------------------------------------------  IN:    Oral Fluid: 960 mL  Total IN: 960 mL    OUT:    Voided (mL): 2250 mL  Total OUT: 2250 mL    Total NET: -1290 mL      25 Nov 2020 07:01  -  25 Nov 2020 20:29  --------------------------------------------------------  IN:    Albumin 5%  - 250 mL: 500 mL    DOBUTamine: 88 mL    Insulin: 11 mL    IV PiggyBack: 200 mL    Lactated Ringers Bolus: 500 mL    Norepinephrine: 56.8 mL    sodium chloride 0.9%: 80 mL    Vasopressin: 34 mL  Total IN: 1469.8 mL    OUT:    Chest Tube (mL): 75 mL    Chest Tube (mL): 210 mL    Indwelling Catheter - Urethral (mL): 665 mL  Total OUT: 950 mL    Total NET: 519.8 mL    ============================ LABS =========================                        11.5   16.21 )-----------( 133      ( 25 Nov 2020 13:39 )             34.8     11-25    140  |  102  |  21  ----------------------------<  165<H>  4.5   |  25  |  0.81    Ca    8.8      25 Nov 2020 13:38  Phos  4.0     11-24  Mg     2.1     11-24    TPro  4.5<L>  /  Alb  3.1<L>  /  TBili  1.2  /  DBili  x   /  AST  96<H>  /  ALT  31  /  AlkPhos  43  11-25    LIVER FUNCTIONS - ( 25 Nov 2020 13:38 )  Alb: 3.1 g/dL / Pro: 4.5 g/dL / ALK PHOS: 43 U/L / ALT: 31 U/L / AST: 96 U/L / GGT: x           PT/INR - ( 25 Nov 2020 13:39 )   PT: 17.7 sec;   INR: 1.50 ratio         PTT - ( 25 Nov 2020 13:39 )  PTT:39.0 sec  ABG - ( 25 Nov 2020 20:09 )  pH, Arterial: 7.39  pH, Blood: x     /  pCO2: 42    /  pO2: 184   / HCO3: 24    / Base Excess: .1    /  SaO2: 99          ======================Micro/Rad/Cardio=================  CXR: Reviewed  Echo: Reviewed  ======================================================  PAST MEDICAL & SURGICAL HISTORY:  Mitral prolapse    Hypercholesteremia    Hypertension    History of hernia repair      ====================ASSESSMENT ==============  CAD s/p CABG x3 11/25  Stress hyperglycemia  Thrombocytopenia    ====================== NEUROLOGY=====================  Nonfocal, continue to assess and monitor neuro status as per ICU protocol    ==================== RESPIRATORY======================  Respiratory status required full ventilatory support, close monitoring of respiratory rate and breathing pattern, the following of ABG’s with A-line monitoring, continuous pulse oximetry monitoring    Mechanical Ventilation:  Mode: AC/ CMV (Assist Control/ Continuous Mandatory Ventilation)  RR (machine): 12  TV (machine): 500  FiO2: 50  PEEP: 5  ITime: 1  MAP: 8  PIP: 17      ====================CARDIOVASCULAR==================  CAD s/p CABG x3 11/25  Continue with ASA for graft patency, hx of CAD  IV Dobutamine infusion for inotropic support  Blood pressure support with IV Levophed and IV Vasopressin infusions  Continue invasive hemodynamic monitoring  Monitor chest tube outputs    ===================HEMATOLOGIC/ONC ===================  H/H 11.5/34.8  Thrombocytopenia, platelet count at 133k.   Continue to monitor hemoglobin, hematocrit and platelet levels.    ===================== RENAL =========================  Continue to monitor I/Os, BUN/Cr, and urine output.  Monitor and replete electrolytes prn. Keep K > 4 and Mg > 2    ==================== GASTROINTESTINAL===================  NPO after recent procedure, advance diet as tolerated.  Continue with IV Pepcid for stress ulcer prophylaxis  Bowel regimen with Miralax    =======================    ENDOCRINE  =====================  Stress hyperglycemia, requiring glucose control with IV Insulin infusion  Continue to monitor blood glucose levels    ========================INFECTIOUS DISEASE================  Continue IV Cefuroxime for perioperative antibiotic coverage.      Patient requires continuous monitoring with bedside rhythm monitoring, arterial line, pulse oximetry, ventilator monitoring; intermittent blood gas analysis. Care plan discussed with ICU care team. Patient remains critical; required more than usual ICU care.    By signing my name below, I, Joni Painter, attest that this documentation has been prepared under the direction and in the presence of Logan Soriano MD.  Electronically signed: Lillie Durham, 11-25-20 @ 20:29    I, Logan Soriano, personally performed the services described in this documentation. All medical record entries made by the dannyibe were at my direction and in my presence. I have reviewed the chart and agree that the record reflects my personal performance and is accurate and complete  Electronically signed: Logan Soriano, 11-25-20 @ 20:29       MARCOS AGUILAR  MRN-[id number]  Patient is a 85y old  Male who presents with a chief complaint of edema (25 Nov 2020 11:45)    HPI:  Pt is an 84 y/o man with pmhx of htn, hld, hiatal hernia / GERD, OA post Lt TKR x1 year ago presenting with concern for increasing redness of left leg and swelling for 2 months.   Patient reports that he has been having increasing swelling of both of his legs for approximately 2-3 months; however, the left leg has been more swollen and has been getting increasingly more red. He states that he went to his podiatrist when he noted some rash on the lower portion of his left just above his foot, was given a 'cream of some kind, and feels that the redness and swelling got worse after starting the cream.'   He states that he was having chest pain approximately 1 mo ago, went to his PMD and was diagnosed with a hiatal hernia with an xray and prescribed omeprazole.   Patient reports that he has been becoming increasingly more short of breath for approximately 2 months as well. Feels that when he takes just a few steps he has to stop and catch his breath, and is so short of breath that he cannot speak in complete sentences without resting for a period of time first.   pt also states was given amoxicillin a few days ago but only took one dose  He denies any fevers, chills, cough, nausea, vomiting, sweating, chest pain  pt admitted for likely acute CHF with elevated troponin / NSTEMI    (18 Nov 2020 16:09)    Surgery/Hospital course:  11/18 Admitted to [facility]  11/25 CABG x3    Today:  S/p CABG x3   postop intubated, multiple pressors and inotropes, lactic acidosis    REVIEW OF SYSTEMS:  Unable to obtain as patient is intubated.    Physical Exam:  Vital Signs Last 24 Hrs  T(C): 36.9 (25 Nov 2020 20:00), Max: 37.4 (25 Nov 2020 18:00)  T(F): 98.4 (25 Nov 2020 20:00), Max: 99.3 (25 Nov 2020 18:00)  HR: 80 (25 Nov 2020 20:15) (61 - 90)  BP: 107/72 (25 Nov 2020 06:35) (107/72 - 109/70)  BP(mean): 84 (25 Nov 2020 06:35) (84 - 84)  RR: 17 (25 Nov 2020 20:15) (12 - 35)  SpO2: 100% (25 Nov 2020 20:15) (81% - 100%)  Gen:  NAD, intubated  CNS: non focal 	  Neck: no JVD  RES : clear , no wheezing    Chest:   + chest tubes                     CVS: Regular  rhythm. Normal S1/S2  Abd: Soft, non-distended. Bowel sounds present.  Skin: No rash.  Ext:  no edema, A Line  ============================I/O===========================   I&O's Detail    24 Nov 2020 07:01  -  25 Nov 2020 07:00  --------------------------------------------------------  IN:    Oral Fluid: 960 mL  Total IN: 960 mL    OUT:    Voided (mL): 2250 mL  Total OUT: 2250 mL    Total NET: -1290 mL      25 Nov 2020 07:01  -  25 Nov 2020 20:29  --------------------------------------------------------  IN:    Albumin 5%  - 250 mL: 500 mL    DOBUTamine: 88 mL    Insulin: 11 mL    IV PiggyBack: 200 mL    Lactated Ringers Bolus: 500 mL    Norepinephrine: 56.8 mL    sodium chloride 0.9%: 80 mL    Vasopressin: 34 mL  Total IN: 1469.8 mL    OUT:    Chest Tube (mL): 75 mL    Chest Tube (mL): 210 mL    Indwelling Catheter - Urethral (mL): 665 mL  Total OUT: 950 mL    Total NET: 519.8 mL    ============================ LABS =========================                        11.5   16.21 )-----------( 133      ( 25 Nov 2020 13:39 )             34.8     11-25    140  |  102  |  21  ----------------------------<  165<H>  4.5   |  25  |  0.81    Ca    8.8      25 Nov 2020 13:38  Phos  4.0     11-24  Mg     2.1     11-24    TPro  4.5<L>  /  Alb  3.1<L>  /  TBili  1.2  /  DBili  x   /  AST  96<H>  /  ALT  31  /  AlkPhos  43  11-25    LIVER FUNCTIONS - ( 25 Nov 2020 13:38 )  Alb: 3.1 g/dL / Pro: 4.5 g/dL / ALK PHOS: 43 U/L / ALT: 31 U/L / AST: 96 U/L / GGT: x           PT/INR - ( 25 Nov 2020 13:39 )   PT: 17.7 sec;   INR: 1.50 ratio         PTT - ( 25 Nov 2020 13:39 )  PTT:39.0 sec  ABG - ( 25 Nov 2020 20:09 )  pH, Arterial: 7.39  pH, Blood: x     /  pCO2: 42    /  pO2: 184   / HCO3: 24    / Base Excess: .1    /  SaO2: 99          ======================Micro/Rad/Cardio=================  CXR: Reviewed  Echo: Reviewed  ======================================================  PAST MEDICAL & SURGICAL HISTORY:  Mitral prolapse    Hypercholesteremia    Hypertension    History of hernia repair      ====================ASSESSMENT ==============  CAD s/p CABG x3 11/25   hemodynamic instability   Lactic acidosis   Acute on chronic CHF  Stress hyperglycemia  Thrombocytopenia    ====================== NEUROLOGY=====================  Nonfocal, continue to assess and monitor neuro status as per ICU protocol    ==================== RESPIRATORY======================  Respiratory status required full ventilatory support, close monitoring of respiratory rate and breathing pattern, the following of ABG’s with A-line monitoring, continuous pulse oximetry monitoring    Mechanical Ventilation:  Mode: AC/ CMV (Assist Control/ Continuous Mandatory Ventilation)  RR (machine): 12  TV (machine): 500  FiO2: 50  PEEP: 5  ITime: 1  MAP: 8  PIP: 17     wean to extubate    ====================CARDIOVASCULAR==================  CAD s/p CABG x3 11/25  Continue with ASA for graft patency, hx of CAD  IV Dobutamine infusion for inotropic support  Blood pressure support with IV Levophed and IV Vasopressin infusions  Continue invasive hemodynamic monitoring  Monitor chest tube outputs    ===================HEMATOLOGIC/ONC ===================  H/H 11.5/34.8  Thrombocytopenia, platelet count at 133k.   Continue to monitor hemoglobin, hematocrit and platelet levels.   transfuse 1 pack RBC  ===================== RENAL =========================  Continue to monitor I/Os, BUN/Cr, and urine output.  Monitor and replete electrolytes prn. Keep K > 4 and Mg > 2   fluid resuscitation  ==================== GASTROINTESTINAL===================  NPO after recent procedure, advance diet as tolerated.  Continue with IV Pepcid for stress ulcer prophylaxis  Bowel regimen with Miralax    =======================    ENDOCRINE  =====================  Stress hyperglycemia, requiring glucose control with IV Insulin infusion  Continue to monitor blood glucose levels    ========================INFECTIOUS DISEASE================  Continue IV Cefuroxime for perioperative antibiotic coverage.      Patient requires continuous monitoring with bedside rhythm monitoring, arterial line, pulse oximetry, ventilator monitoring; intermittent blood gas analysis. Care plan discussed with ICU care team. Patient remains critical; required more than usual  postopICU care. time spent 35 minutes    By signing my name below, I, Joni Painter, attest that this documentation has been prepared under the direction and in the presence of Logan Soriano MD.  Electronically signed: Lillie Durham, 11-25-20 @ 20:29    I, Logan Soriano, personally performed the services described in this documentation. All medical record entries made by the scribe were at my direction and in my presence. I have reviewed the chart and agree that the record reflects my personal performance and is accurate and complete  Electronically signed: Logan Soriano, 11-25-20 @ 20:29

## 2020-11-26 LAB
ALBUMIN SERPL ELPH-MCNC: 3.5 G/DL — SIGNIFICANT CHANGE UP (ref 3.3–5)
ALP SERPL-CCNC: 39 U/L — LOW (ref 40–120)
ALT FLD-CCNC: 30 U/L — SIGNIFICANT CHANGE UP (ref 10–45)
ANION GAP SERPL CALC-SCNC: 12 MMOL/L — SIGNIFICANT CHANGE UP (ref 5–17)
APTT BLD: 37.3 SEC — HIGH (ref 27.5–35.5)
AST SERPL-CCNC: 78 U/L — HIGH (ref 10–40)
BASE EXCESS BLDMV CALC-SCNC: 1.9 MMOL/L — SIGNIFICANT CHANGE UP (ref -3–3)
BASE EXCESS BLDMV CALC-SCNC: 2.9 MMOL/L — SIGNIFICANT CHANGE UP (ref -3–3)
BASE EXCESS BLDMV CALC-SCNC: 3.7 MMOL/L — HIGH (ref -3–3)
BASE EXCESS BLDMV CALC-SCNC: 3.8 MMOL/L — HIGH (ref -3–3)
BILIRUB SERPL-MCNC: 0.9 MG/DL — SIGNIFICANT CHANGE UP (ref 0.2–1.2)
BUN SERPL-MCNC: 21 MG/DL — SIGNIFICANT CHANGE UP (ref 7–23)
CALCIUM SERPL-MCNC: 8.5 MG/DL — SIGNIFICANT CHANGE UP (ref 8.4–10.5)
CHLORIDE SERPL-SCNC: 103 MMOL/L — SIGNIFICANT CHANGE UP (ref 96–108)
CO2 BLDMV-SCNC: 30 MMOL/L — HIGH (ref 21–29)
CO2 BLDMV-SCNC: 31 MMOL/L — HIGH (ref 21–29)
CO2 SERPL-SCNC: 24 MMOL/L — SIGNIFICANT CHANGE UP (ref 22–31)
CREAT SERPL-MCNC: 0.81 MG/DL — SIGNIFICANT CHANGE UP (ref 0.5–1.3)
GAS PNL BLDA: SIGNIFICANT CHANGE UP
GAS PNL BLDMV: SIGNIFICANT CHANGE UP
GLUCOSE BLDC GLUCOMTR-MCNC: 101 MG/DL — HIGH (ref 70–99)
GLUCOSE BLDC GLUCOMTR-MCNC: 124 MG/DL — HIGH (ref 70–99)
GLUCOSE BLDC GLUCOMTR-MCNC: 125 MG/DL — HIGH (ref 70–99)
GLUCOSE BLDC GLUCOMTR-MCNC: 142 MG/DL — HIGH (ref 70–99)
GLUCOSE BLDC GLUCOMTR-MCNC: 155 MG/DL — HIGH (ref 70–99)
GLUCOSE SERPL-MCNC: 148 MG/DL — HIGH (ref 70–99)
HCO3 BLDMV-SCNC: 28 MMOL/L — SIGNIFICANT CHANGE UP (ref 20–28)
HCO3 BLDMV-SCNC: 28 MMOL/L — SIGNIFICANT CHANGE UP (ref 20–28)
HCO3 BLDMV-SCNC: 29 MMOL/L — HIGH (ref 20–28)
HCO3 BLDMV-SCNC: 29 MMOL/L — HIGH (ref 20–28)
HCT VFR BLD CALC: 28.9 % — LOW (ref 39–50)
HGB BLD-MCNC: 9.6 G/DL — LOW (ref 13–17)
HOROWITZ INDEX BLDMV+IHG-RTO: 32 — SIGNIFICANT CHANGE UP
HOROWITZ INDEX BLDMV+IHG-RTO: 40 — SIGNIFICANT CHANGE UP
HOROWITZ INDEX BLDMV+IHG-RTO: 44 — SIGNIFICANT CHANGE UP
INR BLD: 1.27 RATIO — HIGH (ref 0.88–1.16)
MAGNESIUM SERPL-MCNC: 1.9 MG/DL — SIGNIFICANT CHANGE UP (ref 1.6–2.6)
MCHC RBC-ENTMCNC: 33 PG — SIGNIFICANT CHANGE UP (ref 27–34)
MCHC RBC-ENTMCNC: 33.2 GM/DL — SIGNIFICANT CHANGE UP (ref 32–36)
MCV RBC AUTO: 99.3 FL — SIGNIFICANT CHANGE UP (ref 80–100)
NRBC # BLD: 0 /100 WBCS — SIGNIFICANT CHANGE UP (ref 0–0)
O2 CT VFR BLD CALC: 29 MMHG — LOW (ref 30–65)
O2 CT VFR BLD CALC: 32 MMHG — SIGNIFICANT CHANGE UP (ref 30–65)
O2 CT VFR BLD CALC: 37 MMHG — SIGNIFICANT CHANGE UP (ref 30–65)
O2 CT VFR BLD CALC: 38 MMHG — SIGNIFICANT CHANGE UP (ref 30–65)
PCO2 BLDMV: 49 MMHG — SIGNIFICANT CHANGE UP (ref 30–65)
PCO2 BLDMV: 50 MMHG — SIGNIFICANT CHANGE UP (ref 30–65)
PCO2 BLDMV: 52 MMHG — SIGNIFICANT CHANGE UP (ref 30–65)
PCO2 BLDMV: 54 MMHG — SIGNIFICANT CHANGE UP (ref 30–65)
PH BLDMV: 7.33 — SIGNIFICANT CHANGE UP (ref 7.32–7.45)
PH BLDMV: 7.37 — SIGNIFICANT CHANGE UP (ref 7.32–7.45)
PH BLDMV: 7.37 — SIGNIFICANT CHANGE UP (ref 7.32–7.45)
PH BLDMV: 7.38 — SIGNIFICANT CHANGE UP (ref 7.32–7.45)
PHOSPHATE SERPL-MCNC: 3.7 MG/DL — SIGNIFICANT CHANGE UP (ref 2.5–4.5)
PLATELET # BLD AUTO: 96 K/UL — LOW (ref 150–400)
POTASSIUM SERPL-MCNC: 4.5 MMOL/L — SIGNIFICANT CHANGE UP (ref 3.5–5.3)
POTASSIUM SERPL-SCNC: 4.5 MMOL/L — SIGNIFICANT CHANGE UP (ref 3.5–5.3)
PROT SERPL-MCNC: 4.8 G/DL — LOW (ref 6–8.3)
PROTHROM AB SERPL-ACNC: 15.1 SEC — HIGH (ref 10.6–13.6)
RBC # BLD: 2.91 M/UL — LOW (ref 4.2–5.8)
RBC # FLD: 15.8 % — HIGH (ref 10.3–14.5)
SAO2 % BLDMV: 50 % — LOW (ref 60–90)
SAO2 % BLDMV: 57 % — LOW (ref 60–90)
SAO2 % BLDMV: 65 % — SIGNIFICANT CHANGE UP (ref 60–90)
SAO2 % BLDMV: 68 % — SIGNIFICANT CHANGE UP (ref 60–90)
SODIUM SERPL-SCNC: 139 MMOL/L — SIGNIFICANT CHANGE UP (ref 135–145)
WBC # BLD: 9.92 K/UL — SIGNIFICANT CHANGE UP (ref 3.8–10.5)
WBC # FLD AUTO: 9.92 K/UL — SIGNIFICANT CHANGE UP (ref 3.8–10.5)

## 2020-11-26 PROCEDURE — 93010 ELECTROCARDIOGRAM REPORT: CPT

## 2020-11-26 PROCEDURE — 99291 CRITICAL CARE FIRST HOUR: CPT

## 2020-11-26 PROCEDURE — 71045 X-RAY EXAM CHEST 1 VIEW: CPT | Mod: 26

## 2020-11-26 PROCEDURE — 99292 CRITICAL CARE ADDL 30 MIN: CPT

## 2020-11-26 RX ORDER — FUROSEMIDE 40 MG
10 TABLET ORAL ONCE
Refills: 0 | Status: COMPLETED | OUTPATIENT
Start: 2020-11-26 | End: 2020-11-26

## 2020-11-26 RX ORDER — FUROSEMIDE 40 MG
20 TABLET ORAL ONCE
Refills: 0 | Status: COMPLETED | OUTPATIENT
Start: 2020-11-26 | End: 2020-11-26

## 2020-11-26 RX ORDER — HYDROMORPHONE HYDROCHLORIDE 2 MG/ML
0.5 INJECTION INTRAMUSCULAR; INTRAVENOUS; SUBCUTANEOUS ONCE
Refills: 0 | Status: DISCONTINUED | OUTPATIENT
Start: 2020-11-26 | End: 2020-11-26

## 2020-11-26 RX ORDER — MAGNESIUM SULFATE 500 MG/ML
1 VIAL (ML) INJECTION ONCE
Refills: 0 | Status: COMPLETED | OUTPATIENT
Start: 2020-11-26 | End: 2020-11-26

## 2020-11-26 RX ORDER — ACETAMINOPHEN 500 MG
1000 TABLET ORAL ONCE
Refills: 0 | Status: COMPLETED | OUTPATIENT
Start: 2020-11-26 | End: 2020-11-26

## 2020-11-26 RX ORDER — DEXMEDETOMIDINE HYDROCHLORIDE IN 0.9% SODIUM CHLORIDE 4 UG/ML
0.4 INJECTION INTRAVENOUS
Qty: 200 | Refills: 0 | Status: DISCONTINUED | OUTPATIENT
Start: 2020-11-26 | End: 2020-11-27

## 2020-11-26 RX ORDER — ENOXAPARIN SODIUM 100 MG/ML
40 INJECTION SUBCUTANEOUS DAILY
Refills: 0 | Status: DISCONTINUED | OUTPATIENT
Start: 2020-11-26 | End: 2020-11-30

## 2020-11-26 RX ORDER — OXYCODONE AND ACETAMINOPHEN 5; 325 MG/1; MG/1
1 TABLET ORAL EVERY 4 HOURS
Refills: 0 | Status: DISCONTINUED | OUTPATIENT
Start: 2020-11-26 | End: 2020-11-30

## 2020-11-26 RX ORDER — INSULIN LISPRO 100/ML
VIAL (ML) SUBCUTANEOUS
Refills: 0 | Status: DISCONTINUED | OUTPATIENT
Start: 2020-11-26 | End: 2020-12-04

## 2020-11-26 RX ORDER — VASOPRESSIN 20 [USP'U]/ML
0.1 INJECTION INTRAVENOUS
Qty: 50 | Refills: 0 | Status: DISCONTINUED | OUTPATIENT
Start: 2020-11-26 | End: 2020-11-27

## 2020-11-26 RX ORDER — OXYCODONE AND ACETAMINOPHEN 5; 325 MG/1; MG/1
2 TABLET ORAL EVERY 6 HOURS
Refills: 0 | Status: DISCONTINUED | OUTPATIENT
Start: 2020-11-26 | End: 2020-11-30

## 2020-11-26 RX ADMIN — Medication 400 MILLIGRAM(S): at 17:22

## 2020-11-26 RX ADMIN — HYDROMORPHONE HYDROCHLORIDE 0.5 MILLIGRAM(S): 2 INJECTION INTRAMUSCULAR; INTRAVENOUS; SUBCUTANEOUS at 05:49

## 2020-11-26 RX ADMIN — FAMOTIDINE 20 MILLIGRAM(S): 10 INJECTION INTRAVENOUS at 17:25

## 2020-11-26 RX ADMIN — Medication 1000 MILLIGRAM(S): at 17:52

## 2020-11-26 RX ADMIN — Medication 100 MILLIGRAM(S): at 23:37

## 2020-11-26 RX ADMIN — Medication 81 MILLIGRAM(S): at 11:57

## 2020-11-26 RX ADMIN — ENOXAPARIN SODIUM 40 MILLIGRAM(S): 100 INJECTION SUBCUTANEOUS at 11:57

## 2020-11-26 RX ADMIN — FAMOTIDINE 20 MILLIGRAM(S): 10 INJECTION INTRAVENOUS at 05:49

## 2020-11-26 RX ADMIN — Medication 1: at 11:57

## 2020-11-26 RX ADMIN — HYDROMORPHONE HYDROCHLORIDE 0.5 MILLIGRAM(S): 2 INJECTION INTRAMUSCULAR; INTRAVENOUS; SUBCUTANEOUS at 06:05

## 2020-11-26 RX ADMIN — Medication 100 GRAM(S): at 01:33

## 2020-11-26 RX ADMIN — CHLORHEXIDINE GLUCONATE 1 APPLICATION(S): 213 SOLUTION TOPICAL at 05:50

## 2020-11-26 RX ADMIN — HYDROMORPHONE HYDROCHLORIDE 0.5 MILLIGRAM(S): 2 INJECTION INTRAMUSCULAR; INTRAVENOUS; SUBCUTANEOUS at 13:19

## 2020-11-26 RX ADMIN — Medication 100 MILLIGRAM(S): at 06:07

## 2020-11-26 RX ADMIN — HYDROMORPHONE HYDROCHLORIDE 0.5 MILLIGRAM(S): 2 INJECTION INTRAMUSCULAR; INTRAVENOUS; SUBCUTANEOUS at 13:34

## 2020-11-26 RX ADMIN — Medication 10 MILLIGRAM(S): at 09:01

## 2020-11-26 RX ADMIN — Medication 20 MILLIGRAM(S): at 23:37

## 2020-11-26 RX ADMIN — Medication 100 MILLIGRAM(S): at 15:42

## 2020-11-26 RX ADMIN — Medication 20 MILLIGRAM(S): at 14:28

## 2020-11-26 NOTE — AIRWAY REMOVAL NOTE  ADULT & PEDS - ARTIFICAL AIRWAY REMOVAL COMMENTS
Written order for extubation verified. The patient was identified by full name and birth date compared to the identification band. Present during the procedure was SUJATA Jeff

## 2020-11-26 NOTE — PROGRESS NOTE ADULT - SUBJECTIVE AND OBJECTIVE BOX
CC: f/u for cellulitis left leg    Patient reports he is not as well as he had hoped he would feel    REVIEW OF SYSTEMS:  All other review of systems negative (Comprehensive ROS)    Antimicrobials Day #  :pod 1  cefuroxime  IVPB 1500 milliGRAM(s) IV Intermittent every 8 hours    Other Medications Reviewed    T(F): 98.4 (11-26-20 @ 08:00), Max: 99.3 (11-25-20 @ 18:00)  HR: 95 (11-26-20 @ 10:15)  BP: --  RR: 38 (11-26-20 @ 10:15)  SpO2: 99% (11-26-20 @ 10:15)  Wt(kg): --    PHYSICAL EXAM:  General: alert, no acute distress  Eyes:  anicteric, no conjunctival injection, no discharge  Oropharynx: no lesions or injection 	  Neck: supple, without adenopathy  Lungs: course to auscultation  Heart: regular rate and rhythm; no murmur, rubs or gallops  Abdomen: soft, nondistended, nontender, without mass or organomegaly  Skin: no lesions  Extremities: no clubbing, cyanosis,. left leg edema and redness near gone  Neurologic: alert, oriented, moves all extremities  chest tubes, swan in place  sternum dressed  LAB RESULTS:                        9.6    9.92  )-----------( 96       ( 26 Nov 2020 01:01 )             28.9     11-26    139  |  103  |  21  ----------------------------<  148<H>  4.5   |  24  |  0.81    Ca    8.5      26 Nov 2020 01:01  Phos  3.7     11-26  Mg     1.9     11-26    TPro  4.8<L>  /  Alb  3.5  /  TBili  0.9  /  DBili  x   /  AST  78<H>  /  ALT  30  /  AlkPhos  39<L>  11-26    LIVER FUNCTIONS - ( 26 Nov 2020 01:01 )  Alb: 3.5 g/dL / Pro: 4.8 g/dL / ALK PHOS: 39 U/L / ALT: 30 U/L / AST: 78 U/L / GGT: x             MICROBIOLOGY:  RECENT CULTURES:      RADIOLOGY REVIEWED:    < from: Xray Chest 1 View- PORTABLE-Routine (11.26.20 @ 04:03) >      PROCEDURE DATE:  11/26/2020            INTERPRETATION:  A single chest x-ray was obtained on November 26, 2020.    Indication: Status post cardiac surgery.    Impression:    The heart is enlarged. The lungs appear to be clear. Endotracheal tube and NG tube were removed. A [place] catheter is seen on the right and the tip is in the main pulmonary artery. A left chest tube is in place. No pneumothorax. Status post sternotomy. Calcified aortic knob.      < end of copied text >  < from: CT Angio Chest w/ IV Cont (11.20.20 @ 17:06) >  EXAM:  CT ANGIO CHEST (W)AW IC                            PROCEDURE DATE:  11/20/2020            INTERPRETATION:  Clinical information: Evaluate for pulmonary embolus.    CT angiogram of the chest was obtained following administration of intravenous contrast. Approximately 85 cc of Omnipaque 350 was administered. Coronal, sagittal and MIP images were submitted for review.    No hilar and/or mediastinal adenopathy is noted.    Heart is enlarged in size. Calcification of the aortic valve and the coronary arteries is noted. No pericardial effusion is noted. No filling defects are noted within the main, right and left main and lobar pulmonary arteries bilaterally. Evaluation of the segmental and subsegmental pulmonary arteries is limited due tomotion artifact and poor opacification.    No endobronchial lesions are noted. Compressive atelectasis is noted involving portions of both lower lobes. This is secondary to small to moderate size pleural effusions bilaterally, right more than left.    Below the diaphragm, visualized portions of the abdomen demonstrate low-attenuation lesion in the right kidney which is incompletely imaged on this exam.    Degenerative changes of the spine are noted.    Impression: No pulmonary embolus is noted within the main, right and left main and lobar pulmonary arteries bilaterally. Evaluation of the segmental and subsegmental pulmonary arteries is limited.    Small to moderate size pleural effusions bilaterally, right more than left.      < end of copied text >  < from: [facility] Duplex Lower Ext Vein Scan, Bilat (11.18.20 @ 15:01) >    EXAM:  DUPLEX SCAN EXT VEINS LOWER BI                            PROCEDURE DATE:  11/18/2020            INTERPRETATION:  CLINICAL INFORMATION: Bilateral lower extremity swelling, rule out DVT.    COMPARISON: None available.    TECHNIQUE: Duplex sonography of the BILATERAL LOWER extremity veins with color and spectral Doppler, with and without compression.    FINDINGS: There is some edema within the superficial soft tissues of both calves.    There is normal compressibility of the bilateral common femoral, femoral and popliteal veins.  Doppler examination shows normal spontaneous and phasic flow.    No calf vein thrombosis is detected.    IMPRESSION:  No evidence of deep venous thrombosis in either lower extremity.    < end of copied text >            Assessment:  Patient admitted with chf exacerbation and left leg cellulitis. He finished a course of antibiotics and the cellulitis is resolved. He was found to need cabg and is now pod 1. Has been extubated overnight, continues to have inotrope and pressor need so swan remains. No active infection of leg is apparent at present.   Plan:  monitor off antibiotics after finishes surgical site prophylaxis per protocol

## 2020-11-26 NOTE — PROGRESS NOTE ADULT - SUBJECTIVE AND OBJECTIVE BOX
CRITICAL CARE ATTENDING - CTICU    MEDICATIONS  (STANDING):  aspirin enteric coated 81 milliGRAM(s) Oral daily  cefuroxime  IVPB 1500 milliGRAM(s) IV Intermittent every 8 hours  chlorhexidine 2% Cloths 1 Application(s) Topical <User Schedule>  DOBUTamine Infusion 5 MICROgram(s)/kG/Min (11 mL/Hr) IV Continuous <Continuous>  enoxaparin Injectable 40 milliGRAM(s) SubCutaneous daily  famotidine Injectable 20 milliGRAM(s) IV Push every 12 hours  insulin lispro (ADMELOG) corrective regimen sliding scale   SubCutaneous three times a day before meals  norepinephrine Infusion 0.044 MICROgram(s)/kG/Min (6 mL/Hr) IV Continuous <Continuous>  polyethylene glycol 3350 17 Gram(s) Oral daily  sodium chloride 0.9%. 1000 milliLiter(s) (10 mL/Hr) IV Continuous <Continuous>  vasopressin Infusion 0.033 Unit(s)/Min (2 mL/Hr) IV Continuous <Continuous>                                    9.6    9.92  )-----------( 96       ( 2020 01:01 )             28.9           139  |  103  |  21  ----------------------------<  148<H>  4.5   |  24  |  0.81    Ca    8.5      2020 01:  Phos  3.7       Mg     1.9         TPro  4.8<L>  /  Alb  3.5  /  TBili  0.9  /  DBili  x   /  AST  78<H>  /  ALT  30  /  AlkPhos  39<L>        PT/INR - ( 2020 01:01 )   PT: 15.1 sec;   INR: 1.27 ratio         PTT - ( 2020 01:01 )  PTT:37.3 sec      Daily     Daily Weight in k.5 (2020 00:00)       @ 07:01  -   @ 07:00  --------------------------------------------------------  IN: 3180.8 mL / OUT: 1970 mL / NET: 1210.8 mL        Critically Ill patient  : [ ] preoperative ,   [ x] post operative    Requires :  [x ] Arterial Line   [x ] Central Line  [ ] PA catheter  [ ] IABP  [ ] ECMO  [ ] LVAD  [ ] Ventilator  [ x] pacemaker [ ] Impella.                      [x ] ABG's     [ x] Pulse Oxymetry Monitoring  Bedside evaluation , monitoring , treatment of hemodynamics , fluids , IVP/ IVCD meds.        Diagnosis:     POD 1 - CABG     Chest tube drainage     Extubated overnight     Requires chest PT, pulmonary toilet, ambu bagging, suctioning to maintain SaO2,  patent airway and treat atelectasis.     CHF- acute [ x]   chronic [x ]    systolic [ x]   diatolic [ ]          - Echo- EF -   40-45%     [ ] RV dysfunction          - Cxr-cardiomegally, edema          - Clinical-  [x ]inotropes   [x ]pressors   [ ]diuresis   [ ]IABP   [ ]ECMO   [ ]LVAD   [ ]Respiratory Failure.     Hemodynamic lability,  instability. Requires IVCD [ x] vasopressors [x ] inotropes  [ ] vasodilator  [ ]IVSS fluid  to maintain MAP, perfusion, C.I.     Temporary pacemaker (TPM) interrogation and setting.     Hypotension     ECG    Requires bedside physical therapy, mobilization and total long-term care.             By signing my name below, I, Katelyn Guadalupe, attest that this documentation has been prepared under the direction and in the presence of Niko Castillo MD.   Electronically Signed: Lillie Cordero. 20 @ 07:50    Discussed with CT surgeon, Physician's Assistant - Nurse Practitioner- Critical care medicine team.   Dicussed at  AM / PM rounds.   Chart, labs , films reviewed.    Total Time: CRITICAL CARE ATTENDING - CTICU    MEDICATIONS  (STANDING):  aspirin enteric coated 81 milliGRAM(s) Oral daily  cefuroxime  IVPB 1500 milliGRAM(s) IV Intermittent every 8 hours  chlorhexidine 2% Cloths 1 Application(s) Topical <User Schedule>  DOBUTamine Infusion 5 MICROgram(s)/kG/Min (11 mL/Hr) IV Continuous <Continuous>  enoxaparin Injectable 40 milliGRAM(s) SubCutaneous daily  famotidine Injectable 20 milliGRAM(s) IV Push every 12 hours  insulin lispro (ADMELOG) corrective regimen sliding scale   SubCutaneous three times a day before meals  norepinephrine Infusion 0.044 MICROgram(s)/kG/Min (6 mL/Hr) IV Continuous <Continuous>  polyethylene glycol 3350 17 Gram(s) Oral daily  sodium chloride 0.9%. 1000 milliLiter(s) (10 mL/Hr) IV Continuous <Continuous>  vasopressin Infusion 0.033 Unit(s)/Min (2 mL/Hr) IV Continuous <Continuous>                                    9.6    9.92  )-----------( 96       ( 2020 01:01 )             28.9           139  |  103  |  21  ----------------------------<  148<H>  4.5   |  24  |  0.81    Ca    8.5      2020 01:  Phos  3.7       Mg     1.9         TPro  4.8<L>  /  Alb  3.5  /  TBili  0.9  /  DBili  x   /  AST  78<H>  /  ALT  30  /  AlkPhos  39<L>        PT/INR - ( 2020 01:01 )   PT: 15.1 sec;   INR: 1.27 ratio         PTT - ( 2020 01:01 )  PTT:37.3 sec      Daily     Daily Weight in k.5 (2020 00:00)       @ 07:01  -   @ 07:00  --------------------------------------------------------  IN: 3180.8 mL / OUT: 1970 mL / NET: 1210.8 mL        Critically Ill patient  : [ ] preoperative ,   [ x] post operative    Requires :  [x ] Arterial Line   [x ] Central Line  [ ] PA catheter  [ ] IABP  [ ] ECMO  [ ] LVAD  [ ] Ventilator  [ x] pacemaker [ ] Impella.                      [x ] ABG's     [ x] Pulse Oxymetry Monitoring  Bedside evaluation , monitoring , treatment of hemodynamics , fluids , IVP/ IVCD meds.        Diagnosis:     POD 1 - CABG X 3 L    Hypotension    Hypovolemia    Chest tube drainage     Extubated overnight     Requires chest PT, pulmonary toilet, suctioning to maintain SaO2,  patent airway and treat atelectasis.     CHF- acute [ x]   chronic [x ]    systolic [ x]   diatolic [ ]          - Echo- EF -   40-45%     [ ] RV dysfunction          - Cxr-cardiomegally, edema          - Clinical-  [x ]inotropes   [x ]pressors   [ x]diuresis   [ ]IABP   [ ]ECMO   [ ]LVAD   [ ]Respiratory Failure.     Hemodynamic lability,  instability. Requires IVCD [ x] vasopressors [x ] inotropes  [ ] vasodilator  [ ]IVSS fluid  to maintain MAP, perfusion, C.I.     Temporary pacemaker (TPM) interrogation and setting.      ECG    Requires bedside physical therapy, mobilization and total halfway care.     [place] catheter interpretation and therapeutic management of unstable hemodynamics     lactic acid elevation     Thrombocytopenia                 By signing my name below, IKatelyn, attest that this documentation has been prepared under the direction and in the presence of Niko Castillo MD.   Electronically Signed: Lillie Cordero. 20 @ 07:50    INiko, personally performed the services described in this documentation. All medical record entries made by the scribe were at my direction and in my presence. I have reviewed the chart and agree that the record reflects my personal performance and is accurate and complete.   Niko Castillo MD.     Discussed with CT surgeon, Physician's Assistant - Nurse Practitioner- Critical care medicine team.   Dicussed at  AM / PM rounds.   Chart, labs , films reviewed.    Total Time: 30 min

## 2020-11-26 NOTE — PROGRESS NOTE ADULT - SUBJECTIVE AND OBJECTIVE BOX
MARCOS AGUILAR  MRN-[id number]  Patient is a 85y old  Male who presents with a chief complaint of edema (26 Nov 2020 14:10)    HPI:  pt is an 84 y/o man with pmhx of htn, hld, hiatal hernia / GERD, OA post Lt TKR x1 year ago presenting with concern for increasing redness of left leg and swelling for 2 months.   Patient reports that he has been having increasing swelling of both of his legs for approximately 2-3 months; however, the left leg has been more swollen and has been getting increasingly more red. He states that he went to his podiatrist when he noted some rash on the lower portion of his left just above his foot, was given a 'cream of some kind, and feels that the redness and swelling got worse after starting the cream.'   He states that he was having chest pain approximately 1 mo ago, went to his PMD and was diagnosed with a hiatal hernia with an xray and prescribed omeprazole.   Patient reports that he has been becoming increasingly more short of breath for approximately 2 months as well. Feels that when he takes just a few steps he has to stop and catch his breath, and is so short of breath that he cannot speak in complete sentences without resting for a period of time first.   pt also states was given amoxicillin a few days ago but only took one dose  He denies any fevers, chills, cough, nausea, vomiting, sweating, chest pain  pt admitted for likely acute CHF with elevated troponin / NSTEMI (18 Nov 2020 16:09)      Surgery/Hospital course:  11/18 Admitted to [facility]  11/25 CABG x3  11/26 Extubated    Vital Signs Last 24 Hrs  T(C): 37.4 (26 Nov 2020 16:00), Max: 37.4 (26 Nov 2020 16:00)  T(F): 99.3 (26 Nov 2020 16:00), Max: 99.3 (26 Nov 2020 16:00)  HR: 90 (26 Nov 2020 18:45) (79 - 104)  BP: --  BP(mean): --  RR: 36 (26 Nov 2020 18:45) (11 - 44)  SpO2: 98% (26 Nov 2020 18:45) (78% - 100%)  ============================I/O===========================  I&O's Detail    25 Nov 2020 07:01  -  26 Nov 2020 07:00  --------------------------------------------------------  Total IN: 3180.8 mL / Total OUT: 1970 mL / Total NET: 1210.8 mL      26 Nov 2020 07:01  -  26 Nov 2020 19:15  --------------------------------------------------------  Total IN: 443.5 mL / Total OUT: 1490 mL / Total NET: -1046.5 mL    ============================ LABS =========================                        9.6    9.92  )-----------( 96       ( 26 Nov 2020 01:01 )             28.9     11-26    139  |  103  |  21  ----------------------------<  148<H>  4.5   |  24  |  0.81    Ca    8.5      26 Nov 2020 01:01  Phos  3.7     11-26  Mg     1.9     11-26    TPro  4.8<L>  /  Alb  3.5  /  TBili  0.9  /  DBili  x   /  AST  78<H>  /  ALT  30  /  AlkPhos  39<L>  11-26    LIVER FUNCTIONS - ( 26 Nov 2020 01:01 )  Alb: 3.5 g/dL / Pro: 4.8 g/dL / ALK PHOS: 39 U/L / ALT: 30 U/L / AST: 78 U/L / GGT: x           PT/INR - ( 26 Nov 2020 01:01 )   PT: 15.1 sec;   INR: 1.27 ratio         PTT - ( 26 Nov 2020 01:01 )  PTT:37.3 sec  ABG - ( 26 Nov 2020 18:01 )  pH, Arterial: 7.43  pH, Blood: x     /  pCO2: 41    /  pO2: 128   / HCO3: 27    / Base Excess: 3.2   /  SaO2: 99          ======================Micro/Rad/Cardio=================  Telemetry: Reviewed   CXR: Reviewed  Echo: Reviewed  ======================================================  PAST MEDICAL & SURGICAL HISTORY:  Mitral prolapse    Hypercholesteremia    Hypertension    History of hernia repair      ========================ASSESSMENT ================  CAD s/p CABG x3 11/25  Stress hyperglycemia  Thrombocytopenia    Plan:  ====================== NEUROLOGY=====================  Sedated with IV Precedex drip for ventilator synchrony. Assess neuro status per protocol when pt is off sedation.  Addressed analgesic regimen to optimize function.     ==================== RESPIRATORY======================  Patient initially on full ventilator support, subsequently weaned to pressure support and extubated earlier today while closely monitoring respiratory rate, breathing pattern, pulse ox monitoring, and intermittent blood gas analysis.    Mechanical Ventilation:  Mode: CPAP with PS  FiO2: 40  PEEP: 5  PS: 5    ====================CARDIOVASCULAR==================  Patient with history of coronary artery disease, subsequently underwent coronary artery bypass grafting x3 procedure on 11/25. Continue with ASA for graft patency. IV Dobutamine infusion for inotropic support. Blood pressure support with IV Vasopressin infusion. Invasive hemodynamic monitoring with a PA catheter & a Left Radial A-line were required for the following of serial CI's/MVO2's and continuous central venous and MAP/BP monitoring to ensure adequate cardiovascular support.     ===================HEMATOLOGIC/ONC ===================  Anemia with hematocrit 30%. Thrombocytopenia with platelet count at 133k. Continue to monitor hemoglobin, hematocrit and platelet levels. Continue SQ Lovenox for venous thromboembolism prophylaxis.     ===================== RENAL =========================  Optimize renal perfusion with adequate volume resuscitation and continued monitoring of urine output, fluid balance, BUN/Creatinine.     ==================== GASTROINTESTINAL===================  Continue with IV Pepcid for stress ulcer prophylaxis. Bowel regimen with Miralax.    =======================    ENDOCRINE  =====================  Metabolic stability, stress hyperglycemia required review and adjustment of regular Insulin sliding scale and glycemic regimen while following serial glucose levels to help achieve and maintain euglycemia.    ========================INFECTIOUS DISEASE================  Continue IV Cefuroxime for perioperative antibiotic coverage.      Patient requires continuous monitoring with bedside rhythm monitoring, pulse oximetry monitoring, and continuous central venous and arterial pressure monitoring; and intermittent blood gas analysis.  Care plan discussed with ICU care team.    Patient remained critical, at risk for life threatening decompensation.   I have spent 35 minutes providing acute care with multiple re-evaluations throughout the evening.     By signing my name below, I, Tona Rosas, attest that this documentation has been prepared under the direction and in the presence of Bernadine Mercado MD.  Electronically signed: Lillie Mascorro, 11-26-20 @ 19:15    I, Bernadine Mercado MD, personally performed the services described in this documentation. All medical record entries made by the scribe were at my direction and in my presence. I have reviewed the chart and agree that the record reflects my personal performance and is accurate and complete  Electronically signed: Bernadine Mercado MD, 11-26-20 @ 19:15       MARCOS AGUILAR  MRN-[id number]  Patient is a 85y old  Male who presents with a chief complaint of edema (26 Nov 2020 14:10)    HPI:  pt is an 84 y/o man with pmhx of htn, hld, hiatal hernia / GERD, OA post Lt TKR x1 year ago presenting with concern for increasing redness of left leg and swelling for 2 months.   Patient reports that he has been having increasing swelling of both of his legs for approximately 2-3 months; however, the left leg has been more swollen and has been getting increasingly more red. He states that he went to his podiatrist when he noted some rash on the lower portion of his left just above his foot, was given a 'cream of some kind, and feels that the redness and swelling got worse after starting the cream.'   He states that he was having chest pain approximately 1 mo ago, went to his PMD and was diagnosed with a hiatal hernia with an xray and prescribed omeprazole.   Patient reports that he has been becoming increasingly more short of breath for approximately 2 months as well. Feels that when he takes just a few steps he has to stop and catch his breath, and is so short of breath that he cannot speak in complete sentences without resting for a period of time first.   pt also states was given amoxicillin a few days ago but only took one dose  He denies any fevers, chills, cough, nausea, vomiting, sweating, chest pain  pt admitted for likely acute CHF with elevated troponin / NSTEMI (18 Nov 2020 16:09)      Surgery/Hospital course:  11/18 Admitted to [facility]  11/25 CABG x3  11/26 Extubated    Vital Signs Last 24 Hrs  T(C): 37.4 (26 Nov 2020 16:00), Max: 37.4 (26 Nov 2020 16:00)  T(F): 99.3 (26 Nov 2020 16:00), Max: 99.3 (26 Nov 2020 16:00)  HR: 90 (26 Nov 2020 18:45) (79 - 104)  BP: --  BP(mean): --  RR: 36 (26 Nov 2020 18:45) (11 - 44)  SpO2: 98% (26 Nov 2020 18:45) (78% - 100%)    ============================I/O===========================  I&O's Detail    25 Nov 2020 07:01  -  26 Nov 2020 07:00  --------------------------------------------------------  Total IN: 3180.8 mL / Total OUT: 1970 mL / Total NET: 1210.8 mL      26 Nov 2020 07:01  -  26 Nov 2020 19:15  --------------------------------------------------------  Total IN: 443.5 mL / Total OUT: 1490 mL / Total NET: -1046.5 mL    ============================ LABS =========================                        9.6    9.92  )-----------( 96       ( 26 Nov 2020 01:01 )             28.9     11-26    139  |  103  |  21  ----------------------------<  148<H>  4.5   |  24  |  0.81    Ca    8.5      26 Nov 2020 01:01  Phos  3.7     11-26  Mg     1.9     11-26    TPro  4.8<L>  /  Alb  3.5  /  TBili  0.9  /  DBili  x   /  AST  78<H>  /  ALT  30  /  AlkPhos  39<L>  11-26    LIVER FUNCTIONS - ( 26 Nov 2020 01:01 )  Alb: 3.5 g/dL / Pro: 4.8 g/dL / ALK PHOS: 39 U/L / ALT: 30 U/L / AST: 78 U/L / GGT: x           PT/INR - ( 26 Nov 2020 01:01 )   PT: 15.1 sec;   INR: 1.27 ratio    PTT - ( 26 Nov 2020 01:01 )  PTT:37.3 sec    ABG - ( 26 Nov 2020 18:01 )  pH, Arterial: 7.43  pH, Blood: x     /  pCO2: 41    /  pO2: 128   / HCO3: 27    / Base Excess: 3.2   /  SaO2: 99        ======================Micro/Rad/Cardio=================  Telemetry: Reviewed   CXR: Reviewed  Echo: Reviewed  ======================================================  PAST MEDICAL & SURGICAL HISTORY:  Mitral prolapse    Hypercholesteremia    Hypertension    History of hernia repair      ========================ASSESSMENT ================  CAD s/p CABG x3 11/25  Stress hyperglycemia  Thrombocytopenia    Plan:  ====================== NEUROLOGY=====================  Addressed analgesic regimen to optimize function. Patient developed mildly agitated delirium in the early evening and responded well to an IV Dexmedetomidine infusion which is now weaned to off.    ==================== RESPIRATORY======================  Patient initially on full ventilator support, subsequently weaned to pressure support and extubated earlier today while closely monitoring respiratory rate, breathing pattern, pulse ox monitoring, and intermittent blood gas analysis. He had mild hypoxia due to splinting which improved with treatment of pain.    ====================CARDIOVASCULAR==================  Patient with history of coronary artery disease, subsequently underwent coronary artery bypass grafting x3 procedure on 11/25. Continue with ASA for graft patency. IV Dobutamine infusion for inotropic support due to acute on chronic systolic heart failure with moderate MR. Blood pressure support with an IV Vasopressin infusion. Invasive hemodynamic monitoring with a PA catheter & a Left Radial A-line were required for the following of serial CI's/MVO2's and continuous central venous, pulmonary artery pressure and MAP/BP monitoring to ensure adequate cardiovascular support.     ===================HEMATOLOGIC/ONC ===================  Anemia with hematocrit 28.9 %. Thrombocytopenia with platelet count at 96 k. Continue to monitor hemoglobin, hematocrit and platelet levels. Continue SQ Lovenox for venous thromboembolism prophylaxis.     ===================== RENAL =========================  Optimize renal perfusion with adequate inotropic and vasopressor support and continued monitoring of urine output, fluid balance, and BUN/Creatinine. When blood pressure improves, will order Lasix for management of systolic heart failure with MR.    ==================== GASTROINTESTINAL===================  Continue with IV Pepcid for stress ulcer prophylaxis. Bowel regimen with Miralax.    =======================    ENDOCRINE  =====================  Metabolic stability, stress hyperglycemia required review and adjustment of regular Insulin sliding scale and glycemic regimen while following serial glucose levels to help achieve and maintain euglycemia.    ========================INFECTIOUS DISEASE================  Continue IV Cefuroxime for perioperative antibiotic coverage.    Patient requires continuous monitoring with bedside rhythm monitoring, pulse oximetry monitoring, and continuous central venous and arterial pressure monitoring; and intermittent blood gas analysis.  Care plan discussed with ICU care team.    Patient remained critical, at risk for life threatening decompensation.   I have spent 45 minutes providing acute care with multiple re-evaluations throughout the evening.     By signing my name below, I, Tona Rosas, attest that this documentation has been prepared under the direction and in the presence of Bernadine Mercado MD.  Electronically signed: Llilie Mascorro, 11-26-20 @ 19:15    I, Bernadine Mercado MD, personally performed the services described in this documentation. All medical record entries made by the scribe were at my direction and in my presence. I have reviewed the chart and agree that the record reflects my personal performance and is accurate and complete  Electronically signed: Bernadine Mercado MD, 11-26-20 @ 19:15

## 2020-11-26 NOTE — PROGRESS NOTE ADULT - SUBJECTIVE AND OBJECTIVE BOX
CARDIOLOGY     PROGRESS  NOTE   ________________________________________________    CHIEF COMPLAINT:Patient is a 85y old  Male who presents with a chief complaint of edema (26 Nov 2020 07:50)  s/p cabg, s/p extubation.  	  REVIEW OF SYSTEMS:  CONSTITUTIONAL: No fever, weight loss, or fatigue  EYES: No eye pain, visual disturbances, or discharge  ENT:  No difficulty hearing, tinnitus, vertigo; No sinus or throat pain  NECK: No pain or stiffness  RESPIRATORY: No cough, wheezing, chills or hemoptysis; No Shortness of Breath  CARDIOVASCULAR: No chest pain, palpitations, passing out, dizziness, or leg swelling  GASTROINTESTINAL: No abdominal or epigastric pain. No nausea, vomiting, or hematemesis; No diarrhea or constipation. No melena or hematochezia.  GENITOURINARY: No dysuria, frequency, hematuria, or incontinence  NEUROLOGICAL: No headaches, memory loss, loss of strength, numbness, or tremors  SKIN: No itching, burning, rashes, or lesions   LYMPH Nodes: No enlarged glands  ENDOCRINE: No heat or cold intolerance; No hair loss  MUSCULOSKELETAL: No joint pain or swelling; No muscle, back, or extremity pain  PSYCHIATRIC: No depression, anxiety, mood swings, or difficulty sleeping  HEME/LYMPH: No easy bruising, or bleeding gums  ALLERGY AND IMMUNOLOGIC: No hives or eczema	    [ ] All others negative	  [x ] Unable to obtain    PHYSICAL EXAM:  T(C): 36.9 (11-26-20 @ 08:00), Max: 37.4 (11-25-20 @ 18:00)  HR: 88 (11-26-20 @ 09:00) (74 - 104)  BP: --  RR: 19 (11-26-20 @ 09:00) (11 - 44)  SpO2: 97% (11-26-20 @ 09:00) (78% - 100%)  Wt(kg): --  I&O's Summary    25 Nov 2020 07:01  -  26 Nov 2020 07:00  --------------------------------------------------------  IN: 3180.8 mL / OUT: 1970 mL / NET: 1210.8 mL    26 Nov 2020 07:01  -  26 Nov 2020 10:06  --------------------------------------------------------  IN: 183 mL / OUT: 140 mL / NET: 43 mL        Appearance: Normal	  HEENT:   Normal oral mucosa, PERRL, EOMI	  Lymphatic: No lymphadenopathy  Cardiovascular: Normal S1 S2, No JVD, + murmurs, No edema  Respiratory: Lungs clear to auscultation	  Psychiatry: A & O x 3, Mood & affect appropriate  Gastrointestinal:  Soft, Non-tender, + BS	  Skin: No rashes, No ecchymoses, No cyanosis	  Neurologic: Non-focal  Extremities: Normal range of motion, No clubbing, cyanosis or edema  Vascular: Peripheral pulses palpable 2+ bilaterally    MEDICATIONS  (STANDING):  aspirin enteric coated 81 milliGRAM(s) Oral daily  cefuroxime  IVPB 1500 milliGRAM(s) IV Intermittent every 8 hours  chlorhexidine 2% Cloths 1 Application(s) Topical <User Schedule>  DOBUTamine Infusion 5 MICROgram(s)/kG/Min (11 mL/Hr) IV Continuous <Continuous>  enoxaparin Injectable 40 milliGRAM(s) SubCutaneous daily  famotidine Injectable 20 milliGRAM(s) IV Push every 12 hours  insulin lispro (ADMELOG) corrective regimen sliding scale   SubCutaneous three times a day before meals  polyethylene glycol 3350 17 Gram(s) Oral daily  sodium chloride 0.9%. 1000 milliLiter(s) (10 mL/Hr) IV Continuous <Continuous>  vasopressin Infusion 0.033 Unit(s)/Min (2 mL/Hr) IV Continuous <Continuous>      TELEMETRY: 	    ECG:  	  RADIOLOGY:  OTHER: 	  	  LABS:	 	    CARDIAC MARKERS:  CARDIAC MARKERS ( 25 Nov 2020 13:38 )  x     / x     / 742 U/L / x     / 102.8 ng/mL                                9.6    9.92  )-----------( 96       ( 26 Nov 2020 01:01 )             28.9     11-26    139  |  103  |  21  ----------------------------<  148<H>  4.5   |  24  |  0.81    Ca    8.5      26 Nov 2020 01:01  Phos  3.7     11-26  Mg     1.9     11-26    TPro  4.8<L>  /  Alb  3.5  /  TBili  0.9  /  DBili  x   /  AST  78<H>  /  ALT  30  /  AlkPhos  39<L>  11-26    proBNP: Serum Pro-Brain Natriuretic Peptide: 59907 pg/mL (11-18 @ 12:45)    Lipid Profile: Cholesterol 106  LDL --  HDL 40  TG 73    HgA1c:   TSH: Thyroid Stimulating Hormone, Serum: 1.46 uIU/mL (11-19 @ 09:24)  Thyroid Stimulating Hormone, Serum: 1.51 uIU/mL (11-19 @ 08:41)    PT/INR - ( 26 Nov 2020 01:01 )   PT: 15.1 sec;   INR: 1.27 ratio         PTT - ( 26 Nov 2020 01:01 )  PTT:37.3 sec      Assessment and plan  ---------------------------  pt is an 86 y/o man with pmhx of htn, hld, hiatal hernia / GERD, OA post Lt TKR x1 year ago presenting with concern for increasing redness of left leg and swelling for 2 months.   Patient reports that he has been having increasing swelling of both of his legs for approximately 2-3 months; however, the left leg has been more swollen and has been getting increasingly more red. He states that he went to his podiatrist when he noted some rash on the lower portion of his left just above his foot, was given a 'cream of some kind, and feels that the redness and swelling got worse after starting the cream.'   He states that he was having chest pain approximately 1 mo ago, went to his PMD and was diagnosed with a hiatal hernia with an xray and prescribed omeprazole.   Patient reports that he has been becoming increasingly more short of breath for approximately 2 months as well. Feels that when he takes just a few steps he has to stop and catch his breath, and is so short of breath that he cannot speak in complete sentences without resting for a period of time first.   s/p CABG, doing well  s/p extubation  plan as per CTU

## 2020-11-26 NOTE — PHYSICAL THERAPY INITIAL EVALUATION ADULT - PLANNED THERAPY INTERVENTIONS, PT EVAL
gait training/strengthening/balance training/bed mobility training/goal: pt will negotiate 3 steps independently w/ 1 HR use within 2 weeks/transfer training

## 2020-11-26 NOTE — PROGRESS NOTE ADULT - ASSESSMENT
_________________________________________________________________________________________  ========>>  M E D I C A L   A T T E N D I N G    F O L L O W  U P  N O T E  <<=========  -----------------------------------------------------------------------------------------------------    - Patient seen and examined by me approximately sixty minutes ago.   - In summary,  MARCOS AGUILAR is a 85y year old man who originally presented with edema   - Patient today overall doing ok post op, comfortable, taking PO, in good spirits, no significant pain, no SOB / cough     ==================>> REVIEW OF SYSTEM <<=================    GEN: no fever, no chills, no significant pain  RESP: no SOB, no cough, no sputum  CVS: no chest pain, no palpitations   GI: no abdominal pain, no nausea  : no dysuria  Neuro: no headache, no dizziness  Derm : no itching, no rash    ==================>> PHYSICAL EXAM <<=================    GEN: A&O X 3 , NAD , comfortable, pleasant   HEENT: NCAT, PERRL, MMM, hearing intact  Neck: supple , no JVD appreciated, central line in place   CVS: S1S2 , regular , No M/R/G appreciated  PULM: CTA B/L,  no W/R/R appreciated, + left sided chest tube   ABD.: soft. non tender, non distended,  bowel sounds decreased   Extrem: Rt leg wrapped, Left leg improved with erythema and edema .. + venodynes on     + mays   PSYCH : normal mood,  not anxious      ==================>> MEDICATIONS <<====================    aspirin enteric coated 81 milliGRAM(s) Oral daily  cefuroxime  IVPB 1500 milliGRAM(s) IV Intermittent every 8 hours  chlorhexidine 2% Cloths 1 Application(s) Topical <User Schedule>  DOBUTamine Infusion 5 MICROgram(s)/kG/Min IV Continuous <Continuous>  enoxaparin Injectable 40 milliGRAM(s) SubCutaneous daily  famotidine Injectable 20 milliGRAM(s) IV Push every 12 hours  insulin lispro (ADMELOG) corrective regimen sliding scale   SubCutaneous three times a day before meals  polyethylene glycol 3350 17 Gram(s) Oral daily  sodium chloride 0.9%. 1000 milliLiter(s) IV Continuous <Continuous>  vasopressin Infusion 0.033 Unit(s)/Min IV Continuous <Continuous>    MEDICATIONS  (PRN):  oxycodone    5 mG/acetaminophen 325 mG 1 Tablet(s) Oral every 4 hours PRN Moderate Pain (4 - 6)  oxycodone    5 mG/acetaminophen 325 mG 2 Tablet(s) Oral every 6 hours PRN Severe Pain (7 - 10)    ___________  Active diet:  Diet, Regular:   Consistent Carbohydrate No Snacks (CSTCHO)  ___________________    ==================>> VITAL SIGNS <<==================    Height (cm): 160  Weight (kg): 73  BMI (kg/m2): 28.5  Vital Signs Last 24 HrsT(C): 37.3 (11-26-20 @ 12:00)  T(F): 99.1 (11-26-20 @ 12:00), Max: 99.3 (11-25-20 @ 18:00)  HR: 104 (11-26-20 @ 14:00) (75 - 104)  BP: --  RR: 28 (11-26-20 @ 14:00) (11 - 44)  SpO2: 85% (11-26-20 @ 14:00) (78% - 100%)    CAPILLARY BLOOD GLUCOSE  133 (26 Nov 2020 06:00)  125 (26 Nov 2020 05:00)  101 (26 Nov 2020 04:00)  113 (26 Nov 2020 03:00)  124 (26 Nov 2020 02:00)  144 (26 Nov 2020 01:00)  148 (26 Nov 2020 00:00)  134 (25 Nov 2020 23:00)  129 (25 Nov 2020 22:00)  119 (25 Nov 2020 21:00)  127 (25 Nov 2020 20:00)      POCT Blood Glucose.: 155 mg/dL (26 Nov 2020 11:50)  POCT Blood Glucose.: 142 mg/dL (26 Nov 2020 07:36)  POCT Blood Glucose.: 125 mg/dL (26 Nov 2020 04:54)  POCT Blood Glucose.: 101 mg/dL (26 Nov 2020 04:10)  POCT Blood Glucose.: 124 mg/dL (26 Nov 2020 02:01)  POCT Blood Glucose.: 134 mg/dL (25 Nov 2020 23:01)  POCT Blood Glucose.: 129 mg/dL (25 Nov 2020 21:59)     ==================>> LAB AND IMAGING <<==================                        9.6    9.92  )-----------( 96       ( 26 Nov 2020 01:01 )             28.9        11-26    139  |  103  |  21  ----------------------------<  148<H>  4.5   |  24  |  0.81    Ca    8.5      26 Nov 2020 01:01  Phos  3.7     11-26  Mg     1.9     11-26    TPro  4.8<L>  /  Alb  3.5  /  TBili  0.9  /  DBili  x   /  AST  78<H>  /  ALT  30  /  AlkPhos  39<L>  11-26    WBC count:   9.92 <<== ,  16.21 <<== ,  8.84 <<== ,  8.63 <<== ,  7.90 <<== ,  9.34 <<==   Hemoglobin:   9.6 <<==,  11.5 <<==,  13.6 <<==,  12.8 <<==,  13.9 <<==,  13.8 <<==  platelets:  96 <==, 133 <==, 184 <==, 174 <==, 174 <==, 179 <==, 176 <==    Creatinine:  0.81  <<==, 0.81  <<==, 0.99  <<==, 1.11  <<==, 0.98  <<==, 1.02  <<==  Sodium:   139  <==, 140  <==, 139  <==, 139  <==, 141  <==, 140  <==, 140  <==       AST:          78 <== , 96 <==      ALT:        30  <== , 31  <==      AP:        39  <=, 43  <=     Bili:        0.9  <=, 1.2  <=    ___________________________________________________________________________________  ===============>>  A S S E S S M E N T   A N D   P L A N <<===============  ------------------------------------------------------------------------------------------  pt is an 84 y/o man with pmhx of htn, hld, hiatal hernia / GERD, OA post Lt TKR x1 year ago presenting with concern for increasing redness of left leg and swelling for 2 months.     Problem/Plan - 1:  ·  Problem: NSTEMI in pt with significant CAD    pt doing well post CABG X3  apprecaited CTU / CC/ CTS follow up and mgmt   mgmt of José Miguel tube per Sx   monitor on tele  PT / OOB, IS, nutritional support   Continue Current medications otherwise and monitor.   supportive care  pain mgmt   monitor post op anemia    Problem/Plan - 2:  ·  Problem: pt likely with acute exacerbation of chronic diastolic congestive heart failure on admission      pt with also severe pulmonary HTN and aortic stenosis on echo   lasix as needed  monitor vitals, labs, I/O     Problem/Plan - 3:  ·  Problem: HTN / HLD  Continue Current medications otherwise and monitor.   cardio f/u. and further optimization  pt and wife adamant against statins due to intolerance ( no allergic reaction ) >  pharmacy added Zetia !     Problem/Plan - 4:  ·  Problem: Venous stasis.    no DVT on duplex  likely venous stasis changes on left leg and cellulitis  post course of abx treatment   elevation  diuresis  ID appreciated     GI / DVT PPX per surgical protocol     --------------------------------------------  Case discussed with Pt, family at bedside, RN../   Education given on findings and plan of care  ___________________________  H. INEZ Phipps.  Pager: [phone]

## 2020-11-26 NOTE — PHYSICAL THERAPY INITIAL EVALUATION ADULT - PERTINENT HX OF CURRENT PROBLEM, REHAB EVAL
85M pmhx of htn, hld, hiatal hernia / GERD, OA post Lt TKR x1 year ago presenting with concern for increasing redness of left leg and swelling for 2 months. Reports chest pain, dyspnea for 2 months. Pt admitted for likely acute CHF with elevated troponin / NSTEMI. Now s/p CABG on 10/25/20. 85M pmhx of htn, hld, hiatal hernia / GERD, OA post Lt TKR x1 year ago presenting with concern for increasing redness of left leg and swelling for 2 months. Reports chest pain, dyspnea for 2 months. Pt admitted for likely acute CHF with elevated troponin / NSTEMI. Now s/p CABG on 11/25/20.

## 2020-11-26 NOTE — PHYSICAL THERAPY INITIAL EVALUATION ADULT - ADDITIONAL COMMENTS
PTA pt is an independent community ambulator, no assistive devices, who lives in private home with spouse. 3 steps to enter and none within. Pt endorses being independent with all ADLs. Wears glasses, hearing intact & R handed

## 2020-11-27 DIAGNOSIS — Z95.1 PRESENCE OF AORTOCORONARY BYPASS GRAFT: ICD-10-CM

## 2020-11-27 LAB
ALBUMIN SERPL ELPH-MCNC: 3.6 G/DL — SIGNIFICANT CHANGE UP (ref 3.3–5)
ALBUMIN SERPL ELPH-MCNC: 3.7 G/DL — SIGNIFICANT CHANGE UP (ref 3.3–5)
ALP SERPL-CCNC: 55 U/L — SIGNIFICANT CHANGE UP (ref 40–120)
ALP SERPL-CCNC: 92 U/L — SIGNIFICANT CHANGE UP (ref 40–120)
ALT FLD-CCNC: 36 U/L — SIGNIFICANT CHANGE UP (ref 10–45)
ALT FLD-CCNC: 69 U/L — HIGH (ref 10–45)
ANION GAP SERPL CALC-SCNC: 13 MMOL/L — SIGNIFICANT CHANGE UP (ref 5–17)
ANION GAP SERPL CALC-SCNC: 9 MMOL/L — SIGNIFICANT CHANGE UP (ref 5–17)
AST SERPL-CCNC: 60 U/L — HIGH (ref 10–40)
AST SERPL-CCNC: 93 U/L — HIGH (ref 10–40)
BASE EXCESS BLDMV CALC-SCNC: 4.4 MMOL/L — HIGH (ref -3–3)
BASOPHILS # BLD AUTO: 0.01 K/UL — SIGNIFICANT CHANGE UP (ref 0–0.2)
BASOPHILS NFR BLD AUTO: 0.1 % — SIGNIFICANT CHANGE UP (ref 0–2)
BILIRUB SERPL-MCNC: 0.8 MG/DL — SIGNIFICANT CHANGE UP (ref 0.2–1.2)
BILIRUB SERPL-MCNC: 0.8 MG/DL — SIGNIFICANT CHANGE UP (ref 0.2–1.2)
BUN SERPL-MCNC: 24 MG/DL — HIGH (ref 7–23)
BUN SERPL-MCNC: 29 MG/DL — HIGH (ref 7–23)
CALCIUM SERPL-MCNC: 8.6 MG/DL — SIGNIFICANT CHANGE UP (ref 8.4–10.5)
CALCIUM SERPL-MCNC: 8.7 MG/DL — SIGNIFICANT CHANGE UP (ref 8.4–10.5)
CHLORIDE SERPL-SCNC: 98 MMOL/L — SIGNIFICANT CHANGE UP (ref 96–108)
CHLORIDE SERPL-SCNC: 98 MMOL/L — SIGNIFICANT CHANGE UP (ref 96–108)
CO2 BLDMV-SCNC: 31 MMOL/L — HIGH (ref 21–29)
CO2 SERPL-SCNC: 26 MMOL/L — SIGNIFICANT CHANGE UP (ref 22–31)
CO2 SERPL-SCNC: 27 MMOL/L — SIGNIFICANT CHANGE UP (ref 22–31)
CREAT SERPL-MCNC: 0.74 MG/DL — SIGNIFICANT CHANGE UP (ref 0.5–1.3)
CREAT SERPL-MCNC: 0.92 MG/DL — SIGNIFICANT CHANGE UP (ref 0.5–1.3)
EOSINOPHIL # BLD AUTO: 0 K/UL — SIGNIFICANT CHANGE UP (ref 0–0.5)
EOSINOPHIL NFR BLD AUTO: 0 % — SIGNIFICANT CHANGE UP (ref 0–6)
GAS PNL BLDA: SIGNIFICANT CHANGE UP
GAS PNL BLDMV: SIGNIFICANT CHANGE UP
GAS PNL BLDV: SIGNIFICANT CHANGE UP
GLUCOSE BLDC GLUCOMTR-MCNC: 105 MG/DL — HIGH (ref 70–99)
GLUCOSE BLDC GLUCOMTR-MCNC: 127 MG/DL — HIGH (ref 70–99)
GLUCOSE BLDC GLUCOMTR-MCNC: 162 MG/DL — HIGH (ref 70–99)
GLUCOSE BLDC GLUCOMTR-MCNC: 58 MG/DL — LOW (ref 70–99)
GLUCOSE BLDC GLUCOMTR-MCNC: 67 MG/DL — LOW (ref 70–99)
GLUCOSE BLDC GLUCOMTR-MCNC: 99 MG/DL — SIGNIFICANT CHANGE UP (ref 70–99)
GLUCOSE SERPL-MCNC: 143 MG/DL — HIGH (ref 70–99)
GLUCOSE SERPL-MCNC: 95 MG/DL — SIGNIFICANT CHANGE UP (ref 70–99)
HCO3 BLDMV-SCNC: 29 MMOL/L — HIGH (ref 20–28)
HCT VFR BLD CALC: 28.9 % — LOW (ref 39–50)
HCT VFR BLD CALC: 29.3 % — LOW (ref 39–50)
HGB BLD-MCNC: 9.7 G/DL — LOW (ref 13–17)
HGB BLD-MCNC: 9.7 G/DL — LOW (ref 13–17)
HOROWITZ INDEX BLDMV+IHG-RTO: 32 — SIGNIFICANT CHANGE UP
IMM GRANULOCYTES NFR BLD AUTO: 0.5 % — SIGNIFICANT CHANGE UP (ref 0–1.5)
LYMPHOCYTES # BLD AUTO: 0.51 K/UL — LOW (ref 1–3.3)
LYMPHOCYTES # BLD AUTO: 4 % — LOW (ref 13–44)
MAGNESIUM SERPL-MCNC: 2.1 MG/DL — SIGNIFICANT CHANGE UP (ref 1.6–2.6)
MAGNESIUM SERPL-MCNC: 2.7 MG/DL — HIGH (ref 1.6–2.6)
MCHC RBC-ENTMCNC: 32.9 PG — SIGNIFICANT CHANGE UP (ref 27–34)
MCHC RBC-ENTMCNC: 33.1 GM/DL — SIGNIFICANT CHANGE UP (ref 32–36)
MCHC RBC-ENTMCNC: 33.3 PG — SIGNIFICANT CHANGE UP (ref 27–34)
MCHC RBC-ENTMCNC: 33.6 GM/DL — SIGNIFICANT CHANGE UP (ref 32–36)
MCV RBC AUTO: 99.3 FL — SIGNIFICANT CHANGE UP (ref 80–100)
MCV RBC AUTO: 99.3 FL — SIGNIFICANT CHANGE UP (ref 80–100)
MONOCYTES # BLD AUTO: 1.08 K/UL — HIGH (ref 0–0.9)
MONOCYTES NFR BLD AUTO: 8.4 % — SIGNIFICANT CHANGE UP (ref 2–14)
NEUTROPHILS # BLD AUTO: 11.23 K/UL — HIGH (ref 1.8–7.4)
NEUTROPHILS NFR BLD AUTO: 87 % — HIGH (ref 43–77)
NRBC # BLD: 0 /100 WBCS — SIGNIFICANT CHANGE UP (ref 0–0)
NRBC # BLD: 0 /100 WBCS — SIGNIFICANT CHANGE UP (ref 0–0)
O2 CT VFR BLD CALC: 34 MMHG — SIGNIFICANT CHANGE UP (ref 30–65)
PCO2 BLDMV: 49 MMHG — SIGNIFICANT CHANGE UP (ref 30–65)
PH BLDMV: 7.4 — SIGNIFICANT CHANGE UP (ref 7.32–7.45)
PHOSPHATE SERPL-MCNC: 2.6 MG/DL — SIGNIFICANT CHANGE UP (ref 2.5–4.5)
PLATELET # BLD AUTO: 79 K/UL — LOW (ref 150–400)
PLATELET # BLD AUTO: 94 K/UL — LOW (ref 150–400)
POTASSIUM BLDV-SCNC: 3.9 MMOL/L — SIGNIFICANT CHANGE UP (ref 3.5–5.3)
POTASSIUM SERPL-MCNC: 4.5 MMOL/L — SIGNIFICANT CHANGE UP (ref 3.5–5.3)
POTASSIUM SERPL-MCNC: 4.6 MMOL/L — SIGNIFICANT CHANGE UP (ref 3.5–5.3)
POTASSIUM SERPL-SCNC: 4.5 MMOL/L — SIGNIFICANT CHANGE UP (ref 3.5–5.3)
POTASSIUM SERPL-SCNC: 4.6 MMOL/L — SIGNIFICANT CHANGE UP (ref 3.5–5.3)
PROT SERPL-MCNC: 5.6 G/DL — LOW (ref 6–8.3)
PROT SERPL-MCNC: 5.9 G/DL — LOW (ref 6–8.3)
RBC # BLD: 2.91 M/UL — LOW (ref 4.2–5.8)
RBC # BLD: 2.95 M/UL — LOW (ref 4.2–5.8)
RBC # FLD: 15 % — HIGH (ref 10.3–14.5)
RBC # FLD: 15.8 % — HIGH (ref 10.3–14.5)
SAO2 % BLDMV: 61 % — SIGNIFICANT CHANGE UP (ref 60–90)
SODIUM SERPL-SCNC: 134 MMOL/L — LOW (ref 135–145)
SODIUM SERPL-SCNC: 137 MMOL/L — SIGNIFICANT CHANGE UP (ref 135–145)
WBC # BLD: 12.9 K/UL — HIGH (ref 3.8–10.5)
WBC # BLD: 14.49 K/UL — HIGH (ref 3.8–10.5)
WBC # FLD AUTO: 12.9 K/UL — HIGH (ref 3.8–10.5)
WBC # FLD AUTO: 14.49 K/UL — HIGH (ref 3.8–10.5)

## 2020-11-27 PROCEDURE — 99291 CRITICAL CARE FIRST HOUR: CPT

## 2020-11-27 PROCEDURE — 93010 ELECTROCARDIOGRAM REPORT: CPT

## 2020-11-27 PROCEDURE — 99292 CRITICAL CARE ADDL 30 MIN: CPT

## 2020-11-27 PROCEDURE — 71045 X-RAY EXAM CHEST 1 VIEW: CPT | Mod: 26

## 2020-11-27 PROCEDURE — 71045 X-RAY EXAM CHEST 1 VIEW: CPT | Mod: 26,77

## 2020-11-27 RX ORDER — MAGNESIUM SULFATE 500 MG/ML
1 VIAL (ML) INJECTION ONCE
Refills: 0 | Status: COMPLETED | OUTPATIENT
Start: 2020-11-27 | End: 2020-11-27

## 2020-11-27 RX ORDER — METOPROLOL TARTRATE 50 MG
5 TABLET ORAL ONCE
Refills: 0 | Status: DISCONTINUED | OUTPATIENT
Start: 2020-11-27 | End: 2020-11-28

## 2020-11-27 RX ORDER — POTASSIUM CHLORIDE 20 MEQ
40 PACKET (EA) ORAL ONCE
Refills: 0 | Status: COMPLETED | OUTPATIENT
Start: 2020-11-27 | End: 2020-11-27

## 2020-11-27 RX ORDER — ROSUVASTATIN CALCIUM 5 MG/1
10 TABLET ORAL AT BEDTIME
Refills: 0 | Status: DISCONTINUED | OUTPATIENT
Start: 2020-11-27 | End: 2020-11-28

## 2020-11-27 RX ORDER — MAGNESIUM SULFATE 500 MG/ML
2 VIAL (ML) INJECTION ONCE
Refills: 0 | Status: COMPLETED | OUTPATIENT
Start: 2020-11-27 | End: 2020-11-27

## 2020-11-27 RX ORDER — METOPROLOL TARTRATE 50 MG
5 TABLET ORAL
Refills: 0 | Status: COMPLETED | OUTPATIENT
Start: 2020-11-27 | End: 2020-11-27

## 2020-11-27 RX ORDER — METOCLOPRAMIDE HCL 10 MG
10 TABLET ORAL EVERY 8 HOURS
Refills: 0 | Status: DISCONTINUED | OUTPATIENT
Start: 2020-11-27 | End: 2020-11-28

## 2020-11-27 RX ORDER — AMIODARONE HYDROCHLORIDE 400 MG/1
150 TABLET ORAL ONCE
Refills: 0 | Status: DISCONTINUED | OUTPATIENT
Start: 2020-11-27 | End: 2020-11-27

## 2020-11-27 RX ORDER — METOPROLOL TARTRATE 50 MG
25 TABLET ORAL
Refills: 0 | Status: DISCONTINUED | OUTPATIENT
Start: 2020-11-27 | End: 2020-11-27

## 2020-11-27 RX ORDER — PHENYLEPHRINE HYDROCHLORIDE 10 MG/ML
1 INJECTION INTRAVENOUS
Qty: 40 | Refills: 0 | Status: DISCONTINUED | OUTPATIENT
Start: 2020-11-27 | End: 2020-11-28

## 2020-11-27 RX ORDER — FUROSEMIDE 40 MG
20 TABLET ORAL
Refills: 0 | Status: DISCONTINUED | OUTPATIENT
Start: 2020-11-27 | End: 2020-11-30

## 2020-11-27 RX ORDER — POTASSIUM CHLORIDE 20 MEQ
20 PACKET (EA) ORAL ONCE
Refills: 0 | Status: DISCONTINUED | OUTPATIENT
Start: 2020-11-27 | End: 2020-11-27

## 2020-11-27 RX ORDER — METOPROLOL TARTRATE 50 MG
12.5 TABLET ORAL
Refills: 0 | Status: DISCONTINUED | OUTPATIENT
Start: 2020-11-27 | End: 2020-11-27

## 2020-11-27 RX ADMIN — Medication 10 MILLIGRAM(S): at 21:24

## 2020-11-27 RX ADMIN — Medication 12.5 MILLIGRAM(S): at 21:24

## 2020-11-27 RX ADMIN — POLYETHYLENE GLYCOL 3350 17 GRAM(S): 17 POWDER, FOR SOLUTION ORAL at 13:03

## 2020-11-27 RX ADMIN — Medication 10 MILLIGRAM(S): at 10:40

## 2020-11-27 RX ADMIN — Medication 5 MILLIGRAM(S): at 22:00

## 2020-11-27 RX ADMIN — ROSUVASTATIN CALCIUM 10 MILLIGRAM(S): 5 TABLET ORAL at 21:24

## 2020-11-27 RX ADMIN — Medication 50 GRAM(S): at 13:47

## 2020-11-27 RX ADMIN — FAMOTIDINE 20 MILLIGRAM(S): 10 INJECTION INTRAVENOUS at 17:40

## 2020-11-27 RX ADMIN — ENOXAPARIN SODIUM 40 MILLIGRAM(S): 100 INJECTION SUBCUTANEOUS at 13:02

## 2020-11-27 RX ADMIN — OXYCODONE AND ACETAMINOPHEN 2 TABLET(S): 5; 325 TABLET ORAL at 22:00

## 2020-11-27 RX ADMIN — FAMOTIDINE 20 MILLIGRAM(S): 10 INJECTION INTRAVENOUS at 05:32

## 2020-11-27 RX ADMIN — Medication 100 GRAM(S): at 22:00

## 2020-11-27 RX ADMIN — Medication 25 MILLIGRAM(S): at 14:42

## 2020-11-27 RX ADMIN — Medication 5 MILLIGRAM(S): at 21:50

## 2020-11-27 RX ADMIN — CHLORHEXIDINE GLUCONATE 1 APPLICATION(S): 213 SOLUTION TOPICAL at 05:28

## 2020-11-27 RX ADMIN — Medication 40 MILLIEQUIVALENT(S): at 13:34

## 2020-11-27 RX ADMIN — Medication 81 MILLIGRAM(S): at 13:02

## 2020-11-27 RX ADMIN — Medication 20 MILLIGRAM(S): at 21:24

## 2020-11-27 RX ADMIN — OXYCODONE AND ACETAMINOPHEN 2 TABLET(S): 5; 325 TABLET ORAL at 21:25

## 2020-11-27 RX ADMIN — Medication 1: at 18:20

## 2020-11-27 RX ADMIN — Medication 20 MILLIGRAM(S): at 10:40

## 2020-11-27 NOTE — PROGRESS NOTE ADULT - PROBLEM SELECTOR PLAN 1
Continue with  mg PO Daily. Continue with Lopressor mg PO. Continue with Statin Increase activity as tolerated. Encourage Chest PT / Pulmonary toileting  and Incentive spirometry every 1 hour x 10 while awake. Continue with PUD and DVT prophylaxis. Shower on POD #5. D/C plan. Plan of care discussed with attending Continue with  mg PO Daily.   Decrease Lopressor 12.5 mg PO bid.   Continue with Crestor 10 mg PO HS  Increase activity as tolerated.   Maintain PW x 2 --> EPM --> OFF   CXR in AM   Maintain Right IJ and Left Dana   Coffman --> SD   Encourage Chest PT / Pulmonary toileting and Incentive spirometry every 1 hour x 10 while awake.   Continue with PUD and DVT prophylaxis.   Shower on POD #5.   D/C plan awaiting PT evaluation   Plan of care discussed with attending

## 2020-11-27 NOTE — PROGRESS NOTE ADULT - SUBJECTIVE AND OBJECTIVE BOX
CARDIOLOGY     PROGRESS  NOTE   ________________________________________________    CHIEF COMPLAINT:Patient is a 85y old  Male who presents with a chief complaint of edema (2020 07:54)  doing wekll.  	  REVIEW OF SYSTEMS:  CONSTITUTIONAL: No fever, weight loss, or fatigue  EYES: No eye pain, visual disturbances, or discharge  ENT:  No difficulty hearing, tinnitus, vertigo; No sinus or throat pain  NECK: No pain or stiffness  RESPIRATORY: No cough, wheezing, chills or hemoptysis; No Shortness of Breath  CARDIOVASCULAR: No chest pain, palpitations, passing out, dizziness, or leg swelling  GASTROINTESTINAL: No abdominal or epigastric pain. No nausea, vomiting, or hematemesis; No diarrhea or constipation. No melena or hematochezia.  GENITOURINARY: No dysuria, frequency, hematuria, or incontinence  NEUROLOGICAL: No headaches, memory loss, loss of strength, numbness, or tremors  SKIN: No itching, burning, rashes, or lesions   LYMPH Nodes: No enlarged glands  ENDOCRINE: No heat or cold intolerance; No hair loss  MUSCULOSKELETAL: No joint pain or swelling; No muscle, back, or extremity pain  PSYCHIATRIC: No depression, anxiety, mood swings, or difficulty sleeping  HEME/LYMPH: No easy bruising, or bleeding gums  ALLERGY AND IMMUNOLOGIC: No hives or eczema	    [ ] All others negative	  [ ] Unable to obtain    PHYSICAL EXAM:  T(C): 37.3 (20 @ 08:00), Max: 37.6 (20 @ 19:00)  HR: 96 (20 @ 09:00) (80 - 104)  BP: --  RR: 20 (20 @ 09:00) (15 - 41)  SpO2: 99% (20 @ 09:00) (85% - 100%)  Wt(kg): --  I&O's Summary    2020 07:01  -  2020 07:00  --------------------------------------------------------  IN: 668 mL / OUT: 2805 mL / NET: -2137 mL    2020 07:01  -  2020 09:28  --------------------------------------------------------  IN: 231 mL / OUT: 135 mL / NET: 96 mL        Appearance: Normal	  HEENT:   Normal oral mucosa, PERRL, EOMI	  Lymphatic: No lymphadenopathy  Cardiovascular: Normal S1 S2, No JVD, + murmurs, No edema  Respiratory: Lungs clear to auscultation	  Psychiatry: A & O x 3, Mood & affect appropriate  Gastrointestinal:  Soft, Non-tender, + BS	  Skin: No rashes, No ecchymoses, No cyanosis	  Neurologic: Non-focal  Extremities: Normal range of motion, No clubbing, cyanosis or edema  Vascular: Peripheral pulses palpable 2+ bilaterally    MEDICATIONS  (STANDING):  aspirin enteric coated 81 milliGRAM(s) Oral daily  chlorhexidine 2% Cloths 1 Application(s) Topical <User Schedule>  dexMEDEtomidine Infusion 0.4 MICROgram(s)/kG/Hr (7.3 mL/Hr) IV Continuous <Continuous>  DOBUTamine Infusion 2.5 MICROgram(s)/kG/Min (5.48 mL/Hr) IV Continuous <Continuous>  enoxaparin Injectable 40 milliGRAM(s) SubCutaneous daily  famotidine Injectable 20 milliGRAM(s) IV Push every 12 hours  insulin lispro (ADMELOG) corrective regimen sliding scale   SubCutaneous three times a day before meals  polyethylene glycol 3350 17 Gram(s) Oral daily  sodium chloride 0.9%. 1000 milliLiter(s) (10 mL/Hr) IV Continuous <Continuous>  vasopressin Infusion 0.1 Unit(s)/Min (6 mL/Hr) IV Continuous <Continuous>      TELEMETRY: 	    ECG:  	  RADIOLOGY:  OTHER: 	  	  LABS:	 	    CARDIAC MARKERS:  CARDIAC MARKERS ( 2020 13:38 )  x     / x     / 742 U/L / x     / 102.8 ng/mL                                9.7    12.90 )-----------( 79       ( 2020 00:13 )             29.3     11-27    137  |  98  |  24<H>  ----------------------------<  143<H>  4.5   |  26  |  0.74    Ca    8.6      2020 00:13  Phos  2.6       Mg     2.1         TPro  5.6<L>  /  Alb  3.6  /  TBili  0.8  /  DBili  x   /  AST  60<H>  /  ALT  36  /  AlkPhos  55      proBNP: Serum Pro-Brain Natriuretic Peptide: 94355 pg/mL ( @ 12:45)    Lipid Profile: Cholesterol 106  LDL --  HDL 40  TG 73    HgA1c:   TSH: Thyroid Stimulating Hormone, Serum: 1.46 uIU/mL ( @ 09:24)  Thyroid Stimulating Hormone, Serum: 1.51 uIU/mL ( @ 08:41)    PT/INR - ( 2020 01:01 )   PT: 15.1 sec;   INR: 1.27 ratio         PTT - ( 2020 01:01 )  PTT:37.3 sec      Assessment and plan  ---------------------------  pt is an 86 y/o man with pmhx of htn, hld, hiatal hernia / GERD, OA post Lt TKR x1 year ago presenting with concern for increasing redness of left leg and swelling for 2 months.   Patient reports that he has been having increasing swelling of both of his legs for approximately 2-3 months; however, the left leg has been more swollen and has been getting increasingly more red. He states that he went to his podiatrist when he noted some rash on the lower portion of his left just above his foot, was given a 'cream of some kind, and feels that the redness and swelling got worse after starting the cream.'   He states that he was having chest pain approximately 1 mo ago, went to his PMD and was diagnosed with a hiatal hernia with an xray and prescribed omeprazole.   Patient reports that he has been becoming increasingly more short of breath for approximately 2 months as well. Feels that when he takes just a few steps he has to stop and catch his breath, and is so short of breath that he cannot speak in complete sentences without resting for a period of time first.   s/p CABG, doing well  s/p extubation  continue to taper / pressors

## 2020-11-27 NOTE — PROGRESS NOTE ADULT - SUBJECTIVE AND OBJECTIVE BOX
CC: f/u for cellulitis left leg    Patient reports feels pretty good, going to regular room    REVIEW OF SYSTEMS:  All other review of systems negative (Comprehensive ROS)    Antimicrobials Day #  :    Other Medications Reviewed    T(F): 97.5 (11-27-20 @ 12:00), Max: 99.7 (11-26-20 @ 19:00)  HR: 97 (11-27-20 @ 15:00)  BP: --  RR: 21 (11-27-20 @ 15:00)  SpO2: 98% (11-27-20 @ 15:00)  Wt(kg): --    PHYSICAL EXAM:  General: alert, no acute distress  Eyes:  anicteric, no conjunctival injection, no discharge  Oropharynx: no lesions or injection 	  Neck: supple, without adenopathy  Lungs: course  to auscultation  Heart: regular rate and rhythm; no murmur, rubs or gallops  Abdomen: soft, nondistended, nontender, without mass or organomegaly  Skin: no lesions  Extremities: no clubbing, cyanosis,. right leg wrapped, left leg woody  edema  Neurologic: alert, oriented, moves all extremities    LAB RESULTS:                        9.7    12.90 )-----------( 79       ( 27 Nov 2020 00:13 )             29.3     11-27    137  |  98  |  24<H>  ----------------------------<  143<H>  4.5   |  26  |  0.74    Ca    8.6      27 Nov 2020 00:13  Phos  2.6     11-27  Mg     2.1     11-27    TPro  5.6<L>  /  Alb  3.6  /  TBili  0.8  /  DBili  x   /  AST  60<H>  /  ALT  36  /  AlkPhos  55  11-27    LIVER FUNCTIONS - ( 27 Nov 2020 00:13 )  Alb: 3.6 g/dL / Pro: 5.6 g/dL / ALK PHOS: 55 U/L / ALT: 36 U/L / AST: 60 U/L / GGT: x             MICROBIOLOGY:  RECENT CULTURES:      RADIOLOGY REVIEWED:  < from: Xray Chest 1 View- PORTABLE-Urgent (Xray Chest 1 View- PORTABLE-Urgent .) (11.27.20 @ 11:23) >    EXAM:  XR CHEST PORTABLE URGENT 1V                            PROCEDURE DATE:  11/27/2020            INTERPRETATION:  A single chest x-ray was obtained on November 27, 2020.    Indication: Left chest tube removal.    Impression:    The heart is enlarged. The left chest tube was removed. A small left apical pneumothorax cannot be ruled out entirely. Bibasilar pneumonia and/or atelectasis. A central line seen on the right and the tip is in the superior vena cava.      < end of copied text >              Assessment:  Patient with left leg redness finished a course of antibiotics for cellulitis. Found to have tvd now s/p cabg. No infection is apparent at present. Leukocytosis just reactive to surgery  Plan:  monitor off antibiotics

## 2020-11-27 NOTE — PROGRESS NOTE ADULT - ASSESSMENT
_________________________________________________________________________________________  ========>>  M E D I C A L   A T T E N D I N G    F O L L O W  U P  N O T E  <<=========  -----------------------------------------------------------------------------------------------------    - Patient seen and examined by me approximately sixty minutes ago.   - In summary,  MARCOS AGUILAR is a 85y year old man who originally presented with edema   - Patient today overall doing ok post op, comfortable, taking PO, in good spirits ( just upset about the TV service !) , no significant pain, no SOB / cough     ==================>> REVIEW OF SYSTEM <<=================    GEN: no fever, no chills, no significant pain  RESP: no SOB, no cough, no sputum  CVS: no chest pain, no palpitations   GI: no abdominal pain, no nausea, no BM yet   : no dysuria  Neuro: no headache, no dizziness  Derm : no itching, no rash    ==================>> PHYSICAL EXAM <<=================    GEN: A&O X 3 , NAD , comfortable, pleasant   HEENT: NCAT, PERRL, MMM, hearing intact  Neck: supple , no JVD appreciated, central line in place   CVS: S1S2 , regular , No M/R/G appreciated  PULM: CTA B/L,  no W/R/R appreciated, + left sided chest tube   ABD.: soft. non tender, non distended,  bowel sounds decreased   Extrem: Rt leg wrapped, Left leg improved with erythema and edema .. + venodynes on     + mays   PSYCH : normal mood,  not anxious       ==================>> MEDICATIONS <<====================    aspirin enteric coated 81 milliGRAM(s) Oral daily  chlorhexidine 2% Cloths 1 Application(s) Topical <User Schedule>  enoxaparin Injectable 40 milliGRAM(s) SubCutaneous daily  famotidine Injectable 20 milliGRAM(s) IV Push every 12 hours  furosemide    Tablet 20 milliGRAM(s) Oral two times a day  insulin lispro (ADMELOG) corrective regimen sliding scale   SubCutaneous three times a day before meals  metoclopramide Injectable 10 milliGRAM(s) IV Push every 8 hours  polyethylene glycol 3350 17 Gram(s) Oral daily  rosuvastatin 10 milliGRAM(s) Oral at bedtime  sodium chloride 0.9%. 1000 milliLiter(s) IV Continuous <Continuous>    MEDICATIONS  (PRN):  oxycodone    5 mG/acetaminophen 325 mG 1 Tablet(s) Oral every 4 hours PRN Moderate Pain (4 - 6)  oxycodone    5 mG/acetaminophen 325 mG 2 Tablet(s) Oral every 6 hours PRN Severe Pain (7 - 10)    ___________  Active diet:  Diet, Regular:   Consistent Carbohydrate No Snacks (CSTCHO)  ___________________    ==================>> VITAL SIGNS <<==================    Height (cm): 160  Weight (kg): 73  BMI (kg/m2): 28.5  Vital Signs Last 24 HrsT(C): 37.3 (11-27-20 @ 08:00)  T(F): 99.1 (11-27-20 @ 08:00), Max: 99.7 (11-26-20 @ 19:00)  HR: 97 (11-27-20 @ 11:00) (80 - 104)  BP: -- vitals reviewed   RR: 22 (11-27-20 @ 11:00) (15 - 41)  SpO2: 99% (11-27-20 @ 09:00) (85% - 100%)    CAPILLARY BLOOD GLUCOSE  106 (27 Nov 2020 08:00)      POCT Blood Glucose.: 105 mg/dL (27 Nov 2020 07:58)  POCT Blood Glucose.: 155 mg/dL (26 Nov 2020 11:50)     ==================>> LAB AND IMAGING <<==================                        9.7    12.90 )-----------( 79       ( 27 Nov 2020 00:13 )             29.3        11-27    137  |  98  |  24<H>  ----------------------------<  143<H>  4.5   |  26  |  0.74    Ca    8.6      27 Nov 2020 00:13  Phos  2.6     11-27  Mg     2.1     11-27    TPro  5.6<L>  /  Alb  3.6  /  TBili  0.8  /  DBili  x   /  AST  60<H>  /  ALT  36  /  AlkPhos  55  11-27    WBC count:   12.90 <<== ,  9.92 <<== ,  16.21 <<== ,  8.84 <<== ,  8.63 <<== ,  7.90 <<==   Hemoglobin:   9.7 <<==,  9.6 <<==,  11.5 <<==,  13.6 <<==,  12.8 <<==,  13.9 <<==  platelets:  79 <==, 96 <==, 133 <==, 184 <==, 174 <==, 174 <==, 179 <==    Creatinine:  0.74  <<==, 0.81  <<==, 0.81  <<==, 0.99  <<==, 1.11  <<==, 0.98  <<==  Sodium:   137  <==, 139  <==, 140  <==, 139  <==, 139  <==, 141  <==, 140  <==       AST:          60 <== , 78 <== , 96 <==      ALT:        36  <== , 30  <== , 31  <==      AP:        55  <=, 39  <=, 43  <=     Bili:        0.8  <=, 0.9  <=, 1.2  <=    ___________________________________________________________________________________  ===============>>  A S S E S S M E N T   A N D   P L A N <<===============  ------------------------------------------------------------------------------------------  pt is an 84 y/o man with pmhx of htn, hld, hiatal hernia / GERD, OA post Lt TKR x1 year ago presenting with concern for increasing redness of left leg and swelling for 2 months.     Problem/Plan - 1:  ·  Problem: NSTEMI in pt with significant CAD    pt doing well post CABG X3  apprecaited CTU / CC/ CTS follow up and mgmt   mgmt of José Miguel tube per Sx   monitor on tele  PT / OOB, IS, nutritional support   Continue Current medications otherwise and monitor.   supportive care  pain mgmt   monitor post op anemia    Problem/Plan - 2:  ·  Problem: pt likely with acute exacerbation of chronic diastolic congestive heart failure on admission      pt with also severe pulmonary HTN and aortic stenosis on echo   lasix as needed  monitor vitals, labs, I/O     Problem/Plan - 3:  ·  Problem: HTN / HLD  Continue Current medications otherwise and monitor.   cardio f/u. and further optimization  pt and wife adamant against statins due to intolerance ( no allergic reaction ) >  pharmacy added Zetia !     Problem/Plan - 4:  ·  Problem: Venous stasis.    no DVT on duplex  likely venous stasis changes on left leg and cellulitis  post course of abx treatment   elevation  diuresis  ID appreciated     GI / DVT PPX per surgical protocol   PT / OOB    --------------------------------------------  Case discussed with Pt  Education given on findings and plan of care  ___________________________  H. INEZ Phipps.  Pager: [phone]

## 2020-11-27 NOTE — PROGRESS NOTE ADULT - SUBJECTIVE AND OBJECTIVE BOX
MARCOS AGUILAR  MRN-[id number]  Patient is a 85y old  Male who presents with a chief complaint of edema (26 Nov 2020 14:10)    HPI:  pt is an 86 y/o man with pmhx of htn, hld, hiatal hernia / GERD, OA post Lt TKR x1 year ago presenting with concern for increasing redness of left leg and swelling for 2 months.   Patient reports that he has been having increasing swelling of both of his legs for approximately 2-3 months; however, the left leg has been more swollen and has been getting increasingly more red. He states that he went to his podiatrist when he noted some rash on the lower portion of his left just above his foot, was given a 'cream of some kind, and feels that the redness and swelling got worse after starting the cream.'   He states that he was having chest pain approximately 1 mo ago, went to his PMD and was diagnosed with a hiatal hernia with an xray and prescribed omeprazole.   Patient reports that he has been becoming increasingly more short of breath for approximately 2 months as well. Feels that when he takes just a few steps he has to stop and catch his breath, and is so short of breath that he cannot speak in complete sentences without resting for a period of time first.   pt also states was given amoxicillin a few days ago but only took one dose  He denies any fevers, chills, cough, nausea, vomiting, sweating, chest pain  pt admitted for likely acute CHF with elevated troponin / NSTEMI (18 Nov 2020 16:09)      Surgery/Hospital course:  11/18 Admitted to [facility]  11/25 CABG x3  11/26 Extubated    Vital Signs Last 24 Hrs    T(C): 37.6 (27 Nov 2020 04:00), Max: 37.6 (26 Nov 2020 19:00)  T(F): 99.7 (27 Nov 2020 04:00), Max: 99.7 (26 Nov 2020 19:00)  HR: 96 (27 Nov 2020 03:00) (80 - 104)  BP: --  BP(mean): --  ABP: 128/50 (27 Nov 2020 03:00) (94/76 - 146/61)  ABP(mean): 72 (27 Nov 2020 03:00) (60 - 89)  RR: 16 (27 Nov 2020 03:00) (15 - 41)  SpO2: 97% (27 Nov 2020 03:00) (78% - 100%)    ============================I/O===========================  I&O's Summary    25 Nov 2020 07:01  -  26 Nov 2020 07:00  --------------------------------------------------------  IN: 3180.8 mL / OUT: 1970 mL / NET: 1210.8 mL    26 Nov 2020 07:01  -  27 Nov 2020 05:04  --------------------------------------------------------  IN: 621.5 mL / OUT: 2525 mL / NET: -1903.5 mL    ============================ LABS =========================                        9.7    12.90 )-----------( 79       ( 27 Nov 2020 00:13 )             29.3   11-27    137  |  98  |  24<H>  ----------------------------<  143<H>  4.5   |  26  |  0.74    Ca    8.6      27 Nov 2020 00:13  Phos  2.6     11-27  Mg     2.1     11-27    TPro  5.6<L>  /  Alb  3.6  /  TBili  0.8  /  DBili  x   /  AST  60<H>  /  ALT  36  /  AlkPhos  55  11-27    ABG - ( 27 Nov 2020 00:09 )  pH, Arterial: 7.43  pH, Blood: x     /  pCO2: 44    /  pO2: 96    / HCO3: 28    / Base Excess: 3.9   /  SaO2: 98        ======================Micro/Rad/Cardio=================  Telemetry: Reviewed   CXR: Reviewed  Echo: Reviewed  ======================================================  PAST MEDICAL & SURGICAL HISTORY:  Mitral prolapse    Hypercholesteremia    Hypertension    History of hernia repair      ========================ASSESSMENT ================  CAD s/p CABG x3 11/25  Stress hyperglycemia  Thrombocytopenia    Plan:  ====================== NEUROLOGY=====================  Addressed analgesic regimen to optimize function. Patient's delirium improved and Dexmedetomidine discontinued.    ==================== RESPIRATORY======================  Patient on supplemental oxygen after weaning to extubation 11/26, continue closely monitoring respiratory rate, breathing pattern, pulse ox monitoring, and intermittent blood gas analysis. He has atelectasis, plan to  mobilize.    ====================CARDIOVASCULAR==================  Patient with history of coronary artery disease, subsequently underwent coronary artery bypass grafting x3 procedure on 11/25. Continue with ASA for graft patency. IV Dobutamine infusion for inotropic support due to acute on chronic systolic heart failure with moderate MR. Blood pressure support with an IV Vasopressin infusion. Invasive hemodynamic monitoring with a PA catheter & a Left Radial A-line were required for the following of serial CI's/MVO2's and continuous central venous, pulmonary artery pressure and MAP/BP monitoring to ensure adequate cardiovascular support.     ===================HEMATOLOGIC/ONC ===================  Anemia with hematocrit 28.9 %. Thrombocytopenia with platelet count at 96 k. Continue to monitor hemoglobin, hematocrit and platelet levels. Continue SQ Lovenox for venous thromboembolism prophylaxis.     ===================== RENAL =========================  Optimize renal perfusion with adequate inotropic and vasopressor support and continued monitoring of urine output, fluid balance, and BUN/Creatinine. When blood pressure improves, will order Lasix for management of systolic heart failure with MR.    ==================== GASTROINTESTINAL===================  Continue with IV Pepcid for stress ulcer prophylaxis. Bowel regimen with Miralax.    =======================    ENDOCRINE  =====================  Metabolic stability, stress hyperglycemia required review and adjustment of regular Insulin sliding scale and glycemic regimen while following serial glucose levels to help achieve and maintain euglycemia.    ========================INFECTIOUS DISEASE================  Continue IV Cefuroxime for perioperative antibiotic coverage.    Patient requires continuous monitoring with bedside rhythm monitoring, pulse oximetry monitoring, and continuous central venous and arterial pressure monitoring; and intermittent blood gas analysis.  Care plan discussed with ICU care team.    Patient remained critical, at risk for life threatening decompensation.   I have spent 45 minutes providing acute care with multiple re-evaluations overnight and during the early morning hours.      MARCOS AGUILAR  MRN-[id number]  Patient is a 85y old  Male who presents with a chief complaint of edema (26 Nov 2020 14:10)    HPI:  pt is an 86 y/o man with pmhx of htn, hld, hiatal hernia / GERD, OA post Lt TKR x1 year ago presenting with concern for increasing redness of left leg and swelling for 2 months.   Patient reports that he has been having increasing swelling of both of his legs for approximately 2-3 months; however, the left leg has been more swollen and has been getting increasingly more red. He states that he went to his podiatrist when he noted some rash on the lower portion of his left just above his foot, was given a 'cream of some kind, and feels that the redness and swelling got worse after starting the cream.'   He states that he was having chest pain approximately 1 mo ago, went to his PMD and was diagnosed with a hiatal hernia with an xray and prescribed omeprazole.   Patient reports that he has been becoming increasingly more short of breath for approximately 2 months as well. Feels that when he takes just a few steps he has to stop and catch his breath, and is so short of breath that he cannot speak in complete sentences without resting for a period of time first.   pt also states was given amoxicillin a few days ago but only took one dose  He denies any fevers, chills, cough, nausea, vomiting, sweating, chest pain  pt admitted for likely acute CHF with elevated troponin / NSTEMI (18 Nov 2020 16:09)      Surgery/Hospital course:  11/18 Admitted to [facility]  11/25 CABG x3  11/26 Extubated    Vital Signs Last 24 Hrs    T(C): 37.6 (27 Nov 2020 04:00), Max: 37.6 (26 Nov 2020 19:00)  T(F): 99.7 (27 Nov 2020 04:00), Max: 99.7 (26 Nov 2020 19:00)  HR: 96 (27 Nov 2020 03:00) (80 - 104)  BP: --  BP(mean): --  ABP: 128/50 (27 Nov 2020 03:00) (94/76 - 146/61)  ABP(mean): 72 (27 Nov 2020 03:00) (60 - 89)  RR: 16 (27 Nov 2020 03:00) (15 - 41)  SpO2: 97% (27 Nov 2020 03:00) (78% - 100%)    ============================I/O===========================  I&O's Summary    25 Nov 2020 07:01  -  26 Nov 2020 07:00  --------------------------------------------------------  IN: 3180.8 mL / OUT: 1970 mL / NET: 1210.8 mL    26 Nov 2020 07:01  -  27 Nov 2020 05:04  --------------------------------------------------------  IN: 621.5 mL / OUT: 2525 mL / NET: -1903.5 mL    ============================ LABS =========================                        9.7    12.90 )-----------( 79       ( 27 Nov 2020 00:13 )             29.3   11-27    137  |  98  |  24<H>  ----------------------------<  143<H>  4.5   |  26  |  0.74    Ca    8.6      27 Nov 2020 00:13  Phos  2.6     11-27  Mg     2.1     11-27    TPro  5.6<L>  /  Alb  3.6  /  TBili  0.8  /  DBili  x   /  AST  60<H>  /  ALT  36  /  AlkPhos  55  11-27    ABG - ( 27 Nov 2020 00:09 )  pH, Arterial: 7.43  pH, Blood: x     /  pCO2: 44    /  pO2: 96    / HCO3: 28    / Base Excess: 3.9   /  SaO2: 98        ======================Micro/Rad/Cardio=================  Telemetry: Reviewed   CXR: Reviewed  Echo: Reviewed  ======================================================  PAST MEDICAL & SURGICAL HISTORY:  Mitral prolapse    Hypercholesteremia    Hypertension    History of hernia repair      ========================ASSESSMENT ================  CAD s/p CABG x3 11/25  Stress hyperglycemia  Thrombocytopenia    Plan:  ====================== NEUROLOGY=====================  Addressed analgesic regimen to optimize function. Patient's delirium improved and Dexmedetomidine discontinued.    ==================== RESPIRATORY======================  Patient on supplemental oxygen after weaning to extubation 11/26, continue closely monitoring respiratory rate, breathing pattern, pulse ox monitoring, and intermittent blood gas analysis. He has bibasilar atelectasis, plan to  mobilize. Lasix ordered for pulmonary congestion.    ====================CARDIOVASCULAR==================  Patient with history of coronary artery disease, subsequently underwent coronary artery bypass grafting x3 procedure on 11/25. Continue with ASA for graft patency. IV Dobutamine infusion for inotropic support due to acute on chronic systolic heart failure with moderate MR. Lactic acidosis now cleared. IV Vasopressin infusion weaned to off. IV Lasix ordered for heart failure and MR once blood pressure improved. Invasive hemodynamic monitoring with a PA catheter & a Left Radial A-line were required for the following of serial CI's/MVO2's and continuous central venous, pulmonary artery pressure and MAP/BP monitoring to ensure adequate cardiovascular support.     ===================HEMATOLOGIC/ONC ===================  Anemia with hematocrit 29.3 %. Thrombocytopenia with platelet count at 79 k. Continue to monitor hemoglobin, hematocrit and platelet levels. Continue SQ Lovenox for venous thromboembolism prophylaxis.     ===================== RENAL =========================  Optimize renal perfusion with adequate inotropic and vasopressor support and continued monitoring of urine output, fluid balance, and BUN/Creatinine. Increased urine output s/p lasix without increased creatinine.    ==================== GASTROINTESTINAL===================  Continue with IV Pepcid for stress ulcer prophylaxis. Bowel regimen with Miralax.    =======================    ENDOCRINE  =====================  Metabolic stability, stress hyperglycemia required review and adjustment of regular Insulin sliding scale and glycemic regimen while following serial glucose levels to help achieve and maintain euglycemia.    ========================INFECTIOUS DISEASE================  Continue IV Cefuroxime for perioperative antibiotic coverage.    Patient requires continuous monitoring with bedside rhythm monitoring, pulse oximetry monitoring, and continuous central venous and arterial pressure monitoring; and intermittent blood gas analysis.  Care plan discussed with ICU care team.    Patient remained critical, at risk for life threatening decompensation.   I have spent 35 minutes providing acute care with multiple re-evaluations overnight and during the early morning hours.

## 2020-11-27 NOTE — PROGRESS NOTE ADULT - ASSESSMENT
86 y/o male with PMH of HTN, HLD, hiatal hernia, GERD, OA post Lt TKR x1 year ago presenting with concern for increasing redness of left leg and swelling for 2 months. Pt admitted for acute on chronic CHD with elevated troponins / NSTEMI. Cardiology following, Dr. De La Vega. Abnormal NST on , now s/p cardiac cath demonstrating multivessel CAD. CT Surgery consulted for CABG evaluation.    On  s/p CABG x 3 LIMA / LIMA to LAD, SVG to Diagonal, SVG to OM  Post op - Course:   Inotropic and pressor support required à weaned off.   Extubated POD # 1  ID following for prior LLE cellulitis àcompleted full course of Abx. Monitoring off Abx.     gtt weaned off. Started on Lopressor 25 mg PO BID.  Transferred to SDU; received patient Hypotensive SBP 70-90’s asymptomatic.  Lopressor decreased to 12.5 mg PO BID. Hypoglycemia BFG 58 à treated with 4 0z of apple juice; repeat .  Continue to monitor.

## 2020-11-27 NOTE — PROGRESS NOTE ADULT - SUBJECTIVE AND OBJECTIVE BOX
CRITICAL CARE ATTENDING - CTICU    MEDICATIONS  (STANDING):  aspirin enteric coated 81 milliGRAM(s) Oral daily  chlorhexidine 2% Cloths 1 Application(s) Topical <User Schedule>  dexMEDEtomidine Infusion 0.4 MICROgram(s)/kG/Hr (7.3 mL/Hr) IV Continuous <Continuous>  DOBUTamine Infusion 2.5 MICROgram(s)/kG/Min (5.48 mL/Hr) IV Continuous <Continuous>  enoxaparin Injectable 40 milliGRAM(s) SubCutaneous daily  famotidine Injectable 20 milliGRAM(s) IV Push every 12 hours  insulin lispro (ADMELOG) corrective regimen sliding scale   SubCutaneous three times a day before meals  polyethylene glycol 3350 17 Gram(s) Oral daily  sodium chloride 0.9%. 1000 milliLiter(s) (10 mL/Hr) IV Continuous <Continuous>  vasopressin Infusion 0.1 Unit(s)/Min (6 mL/Hr) IV Continuous <Continuous>                                    9.7    12.90 )-----------( 79       ( 2020 00:13 )             29.3           137  |  98  |  24<H>  ----------------------------<  143<H>  4.5   |  26  |  0.74    Ca    8.6      2020 00:13  Phos  2.6       Mg     2.1         TPro  5.6<L>  /  Alb  3.6  /  TBili  0.8  /  DBili  x   /  AST  60<H>  /  ALT  36  /  AlkPhos  55        PT/INR - ( 2020 01:01 )   PT: 15.1 sec;   INR: 1.27 ratio         PTT - ( 2020 01:01 )  PTT:37.3 sec        Daily     Daily Weight in k.5 (2020 00:00)       @ 07:01  -   @ 07:00  --------------------------------------------------------  IN: 668 mL / OUT: 2805 mL / NET: -2137 mL            Critically Ill patient  : [ ] preoperative ,   [ x] post operative    Requires :  [x ] Arterial Line   [x ] Central Line  [ x] PA catheter  [ ] IABP  [ ] ECMO  [ ] LVAD  [ ] Ventilator  [ x] pacemaker [ ] Impella.                      [x ] ABG's     [ x] Pulse Oxymetry Monitoring  Bedside evaluation , monitoring , treatment of hemodynamics , fluids , IVP/ IVCD meds.        Diagnosis:     POD 2 - CABG X 3 L    Hypotension    Hypovolemia    Chest tube drainage     Requires chest PT, pulmonary toilet, suctioning to maintain SaO2,  patent airway and treat atelectasis.     CHF- acute [ x]   chronic [x ]    systolic [ x]   diatolic [ ]          - Echo- EF -   40-45%     [ ] RV dysfunction          - Cxr-cardiomegally, edema          - Clinical-  [x ]inotropes   [x ]pressors   [ ]diuresis   [ ]IABP   [ ]ECMO   [ ]LVAD   [ ]Respiratory Failure.     Sedated with IVCD Precedex for delirium     Hemodynamic lability,  instability. Requires IVCD [ x] vasopressors [x ] inotropes  [ ] vasodilator  [ ]IVSS fluid  to maintain MAP, perfusion, C.I.     Temporary pacemaker (TPM) interrogation and setting.      ECG    Requires bedside physical therapy, mobilization and total [facility] care.     Elizabeth Tee catheter interpretation and therapeutic management of unstable hemodynamics     Lactic acidosis - resolved     Thrombocytopenia     Prerenal azotemia               By signing my name below, I, Katelyn Guadalupe, attest that this documentation has been prepared under the direction and in the presence of Niko Castillo MD.   Electronically Signed: Lillie Cordero. 20 @ 07:54    Discussed with CT surgeon, Physician's Assistant - Nurse Practitioner- Critical care medicine team.   Dicussed at  AM / PM rounds.   Chart, labs , films reviewed.    Total Time: CRITICAL CARE ATTENDING - CTICU    MEDICATIONS  (STANDING):  aspirin enteric coated 81 milliGRAM(s) Oral daily  chlorhexidine 2% Cloths 1 Application(s) Topical <User Schedule>  dexMEDEtomidine Infusion 0.4 MICROgram(s)/kG/Hr (7.3 mL/Hr) IV Continuous <Continuous>  DOBUTamine Infusion 2.5 MICROgram(s)/kG/Min (5.48 mL/Hr) IV Continuous <Continuous>  enoxaparin Injectable 40 milliGRAM(s) SubCutaneous daily  famotidine Injectable 20 milliGRAM(s) IV Push every 12 hours  insulin lispro (ADMELOG) corrective regimen sliding scale   SubCutaneous three times a day before meals  polyethylene glycol 3350 17 Gram(s) Oral daily  sodium chloride 0.9%. 1000 milliLiter(s) (10 mL/Hr) IV Continuous <Continuous>  vasopressin Infusion 0.1 Unit(s)/Min (6 mL/Hr) IV Continuous <Continuous>                                    9.7    12.90 )-----------( 79       ( 2020 00:13 )             29.3           137  |  98  |  24<H>  ----------------------------<  143<H>  4.5   |  26  |  0.74    Ca    8.6      2020 00:13  Phos  2.6       Mg     2.1         TPro  5.6<L>  /  Alb  3.6  /  TBili  0.8  /  DBili  x   /  AST  60<H>  /  ALT  36  /  AlkPhos  55        PT/INR - ( 2020 01:01 )   PT: 15.1 sec;   INR: 1.27 ratio         PTT - ( 2020 01:01 )  PTT:37.3 sec        Daily     Daily Weight in k.5 (2020 00:00)       @ 07:01  -   @ 07:00  --------------------------------------------------------  IN: 668 mL / OUT: 2805 mL / NET: -2137 mL            Critically Ill patient  : [ ] preoperative ,   [ x] post operative    Requires :  [x ] Arterial Line   [x ] Central Line  [ x] PA catheter  [ ] IABP  [ ] ECMO  [ ] LVAD  [ ] Ventilator  [ x] pacemaker [ ] Impella.                      [x ] ABG's     [ x] Pulse Oxymetry Monitoring  Bedside evaluation , monitoring , treatment of hemodynamics , fluids , IVP/ IVCD meds.        Diagnosis:     POD 2 - CABG X 3 L    Hypotension    Hypovolemia     Requires chest PT, pulmonary toilet, suctioning to maintain SaO2,  patent airway and treat atelectasis.     CHF- acute [ x]   chronic [x ]    systolic [ x]   diatolic [ ]          - Echo- EF -   40-45%     [ ] RV dysfunction          - Cxr-cardiomegally, edema          - Clinical-  [x ]inotropes   [x ]pressors   [ x]diuresis   [ ]IABP   [ ]ECMO   [ ]LVAD   [ ]Respiratory Failure.     Sedated with IVCD Precedex for delirium     Hemodynamic lability,  instability. Requires IVCD [ x] vasopressors [x ] inotropes  [ ] vasodilator  [ ]IVSS fluid  to maintain MAP, perfusion, C.I.     Temporary pacemaker (TPM) interrogation and setting.      ECG    Requires bedside physical therapy, mobilization and total group home care.     Cromwell Tee catheter interpretation and therapeutic management of unstable hemodynamics     Lactic acidosis - resolved     Thrombocytopenia     Prerenal azotemia               By signing my name below, I, Katelyn Guadalupe, attest that this documentation has been prepared under the direction and in the presence of Niko Castillo MD.   Electronically Signed: Lillie Cordero. 20 @ 07:54    I, Niko Castillo, personally performed the services described in this documentation. All medical record entries made by the scribe were at my direction and in my presence. I have reviewed the chart and agree that the record reflects my personal performance and is accurate and complete.   Niko Castillo MD.     Discussed with CT surgeon, Physician's Assistant - Nurse Practitioner- Critical care medicine team.   Dicussed at  AM / PM rounds.   Chart, labs , films reviewed.    Total Time: 30 min

## 2020-11-27 NOTE — PROGRESS NOTE ADULT - SUBJECTIVE AND OBJECTIVE BOX
VITAL SIGNS    Subjective: "I'm feeling ok." Denies CP, palpitation, SOB, CRUZ, HA, dizziness, N/V/D, fever or chills.  No acute event noted overnight.     Telemetry: NSR  (PVC's)     Vital Signs Last 24 Hrs  T(C): 37.3 (20 @ 19:16), Max: 37.6 (20 @ 04:00)  T(F): 99.1 (20 @ 19:16), Max: 99.7 (20 @ 04:00)  HR: 85 (20 @ 19:16) (73 - 112)  BP: 97/56 (20 @ 15:38) (70/41 - 97/56)  RR: 18 (20 @ 15:18) (16 - 41)  SpO2: 96% (20 @ 19:16) (65% - 99%)            @ 07:  -   @ 07:00  --------------------------------------------------------  IN: 668 mL / OUT: 2805 mL / NET: -2137 mL     @ 07:01  -   @ 19:25  --------------------------------------------------------  IN: 691 mL / OUT: 595 mL / NET: 96 mL    Daily     Daily Weight in k.5 (2020 00:00)    CAPILLARY BLOOD GLUCOSE  106 (2020 08:00)    POCT Blood Glucose.: 162 mg/dL (2020 17:57)  POCT Blood Glucose.: 127 mg/dL (2020 17:33)  POCT Blood Glucose.: 58 mg/dL (2020 16:52)  POCT Blood Glucose.: 67 mg/dL (2020 16:51)  POCT Blood Glucose.: 105 mg/dL (2020 07:58)        PHYSICAL EXAM    Neurology: alert and oriented x 3, nonfocal, no gross deficits    CV: (+) S1 and S2, No murmurs, rubs, gallops or clicks     Sternal Wound:  MSI -->CDI , sternum stable    Lungs: CTA B/L     Abdomen: soft, nontender, nondistended, positive bowel sounds, (+) Flatus; (+) BM     :  Voiding               Extremities:  B/L LE (+) edema; negative calf tenderness; (+) 2 DP palpable        aspirin enteric coated 81 milliGRAM(s) Oral daily  chlorhexidine 2% Cloths 1 Application(s) Topical <User Schedule>  enoxaparin Injectable 40 milliGRAM(s) SubCutaneous daily  famotidine Injectable 20 milliGRAM(s) IV Push every 12 hours  furosemide Tablet 20 milliGRAM(s) Oral two times a day  insulin lispro (ADMELOG) corrective regimen sliding scale   SubCutaneous three times a day before meals  metoclopramide Injectable 10 milliGRAM(s) IV Push every 8 hours  metoprolol tartrate 12.5 milliGRAM(s) Oral two times a day  oxycodone 5 mG/acetaminophen 325 mG 1 Tablet(s) Oral every 4 hours PRN  oxycodone 5 mG/acetaminophen 325 mG 2 Tablet(s) Oral every 6 hours PRN  polyethylene glycol 3350 17 Gram(s) Oral daily  rosuvastatin 10 milliGRAM(s) Oral at bedtime  sodium chloride 0.9%. 1000 milliLiter(s) IV Continuous <Continuous>    Physical Therapy Rec:   Home  [  ]   Home w/ PT  [  ]  Rehab  [  ] Awaiting PT eval     Discussed with Cardiothoracic Team at AM rounds.     VITAL SIGNS    Subjective: "I'm feeling ok." Denies CP, palpitation, SOB, CRUZ, HA, dizziness, N/V/D, fever or chills.  No acute event noted overnight.     Telemetry: NSR  (PVC's)     Vital Signs Last 24 Hrs  T(C): 37.3 (20 @ 19:16), Max: 37.6 (20 @ 04:00)  T(F): 99.1 (20 @ 19:16), Max: 99.7 (20 @ 04:00)  HR: 85 (20 @ 19:16) (73 - 112)  BP: 97/56 (20 @ 15:38) (70/41 - 97/56)  RR: 18 (20 @ 15:18) (16 - 41)  SpO2: 96% (20 @ 19:16) (65% - 99%)            @ 07:  -   @ 07:00  --------------------------------------------------------  IN: 668 mL / OUT: 2805 mL / NET: -2137 mL     @ 07:01  -   @ 19:25  --------------------------------------------------------  IN: 691 mL / OUT: 595 mL / NET: 96 mL    Daily     Daily Weight in k.5 (2020 00:00)    CAPILLARY BLOOD GLUCOSE  106 (2020 08:00)    POCT Blood Glucose.: 162 mg/dL (2020 17:57)  POCT Blood Glucose.: 127 mg/dL (2020 17:33)  POCT Blood Glucose.: 58 mg/dL (2020 16:52)  POCT Blood Glucose.: 67 mg/dL (2020 16:51)  POCT Blood Glucose.: 105 mg/dL (2020 07:58)        PHYSICAL EXAM    Neurology: alert and oriented x 3, nonfocal, no gross deficits    CV: (+) S1 and S2, No murmurs, rubs, gallops or clicks     Sternal Wound: MSI -->with coverlet dressing --> CDI, sternum stable; PW x 2 --> EPM     Lungs: CTA B/L     Abdomen: soft, nontender, nondistended, positive bowel sounds, (+) Flatus; (+) BM     :  Indwelling mays cath --> SD                Extremities:  B/L LE (+) edema; negative calf tenderness; (+) 2 DP palpable; Left radial natalio C/D/I. Right IJ Intro with occlusive dressing C/D/I        aspirin enteric coated 81 milliGRAM(s) Oral daily  chlorhexidine 2% Cloths 1 Application(s) Topical <User Schedule>  enoxaparin Injectable 40 milliGRAM(s) SubCutaneous daily  famotidine Injectable 20 milliGRAM(s) IV Push every 12 hours  furosemide Tablet 20 milliGRAM(s) Oral two times a day  insulin lispro (ADMELOG) corrective regimen sliding scale   SubCutaneous three times a day before meals  metoclopramide Injectable 10 milliGRAM(s) IV Push every 8 hours  metoprolol tartrate 12.5 milliGRAM(s) Oral two times a day  oxycodone 5 mG/acetaminophen 325 mG 1 Tablet(s) Oral every 4 hours PRN  oxycodone 5 mG/acetaminophen 325 mG 2 Tablet(s) Oral every 6 hours PRN  polyethylene glycol 3350 17 Gram(s) Oral daily  rosuvastatin 10 milliGRAM(s) Oral at bedtime  sodium chloride 0.9%. 1000 milliLiter(s) IV Continuous <Continuous>    Physical Therapy Rec:   Home  [  ]   Home w/ PT  [  ]  Rehab  [  ] Awaiting PT eval     Discussed with Cardiothoracic Team at AM rounds.

## 2020-11-28 LAB
ALBUMIN SERPL ELPH-MCNC: 3.3 G/DL — SIGNIFICANT CHANGE UP (ref 3.3–5)
ALBUMIN SERPL ELPH-MCNC: 3.9 G/DL — SIGNIFICANT CHANGE UP (ref 3.3–5)
ALP SERPL-CCNC: 97 U/L — SIGNIFICANT CHANGE UP (ref 40–120)
ALP SERPL-CCNC: 98 U/L — SIGNIFICANT CHANGE UP (ref 40–120)
ALT FLD-CCNC: 1135 U/L — HIGH (ref 10–45)
ALT FLD-CCNC: 178 U/L — HIGH (ref 10–45)
ANION GAP SERPL CALC-SCNC: 14 MMOL/L — SIGNIFICANT CHANGE UP (ref 5–17)
ANION GAP SERPL CALC-SCNC: 9 MMOL/L — SIGNIFICANT CHANGE UP (ref 5–17)
APPEARANCE UR: ABNORMAL
AST SERPL-CCNC: 1592 U/L — HIGH (ref 10–40)
AST SERPL-CCNC: 213 U/L — HIGH (ref 10–40)
BACTERIA # UR AUTO: NEGATIVE — SIGNIFICANT CHANGE UP
BASE EXCESS BLDV CALC-SCNC: 0.1 MMOL/L — SIGNIFICANT CHANGE UP (ref -2–2)
BASE EXCESS BLDV CALC-SCNC: 1.9 MMOL/L — SIGNIFICANT CHANGE UP (ref -2–2)
BASE EXCESS BLDV CALC-SCNC: 4 MMOL/L — HIGH (ref -2–2)
BILIRUB SERPL-MCNC: 1 MG/DL — SIGNIFICANT CHANGE UP (ref 0.2–1.2)
BILIRUB SERPL-MCNC: 1.3 MG/DL — HIGH (ref 0.2–1.2)
BILIRUB UR-MCNC: NEGATIVE — SIGNIFICANT CHANGE UP
BUN SERPL-MCNC: 30 MG/DL — HIGH (ref 7–23)
BUN SERPL-MCNC: 31 MG/DL — HIGH (ref 7–23)
CA-I SERPL-SCNC: 1.14 MMOL/L — SIGNIFICANT CHANGE UP (ref 1.12–1.3)
CALCIUM SERPL-MCNC: 8.2 MG/DL — LOW (ref 8.4–10.5)
CALCIUM SERPL-MCNC: 8.7 MG/DL — SIGNIFICANT CHANGE UP (ref 8.4–10.5)
CHLORIDE BLDV-SCNC: 101 MMOL/L — SIGNIFICANT CHANGE UP (ref 96–108)
CHLORIDE SERPL-SCNC: 101 MMOL/L — SIGNIFICANT CHANGE UP (ref 96–108)
CHLORIDE SERPL-SCNC: 97 MMOL/L — SIGNIFICANT CHANGE UP (ref 96–108)
CK MB BLD-MCNC: 2.1 % — SIGNIFICANT CHANGE UP (ref 0–3.5)
CK MB CFR SERPL CALC: 5.2 NG/ML — SIGNIFICANT CHANGE UP (ref 0–6.7)
CK SERPL-CCNC: 245 U/L — HIGH (ref 30–200)
CO2 BLDV-SCNC: 26 MMOL/L — SIGNIFICANT CHANGE UP (ref 22–30)
CO2 BLDV-SCNC: 27 MMOL/L — SIGNIFICANT CHANGE UP (ref 22–30)
CO2 BLDV-SCNC: 31 MMOL/L — HIGH (ref 22–30)
CO2 SERPL-SCNC: 22 MMOL/L — SIGNIFICANT CHANGE UP (ref 22–31)
CO2 SERPL-SCNC: 25 MMOL/L — SIGNIFICANT CHANGE UP (ref 22–31)
COLOR SPEC: YELLOW — SIGNIFICANT CHANGE UP
CREAT SERPL-MCNC: 1 MG/DL — SIGNIFICANT CHANGE UP (ref 0.5–1.3)
CREAT SERPL-MCNC: 1.12 MG/DL — SIGNIFICANT CHANGE UP (ref 0.5–1.3)
DIFF PNL FLD: ABNORMAL
EPI CELLS # UR: 2 /HPF — SIGNIFICANT CHANGE UP
GAS PNL BLDA: SIGNIFICANT CHANGE UP
GAS PNL BLDV: 133 MMOL/L — LOW (ref 135–145)
GAS PNL BLDV: SIGNIFICANT CHANGE UP
GLUCOSE BLDC GLUCOMTR-MCNC: 104 MG/DL — HIGH (ref 70–99)
GLUCOSE BLDC GLUCOMTR-MCNC: 93 MG/DL — SIGNIFICANT CHANGE UP (ref 70–99)
GLUCOSE BLDV-MCNC: 73 MG/DL — SIGNIFICANT CHANGE UP (ref 70–99)
GLUCOSE SERPL-MCNC: 104 MG/DL — HIGH (ref 70–99)
GLUCOSE SERPL-MCNC: 80 MG/DL — SIGNIFICANT CHANGE UP (ref 70–99)
GLUCOSE UR QL: NEGATIVE — SIGNIFICANT CHANGE UP
HCO3 BLDV-SCNC: 25 MMOL/L — SIGNIFICANT CHANGE UP (ref 21–29)
HCO3 BLDV-SCNC: 26 MMOL/L — SIGNIFICANT CHANGE UP (ref 21–29)
HCO3 BLDV-SCNC: 29 MMOL/L — SIGNIFICANT CHANGE UP (ref 21–29)
HCT VFR BLD CALC: 32.5 % — LOW (ref 39–50)
HCT VFR BLDA CALC: 30 % — LOW (ref 39–50)
HGB BLD CALC-MCNC: 9.6 G/DL — LOW (ref 13–17)
HGB BLD-MCNC: 10.9 G/DL — LOW (ref 13–17)
HOROWITZ INDEX BLDV+IHG-RTO: 28 — SIGNIFICANT CHANGE UP
HOROWITZ INDEX BLDV+IHG-RTO: 28 — SIGNIFICANT CHANGE UP
HOROWITZ INDEX BLDV+IHG-RTO: 44 — SIGNIFICANT CHANGE UP
HYALINE CASTS # UR AUTO: 13 /LPF — HIGH (ref 0–2)
KETONES UR-MCNC: NEGATIVE — SIGNIFICANT CHANGE UP
LACTATE BLDV-MCNC: 1.7 MMOL/L — SIGNIFICANT CHANGE UP (ref 0.7–2)
LEUKOCYTE ESTERASE UR-ACNC: NEGATIVE — SIGNIFICANT CHANGE UP
MAGNESIUM SERPL-MCNC: 3.1 MG/DL — HIGH (ref 1.6–2.6)
MCHC RBC-ENTMCNC: 33.2 PG — SIGNIFICANT CHANGE UP (ref 27–34)
MCHC RBC-ENTMCNC: 33.5 GM/DL — SIGNIFICANT CHANGE UP (ref 32–36)
MCV RBC AUTO: 99.1 FL — SIGNIFICANT CHANGE UP (ref 80–100)
NITRITE UR-MCNC: NEGATIVE — SIGNIFICANT CHANGE UP
NRBC # BLD: 0 /100 WBCS — SIGNIFICANT CHANGE UP (ref 0–0)
OTHER CELLS CSF MANUAL: 6 ML/DL — LOW (ref 18–22)
PCO2 BLDV: 41 MMHG — SIGNIFICANT CHANGE UP (ref 35–50)
PCO2 BLDV: 45 MMHG — SIGNIFICANT CHANGE UP (ref 35–50)
PCO2 BLDV: 50 MMHG — SIGNIFICANT CHANGE UP (ref 35–50)
PH BLDV: 7.36 — SIGNIFICANT CHANGE UP (ref 7.35–7.45)
PH BLDV: 7.39 — SIGNIFICANT CHANGE UP (ref 7.35–7.45)
PH BLDV: 7.42 — SIGNIFICANT CHANGE UP (ref 7.35–7.45)
PH UR: 6 — SIGNIFICANT CHANGE UP (ref 5–8)
PHOSPHATE SERPL-MCNC: 3 MG/DL — SIGNIFICANT CHANGE UP (ref 2.5–4.5)
PLATELET # BLD AUTO: 109 K/UL — LOW (ref 150–400)
PO2 BLDV: 29 MMHG — SIGNIFICANT CHANGE UP (ref 25–45)
PO2 BLDV: 31 MMHG — SIGNIFICANT CHANGE UP (ref 25–45)
PO2 BLDV: 34 MMHG — SIGNIFICANT CHANGE UP (ref 25–45)
POTASSIUM BLDV-SCNC: 4.6 MMOL/L — SIGNIFICANT CHANGE UP (ref 3.5–5.3)
POTASSIUM SERPL-MCNC: 4.4 MMOL/L — SIGNIFICANT CHANGE UP (ref 3.5–5.3)
POTASSIUM SERPL-MCNC: 5.6 MMOL/L — HIGH (ref 3.5–5.3)
POTASSIUM SERPL-SCNC: 4.4 MMOL/L — SIGNIFICANT CHANGE UP (ref 3.5–5.3)
POTASSIUM SERPL-SCNC: 5.6 MMOL/L — HIGH (ref 3.5–5.3)
PROT SERPL-MCNC: 5.3 G/DL — LOW (ref 6–8.3)
PROT SERPL-MCNC: 6 G/DL — SIGNIFICANT CHANGE UP (ref 6–8.3)
PROT UR-MCNC: ABNORMAL
RBC # BLD: 3.28 M/UL — LOW (ref 4.2–5.8)
RBC # FLD: 15.1 % — HIGH (ref 10.3–14.5)
RBC CASTS # UR COMP ASSIST: 147 /HPF — HIGH (ref 0–4)
SAO2 % BLDV: 47 % — LOW (ref 67–88)
SAO2 % BLDV: 49 % — LOW (ref 67–88)
SAO2 % BLDV: 59 % — LOW (ref 67–88)
SODIUM SERPL-SCNC: 133 MMOL/L — LOW (ref 135–145)
SODIUM SERPL-SCNC: 135 MMOL/L — SIGNIFICANT CHANGE UP (ref 135–145)
SP GR SPEC: 1.02 — SIGNIFICANT CHANGE UP (ref 1.01–1.02)
TROPONIN T, HIGH SENSITIVITY RESULT: 1611 NG/L — HIGH (ref 0–51)
UROBILINOGEN FLD QL: NEGATIVE — SIGNIFICANT CHANGE UP
WBC # BLD: 16.95 K/UL — HIGH (ref 3.8–10.5)
WBC # FLD AUTO: 16.95 K/UL — HIGH (ref 3.8–10.5)
WBC UR QL: 9 /HPF — HIGH (ref 0–5)

## 2020-11-28 PROCEDURE — 93010 ELECTROCARDIOGRAM REPORT: CPT

## 2020-11-28 PROCEDURE — 99292 CRITICAL CARE ADDL 30 MIN: CPT

## 2020-11-28 PROCEDURE — 93306 TTE W/DOPPLER COMPLETE: CPT | Mod: 26

## 2020-11-28 PROCEDURE — 71045 X-RAY EXAM CHEST 1 VIEW: CPT | Mod: 26

## 2020-11-28 PROCEDURE — 99291 CRITICAL CARE FIRST HOUR: CPT

## 2020-11-28 PROCEDURE — 36620 INSERTION CATHETER ARTERY: CPT | Mod: 78

## 2020-11-28 RX ORDER — DEXTROSE 50 % IN WATER 50 %
50 SYRINGE (ML) INTRAVENOUS ONCE
Refills: 0 | Status: COMPLETED | OUTPATIENT
Start: 2020-11-28 | End: 2020-11-28

## 2020-11-28 RX ORDER — PIPERACILLIN AND TAZOBACTAM 4; .5 G/20ML; G/20ML
3.38 INJECTION, POWDER, LYOPHILIZED, FOR SOLUTION INTRAVENOUS EVERY 8 HOURS
Refills: 0 | Status: DISCONTINUED | OUTPATIENT
Start: 2020-11-28 | End: 2020-11-30

## 2020-11-28 RX ORDER — DEXMEDETOMIDINE HYDROCHLORIDE IN 0.9% SODIUM CHLORIDE 4 UG/ML
0.5 INJECTION INTRAVENOUS
Qty: 200 | Refills: 0 | Status: DISCONTINUED | OUTPATIENT
Start: 2020-11-28 | End: 2020-11-28

## 2020-11-28 RX ORDER — INSULIN HUMAN 100 [IU]/ML
10 INJECTION, SOLUTION SUBCUTANEOUS ONCE
Refills: 0 | Status: COMPLETED | OUTPATIENT
Start: 2020-11-28 | End: 2020-11-28

## 2020-11-28 RX ORDER — POTASSIUM CHLORIDE 20 MEQ
10 PACKET (EA) ORAL
Refills: 0 | Status: COMPLETED | OUTPATIENT
Start: 2020-11-28 | End: 2020-11-28

## 2020-11-28 RX ORDER — PANTOPRAZOLE SODIUM 20 MG/1
40 TABLET, DELAYED RELEASE ORAL
Refills: 0 | Status: DISCONTINUED | OUTPATIENT
Start: 2020-11-28 | End: 2020-12-06

## 2020-11-28 RX ORDER — FUROSEMIDE 40 MG
20 TABLET ORAL ONCE
Refills: 0 | Status: COMPLETED | OUTPATIENT
Start: 2020-11-28 | End: 2020-11-28

## 2020-11-28 RX ORDER — DOBUTAMINE HCL 250MG/20ML
1.5 VIAL (ML) INTRAVENOUS
Qty: 500 | Refills: 0 | Status: DISCONTINUED | OUTPATIENT
Start: 2020-11-28 | End: 2020-11-29

## 2020-11-28 RX ORDER — PIPERACILLIN AND TAZOBACTAM 4; .5 G/20ML; G/20ML
3.38 INJECTION, POWDER, LYOPHILIZED, FOR SOLUTION INTRAVENOUS ONCE
Refills: 0 | Status: COMPLETED | OUTPATIENT
Start: 2020-11-28 | End: 2020-11-28

## 2020-11-28 RX ORDER — MAGNESIUM SULFATE 500 MG/ML
1 VIAL (ML) INJECTION ONCE
Refills: 0 | Status: COMPLETED | OUTPATIENT
Start: 2020-11-28 | End: 2020-11-28

## 2020-11-28 RX ORDER — VASOPRESSIN 20 [USP'U]/ML
0.03 INJECTION INTRAVENOUS
Qty: 50 | Refills: 0 | Status: DISCONTINUED | OUTPATIENT
Start: 2020-11-28 | End: 2020-11-30

## 2020-11-28 RX ORDER — POTASSIUM CHLORIDE 20 MEQ
10 PACKET (EA) ORAL ONCE
Refills: 0 | Status: COMPLETED | OUTPATIENT
Start: 2020-11-28 | End: 2020-11-28

## 2020-11-28 RX ORDER — AMIODARONE HYDROCHLORIDE 400 MG/1
150 TABLET ORAL ONCE
Refills: 0 | Status: COMPLETED | OUTPATIENT
Start: 2020-11-28 | End: 2020-11-28

## 2020-11-28 RX ORDER — CALCIUM GLUCONATE 100 MG/ML
1 VIAL (ML) INTRAVENOUS ONCE
Refills: 0 | Status: COMPLETED | OUTPATIENT
Start: 2020-11-28 | End: 2020-11-28

## 2020-11-28 RX ORDER — ALBUMIN HUMAN 25 %
250 VIAL (ML) INTRAVENOUS ONCE
Refills: 0 | Status: COMPLETED | OUTPATIENT
Start: 2020-11-28 | End: 2020-11-28

## 2020-11-28 RX ADMIN — Medication 20 MILLIGRAM(S): at 02:34

## 2020-11-28 RX ADMIN — Medication 81 MILLIGRAM(S): at 12:08

## 2020-11-28 RX ADMIN — CHLORHEXIDINE GLUCONATE 1 APPLICATION(S): 213 SOLUTION TOPICAL at 05:36

## 2020-11-28 RX ADMIN — AMIODARONE HYDROCHLORIDE 600 MILLIGRAM(S): 400 TABLET ORAL at 23:45

## 2020-11-28 RX ADMIN — Medication 100 GRAM(S): at 22:15

## 2020-11-28 RX ADMIN — Medication 50 MILLIEQUIVALENT(S): at 21:52

## 2020-11-28 RX ADMIN — Medication 50 MILLIEQUIVALENT(S): at 03:45

## 2020-11-28 RX ADMIN — Medication 10 MILLIGRAM(S): at 05:50

## 2020-11-28 RX ADMIN — Medication 20 MILLIGRAM(S): at 19:35

## 2020-11-28 RX ADMIN — Medication 50 MILLILITER(S): at 01:30

## 2020-11-28 RX ADMIN — ENOXAPARIN SODIUM 40 MILLIGRAM(S): 100 INJECTION SUBCUTANEOUS at 12:08

## 2020-11-28 RX ADMIN — Medication 50 MILLIEQUIVALENT(S): at 04:45

## 2020-11-28 RX ADMIN — FAMOTIDINE 20 MILLIGRAM(S): 10 INJECTION INTRAVENOUS at 05:50

## 2020-11-28 RX ADMIN — PANTOPRAZOLE SODIUM 40 MILLIGRAM(S): 20 TABLET, DELAYED RELEASE ORAL at 10:29

## 2020-11-28 RX ADMIN — POLYETHYLENE GLYCOL 3350 17 GRAM(S): 17 POWDER, FOR SOLUTION ORAL at 12:08

## 2020-11-28 RX ADMIN — PIPERACILLIN AND TAZOBACTAM 200 GRAM(S): 4; .5 INJECTION, POWDER, LYOPHILIZED, FOR SOLUTION INTRAVENOUS at 12:09

## 2020-11-28 RX ADMIN — Medication 50 MILLIEQUIVALENT(S): at 05:15

## 2020-11-28 RX ADMIN — INSULIN HUMAN 10 UNIT(S): 100 INJECTION, SOLUTION SUBCUTANEOUS at 01:38

## 2020-11-28 RX ADMIN — Medication 20 MILLIGRAM(S): at 21:52

## 2020-11-28 RX ADMIN — Medication 125 MILLILITER(S): at 10:40

## 2020-11-28 RX ADMIN — PIPERACILLIN AND TAZOBACTAM 25 GRAM(S): 4; .5 INJECTION, POWDER, LYOPHILIZED, FOR SOLUTION INTRAVENOUS at 21:15

## 2020-11-28 NOTE — PROGRESS NOTE ADULT - SUBJECTIVE AND OBJECTIVE BOX
CARDIOLOGY     PROGRESS  NOTE   ________________________________________________    CHIEF Complaint: patient is a 85y old  Male who presents with a chief complaint of edema (28 Nov 2020 09:50)  no complain.  	  REVIEW OF SYSTEMS:  CONSTITUTIONAL: No fever, weight loss, or fatigue  EYES: No eye pain, visual disturbances, or discharge  ENT:  No difficulty hearing, tinnitus, vertigo; No sinus or throat pain  NECK: No pain or stiffness  RESPIRATORY: No cough, wheezing, chills or hemoptysis; No Shortness of Breath  CARDIOVASCULAR: No chest pain, palpitations, passing out, dizziness, or leg swelling  GASTROINTESTINAL: No abdominal or epigastric pain. No nausea, vomiting, or hematemesis; No diarrhea or constipation. No melena or hematochezia.  GENITOURINARY: No dysuria, frequency, hematuria, or incontinence  NEUROLOGICAL: No headaches, memory loss, loss of strength, numbness, or tremors  SKIN: No itching, burning, rashes, or lesions   LYMPH Nodes: No enlarged glands  ENDOCRINE: No heat or cold intolerance; No hair loss  MUSCULOSKELETAL: No joint pain or swelling; No muscle, back, or extremity pain  PSYCHIATRIC: No depression, anxiety, mood swings, or difficulty sleeping  HEME/LYMPH: No easy bruising, or bleeding gums  ALLERGY AND IMMUNOLOGIC: No hives or eczema	    [ ] All others negative	  [ ] Unable to obtain    PHYSICAL EXAM:  T(C): 35.6 (11-28-20 @ 08:00), Max: 37.3 (11-27-20 @ 19:16)  HR: 80 (11-28-20 @ 09:00) (59 - 135)  BP: 109/63 (11-28-20 @ 00:00) (70/41 - 109/63)  RR: 22 (11-28-20 @ 09:00) (14 - 113)  SpO2: 100% (11-28-20 @ 09:00) (65% - 100%)  Wt(kg): --  I&O's Summary    27 Nov 2020 07:01  -  28 Nov 2020 07:00  --------------------------------------------------------  IN: 1119.3 mL / OUT: 1320 mL / NET: -200.7 mL    28 Nov 2020 07:01  -  28 Nov 2020 10:32  --------------------------------------------------------  IN: 31 mL / OUT: 30 mL / NET: 1 mL        Appearance: Normal	  HEENT:   Normal oral mucosa, PERRL, EOMI	  Lymphatic: No lymphadenopathy  Cardiovascular: Normal S1 S2, No JVD, + murmurs, No edema  Respiratory: Lungs clear to auscultation	  Psychiatry: A & O x 3, Mood & affect appropriate  Gastrointestinal:  Soft, Non-tender, + BS	  Skin: No rashes, No ecchymoses, No cyanosis	  Neurologic: Non-focal  Extremities: Normal range of motion,chronic lle erythema  Vascular: Peripheral pulses palpable 2+ bilaterally    MEDICATIONS  (STANDING):  aspirin enteric coated 81 milliGRAM(s) Oral daily  chlorhexidine 2% Cloths 1 Application(s) Topical <User Schedule>  DOBUTamine Infusion 5 MICROgram(s)/kG/Min (11 mL/Hr) IV Continuous <Continuous>  enoxaparin Injectable 40 milliGRAM(s) SubCutaneous daily  furosemide    Tablet 20 milliGRAM(s) Oral two times a day  insulin lispro (ADMELOG) corrective regimen sliding scale   SubCutaneous three times a day before meals  magnesium sulfate  IVPB 1 Gram(s) IV Intermittent once  pantoprazole    Tablet 40 milliGRAM(s) Oral before breakfast  phenylephrine    Infusion 1 MICROgram(s)/kG/Min (27.4 mL/Hr) IV Continuous <Continuous>  piperacillin/tazobactam IVPB. 3.375 Gram(s) IV Intermittent once  piperacillin/tazobactam IVPB.. 3.375 Gram(s) IV Intermittent every 8 hours  polyethylene glycol 3350 17 Gram(s) Oral daily  sodium chloride 0.9%. 1000 milliLiter(s) (10 mL/Hr) IV Continuous <Continuous>      TELEMETRY: 	    ECG:  	  RADIOLOGY:  OTHER: 	  	  LABS:	 	    CARDIAC MARKERS:  CARDIAC MARKERS ( 28 Nov 2020 01:36 )  x     / x     / 245 U/L / x     / 5.2 ng/mL                                10.9   16.95 )-----------( 109      ( 28 Nov 2020 00:38 )             32.5     11-28    135  |  101  |  31<H>  ----------------------------<  80  4.4   |  25  |  1.00    Ca    8.2<L>      28 Nov 2020 06:45  Phos  3.0     11-28  Mg     3.1     11-28    TPro  5.3<L>  /  Alb  3.3  /  TBili  1.0  /  DBili  x   /  AST  1592<H>  /  ALT  1135<H>  /  AlkPhos  97  11-28    proBNP: Serum Pro-Brain Natriuretic Peptide: 62231 pg/mL (11-18 @ 12:45)    Lipid Profile: Cholesterol 106  LDL --  HDL 40  TG 73    HgA1c:   TSH: Thyroid Stimulating Hormone, Serum: 1.46 uIU/mL (11-19 @ 09:24)  Thyroid Stimulating Hormone, Serum: 1.51 uIU/mL (11-19 @ 08:41)      Patient with left leg redness finished a course of antibiotics for cellulitis. Found to have tvd now s/p cabg. No infection is apparent at present. Leukocytosis just reactive to surgery  Plan:  monitor off antibiotics      CAD s/p CABG   Rapid afib with RVR   Hypovolemia   Delirium   Hypertension   Hyperlipidemia     Assessment and plan  ---------------------------  pt is an 84 y/o man with pmhx of htn, hld, hiatal hernia / GERD, OA post Lt TKR x1 year ago presenting with concern for increasing redness of left leg and swelling for 2 months.   Patient reports that he has been having increasing swelling of both of his legs for approximately 2-3 months; however, the left leg has been more swollen and has been getting increasingly more red. He states that he went to his podiatrist when he noted some rash on the lower portion of his left just above his foot, was given a 'cream of some kind, and feels that the redness and swelling got worse after starting the cream.'   He states that he was having chest pain approximately 1 mo ago, went to his PMD and was diagnosed with a hiatal hernia with an xray and prescribed omeprazole.   Patient reports that he has been becoming increasingly more short of breath for approximately 2 months as well. Feels that when he takes just a few steps he has to stop and catch his breath, and is so short of breath that he cannot speak in complete sentences without resting for a period of time first.   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  	  REVIEW OF SYSTEMS:  CONSTITUTIONAL: No fever, weight loss, or fatigue  EYES: No eye pain, visual disturbances, or discharge  ENT:  No difficulty hearing, tinnitus, vertigo; No sinus or throat pain  NECK: No pain or stiffness  RESPIRATORY: No cough, wheezing, chills or hemoptysis; No Shortness of Breath  CARDIOVASCULAR: No chest pain, palpitations, passing out, dizziness, or leg swelling  GASTROINTESTINAL: No abdominal or epigastric pain. No nausea, vomiting, or hematemesis; No diarrhea or constipation. No melena or hematochezia.  GENITOURINARY: No dysuria, frequency, hematuria, or incontinence  NEUROLOGICAL: No headaches, memory loss, loss of strength, numbness, or tremors  SKIN: No itching, burning, rashes, or lesions   LYMPH Nodes: No enlarged glands  ENDOCRINE: No heat or cold intolerance; No hair loss  MUSCULOSKELETAL: No joint pain or swelling; No muscle, back, or extremity pain  PSYCHIATRIC: No depression, anxiety, mood swings, or difficulty sleeping  HEME/LYMPH: No easy bruising, or bleeding gums  ALLERGY AND IMMUNOLOGIC: No hives or eczema	    [ ] All others negative	  [ ] Unable to obtain    PHYSICAL EXAM:  T(C): 35.6 (11-28-20 @ 08:00), Max: 37.3 (11-27-20 @ 19:16)  HR: 80 (11-28-20 @ 09:00) (59 - 135)  BP: 109/63 (11-28-20 @ 00:00) (70/41 - 109/63)  RR: 22 (11-28-20 @ 09:00) (14 - 113)  SpO2: 100% (11-28-20 @ 09:00) (65% - 100%)  Wt(kg): --  I&O's Summary    27 Nov 2020 07:01  -  28 Nov 2020 07:00  --------------------------------------------------------  IN: 1119.3 mL / OUT: 1320 mL / NET: -200.7 mL    28 Nov 2020 07:01  -  28 Nov 2020 10:32  --------------------------------------------------------  IN: 31 mL / OUT: 30 mL / NET: 1 mL        Appearance: Normal	  HEENT:   Normal oral mucosa, PERRL, EOMI	  Lymphatic: No lymphadenopathy  Cardiovascular: Normal S1 S2, No JVD, + murmurs, No edema  Respiratory: Lungs clear to auscultation	  Psychiatry: A & O x 3, Mood & affect appropriate  Gastrointestinal:  Soft, Non-tender, + BS	  Skin: No rashes, No ecchymoses, No cyanosis	  Neurologic: Non-focal  Extremities: Normal range of motion,chronic lle erythema  Vascular: Peripheral pulses palpable 2+ bilaterally    MEDICATIONS  (STANDING):  aspirin enteric coated 81 milliGRAM(s) Oral daily  chlorhexidine 2% Cloths 1 Application(s) Topical <User Schedule>  DOBUTamine Infusion 5 MICROgram(s)/kG/Min (11 mL/Hr) IV Continuous <Continuous>  enoxaparin Injectable 40 milliGRAM(s) SubCutaneous daily  furosemide    Tablet 20 milliGRAM(s) Oral two times a day  insulin lispro (ADMELOG) corrective regimen sliding scale   SubCutaneous three times a day before meals  magnesium sulfate  IVPB 1 Gram(s) IV Intermittent once  pantoprazole    Tablet 40 milliGRAM(s) Oral before breakfast  phenylephrine    Infusion 1 MICROgram(s)/kG/Min (27.4 mL/Hr) IV Continuous <Continuous>  piperacillin/tazobactam IVPB. 3.375 Gram(s) IV Intermittent once  piperacillin/tazobactam IVPB.. 3.375 Gram(s) IV Intermittent every 8 hours  polyethylene glycol 3350 17 Gram(s) Oral daily  sodium chloride 0.9%. 1000 milliLiter(s) (10 mL/Hr) IV Continuous <Continuous>      TELEMETRY: 	    ECG:  	  RADIOLOGY:  OTHER: 	  	  LABS:	 	    CARDIAC MARKERS:  CARDIAC MARKERS ( 28 Nov 2020 01:36 )  x     / x     / 245 U/L / x     / 5.2 ng/mL                                10.9   16.95 )-----------( 109      ( 28 Nov 2020 00:38 )             32.5     11-28    135  |  101  |  31<H>  ----------------------------<  80  4.4   |  25  |  1.00    Ca    8.2<L>      28 Nov 2020 06:45  Phos  3.0     11-28  Mg     3.1     11-28    TPro  5.3<L>  /  Alb  3.3  /  TBili  1.0  /  DBili  x   /  AST  1592<H>  /  ALT  1135<H>  /  AlkPhos  97  11-28    proBNP: Serum Pro-Brain Natriuretic Peptide: 95834 pg/mL (11-18 @ 12:45)    Lipid Profile: Cholesterol 106  LDL --  HDL 40  TG 73    HgA1c:   TSH: Thyroid Stimulating Hormone, Serum: 1.46 uIU/mL (11-19 @ 09:24)  Thyroid Stimulating Hormone, Serum: 1.51 uIU/mL (11-19 @ 08:41)      Patient with left leg redness finished a course of antibiotics for cellulitis. Found to have tvd now s/p cabg. No infection is apparent at present. Leukocytosis just reactive to surgery  Plan:  monitor off antibiotics      CAD s/p CABG   Rapid afib with RVR   Hypovolemia   Delirium   Hypertension   Hyperlipidemia     Assessment and plan  ---------------------------  pt is an 86 y/o man with pmhx of htn, hld, hiatal hernia / GERD, OA post Lt TKR x1 year ago presenting with concern for increasing redness of left leg and swelling for 2 months.   Patient reports that he has been having increasing swelling of both of his legs for approximately 2-3 months; however, the left leg has been more swollen and has been getting increasingly more red. He states that he went to his podiatrist when he noted some rash on the lower portion of his left just above his foot, was given a 'cream of some kind, and feels that the redness and swelling got worse after starting the cream.'   He states that he was having chest pain approximately 1 mo ago, went to his PMD and was diagnosed with a hiatal hernia with an xray and prescribed omeprazole.   Patient reports that he has been becoming increasingly more short of breath for approximately 2 months as well. Feels that when he takes just a few steps he has to stop and catch his breath, and is so short of breath that he cannot speak in complete sentences without resting for a period of time first.   s/p CABG, doing well  s/p extubation  events noted pt in rapid a.fib  increase lft ? 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

## 2020-11-28 NOTE — PROGRESS NOTE ADULT - SUBJECTIVE AND OBJECTIVE BOX
MARCOS AGUILAR  MRN-[id number]  Patient is a 85y old  Male who presents with a chief complaint of edema (27 Nov 2020 19:25)    HPI:    >>>>>>Medical Attending Initial / Admission note<<<<<<  -------------------------------------------------------------------------------  CHIEF COMPLAINT & HISTORY OF PRESENT ILLNESS:      pt is an 84 y/o man with pmhx of htn, hld, hiatal hernia / GERD, OA post Lt TKR x1 year ago presenting with concern for increasing redness of left leg and swelling for 2 months.   Patient reports that he has been having increasing swelling of both of his legs for approximately 2-3 months; however, the left leg has been more swollen and has been getting increasingly more red. He states that he went to his podiatrist when he noted some rash on the lower portion of his left just above his foot, was given a 'cream of some kind, and feels that the redness and swelling got worse after starting the cream.'   He states that he was having chest pain approximately 1 mo ago, went to his PMD and was diagnosed with a hiatal hernia with an xray and prescribed omeprazole.   Patient reports that he has been becoming increasingly more short of breath for approximately 2 months as well. Feels that when he takes just a few steps he has to stop and catch his breath, and is so short of breath that he cannot speak in complete sentences without resting for a period of time first.   pt also states was given amoxicillin a few days ago but only took one dose  He denies any fevers, chills, cough, nausea, vomiting, sweating, chest pain  pt admitted for likely acute CHF with elevated troponin / NSTEMI     --------------------------------------------------------------------------------  PAST MEDICAL HISTORY:    HTN  HLD  GERD / Hiatal hernia   OA    --------------------------------------------------------------------------------  PAST SURGICAL HISTORY:    left knee replacement   inguinal hernia repair    --------------------------------------------------------------------------------  FAMILY HISTORY:     sister: breast CA   Mother: gastric cancer   no known heart disease    --------------------------------------------------------------------------------    SOCIAL HISTORY:  Alcohol: None reported  Smoking: None reported     --------------------------------------------------------------------------------  ALLERGIES:     listed to have allergies to statins , valsartan, naprosyn ( none with rash or swelling )      --------------------------------------------------------------------------------  MEDICATIONS:   [As silvana, александр]    --------------------------------------------------------------------------------  REVIEW OF SYSTEM:    GEN: no fever, no chills, no pain  RESP: + SOB with minimal exertion , no cough, no sputum  CVS: no chest pain, no palpitations, ++ edema with redness of left leg as above   GI: no abdominal pain, no nausea, no vomiting, no constipation, no diarrhea  : no dysuria, no frequency, no hematuria  Neuro: no headache, no dizziness  PSYCH: no anxiety, no depression  Derm : no itching, no rash, redness as above     --------------------------------------------------------------------------------  VITAL SIGNS:     T(C): 36.4 (11-18-20 @ 10:32), Max: 36.4 (11-18-20 @ 10:32)  HR: 55 (11-18-20 @ 16:23) (55 - 63)  BP: 167/76 (11-18-20 @ 16:23) (155/92 - 167/76)  RR: 18 (11-18-20 @ 16:23) (18 - 18)  SpO2: 100% (11-18-20 @ 16:23) (96% - 100%)     --------------------------------------------------------------------------------  PHYSICAL EXAM:    GEN: A&O X 3 , NAD , comfortable, pleasant   HEENT: NCAT, PERRL, MMM, hearing intact  Neck: supple , mild JVD  CVS: S1S2 , regular , No M/R/G appreciated  PULM: CTA B/L,  decreased BS B/L   ABD.: soft. non tender, non distended,  bowel sounds present  Extrem: intact pulses , ++ B/L LE 3+ edema  with chronic stasis changes of left leg   Derm: No rash , no ecchymoses, changes as above in left leg   PSYCH : normal mood,  no delusion not anxious    --------------------------------------------------------------------------------    LAB AND IMAGING:   [As silvana, reviewed]    --------------------------------------------------------------------------------  ASSESSMENT AND PLAN:   [As silvana]    --------------------  Case discussed with pt, wife, ER, cardio  ___________________________  H. INEZ Phipps.  Pager: [phone]    (18 Nov 2020 16:09)      Surgery/Hospital course:    Today:    REVIEW OF SYSTEMS:  Gen: No fever  EYES/ENT: No visual changes;  No vertigo or throat pain   NECK: No pain   RES:  No shortness of breath or Cough  Chest: + incisional pain  CARD: No chest pain   GI: No abdominal pain  : No dysuria  NEURO: No weakness  SKIN: No itching, rashes     Physical Exam:  Vital Signs Last 24 Hrs  T(C): 37.3 (27 Nov 2020 19:16), Max: 37.3 (27 Nov 2020 08:00)  T(F): 99.1 (27 Nov 2020 19:16), Max: 99.1 (27 Nov 2020 08:00)  HR: 80 (28 Nov 2020 05:00) (59 - 135)  BP: 109/63 (28 Nov 2020 00:00) (70/41 - 109/63)  BP(mean): 81 (28 Nov 2020 00:00) (52 - 81)  RR: 15 (28 Nov 2020 05:00) (15 - 113)  SpO2: 98% (28 Nov 2020 05:00) (65% - 100%)  Gen:  Awake, alert   CNS: non focal 	  Neck: no JVD  RES : clear , no wheezing    Chest:   + chest tubes                     CVS: Regular  rhythm. Normal S1/S2  Abd: Soft, non-distended. Bowel sounds present.  Skin: No rash.  Ext:  no edema, A Line  PSY:  ============================I/O===========================   I&O's Detail    26 Nov 2020 07:01  -  27 Nov 2020 07:00  --------------------------------------------------------  IN:    Dexmedetomidine: 5.5 mL    DOBUTamine: 82.5 mL    DOBUTamine: 99 mL    IV PiggyBack: 150 mL    Oral Fluid: 150 mL    sodium chloride 0.9%: 130 mL    Vasopressin: 28 mL    Vasopressin: 23 mL  Total IN: 668 mL    OUT:    Chest Tube (mL): 70 mL    Chest Tube (mL): 250 mL    Indwelling Catheter - Urethral (mL): 2485 mL    Norepinephrine: 0 mL  Total OUT: 2805 mL    Total NET: -2137 mL      27 Nov 2020 07:01  -  28 Nov 2020 06:41  --------------------------------------------------------  IN:    DOBUTamine: 11 mL    DOBUTamine: 16.5 mL    Oral Fluid: 570 mL    Phenylephrine: 109.7 mL    sodium chloride 0.9%: 110 mL  Total IN: 817.2 mL    OUT:    Chest Tube (mL): 10 mL    Dexmedetomidine: 0 mL    Indwelling Catheter - Urethral (mL): 1065 mL    Vasopressin: 0 mL  Total OUT: 1075 mL    Total NET: -257.8 mL        ============================ LABS =========================                        10.9   16.95 )-----------( 109      ( 28 Nov 2020 00:38 )             32.5     11-28    133<L>  |  97  |  30<H>  ----------------------------<  104<H>  5.6<H>   |  22  |  1.12    Ca    8.7      28 Nov 2020 00:38  Phos  3.0     11-28  Mg     3.1     11-28    TPro  6.0  /  Alb  3.9  /  TBili  1.3<H>  /  DBili  x   /  AST  213<H>  /  ALT  178<H>  /  AlkPhos  98  11-28    LIVER FUNCTIONS - ( 28 Nov 2020 00:38 )  Alb: 3.9 g/dL / Pro: 6.0 g/dL / ALK PHOS: 98 U/L / ALT: 178 U/L / AST: 213 U/L / GGT: x             ABG - ( 28 Nov 2020 06:08 )  pH, Arterial: 7.46  pH, Blood: x     /  pCO2: 39    /  pO2: 123   / HCO3: 27    / Base Excess: 3.4   /  SaO2: 99                  ======================Micro/Rad/Cardio=================  Culture: Reviewed   CXR: Reviewed  Echo:Reviewed  ======================================================  PAST MEDICAL & SURGICAL HISTORY:  Mitral prolapse    Hypercholesteremia    Hypertension    History of hernia repair      ====================ASSESMENT ==============  CNS:  RES:  CVS:  Hem:  Max:  GI:  Endo:  ID:  Skin  Plan:  ====================== NEUROLOGY=====================  dexMEDEtomidine Infusion 0.5 MICROgram(s)/kG/Hr (9.13 mL/Hr) IV Continuous <Continuous>  metoclopramide Injectable 10 milliGRAM(s) IV Push every 8 hours  oxycodone    5 mG/acetaminophen 325 mG 1 Tablet(s) Oral every 4 hours PRN Moderate Pain (4 - 6)  oxycodone    5 mG/acetaminophen 325 mG 2 Tablet(s) Oral every 6 hours PRN Severe Pain (7 - 10)    ==================== RESPIRATORY======================  Mechanical Ventilation:      ====================CARDIOVASCULAR==================  DOBUTamine Infusion 2.5 MICROgram(s)/kG/Min (5.48 mL/Hr) IV Continuous <Continuous>  furosemide    Tablet 20 milliGRAM(s) Oral two times a day  phenylephrine    Infusion 1 MICROgram(s)/kG/Min (27.4 mL/Hr) IV Continuous <Continuous>    ===================HEMATOLOGIC/ONC ===================  aspirin enteric coated 81 milliGRAM(s) Oral daily  enoxaparin Injectable 40 milliGRAM(s) SubCutaneous daily    ===================== RENAL =========================  Coffman for monitoring urine output    ==================== GASTROINTESTINAL===================  famotidine Injectable 20 milliGRAM(s) IV Push every 12 hours  magnesium sulfate  IVPB 1 Gram(s) IV Intermittent once  polyethylene glycol 3350 17 Gram(s) Oral daily  potassium chloride  10 mEq/50 mL IVPB 10 milliEquivalent(s) IV Intermittent every 1 hour  sodium chloride 0.9%. 1000 milliLiter(s) (10 mL/Hr) IV Continuous <Continuous>    =======================    ENDOCRINE  =====================  insulin lispro (ADMELOG) corrective regimen sliding scale   SubCutaneous three times a day before meals  rosuvastatin 10 milliGRAM(s) Oral at bedtime    ========================INFECTIOUS DISEASE================    .crit           MARCOS AGUILAR  MRN-[id number]  Patient is a 85y old  Male who presents with a chief complaint of edema (27 Nov 2020 19:25)    HPI:    >>>>>>Medical Attending Initial / Admission note<<<<<<  -------------------------------------------------------------------------------  CHIEF COMPLAINT & HISTORY OF PRESENT ILLNESS:      pt is an 86 y/o man with pmhx of htn, hld, hiatal hernia / GERD, OA post Lt TKR x1 year ago presenting with concern for increasing redness of left leg and swelling for 2 months.   Patient reports that he has been having increasing swelling of both of his legs for approximately 2-3 months; however, the left leg has been more swollen and has been getting increasingly more red. He states that he went to his podiatrist when he noted some rash on the lower portion of his left just above his foot, was given a 'cream of some kind, and feels that the redness and swelling got worse after starting the cream.'   He states that he was having chest pain approximately 1 mo ago, went to his PMD and was diagnosed with a hiatal hernia with an xray and prescribed omeprazole.   Patient reports that he has been becoming increasingly more short of breath for approximately 2 months as well. Feels that when he takes just a few steps he has to stop and catch his breath, and is so short of breath that he cannot speak in complete sentences without resting for a period of time first.   pt also states was given amoxicillin a few days ago but only took one dose  He denies any fevers, chills, cough, nausea, vomiting, sweating, chest pain  pt admitted for likely acute CHF with elevated troponin / NSTEMI     --------------------------------------------------------------------------------  PAST MEDICAL HISTORY:    HTN  HLD  GERD / Hiatal hernia   OA    --------------------------------------------------------------------------------  PAST SURGICAL HISTORY:    left knee replacement   inguinal hernia repair    --------------------------------------------------------------------------------  FAMILY HISTORY:     sister: breast CA   Mother: gastric cancer   no known heart disease    --------------------------------------------------------------------------------    SOCIAL HISTORY:  Alcohol: None reported  Smoking: None reported     --------------------------------------------------------------------------------  ALLERGIES:     listed to have allergies to statins , valsartan, naprosyn ( none with rash or swelling )      --------------------------------------------------------------------------------  MEDICATIONS:   [As silvana, александр]    --------------------------------------------------------------------------------  REVIEW OF SYSTEM:    GEN: no fever, no chills, no pain  RESP: + SOB with minimal exertion , no cough, no sputum  CVS: no chest pain, no palpitations, ++ edema with redness of left leg as above   GI: no abdominal pain, no nausea, no vomiting, no constipation, no diarrhea  : no dysuria, no frequency, no hematuria  Neuro: no headache, no dizziness  PSYCH: no anxiety, no depression  Derm : no itching, no rash, redness as above     --------------------------------------------------------------------------------  VITAL SIGNS:     T(C): 36.4 (11-18-20 @ 10:32), Max: 36.4 (11-18-20 @ 10:32)  HR: 55 (11-18-20 @ 16:23) (55 - 63)  BP: 167/76 (11-18-20 @ 16:23) (155/92 - 167/76)  RR: 18 (11-18-20 @ 16:23) (18 - 18)  SpO2: 100% (11-18-20 @ 16:23) (96% - 100%)     --------------------------------------------------------------------------------  PHYSICAL EXAM:    GEN: A&O X 3 , NAD , comfortable, pleasant   HEENT: NCAT, PERRL, MMM, hearing intact  Neck: supple , mild JVD  CVS: S1S2 , regular , No M/R/G appreciated  PULM: CTA B/L,  decreased BS B/L   ABD.: soft. non tender, non distended,  bowel sounds present  Extrem: intact pulses , ++ B/L LE 3+ edema  with chronic stasis changes of left leg   Derm: No rash , no ecchymoses, changes as above in left leg   PSYCH : normal mood,  no delusion not anxious    --------------------------------------------------------------------------------    LAB AND IMAGING:   [As silvana, reviewed]    --------------------------------------------------------------------------------  ASSESSMENT AND PLAN:   [As silvana]    --------------------  Case discussed with pt, wife, ER, cardio  ___________________________  H. INEZ Phipps.  Pager: [phone]    (18 Nov 2020 16:09)      Surgery/Hospital course:    Today:    REVIEW OF SYSTEMS:  Gen: No fever  EYES/ENT: No visual changes;  No vertigo or throat pain   NECK: No pain   RES:  No shortness of breath or Cough  Chest: + incisional pain  CARD: No chest pain   GI: No abdominal pain  : No dysuria  NEURO: No weakness  SKIN: No itching, rashes     Physical Exam:  Vital Signs Last 24 Hrs  T(C): 37.3 (27 Nov 2020 19:16), Max: 37.3 (27 Nov 2020 08:00)  T(F): 99.1 (27 Nov 2020 19:16), Max: 99.1 (27 Nov 2020 08:00)  HR: 80 (28 Nov 2020 05:00) (59 - 135)  BP: 109/63 (28 Nov 2020 00:00) (70/41 - 109/63)  BP(mean): 81 (28 Nov 2020 00:00) (52 - 81)  RR: 15 (28 Nov 2020 05:00) (15 - 113)  SpO2: 98% (28 Nov 2020 05:00) (65% - 100%)  Gen:  Awake, alert   CNS: non focal 	  Neck: no JVD  RES : clear , no wheezing    Chest:   + chest tubes                     CVS: Regular  rhythm. Normal S1/S2  Abd: Soft, non-distended. Bowel sounds present.  Skin: No rash.  Ext:  no edema, A Line  PSY:  ============================I/O===========================   I&O's Detail    26 Nov 2020 07:01  -  27 Nov 2020 07:00  --------------------------------------------------------  IN:    Dexmedetomidine: 5.5 mL    DOBUTamine: 82.5 mL    DOBUTamine: 99 mL    IV PiggyBack: 150 mL    Oral Fluid: 150 mL    sodium chloride 0.9%: 130 mL    Vasopressin: 28 mL    Vasopressin: 23 mL  Total IN: 668 mL    OUT:    Chest Tube (mL): 70 mL    Chest Tube (mL): 250 mL    Indwelling Catheter - Urethral (mL): 2485 mL    Norepinephrine: 0 mL  Total OUT: 2805 mL    Total NET: -2137 mL      27 Nov 2020 07:01  -  28 Nov 2020 06:41  --------------------------------------------------------  IN:    DOBUTamine: 11 mL    DOBUTamine: 16.5 mL    Oral Fluid: 570 mL    Phenylephrine: 109.7 mL    sodium chloride 0.9%: 110 mL  Total IN: 817.2 mL    OUT:    Chest Tube (mL): 10 mL    Dexmedetomidine: 0 mL    Indwelling Catheter - Urethral (mL): 1065 mL    Vasopressin: 0 mL  Total OUT: 1075 mL    Total NET: -257.8 mL        ============================ LABS =========================                        10.9   16.95 )-----------( 109      ( 28 Nov 2020 00:38 )             32.5     11-28    133<L>  |  97  |  30<H>  ----------------------------<  104<H>  5.6<H>   |  22  |  1.12    Ca    8.7      28 Nov 2020 00:38  Phos  3.0     11-28  Mg     3.1     11-28    TPro  6.0  /  Alb  3.9  /  TBili  1.3<H>  /  DBili  x   /  AST  213<H>  /  ALT  178<H>  /  AlkPhos  98  11-28    LIVER FUNCTIONS - ( 28 Nov 2020 00:38 )  Alb: 3.9 g/dL / Pro: 6.0 g/dL / ALK PHOS: 98 U/L / ALT: 178 U/L / AST: 213 U/L / GGT: x             ABG - ( 28 Nov 2020 06:08 )  pH, Arterial: 7.46  pH, Blood: x     /  pCO2: 39    /  pO2: 123   / HCO3: 27    / Base Excess: 3.4   /  SaO2: 99                  ======================Micro/Rad/Cardio=================  Culture: Reviewed   CXR: Reviewed  Echo:Reviewed  ======================================================  PAST MEDICAL & SURGICAL HISTORY:  Mitral prolapse    Hypercholesteremia    Hypertension    History of hernia repair      ====================ASSESMENT ==============   atrial  fibrillation rapid ventricular rate  Hemodynamic instability    acute systolic congestive heart failure   CAD/ASHD, S/p CABG X 5   lactic acidosis   HLD(Hyperlipidemia)    Plan:  ====================== NEUROLOGY=====================  dexMEDEtomidine Infusion 0.5 MICROgram(s)/kG/Hr (9.13 mL/Hr) IV Continuous <Continuous>  metoclopramide Injectable 10 milliGRAM(s) IV Push every 8 hours  oxycodone    5 mG/acetaminophen 325 mG 1 Tablet(s) Oral every 4 hours PRN Moderate Pain (4 - 6)  oxycodone    5 mG/acetaminophen 325 mG 2 Tablet(s) Oral every 6 hours PRN Severe Pain (7 - 10)    ==================== RESPIRATORY======================   oxygen supplement  BiPAP support as needed    ====================CARDIOVASCULAR==================  DOBUTamine Infusion 2.5 MICROgram(s)/kG/Min (5.48 mL/Hr) IV Continuous <Continuous>  furosemide    Tablet 20 milliGRAM(s) Oral two times a day  phenylephrine    Infusion 1 MICROgram(s)/kG/Min (27.4 mL/Hr) IV Continuous <Continuous>    ===================HEMATOLOGIC/ONC ===================  aspirin enteric coated 81 milliGRAM(s) Oral daily  enoxaparin Injectable 40 milliGRAM(s) SubCutaneous daily    ===================== RENAL =========================  Coffman for monitoring urine output    ==================== GASTROINTESTINAL===================  famotidine Injectable 20 milliGRAM(s) IV Push every 12 hours  magnesium sulfate  IVPB 1 Gram(s) IV Intermittent once  polyethylene glycol 3350 17 Gram(s) Oral daily  potassium chloride  10 mEq/50 mL IVPB 10 milliEquivalent(s) IV Intermittent every 1 hour  sodium chloride 0.9%. 1000 milliLiter(s) (10 mL/Hr) IV Continuous <Continuous>    =======================    ENDOCRINE  =====================  insulin lispro (ADMELOG) corrective regimen sliding scale   SubCutaneous three times a day before meals  rosuvastatin 10 milliGRAM(s) Oral at bedtime      Patient requires continuous monitoring with:  bedside rhythm monitoring, arterial line, pulse oximetry, ventilator management and monitoring; intermittent blood gas analysis.  Care plan discussed with ICU care team.  patient remain critical; required more than usual post op care; I have spent 35 minutes providing non routine post op care, revaluated multiple times during the day .

## 2020-11-28 NOTE — PROGRESS NOTE ADULT - SUBJECTIVE AND OBJECTIVE BOX
MARCOS AGUILAR  MRN-[id number]  Patient is a 85y old  Male who presents with a chief complaint of edema; coronary artery disease (28 Nov 2020 06:40)    HPI:  pt is an 86 y/o man with pmhx of htn, hld, hiatal hernia / GERD, OA post Lt TKR x1 year ago presenting with concern for increasing redness of left leg and swelling for 2 months.   Patient reports that he has been having increasing swelling of both of his legs for approximately 2-3 months; however, the left leg has been more swollen and has been getting increasingly more red. He states that he went to his podiatrist when he noted some rash on the lower portion of his left just above his foot, was given a 'cream of some kind, and feels that the redness and swelling got worse after starting the cream.'   He states that he was having chest pain approximately 1 mo ago, went to his PMD and was diagnosed with a hiatal hernia with an xray and prescribed omeprazole.   Patient reports that he has been becoming increasingly more short of breath for approximately 2 months as well. Feels that when he takes just a few steps he has to stop and catch his breath, and is so short of breath that he cannot speak in complete sentences without resting for a period of time first.   pt also states was given amoxicillin a few days ago but only took one dose  He denies any fevers, chills, cough, nausea, vomiting, sweating, chest pain  pt admitted for likely acute CHF with elevated troponin / NSTEMI    (18 Nov 2020 16:09)      Surgery/Hospital Course:  11/25 CABG x3   11/27 Rapid afib with RVR     Today:  No acute events     ICU Vital Signs Last 24 Hrs  T(C): 35.6 (28 Nov 2020 08:00), Max: 37.3 (27 Nov 2020 19:16)  T(F): 96 (28 Nov 2020 08:00), Max: 99.1 (27 Nov 2020 19:16)  HR: 80 (28 Nov 2020 09:00) (59 - 135)  BP: 109/63 (28 Nov 2020 00:00) (70/41 - 109/63)  BP(mean): 81 (28 Nov 2020 00:00) (52 - 81)  ABP: 124/64 (28 Nov 2020 09:00) (77/57 - 164/62)  ABP(mean): 85 (28 Nov 2020 09:00) (56 - 94)  RR: 22 (28 Nov 2020 09:00) (14 - 113)  SpO2: 100% (28 Nov 2020 09:00) (65% - 100%)      Physical Exam:  Gen:  NAD   CNS: sedated   Neck: no JVD  RES : clear , no wheezing              CVS: Regular  rhythm. Normal S1/S2  Chest: +chest tubes  Abd: Soft, non-distended. Bowel sounds present.  Skin: No rash.  Ext:  no edema    ============================I/O===========================   I&O's Detail    27 Nov 2020 07:01  -  28 Nov 2020 07:00  --------------------------------------------------------  IN:    Dexmedetomidine: 18.2 mL    DOBUTamine: 11 mL    DOBUTamine: 33 mL    IV PiggyBack: 150 mL    Oral Fluid: 570 mL    Phenylephrine: 137.1 mL    sodium chloride 0.9%: 200 mL  Total IN: 1119.3 mL    OUT:    Chest Tube (mL): 10 mL    Dexmedetomidine: 0 mL    Indwelling Catheter - Urethral (mL): 1310 mL    Vasopressin: 0 mL  Total OUT: 1320 mL    Total NET: -200.7 mL      28 Nov 2020 07:01  -  28 Nov 2020 09:50  --------------------------------------------------------  IN:    DOBUTamine: 11 mL    sodium chloride 0.9%: 20 mL  Total IN: 31 mL    OUT:    Dexmedetomidine: 0 mL    Indwelling Catheter - Urethral (mL): 30 mL  Total OUT: 30 mL    Total NET: 1 mL        ============================ LABS =========================                        10.9   16.95 )-----------( 109      ( 28 Nov 2020 00:38 )             32.5     11-28    135  |  101  |  31<H>  ----------------------------<  80  4.4   |  25  |  1.00    Ca    8.2<L>      28 Nov 2020 06:45  Phos  3.0     11-28  Mg     3.1     11-28    TPro  5.3<L>  /  Alb  3.3  /  TBili  1.0  /  DBili  x   /  AST  1592<H>  /  ALT  1135<H>  /  AlkPhos  97  11-28    LIVER FUNCTIONS - ( 28 Nov 2020 06:45 )  Alb: 3.3 g/dL / Pro: 5.3 g/dL / ALK PHOS: 97 U/L / ALT: 1135 U/L / AST: 1592 U/L / GGT: x             ABG - ( 28 Nov 2020 06:08 )  pH, Arterial: 7.46  pH, Blood: x     /  pCO2: 39    /  pO2: 123   / HCO3: 27    / Base Excess: 3.4   /  SaO2: 99                  ======================Micro/Rad/Cardio=================  CXR: Reviewed  Echo:Reviewed  ======================================================  PAST MEDICAL & SURGICAL HISTORY:  Mitral prolapse    Hypercholesteremia    Hypertension    History of hernia repair      ====================ASSESSMENT ==============  CAD s/p CABG   Rapid afib with RVR   Hypovolemia   Delirium   Hypertension   Hyperlipidemia       Plan:  ====================== NEUROLOGY=====================  Sedated with IV Precedex for delirium   Oxycodone prn for analgesia     dexMEDEtomidine Infusion 0.5 MICROgram(s)/kG/Hr (9.13 mL/Hr) IV Continuous <Continuous>  oxycodone    5 mG/acetaminophen 325 mG 1 Tablet(s) Oral every 4 hours PRN Moderate Pain (4 - 6)  oxycodone    5 mG/acetaminophen 325 mG 2 Tablet(s) Oral every 6 hours PRN Severe Pain (7 - 10)    ==================== RESPIRATORY======================  Stable on RA   Encourage incentive spirometry, continue pulse ox monitoring, follow ABGs     ====================CARDIOVASCULAR==================  CAD s/p CABG   Continue invasive hemodynamic monitoring   Inotropic support with IV Dobutamine   IV Phenylephrine for afib with RVR   ASA for CAD     aspirin enteric coated 81 milliGRAM(s) Oral daily  DOBUTamine Infusion 2.5 MICROgram(s)/kG/Min (5.48 mL/Hr) IV Continuous <Continuous>  phenylephrine    Infusion 1 MICROgram(s)/kG/Min (27.4 mL/Hr) IV Continuous <Continuous>    ===================HEMATOLOGIC/ONC ===================  Anemia, monitor H&H/Plts      VTE prophylaxis, enoxaparin Injectable 40 milliGRAM(s) SubCutaneous daily    ===================== RENAL =========================  Continue monitoring urine output, I&OS, BUN/Cr   Diuresis with Lasix     furosemide    Tablet 20 milliGRAM(s) Oral two times a day    ==================== GASTROINTESTINAL===================  Tolerating consistent carb diet   Bowel regimen with Miralax     magnesium sulfate  IVPB 1 Gram(s) IV Intermittent once  GI prophylaxis, pantoprazole    Tablet 40 milliGRAM(s) Oral before breakfast  polyethylene glycol 3350 17 Gram(s) Oral daily  sodium chloride 0.9%. 1000 milliLiter(s) (10 mL/Hr) IV Continuous <Continuous>    =======================    ENDOCRINE  =====================  Glucose control with admelog sliding scale for stress hyperglycemia     insulin lispro (ADMELOG) corrective regimen sliding scale   SubCutaneous three times a day before meals    ========================INFECTIOUS DISEASE================  Afebrile, WBC rising 14.49->16.95   Trend WBC & fever       Patient requires continuous monitoring with bedside rhythm monitoring, pulse ox monitoring, and intermittent blood gas analysis. Care plan discussed with ICU care team. Patient remained critical and at risk for life threatening decompensation.     By signing my name below, I, Katelyn Guadalupe, attest that this documentation has been prepared under the direction and in the presence of Uriel Wesley MD   Electronically signed: Lillie Cordero, 11-28-20 @ 09:50    I, Uriel Wesley, personally performed the services described in this documentation. all medical record entries made by the dannyibpedro were at my direction and in my presence. I have reviewed the chart and agree that the record reflects my personal performance and is accurate and complete  Electronically signed: Uriel Wesley MD

## 2020-11-28 NOTE — PROCEDURE NOTE - NSTIMEOUT_GEN_A_CORE
Patient's first and last name, , procedure, and correct site confirmed prior to the start of procedure. 04-Oct-2017 04-Oct-2017 09:08

## 2020-11-28 NOTE — PROGRESS NOTE ADULT - SUBJECTIVE AND OBJECTIVE BOX
CC: f/u for  cellulitis left leg  Patient reports  feels much better   REVIEW OF SYSTEMS:  All other review of systems negative (Comprehensive ROS)    Antimicrobials Day #  :  piperacillin/tazobactam IVPB.. 3.375 Gram(s) IV Intermittent every 8 hours    Other Medications Reviewed    T(F): 100.2 (20 @ 20:00), Max: 100.2 (20 @ 16:00)  HR: 85 (20 @ 21:00)  BP: 109/63 (20 @ 00:00)  RR: 26 (20 @ 21:00)  SpO2: 98% (20 @ 21:00)  Wt(kg): --    PHYSICAL EXAM:  General: alert, no acute distress  Eyes:  anicteric, no conjunctival injection, no discharge  Oropharynx: no lesions or injection 	  Neck: supple, without adenopathy  Lungs: course  to auscultation  Heart: irregular rate and rhythm; no murmur, rubs or gallops  Abdomen: soft, nondistended, nontender, without mass or organomegaly  Skin: no lesions  Extremities: no clubbing, cyanosis,. right leg wrapped, left mild edema  Neurologic: alert, oriented, moves all extremities    LAB RESULTS:                        10.9   16.95 )-----------( 109      ( 2020 00:38 )             32.5         135  |  101  |  31<H>  ----------------------------<  80  4.4   |  25  |  1.00    Ca    8.2<L>      2020 06:45  Phos  3.0       Mg     3.1         TPro  5.3<L>  /  Alb  3.3  /  TBili  1.0  /  DBili  x   /  AST  1592<H>  /  ALT  1135<H>  /  AlkPhos  97      LIVER FUNCTIONS - ( 2020 06:45 )  Alb: 3.3 g/dL / Pro: 5.3 g/dL / ALK PHOS: 97 U/L / ALT: 1135 U/L / AST: 1592 U/L / GGT: x           Urinalysis Basic - ( 2020 17:43 )    Color: Yellow / Appearance: Turbid / S.022 / pH: x  Gluc: x / Ketone: Negative  / Bili: Negative / Urobili: Negative   Blood: x / Protein: 30 mg/dL / Nitrite: Negative   Leuk Esterase: Negative / RBC: 147 /hpf / WBC 9 /HPF   Sq Epi: x / Non Sq Epi: 2 /hpf / Bacteria: Negative      MICROBIOLOGY:  RECENT CULTURES:      RADIOLOGY REVIEWED:              Assessment:  patient admitted with chf, edema legs, left leg cellultiis tx with abx, Found to need cabg, had it done , yesterday rapid afib, required  inotrope and pressor with shock liver. No infection apparent.   Plan:  monitor off antibiotics

## 2020-11-28 NOTE — CHART NOTE - NSCHARTNOTEFT_GEN_A_CORE
11/27 @ 2200 Rapid afib w/ -180. S/p IVP lopressor x3. S/p IV amio bolus x1, phenylephrine gtt started SBP 80-90. Remained persistent afib 140s after intervetions. [employer] d/c'd. Intensivist at bedside, Pt transferred to CTU for further management.

## 2020-11-29 LAB
ALBUMIN SERPL ELPH-MCNC: 3.5 G/DL — SIGNIFICANT CHANGE UP (ref 3.3–5)
ALP SERPL-CCNC: 103 U/L — SIGNIFICANT CHANGE UP (ref 40–120)
ALT FLD-CCNC: 982 U/L — HIGH (ref 10–45)
ANION GAP SERPL CALC-SCNC: 12 MMOL/L — SIGNIFICANT CHANGE UP (ref 5–17)
AST SERPL-CCNC: 942 U/L — HIGH (ref 10–40)
BASE EXCESS BLDV CALC-SCNC: -0.9 MMOL/L — SIGNIFICANT CHANGE UP (ref -2–2)
BASE EXCESS BLDV CALC-SCNC: -0.9 MMOL/L — SIGNIFICANT CHANGE UP (ref -2–2)
BASE EXCESS BLDV CALC-SCNC: 3.3 MMOL/L — HIGH (ref -2–2)
BILIRUB SERPL-MCNC: 1.1 MG/DL — SIGNIFICANT CHANGE UP (ref 0.2–1.2)
BLD GP AB SCN SERPL QL: NEGATIVE — SIGNIFICANT CHANGE UP
BUN SERPL-MCNC: 32 MG/DL — HIGH (ref 7–23)
CA-I SERPL-SCNC: 1.1 MMOL/L — LOW (ref 1.12–1.3)
CALCIUM SERPL-MCNC: 8.3 MG/DL — LOW (ref 8.4–10.5)
CHLORIDE BLDV-SCNC: 97 MMOL/L — SIGNIFICANT CHANGE UP (ref 96–108)
CHLORIDE SERPL-SCNC: 96 MMOL/L — SIGNIFICANT CHANGE UP (ref 96–108)
CO2 BLDV-SCNC: 24 MMOL/L — SIGNIFICANT CHANGE UP (ref 22–30)
CO2 BLDV-SCNC: 25 MMOL/L — SIGNIFICANT CHANGE UP (ref 22–30)
CO2 BLDV-SCNC: 29 MMOL/L — SIGNIFICANT CHANGE UP (ref 22–30)
CO2 SERPL-SCNC: 23 MMOL/L — SIGNIFICANT CHANGE UP (ref 22–31)
CREAT SERPL-MCNC: 0.92 MG/DL — SIGNIFICANT CHANGE UP (ref 0.5–1.3)
GAS PNL BLDA: SIGNIFICANT CHANGE UP
GAS PNL BLDV: 126 MMOL/L — LOW (ref 135–145)
GAS PNL BLDV: SIGNIFICANT CHANGE UP
GLUCOSE BLDC GLUCOMTR-MCNC: 116 MG/DL — HIGH (ref 70–99)
GLUCOSE BLDV-MCNC: 210 MG/DL — HIGH (ref 70–99)
GLUCOSE SERPL-MCNC: 192 MG/DL — HIGH (ref 70–99)
HCO3 BLDV-SCNC: 23 MMOL/L — SIGNIFICANT CHANGE UP (ref 21–29)
HCO3 BLDV-SCNC: 24 MMOL/L — SIGNIFICANT CHANGE UP (ref 21–29)
HCO3 BLDV-SCNC: 28 MMOL/L — SIGNIFICANT CHANGE UP (ref 21–29)
HCT VFR BLD CALC: 27.1 % — LOW (ref 39–50)
HCT VFR BLDA CALC: 29 % — LOW (ref 39–50)
HGB BLD CALC-MCNC: 9.2 G/DL — LOW (ref 13–17)
HGB BLD-MCNC: 9.3 G/DL — LOW (ref 13–17)
HOROWITZ INDEX BLDV+IHG-RTO: 28 — SIGNIFICANT CHANGE UP
HOROWITZ INDEX BLDV+IHG-RTO: 36 — SIGNIFICANT CHANGE UP
HOROWITZ INDEX BLDV+IHG-RTO: 36 — SIGNIFICANT CHANGE UP
LACTATE BLDV-MCNC: 1.8 MMOL/L — SIGNIFICANT CHANGE UP (ref 0.7–2)
MAGNESIUM SERPL-MCNC: 2.9 MG/DL — HIGH (ref 1.6–2.6)
MCHC RBC-ENTMCNC: 33.6 PG — SIGNIFICANT CHANGE UP (ref 27–34)
MCHC RBC-ENTMCNC: 34.3 GM/DL — SIGNIFICANT CHANGE UP (ref 32–36)
MCV RBC AUTO: 97.8 FL — SIGNIFICANT CHANGE UP (ref 80–100)
NRBC # BLD: 0 /100 WBCS — SIGNIFICANT CHANGE UP (ref 0–0)
OTHER CELLS CSF MANUAL: 5 ML/DL — LOW (ref 18–22)
PCO2 BLDV: 37 MMHG — SIGNIFICANT CHANGE UP (ref 35–50)
PCO2 BLDV: 40 MMHG — SIGNIFICANT CHANGE UP (ref 35–50)
PCO2 BLDV: 45 MMHG — SIGNIFICANT CHANGE UP (ref 35–50)
PH BLDV: 7.38 — SIGNIFICANT CHANGE UP (ref 7.35–7.45)
PH BLDV: 7.4 — SIGNIFICANT CHANGE UP (ref 7.35–7.45)
PH BLDV: 7.41 — SIGNIFICANT CHANGE UP (ref 7.35–7.45)
PHOSPHATE SERPL-MCNC: 2 MG/DL — LOW (ref 2.5–4.5)
PLATELET # BLD AUTO: 82 K/UL — LOW (ref 150–400)
PO2 BLDV: 25 MMHG — SIGNIFICANT CHANGE UP (ref 25–45)
PO2 BLDV: 26 MMHG — SIGNIFICANT CHANGE UP (ref 25–45)
PO2 BLDV: 32 MMHG — SIGNIFICANT CHANGE UP (ref 25–45)
POTASSIUM BLDV-SCNC: 5.6 MMOL/L — HIGH (ref 3.5–5.3)
POTASSIUM SERPL-MCNC: 4.5 MMOL/L — SIGNIFICANT CHANGE UP (ref 3.5–5.3)
POTASSIUM SERPL-SCNC: 4.5 MMOL/L — SIGNIFICANT CHANGE UP (ref 3.5–5.3)
PROT SERPL-MCNC: 5.6 G/DL — LOW (ref 6–8.3)
RBC # BLD: 2.77 M/UL — LOW (ref 4.2–5.8)
RBC # FLD: 14.7 % — HIGH (ref 10.3–14.5)
RH IG SCN BLD-IMP: NEGATIVE — SIGNIFICANT CHANGE UP
SAO2 % BLDV: 36 % — LOW (ref 67–88)
SAO2 % BLDV: 37 % — LOW (ref 67–88)
SAO2 % BLDV: 54 % — LOW (ref 67–88)
SODIUM SERPL-SCNC: 131 MMOL/L — LOW (ref 135–145)
WBC # BLD: 10.24 K/UL — SIGNIFICANT CHANGE UP (ref 3.8–10.5)
WBC # FLD AUTO: 10.24 K/UL — SIGNIFICANT CHANGE UP (ref 3.8–10.5)

## 2020-11-29 PROCEDURE — 99292 CRITICAL CARE ADDL 30 MIN: CPT

## 2020-11-29 PROCEDURE — 71045 X-RAY EXAM CHEST 1 VIEW: CPT | Mod: 26

## 2020-11-29 PROCEDURE — 36620 INSERTION CATHETER ARTERY: CPT | Mod: 78

## 2020-11-29 PROCEDURE — 93010 ELECTROCARDIOGRAM REPORT: CPT

## 2020-11-29 PROCEDURE — 99291 CRITICAL CARE FIRST HOUR: CPT

## 2020-11-29 RX ORDER — AMIODARONE HYDROCHLORIDE 400 MG/1
150 TABLET ORAL ONCE
Refills: 0 | Status: COMPLETED | OUTPATIENT
Start: 2020-11-29 | End: 2020-11-29

## 2020-11-29 RX ORDER — AMIODARONE HYDROCHLORIDE 400 MG/1
0.5 TABLET ORAL
Qty: 900 | Refills: 0 | Status: DISCONTINUED | OUTPATIENT
Start: 2020-11-29 | End: 2020-11-29

## 2020-11-29 RX ORDER — DILTIAZEM HCL 120 MG
10 CAPSULE, EXT RELEASE 24 HR ORAL
Qty: 125 | Refills: 0 | Status: DISCONTINUED | OUTPATIENT
Start: 2020-11-29 | End: 2020-11-29

## 2020-11-29 RX ORDER — CALCIUM GLUCONATE 100 MG/ML
1 VIAL (ML) INTRAVENOUS ONCE
Refills: 0 | Status: COMPLETED | OUTPATIENT
Start: 2020-11-29 | End: 2020-11-29

## 2020-11-29 RX ORDER — AMIODARONE HYDROCHLORIDE 400 MG/1
1 TABLET ORAL
Qty: 900 | Refills: 0 | Status: DISCONTINUED | OUTPATIENT
Start: 2020-11-29 | End: 2020-12-01

## 2020-11-29 RX ORDER — VERAPAMIL HCL 240 MG
2.5 CAPSULE, EXTENDED RELEASE PELLETS 24 HR ORAL ONCE
Refills: 0 | Status: COMPLETED | OUTPATIENT
Start: 2020-11-29 | End: 2020-11-29

## 2020-11-29 RX ORDER — DIGOXIN 250 MCG
0.5 TABLET ORAL ONCE
Refills: 0 | Status: COMPLETED | OUTPATIENT
Start: 2020-11-29 | End: 2020-11-29

## 2020-11-29 RX ORDER — AMIODARONE HYDROCHLORIDE 400 MG/1
1 TABLET ORAL
Qty: 900 | Refills: 0 | Status: DISCONTINUED | OUTPATIENT
Start: 2020-11-29 | End: 2020-11-29

## 2020-11-29 RX ORDER — DILTIAZEM HCL 120 MG
26 CAPSULE, EXT RELEASE 24 HR ORAL ONCE
Refills: 0 | Status: COMPLETED | OUTPATIENT
Start: 2020-11-29 | End: 2020-11-29

## 2020-11-29 RX ORDER — DIGOXIN 250 MCG
0.25 TABLET ORAL ONCE
Refills: 0 | Status: COMPLETED | OUTPATIENT
Start: 2020-11-29 | End: 2020-11-29

## 2020-11-29 RX ORDER — DILTIAZEM HCL 120 MG
18 CAPSULE, EXT RELEASE 24 HR ORAL ONCE
Refills: 0 | Status: COMPLETED | OUTPATIENT
Start: 2020-11-29 | End: 2020-11-29

## 2020-11-29 RX ORDER — FUROSEMIDE 40 MG
20 TABLET ORAL ONCE
Refills: 0 | Status: COMPLETED | OUTPATIENT
Start: 2020-11-29 | End: 2020-11-29

## 2020-11-29 RX ORDER — DILTIAZEM HCL 120 MG
20 CAPSULE, EXT RELEASE 24 HR ORAL
Qty: 125 | Refills: 0 | Status: DISCONTINUED | OUTPATIENT
Start: 2020-11-29 | End: 2020-11-29

## 2020-11-29 RX ORDER — MILRINONE LACTATE 1 MG/ML
0.2 INJECTION, SOLUTION INTRAVENOUS
Qty: 20 | Refills: 0 | Status: DISCONTINUED | OUTPATIENT
Start: 2020-11-29 | End: 2020-12-02

## 2020-11-29 RX ADMIN — POLYETHYLENE GLYCOL 3350 17 GRAM(S): 17 POWDER, FOR SOLUTION ORAL at 10:08

## 2020-11-29 RX ADMIN — Medication 2.5 MILLIGRAM(S): at 09:09

## 2020-11-29 RX ADMIN — PIPERACILLIN AND TAZOBACTAM 25 GRAM(S): 4; .5 INJECTION, POWDER, LYOPHILIZED, FOR SOLUTION INTRAVENOUS at 03:02

## 2020-11-29 RX ADMIN — AMIODARONE HYDROCHLORIDE 600 MILLIGRAM(S): 400 TABLET ORAL at 00:15

## 2020-11-29 RX ADMIN — Medication 62.5 MILLIMOLE(S): at 02:38

## 2020-11-29 RX ADMIN — Medication 0.25 MILLIGRAM(S): at 21:12

## 2020-11-29 RX ADMIN — AMIODARONE HYDROCHLORIDE 600 MILLIGRAM(S): 400 TABLET ORAL at 08:31

## 2020-11-29 RX ADMIN — ENOXAPARIN SODIUM 40 MILLIGRAM(S): 100 INJECTION SUBCUTANEOUS at 10:08

## 2020-11-29 RX ADMIN — Medication 26 MILLIGRAM(S): at 13:30

## 2020-11-29 RX ADMIN — Medication 18 MILLIGRAM(S): at 11:03

## 2020-11-29 RX ADMIN — AMIODARONE HYDROCHLORIDE 33.3 MG/MIN: 400 TABLET ORAL at 20:46

## 2020-11-29 RX ADMIN — Medication 0.25 MILLIGRAM(S): at 16:17

## 2020-11-29 RX ADMIN — Medication 20 MILLIGRAM(S): at 10:08

## 2020-11-29 RX ADMIN — PIPERACILLIN AND TAZOBACTAM 25 GRAM(S): 4; .5 INJECTION, POWDER, LYOPHILIZED, FOR SOLUTION INTRAVENOUS at 09:33

## 2020-11-29 RX ADMIN — Medication 18 MILLIGRAM(S): at 12:00

## 2020-11-29 RX ADMIN — Medication 2.5 MILLIGRAM(S): at 09:29

## 2020-11-29 RX ADMIN — PIPERACILLIN AND TAZOBACTAM 25 GRAM(S): 4; .5 INJECTION, POWDER, LYOPHILIZED, FOR SOLUTION INTRAVENOUS at 17:19

## 2020-11-29 RX ADMIN — Medication 100 GRAM(S): at 01:13

## 2020-11-29 RX ADMIN — Medication 20 MILLIGRAM(S): at 21:13

## 2020-11-29 RX ADMIN — Medication 100 GRAM(S): at 00:00

## 2020-11-29 RX ADMIN — Medication 81 MILLIGRAM(S): at 10:08

## 2020-11-29 RX ADMIN — Medication 20 MILLIGRAM(S): at 02:39

## 2020-11-29 RX ADMIN — CHLORHEXIDINE GLUCONATE 1 APPLICATION(S): 213 SOLUTION TOPICAL at 06:32

## 2020-11-29 RX ADMIN — PANTOPRAZOLE SODIUM 40 MILLIGRAM(S): 20 TABLET, DELAYED RELEASE ORAL at 06:35

## 2020-11-29 RX ADMIN — AMIODARONE HYDROCHLORIDE 600 MILLIGRAM(S): 400 TABLET ORAL at 20:46

## 2020-11-29 RX ADMIN — Medication 0.5 MILLIGRAM(S): at 09:33

## 2020-11-29 NOTE — PROVIDER CONTACT NOTE (OTHER) - SITUATION
Ectopy on tele PAT up to 170 for 11 seconds
HR SB 55 over night currently 57 due for am metoprolol 25mg PO QD
Patient scheduled for cardiac cath, report given to cath RN. Patient unaware of cath decision, explaining nobody spoke to him regarding this plan and results from the stress test.
s/p cabgx3. sitting in chair. patient paced DDD @ 90. patient converts from paced rhythm to rapid atrial fibrillation at 0830. Rate is 160s-190s. /58 (MAP 78). sats 100% md yung made aware

## 2020-11-29 NOTE — PROCEDURE NOTE - NSPROCDETAILS_GEN_ALL_CORE
positive blood return obtained via catheter/connected to a pressurized flush line/Seldinger technique/location identified, draped/prepped, sterile technique used, needle inserted/introduced/all materials/supplies accounted for at end of procedure/sutured in place
sutured in place/location identified, draped/prepped, sterile technique used, needle inserted/introduced/connected to a pressurized flush line/Seldinger technique/positive blood return obtained via catheter/all materials/supplies accounted for at end of procedure/ultrasound guidance
sutured in place/ultrasound guidance/location identified, draped/prepped, sterile technique used, needle inserted/introduced/positive blood return obtained via catheter/Seldinger technique/all materials/supplies accounted for at end of procedure/connected to a pressurized flush line/hemostasis with direct pressure, dressing applied

## 2020-11-29 NOTE — PROGRESS NOTE ADULT - SUBJECTIVE AND OBJECTIVE BOX
Progress Note:   · Provider Specialty	Critical Care    Reason for Admission:    Reason for Admission:  · Reason for Admission	Edema      · Subjective and Objective:   MARCOS AGUILAR  [id number]  Patient is a 85y old  Male who presents with a chief complaint of edema (28 Nov 2020 21:35)    HPI:    >>>>>>Medical Attending Initial / Admission note<<<<<<  -------------------------------------------------------------------------------  CHIEF COMPLAINT & HISTORY OF PRESENT ILLNESS:      pt is an 84 y/o man with pmhx of htn, hld, hiatal hernia / GERD, OA post Lt TKR x1 year ago presenting with concern for increasing redness of left leg and swelling for 2 months.   Patient reports that he has been having increasing swelling of both of his legs for approximately 2-3 months; however, the left leg has been more swollen and has been getting increasingly more red. He states that he went to his podiatrist when he noted some rash on the lower portion of his left just above his foot, was given a 'cream of some kind, and feels that the redness and swelling got worse after starting the cream.'   He states that he was having chest pain approximately 1 mo ago, went to his PMD and was diagnosed with a hiatal hernia with an xray and prescribed omeprazole.   Patient reports that he has been becoming increasingly more short of breath for approximately 2 months as well. Feels that when he takes just a few steps he has to stop and catch his breath, and is so short of breath that he cannot speak in complete sentences without resting for a period of time first.   pt also states was given amoxicillin a few days ago but only took one dose  He denies any fevers, chills, cough, nausea, vomiting, sweating, chest pain  pt admitted for likely acute CHF with elevated troponin / NSTEMI  (18 Nov 2020 16:09)      Surgery/Hospital course:  11/25 CABG x3   11/27 Rapid afib with RVR     Today:  AF with RVR  Dobutamine transitioned to Milrinone  Amio gtt held, Diltiazem gtt started     Vital Signs Last 24 Hrs  T(C): 37.1 (29 Nov 2020 08:00), Max: 37.9 (28 Nov 2020 16:00)  T(F): 98.8 (29 Nov 2020 08:00), Max: 100.2 (28 Nov 2020 16:00)  HR: 139 (29 Nov 2020 10:00) (64 - 164)  BP: 110/73 (29 Nov 2020 09:45) (79/51 - 130/68)  BP(mean): 86 (29 Nov 2020 09:45) (61 - 97)  RR: 40 (29 Nov 2020 10:00) (13 - 57)  SpO2: 91% (29 Nov 2020 10:00) (79% - 100%)    Physical Exam:  Gen:  NAD   CNS: nonfocal  Neck: no JVD  RES : clear , no wheezing              CVS: Tachycardic, irregularly irregular rhythm  Chest: +chest tubes  Abd: Soft, non-distended. Bowel sounds present.  Skin: No rash.  Ext:  no edema    ============================I/O===========================   I&O's Detail    28 Nov 2020 07:01  -  29 Nov 2020 07:00  --------------------------------------------------------  IN:    Albumin 5%  - 250 mL: 250 mL    DOBUTamine: 110 mL    DOBUTamine: 37.4 mL    DOBUTamine: 11 mL    IV PiggyBack: 812.5 mL    Milrinone: 41.5 mL    Oral Fluid: 200 mL    Phenylephrine: 71 mL    sodium chloride 0.9%: 120 mL    Vasopressin: 26 mL  Total IN: 1679.4 mL    OUT:    Dexmedetomidine: 0 mL    Indwelling Catheter - Urethral (mL): 2705 mL  Total OUT: 2705 mL    Total NET: -1025.6 mL        ============================ LABS =========================             ABG - ( 30 Nov 2020 00:01 )  pH, Arterial: 7.48  pH, Blood: x     /  pCO2: 37    /  pO2: 88    / HCO3: 27    / Base Excess: 3.8   /  SaO2: 97                              9.3    10.24 )-----------( 82       ( 29 Nov 2020 00:33 )             27.1     11-29    131<L>  |  96  |  32<H>  ----------------------------<  192<H>  4.5   |  23  |  0.92    Ca    8.3<L>      29 Nov 2020 00:33  Phos  2.0     11-29  Mg     2.9     11-29    TPro  5.6<L>  /  Alb  3.5  /  TBili  1.1  /  DBili  x   /  AST  942<H>  /  ALT  982<H>  /  AlkPhos  103  11-29      ======================Micro/Rad/Cardio=================  Telemetry: Reviewed   CXR: Reviewed  Echo: Reviewed  ======================================================  PAST MEDICAL & SURGICAL HISTORY:  Mitral prolapse    Hypercholesteremia    Hypertension    History of hernia repair      ====================ASSESSMENT ==============  CAD s/p CABG   Rapid afib with RVR   Hypovolemia   Delirium   Hypertension   Hyperlipidemia     Plan:  ====================== NEUROLOGY=====================  Off sedation, continue to assess neuro status per ICU protocol.   Percocet PRN for analgesia    oxycodone    5 mG/acetaminophen 325 mG 1 Tablet(s) Oral every 4 hours PRN Moderate Pain (4 - 6)  oxycodone    5 mG/acetaminophen 325 mG 2 Tablet(s) Oral every 6 hours PRN Severe Pain (7 - 10)    ==================== RESPIRATORY======================  Comfortable on room air, SpO2 >97-98%  Continue to monitor SpO2 via pulse oximetry  Encourage bedside spirometry    ====================CARDIOVASCULAR==================  CAD s/p CABG   Continue invasive hemodynamic monitoring   Inotropic support with IV Milrinone  Pressor support with IV Vasopressin  Continue ASA for CAD   Hold Cardizem infusion, restart Amiodarone infusion    digoxin  Injectable 0.25 milliGRAM(s) IV Push once  milrinone Infusion 0.375 MICROgram(s)/kG/Min (8.21 mL/Hr) IV Continuous <Continuous>  aspirin enteric coated 81 milliGRAM(s) Oral daily  vasopressin Infusion 0.033 Unit(s)/Min (2 mL/Hr) IV Continuous <Continuous>  ===================HEMATOLOGIC/ONC ===================  Anemia, monitor H&H/Plts   Continue SQ Lovenox for venous thromboembolism prophylaxis.     enoxaparin Injectable 40 milliGRAM(s) SubCutaneous daily  ===================== RENAL =========================  Continue monitoring urine output, I&OS, BUN/Cr   Diuresis with Lasix to maintain net negative fluid balance    furosemide    Tablet 20 milliGRAM(s) Oral two times a day  ==================== GASTROINTESTINAL===================  Tolerating consistent carb diet   Bowel regimen with Miralax   Continue Protonix for stress ulcer prophylaxis.     pantoprazole    Tablet 40 milliGRAM(s) Oral before breakfast  polyethylene glycol 3350 17 Gram(s) Oral daily  sodium chloride 0.9%. 1000 milliLiter(s) (10 mL/Hr) IV Continuous <Continuous>    =======================ENDOCRINE =====================  Stress hyperglycemia, glycemic control with Humalog sliding scale.  Monitor blood glucose levels.     insulin lispro (ADMELOG) corrective regimen sliding scale   SubCutaneous three times a day before meals    ========================INFECTIOUS DISEASE================  C/w IV Zosyn for empiric abx coverage.  Monitor for leukocytosis / fever.     piperacillin/tazobactam IVPB.. 3.375 Gram(s) IV Intermittent every 8 hours      Patient requires continuous monitoring with bedside rhythm monitoring, arterial line, pulse oximetry, ventilator monitoring and intermittent blood gas analysis.  Care plan discussed with ICU care team.  Patient remained critical; required more than usual post op care; I have spent 35 minutes providing non-routine post op care, revaluated multiple times during the day.    Time: 30 minutes

## 2020-11-29 NOTE — PROCEDURE NOTE - NSINDICATIONS_GEN_A_CORE
monitoring purposes
monitoring purposes/arterial puncture to obtain ABG's/critical patient
arterial puncture to obtain ABG's/monitoring purposes/critical patient

## 2020-11-29 NOTE — PROVIDER CONTACT NOTE (OTHER) - ASSESSMENT
As per patient, no doctor spoke to him this am or last night regarding cath plan.
SBP 90, VSS. Pt denies CP and SOB
VSS. Pt denies CP and SOB
s/p cabgx3. sitting in chair. patient paced DDD @ 90. patient converts from paced rhythm to rapid atrial fibrillation at 0830. Rate is 160s-190s. /58 (MAP 78). sats 100% on 4L nasal cannula. md yung made aware and at bedside.

## 2020-11-29 NOTE — PROGRESS NOTE ADULT - SUBJECTIVE AND OBJECTIVE BOX
CARDIOLOGY     PROGRESS  NOTE   ________________________________________________    CHIEF COMPLAINT:Patient is a 85y old  Male who presents with a chief complaint of Edema (29 Nov 2020 10:25)  no complain.  	  REVIEW OF SYSTEMS:  CONSTITUTIONAL: No fever, weight loss, or fatigue  EYES: No eye pain, visual disturbances, or discharge  ENT:  No difficulty hearing, tinnitus, vertigo; No sinus or throat pain  NECK: No pain or stiffness  RESPIRATORY: No cough, wheezing, chills or hemoptysis; No Shortness of Breath  CARDIOVASCULAR: No chest pain, palpitations, passing out, dizziness, or leg swelling  GASTROINTESTINAL: No abdominal or epigastric pain. No nausea, vomiting, or hematemesis; No diarrhea or constipation. No melena or hematochezia.  GENITOURINARY: No dysuria, frequency, hematuria, or incontinence  NEUROLOGICAL: No headaches, memory loss, loss of strength, numbness, or tremors  SKIN: No itching, burning, rashes, or lesions   LYMPH Nodes: No enlarged glands  ENDOCRINE: No heat or cold intolerance; No hair loss  MUSCULOSKELETAL: No joint pain or swelling; No muscle, back, or extremity pain  PSYCHIATRIC: No depression, anxiety, mood swings, or difficulty sleeping  HEME/LYMPH: No easy bruising, or bleeding gums  ALLERGY AND IMMUNOLOGIC: No hives or eczema	    [ ] All others negative	  [ ] Unable to obtain    PHYSICAL EXAM:  T(C): 37.1 (11-29-20 @ 08:00), Max: 37.9 (11-28-20 @ 16:00)  HR: 139 (11-29-20 @ 10:00) (64 - 164)  BP: 110/73 (11-29-20 @ 09:45) (79/51 - 130/68)  RR: 40 (11-29-20 @ 10:00) (13 - 57)  SpO2: 91% (11-29-20 @ 10:00) (79% - 100%)  Wt(kg): --  I&O's Summary    28 Nov 2020 07:01  -  29 Nov 2020 07:00  --------------------------------------------------------  IN: 1679.4 mL / OUT: 2705 mL / NET: -1025.6 mL    29 Nov 2020 07:01  -  29 Nov 2020 10:38  --------------------------------------------------------  IN: 413.2 mL / OUT: 210 mL / NET: 203.2 mL        Appearance: Normal	  HEENT:   Normal oral mucosa, PERRL, EOMI	  Lymphatic: No lymphadenopathy  Cardiovascular: Normal S1 S2, No JVD, + murmurs, No edema  Respiratory: Lungs clear to auscultation	  Psychiatry: A & O x 3, Mood & affect appropriate  Gastrointestinal:  Soft, Non-tender, + BS	  Skin: No rashes, No ecchymoses, No cyanosis	  Neurologic: Non-focal  Extremities: Normal range of motion, No clubbing, cyanosis or edema  Vascular: Peripheral pulses palpable 2+ bilaterally    MEDICATIONS  (STANDING):  aspirin enteric coated 81 milliGRAM(s) Oral daily  chlorhexidine 2% Cloths 1 Application(s) Topical <User Schedule>  digoxin  Injectable 0.25 milliGRAM(s) IV Push once  diltiazem Infusion 10 mG/Hr (10 mL/Hr) IV Continuous <Continuous>  diltiazem Injectable 18 milliGRAM(s) IV Push once  enoxaparin Injectable 40 milliGRAM(s) SubCutaneous daily  furosemide    Tablet 20 milliGRAM(s) Oral two times a day  insulin lispro (ADMELOG) corrective regimen sliding scale   SubCutaneous three times a day before meals  milrinone Infusion 0.375 MICROgram(s)/kG/Min (8.21 mL/Hr) IV Continuous <Continuous>  pantoprazole    Tablet 40 milliGRAM(s) Oral before breakfast  piperacillin/tazobactam IVPB.. 3.375 Gram(s) IV Intermittent every 8 hours  polyethylene glycol 3350 17 Gram(s) Oral daily  sodium chloride 0.9%. 1000 milliLiter(s) (10 mL/Hr) IV Continuous <Continuous>  vasopressin Infusion 0.033 Unit(s)/Min (2 mL/Hr) IV Continuous <Continuous>      TELEMETRY: 	    ECG:  	  RADIOLOGY:  OTHER: 	  	  LABS:	 	    CARDIAC MARKERS:  CARDIAC MARKERS ( 28 Nov 2020 01:36 )  x     / x     / 245 U/L / x     / 5.2 ng/mL                                9.3    10.24 )-----------( 82       ( 29 Nov 2020 00:33 )             27.1     11-29    131<L>  |  96  |  32<H>  ----------------------------<  192<H>  4.5   |  23  |  0.92    Ca    8.3<L>      29 Nov 2020 00:33  Phos  2.0     11-29  Mg     2.9     11-29    TPro  5.6<L>  /  Alb  3.5  /  TBili  1.1  /  DBili  x   /  AST  942<H>  /  ALT  982<H>  /  AlkPhos  103  11-29    proBNP: Serum Pro-Brain Natriuretic Peptide: 61339 pg/mL (11-18 @ 12:45)    Lipid Profile: Cholesterol 106  LDL --  HDL 40  TG 73    HgA1c:   TSH: Thyroid Stimulating Hormone, Serum: 1.46 uIU/mL (11-19 @ 09:24)  Thyroid Stimulating Hormone, Serum: 1.51 uIU/mL (11-19 @ 08:41)    CAD s/p CABG   Rapid afib with RVR   Hypovolemia   Delirium   Hypertension   Hyperlipidemia     < from: Transthoracic Echocardiogram (11.28.20 @ 06:01) >  1. Thickened mitral valve. Moderate mitral regurgitation.  2. Calcified trileaflet aortic valve with decreased  opening. Peak transaortic valve gradient equals 16 mm Hg,  mean transaortic valve gradient equals 9 mm Hg, estimated  aortic valve area equals 1 sqcm (by continuity equation),  aortic valve velocity time integral equals 35 cm,  consistent with moderate aortic stenosis. Minimal aortic  regurgitation.  3. Endocardial visualization enhanced with intravenous  injection of Ultrasonic Enhancing Agent (Definity).  Moderate global left ventricular systolic dysfunction.  4. Moderate diastolic dysfunction (Stage II).  5. Right ventricular enlargement with decreased right  ventricular systolic function.  6. Normal tricuspid valve. Moderate tricuspid  regurgitation.      Assessment and plan  ---------------------------  pt is an 84 y/o man with pmhx of htn, hld, hiatal hernia / GERD, OA post Lt TKR x1 year ago presenting with concern for increasing redness of left leg and swelling for 2 months.   Patient reports that he has been having increasing swelling of both of his legs for approximately 2-3 months; however, the left leg has been more swollen and has been getting increasingly more red. He states that he went to his podiatrist when he noted some rash on the lower portion of his left just above his foot, was given a 'cream of some kind, and feels that the redness and swelling got worse after starting the cream.'   He states that he was having chest pain approximately 1 mo ago, went to his PMD and was diagnosed with a hiatal hernia with an xray and prescribed omeprazole.   Patient reports that he has been becoming increasingly more short of breath for approximately 2 months as well. Feels that when he takes just a few steps he has to stop and catch his breath, and is so short of breath that he cannot speak in complete sentences without resting for a period of time first.   lft improving, continue mes/ no amio sec to increase lft  av blocking agent to control a.fib  echo noted as above  continue milrinone and vasopressin  plan as per CTU

## 2020-11-29 NOTE — PROGRESS NOTE ADULT - SUBJECTIVE AND OBJECTIVE BOX
MARCOS AGUILAR  MRN-[id number]  Patient is a 85y old  Male who presents with a chief complaint of edema (2020 21:35)    HPI:    >>>>>>Medical Attending Initial / Admission note<<<<<<  -------------------------------------------------------------------------------  CHIEF COMPLAINT & HISTORY OF PRESENT ILLNESS:      pt is an 84 y/o man with pmhx of htn, hld, hiatal hernia / GERD, OA post Lt TKR x1 year ago presenting with concern for increasing redness of left leg and swelling for 2 months.   Patient reports that he has been having increasing swelling of both of his legs for approximately 2-3 months; however, the left leg has been more swollen and has been getting increasingly more red. He states that he went to his podiatrist when he noted some rash on the lower portion of his left just above his foot, was given a 'cream of some kind, and feels that the redness and swelling got worse after starting the cream.'   He states that he was having chest pain approximately 1 mo ago, went to his PMD and was diagnosed with a hiatal hernia with an xray and prescribed omeprazole.   Patient reports that he has been becoming increasingly more short of breath for approximately 2 months as well. Feels that when he takes just a few steps he has to stop and catch his breath, and is so short of breath that he cannot speak in complete sentences without resting for a period of time first.   pt also states was given amoxicillin a few days ago but only took one dose  He denies any fevers, chills, cough, nausea, vomiting, sweating, chest pain  pt admitted for likely acute CHF with elevated troponin / NSTEMI  (2020 16:09)      Surgery/Hospital course:   CABG x3    Rapid afib with RVR     Today:  No acute events.     Vital Signs Last 24 Hrs  T(C): 37.1 (2020 08:00), Max: 37.9 (2020 16:00)  T(F): 98.8 (2020 08:00), Max: 100.2 (2020 16:00)  HR: 139 (2020 10:00) (64 - 164)  BP: 110/73 (2020 09:45) (79/51 - 130/68)  BP(mean): 86 (2020 09:45) (61 - 97)  RR: 40 (2020 10:00) (13 - 57)  SpO2: 91% (2020 10:00) (79% - 100%)    Physical Exam:  Gen:  NAD   CNS: nonfocal  Neck: no JVD  RES : clear , no wheezing              CVS: Regular  rhythm. Normal S1/S2  Chest: +chest tubes  Abd: Soft, non-distended. Bowel sounds present.  Skin: No rash.  Ext:  no edema    ============================I/O===========================   I&O's Detail    2020 07:  -  2020 07:00  --------------------------------------------------------  IN:    Albumin 5%  - 250 mL: 250 mL    DOBUTamine: 110 mL    DOBUTamine: 37.4 mL    DOBUTamine: 11 mL    IV PiggyBack: 812.5 mL    Milrinone: 41.5 mL    Oral Fluid: 200 mL    Phenylephrine: 71 mL    sodium chloride 0.9%: 120 mL    Vasopressin: 26 mL  Total IN: 1679.4 mL    OUT:    Dexmedetomidine: 0 mL    Indwelling Catheter - Urethral (mL): 2705 mL  Total OUT: 2705 mL    Total NET: -1025.6 mL      2020 07:  -  2020 10:26  --------------------------------------------------------  IN:    IV PiggyBack: 100 mL    Milrinone: 33.2 mL    Oral Fluid: 240 mL    sodium chloride 0.9%: 40 mL  Total IN: 413.2 mL    OUT:    Indwelling Catheter - Urethral (mL): 210 mL    Vasopressin: 0 mL  Total OUT: 210 mL    Total NET: 203.2 mL        ============================ LABS =========================                        9.3    10.24 )-----------( 82       ( 2020 00:33 )             27.1     11-    131<L>  |  96  |  32<H>  ----------------------------<  192<H>  4.5   |  23  |  0.92    Ca    8.3<L>      2020 00:33  Phos  2.0     -  Mg     2.9         TPro  5.6<L>  /  Alb  3.5  /  TBili  1.1  /  DBili  x   /  AST  942<H>  /  ALT  982<H>  /  AlkPhos  103  -29    LIVER FUNCTIONS - ( 2020 00:33 )  Alb: 3.5 g/dL / Pro: 5.6 g/dL / ALK PHOS: 103 U/L / ALT: 982 U/L / AST: 942 U/L / GGT: x             ABG - ( 2020 08:07 )  pH, Arterial: 7.48  pH, Blood: x     /  pCO2: 38    /  pO2: 171   / HCO3: 28    / Base Excess: 4.6   /  SaO2: 100               Urinalysis Basic - ( 2020 17:43 )    Color: Yellow / Appearance: Turbid / S.022 / pH: x  Gluc: x / Ketone: Negative  / Bili: Negative / Urobili: Negative   Blood: x / Protein: 30 mg/dL / Nitrite: Negative   Leuk Esterase: Negative / RBC: 147 /hpf / WBC 9 /HPF   Sq Epi: x / Non Sq Epi: 2 /hpf / Bacteria: Negative      ======================Micro/Rad/Cardio=================  Telemetry: Reviewed   CXR: Reviewed  Echo: Reviewed  ======================================================  PAST MEDICAL & SURGICAL HISTORY:  Mitral prolapse    Hypercholesteremia    Hypertension    History of hernia repair      ====================ASSESSMENT ==============  CAD s/p CABG   Rapid afib with RVR   Hypovolemia   Delirium   Hypertension   Hyperlipidemia     Plan:  ====================== NEUROLOGY=====================  Off sedation, continue to assess neuro status per ICU protocol.   Percocet PRN for analgesia    oxycodone    5 mG/acetaminophen 325 mG 1 Tablet(s) Oral every 4 hours PRN Moderate Pain (4 - 6)  oxycodone    5 mG/acetaminophen 325 mG 2 Tablet(s) Oral every 6 hours PRN Severe Pain (7 - 10)    ==================== RESPIRATORY======================  Comfortable on room air, SpO2 >97-98%  Continue to monitor SpO2 via pulse oximetry  Encourage bedside spirometry    ====================CARDIOVASCULAR==================  CAD s/p CABG   Continue invasive hemodynamic monitoring   Inotropic support with IV Dobutamine   Pressor support with IV Vasopressin  Continue ASA for CAD     digoxin  Injectable 0.25 milliGRAM(s) IV Push once  milrinone Infusion 0.375 MICROgram(s)/kG/Min (8.21 mL/Hr) IV Continuous <Continuous>  aspirin enteric coated 81 milliGRAM(s) Oral daily  vasopressin Infusion 0.033 Unit(s)/Min (2 mL/Hr) IV Continuous <Continuous>  ===================HEMATOLOGIC/ONC ===================  Anemia, monitor H&H/Plts   Continue SQ Lovenox for venous thromboembolism prophylaxis.     enoxaparin Injectable 40 milliGRAM(s) SubCutaneous daily  ===================== RENAL =========================  Continue monitoring urine output, I&OS, BUN/Cr   Diuresis with Lasix to maintain net negative fluid balance    furosemide    Tablet 20 milliGRAM(s) Oral two times a day  ==================== GASTROINTESTINAL===================  Tolerating consistent carb diet   Bowel regimen with Miralax   Continue Protonix for stress ulcer prophylaxis.     pantoprazole    Tablet 40 milliGRAM(s) Oral before breakfast  polyethylene glycol 3350 17 Gram(s) Oral daily  sodium chloride 0.9%. 1000 milliLiter(s) (10 mL/Hr) IV Continuous <Continuous>    =======================ENDOCRINE =====================  Stress hyperglycemia, glycemic control with Humalog sliding scale.  Monitor blood glucose levels.     insulin lispro (ADMELOG) corrective regimen sliding scale   SubCutaneous three times a day before meals    ========================INFECTIOUS DISEASE================  C/w IV Zosyn for empiric abx coverage.  Monitor for leukocytosis / fever.     piperacillin/tazobactam IVPB.. 3.375 Gram(s) IV Intermittent every 8 hours      Patient requires continuous monitoring with bedside rhythm monitoring, arterial line, pulse oximetry, ventilator monitoring and intermittent blood gas analysis.  Care plan discussed with ICU care team.  Patient remained critical; required more than usual post op care; I have spent 35 minutes providing non-routine post op care, revaluated multiple times during the day.    By signing my name below, I, Tona Rosas, attest that this documentation has been prepared under the direction and in the presence of Uriel Wesley MD.  Electronically signed: Lillie Mascorro, 20 @ 10:26    I, Uriel Wesley MD, personally performed the services described in this documentation. all medical record entries made by the scribe were at my direction and in my presence. I have reviewed the chart and agree that the record reflects my personal performance and is accurate and complete  Electronically signed: Uriel Wesley MD, 20 @ 10:26

## 2020-11-29 NOTE — PROGRESS NOTE ADULT - ASSESSMENT
_________________________________________________________________________________________  ========>>  M E D I C A L   A T T E N D I N G    F O L L O W  U P  N O T E  <<=========  -----------------------------------------------------------------------------------------------------    - Patient seen and examined by me approximately sixty minutes ago.   - In summary,  MARCOS AGUILAR is a 85y year old man who originally presented with edema   - Patient today overall doing ok post op, remains in the CTICU, comfortable, taking PO, no significant pain, no SOB / cough       pt with new AF with RVR, on Cardizem drip.. no symptoms of SOB, CP, palpitation, CP, dizziness..     ==================>> REVIEW OF SYSTEM <<=================    GEN: no fever, no chills, no significant pain  RESP: no SOB at rest , no cough, no sputum  CVS: no chest pain, no palpitations   GI: no abdominal pain, no nausea, no BM yet   : no dysuria, no discomfort with mays   Neuro: no headache, no dizziness  Derm : no itching, no rash    ==================>> PHYSICAL EXAM <<=================    GEN: A&O X 3 , NAD , comfortable, pleasant   HEENT: NCAT, PERRL, MMM, hearing intact  Neck: supple , no JVD appreciated  CVS: S1S2 , No M/R/G appreciated, tachycardic   PULM: mild scattered rhonchi + chest tube out  ABD.: soft. non tender, non distended,  bowel sounds decreased   Extrem: legs improved overall,  mild edema     + mays with goof urine output   PSYCH : normal mood,  not anxious    ==================>> MEDICATIONS <<====================    aspirin enteric coated 81 milliGRAM(s) Oral daily  chlorhexidine 2% Cloths 1 Application(s) Topical <User Schedule>  digoxin  Injectable 0.25 milliGRAM(s) IV Push once  diltiazem Infusion 10 mG/Hr IV Continuous <Continuous>  enoxaparin Injectable 40 milliGRAM(s) SubCutaneous daily  furosemide    Tablet 20 milliGRAM(s) Oral two times a day  insulin lispro (ADMELOG) corrective regimen sliding scale   SubCutaneous three times a day before meals  milrinone Infusion 0.375 MICROgram(s)/kG/Min IV Continuous <Continuous>  pantoprazole    Tablet 40 milliGRAM(s) Oral before breakfast  piperacillin/tazobactam IVPB.. 3.375 Gram(s) IV Intermittent every 8 hours  polyethylene glycol 3350 17 Gram(s) Oral daily  sodium chloride 0.9%. 1000 milliLiter(s) IV Continuous <Continuous>  vasopressin Infusion 0.033 Unit(s)/Min IV Continuous <Continuous>    MEDICATIONS  (PRN):  oxycodone    5 mG/acetaminophen 325 mG 1 Tablet(s) Oral every 4 hours PRN Moderate Pain (4 - 6)  oxycodone    5 mG/acetaminophen 325 mG 2 Tablet(s) Oral every 6 hours PRN Severe Pain (7 - 10)    ___________  Active diet:  Diet, Regular:   Consistent Carbohydrate No Snacks (CSTCHO)  ___________________    ==================>> VITAL SIGNS <<==================      Vital Signs Last 24 HrsT(C): 37.2 (11-29-20 @ 12:00)  T(F): 99 (11-29-20 @ 12:00), Max: 100.2 (11-28-20 @ 16:00)  HR: 139 (11-29-20 @ 11:00) (64 - 164)  BP: 126/62 (11-29-20 @ 11:00)  RR: 25 (11-29-20 @ 11:00) (13 - 57)  SpO2: 100% (11-29-20 @ 11:00) (79% - 100%)      POCT Blood Glucose.: 93 mg/dL (28 Nov 2020 16:05)     ==================>> LAB AND IMAGING <<==================                        9.3    10.24 )-----------( 82       ( 29 Nov 2020 00:33 )             27.1        11-29    131<L>  |  96  |  32<H>  ----------------------------<  192<H>  4.5   |  23  |  0.92    Ca    8.3<L>      29 Nov 2020 00:33  Phos  2.0     11-29  Mg     2.9     11-29    TPro  5.6<L>  /  Alb  3.5  /  TBili  1.1  /  DBili  x   /  AST  942<H>  /  ALT  982<H>  /  AlkPhos  103  11-29    WBC count:   10.24 <<== ,  16.95 <<== ,  14.49 <<== ,  12.90 <<== ,  9.92 <<== ,  16.21 <<==   Hemoglobin:   9.3 <<==,  10.9 <<==,  9.7 <<==,  9.7 <<==,  9.6 <<==,  11.5 <<==  platelets:  82 <==, 109 <==, 94 <==, 79 <==, 96 <==, 133 <==, 184 <==    Creatinine:  0.92  <<==, 1.00  <<==, 1.12  <<==, 0.92  <<==, 0.74  <<==, 0.81  <<==  Sodium:   131  <==, 135  <==, 133  <==, 134  <==, 137  <==, 139  <==, 140  <==       AST:          942 <== , 1592 <== , 213 <== , 93 <== , 60 <==      ALT:        982  <== , 1135  <== , 178  <== , 69  <== , 36  <==      AP:        103  <=, 97  <=, 98  <=, 92  <=, 55  <=     Bili:        1.1  <=, 1.0  <=, 1.3  <=, 0.8  <=, 0.8  <=    ___________________________________________________________________________________  ===============>>  A S S E S S M E N T   A N D   P L A N <<===============  ------------------------------------------------------------------------------------------  pt is an 84 y/o man with pmhx of htn, hld, hiatal hernia / GERD, OA post Lt TKR x1 year ago presenting with concern for increasing redness of left leg and swelling for 2 months.     Problem/Plan - 1:  ·  Problem: NSTEMI in pt with significant CAD    pt doing overall well post CABG X3  appreciated CTU / CC/ CTS follow up and mgmt     new AF with RVR  on Amio drip   monitor on tele  med [facility] per cardio / EP / CC    PT / OOB, IS, nutritional support   Continue Current medications otherwise and monitor.   supportive care  pain mgmt as needed   monitor post op anemia    Problem/Plan - 2:  ·  Problem:  HTN / HLD  Continue Current medications otherwise and monitor.   cardio f/u. and further optimization    Problem/Plan - 3:  ·  Problem: Venous stasis.    no DVT on duplex  likely venous stasis changes on left leg and cellulitis  post course of abx treatment   elevation  diuresis  ID f/u     GI / DVT PPX per surgical protocol   PT / OOB    --------------------------------------------  Case discussed with Pt, RN  Education given on findings and plan of care  ___________________________  H. INEZ Phipps.  Pager: [phone]

## 2020-11-29 NOTE — PROVIDER CONTACT NOTE (OTHER) - ACTION/TREATMENT ORDERED:
Patient refusing cardiac cath, NP explained procedure and is aware. Cardiac cath called to inform patient's refusal.
provider notified.
As per provider reschedule for 0800am reassess VS and HR.
blood gas and venous gas drawn and sent to lab. 150mg iv amio bolus given, and amio gtt ordered

## 2020-11-30 LAB
ALBUMIN SERPL ELPH-MCNC: 3.3 G/DL — SIGNIFICANT CHANGE UP (ref 3.3–5)
ALBUMIN SERPL ELPH-MCNC: 3.3 G/DL — SIGNIFICANT CHANGE UP (ref 3.3–5)
ALP SERPL-CCNC: 103 U/L — SIGNIFICANT CHANGE UP (ref 40–120)
ALP SERPL-CCNC: 122 U/L — HIGH (ref 40–120)
ALT FLD-CCNC: 763 U/L — HIGH (ref 10–45)
ALT FLD-CCNC: 926 U/L — HIGH (ref 10–45)
ANION GAP SERPL CALC-SCNC: 10 MMOL/L — SIGNIFICANT CHANGE UP (ref 5–17)
ANION GAP SERPL CALC-SCNC: 11 MMOL/L — SIGNIFICANT CHANGE UP (ref 5–17)
APTT BLD: 35.2 SEC — SIGNIFICANT CHANGE UP (ref 27.5–35.5)
APTT BLD: 35.3 SEC — SIGNIFICANT CHANGE UP (ref 27.5–35.5)
APTT BLD: 61.4 SEC — HIGH (ref 27.5–35.5)
AST SERPL-CCNC: 355 U/L — HIGH (ref 10–40)
AST SERPL-CCNC: 565 U/L — HIGH (ref 10–40)
BILIRUB SERPL-MCNC: 1.1 MG/DL — SIGNIFICANT CHANGE UP (ref 0.2–1.2)
BILIRUB SERPL-MCNC: 1.2 MG/DL — SIGNIFICANT CHANGE UP (ref 0.2–1.2)
BUN SERPL-MCNC: 21 MG/DL — SIGNIFICANT CHANGE UP (ref 7–23)
BUN SERPL-MCNC: 28 MG/DL — HIGH (ref 7–23)
CALCIUM SERPL-MCNC: 8.3 MG/DL — LOW (ref 8.4–10.5)
CALCIUM SERPL-MCNC: 8.3 MG/DL — LOW (ref 8.4–10.5)
CHLORIDE SERPL-SCNC: 93 MMOL/L — LOW (ref 96–108)
CHLORIDE SERPL-SCNC: 94 MMOL/L — LOW (ref 96–108)
CO2 SERPL-SCNC: 25 MMOL/L — SIGNIFICANT CHANGE UP (ref 22–31)
CO2 SERPL-SCNC: 25 MMOL/L — SIGNIFICANT CHANGE UP (ref 22–31)
CREAT SERPL-MCNC: 0.88 MG/DL — SIGNIFICANT CHANGE UP (ref 0.5–1.3)
CREAT SERPL-MCNC: 0.88 MG/DL — SIGNIFICANT CHANGE UP (ref 0.5–1.3)
DIGOXIN SERPL-MCNC: 1.8 NG/ML — SIGNIFICANT CHANGE UP (ref 0.8–2)
GAS PNL BLDA: SIGNIFICANT CHANGE UP
GLUCOSE BLDC GLUCOMTR-MCNC: 121 MG/DL — HIGH (ref 70–99)
GLUCOSE BLDC GLUCOMTR-MCNC: 124 MG/DL — HIGH (ref 70–99)
GLUCOSE SERPL-MCNC: 103 MG/DL — HIGH (ref 70–99)
GLUCOSE SERPL-MCNC: 130 MG/DL — HIGH (ref 70–99)
HCT VFR BLD CALC: 25.2 % — LOW (ref 39–50)
HGB BLD-MCNC: 8.4 G/DL — LOW (ref 13–17)
MAGNESIUM SERPL-MCNC: 2 MG/DL — SIGNIFICANT CHANGE UP (ref 1.6–2.6)
MAGNESIUM SERPL-MCNC: 2 MG/DL — SIGNIFICANT CHANGE UP (ref 1.6–2.6)
MCHC RBC-ENTMCNC: 32.4 PG — SIGNIFICANT CHANGE UP (ref 27–34)
MCHC RBC-ENTMCNC: 33.3 GM/DL — SIGNIFICANT CHANGE UP (ref 32–36)
MCV RBC AUTO: 97.3 FL — SIGNIFICANT CHANGE UP (ref 80–100)
NRBC # BLD: 0 /100 WBCS — SIGNIFICANT CHANGE UP (ref 0–0)
PHOSPHATE SERPL-MCNC: 1.8 MG/DL — LOW (ref 2.5–4.5)
PHOSPHATE SERPL-MCNC: 2.7 MG/DL — SIGNIFICANT CHANGE UP (ref 2.5–4.5)
PLATELET # BLD AUTO: 113 K/UL — LOW (ref 150–400)
POTASSIUM SERPL-MCNC: 4.1 MMOL/L — SIGNIFICANT CHANGE UP (ref 3.5–5.3)
POTASSIUM SERPL-MCNC: 4.2 MMOL/L — SIGNIFICANT CHANGE UP (ref 3.5–5.3)
POTASSIUM SERPL-SCNC: 4.1 MMOL/L — SIGNIFICANT CHANGE UP (ref 3.5–5.3)
POTASSIUM SERPL-SCNC: 4.2 MMOL/L — SIGNIFICANT CHANGE UP (ref 3.5–5.3)
PROT SERPL-MCNC: 5.5 G/DL — LOW (ref 6–8.3)
PROT SERPL-MCNC: 5.8 G/DL — LOW (ref 6–8.3)
RBC # BLD: 2.59 M/UL — LOW (ref 4.2–5.8)
RBC # FLD: 14.1 % — SIGNIFICANT CHANGE UP (ref 10.3–14.5)
SODIUM SERPL-SCNC: 128 MMOL/L — LOW (ref 135–145)
SODIUM SERPL-SCNC: 130 MMOL/L — LOW (ref 135–145)
T3 SERPL-MCNC: 63 NG/DL — LOW (ref 80–200)
T4 AB SER-ACNC: 4.6 UG/DL — SIGNIFICANT CHANGE UP (ref 4.6–12)
TSH SERPL-MCNC: 1.69 UIU/ML — SIGNIFICANT CHANGE UP (ref 0.27–4.2)
WBC # BLD: 8.67 K/UL — SIGNIFICANT CHANGE UP (ref 3.8–10.5)
WBC # FLD AUTO: 8.67 K/UL — SIGNIFICANT CHANGE UP (ref 3.8–10.5)

## 2020-11-30 PROCEDURE — 71045 X-RAY EXAM CHEST 1 VIEW: CPT | Mod: 26

## 2020-11-30 PROCEDURE — 99291 CRITICAL CARE FIRST HOUR: CPT

## 2020-11-30 PROCEDURE — 99292 CRITICAL CARE ADDL 30 MIN: CPT

## 2020-11-30 PROCEDURE — 93010 ELECTROCARDIOGRAM REPORT: CPT

## 2020-11-30 PROCEDURE — 99024 POSTOP FOLLOW-UP VISIT: CPT

## 2020-11-30 PROCEDURE — 99292 CRITICAL CARE ADDL 30 MIN: CPT | Mod: 24

## 2020-11-30 RX ORDER — MAGNESIUM SULFATE 500 MG/ML
2 VIAL (ML) INJECTION ONCE
Refills: 0 | Status: COMPLETED | OUTPATIENT
Start: 2020-11-30 | End: 2020-11-30

## 2020-11-30 RX ORDER — POTASSIUM CHLORIDE 20 MEQ
10 PACKET (EA) ORAL ONCE
Refills: 0 | Status: COMPLETED | OUTPATIENT
Start: 2020-11-30 | End: 2020-11-30

## 2020-11-30 RX ORDER — BUMETANIDE 0.25 MG/ML
1 INJECTION INTRAMUSCULAR; INTRAVENOUS EVERY 12 HOURS
Refills: 0 | Status: DISCONTINUED | OUTPATIENT
Start: 2020-11-30 | End: 2020-12-01

## 2020-11-30 RX ORDER — POTASSIUM CHLORIDE 20 MEQ
40 PACKET (EA) ORAL ONCE
Refills: 0 | Status: COMPLETED | OUTPATIENT
Start: 2020-11-30 | End: 2020-11-30

## 2020-11-30 RX ORDER — AMIODARONE HYDROCHLORIDE 400 MG/1
200 TABLET ORAL EVERY 8 HOURS
Refills: 0 | Status: DISCONTINUED | OUTPATIENT
Start: 2020-11-30 | End: 2020-12-01

## 2020-11-30 RX ORDER — ESMOLOL HCL 100MG/10ML
150 VIAL (ML) INTRAVENOUS
Qty: 2500 | Refills: 0 | Status: DISCONTINUED | OUTPATIENT
Start: 2020-11-30 | End: 2020-12-01

## 2020-11-30 RX ORDER — OXYCODONE HYDROCHLORIDE 5 MG/1
10 TABLET ORAL EVERY 4 HOURS
Refills: 0 | Status: DISCONTINUED | OUTPATIENT
Start: 2020-11-30 | End: 2020-12-06

## 2020-11-30 RX ORDER — OXYCODONE HYDROCHLORIDE 5 MG/1
5 TABLET ORAL EVERY 4 HOURS
Refills: 0 | Status: DISCONTINUED | OUTPATIENT
Start: 2020-11-30 | End: 2020-12-06

## 2020-11-30 RX ORDER — HEPARIN SODIUM 5000 [USP'U]/ML
5000 INJECTION INTRAVENOUS; SUBCUTANEOUS ONCE
Refills: 0 | Status: COMPLETED | OUTPATIENT
Start: 2020-11-30 | End: 2020-11-30

## 2020-11-30 RX ORDER — ESMOLOL HCL 100MG/10ML
36500 VIAL (ML) INTRAVENOUS ONCE
Refills: 0 | Status: COMPLETED | OUTPATIENT
Start: 2020-11-30 | End: 2020-11-30

## 2020-11-30 RX ORDER — HEPARIN SODIUM 5000 [USP'U]/ML
1100 INJECTION INTRAVENOUS; SUBCUTANEOUS
Qty: 25000 | Refills: 0 | Status: DISCONTINUED | OUTPATIENT
Start: 2020-11-30 | End: 2020-12-01

## 2020-11-30 RX ORDER — MAGNESIUM SULFATE 500 MG/ML
1 VIAL (ML) INJECTION ONCE
Refills: 0 | Status: COMPLETED | OUTPATIENT
Start: 2020-11-30 | End: 2020-11-30

## 2020-11-30 RX ORDER — BUMETANIDE 0.25 MG/ML
1 INJECTION INTRAMUSCULAR; INTRAVENOUS
Refills: 0 | Status: DISCONTINUED | OUTPATIENT
Start: 2020-11-30 | End: 2020-11-30

## 2020-11-30 RX ORDER — HEPARIN SODIUM 5000 [USP'U]/ML
800 INJECTION INTRAVENOUS; SUBCUTANEOUS
Qty: 25000 | Refills: 0 | Status: DISCONTINUED | OUTPATIENT
Start: 2020-11-30 | End: 2020-11-30

## 2020-11-30 RX ORDER — FENTANYL CITRATE 50 UG/ML
25 INJECTION INTRAVENOUS ONCE
Refills: 0 | Status: DISCONTINUED | OUTPATIENT
Start: 2020-11-30 | End: 2020-11-30

## 2020-11-30 RX ADMIN — HEPARIN SODIUM 8 UNIT(S)/HR: 5000 INJECTION INTRAVENOUS; SUBCUTANEOUS at 07:56

## 2020-11-30 RX ADMIN — AMIODARONE HYDROCHLORIDE 200 MILLIGRAM(S): 400 TABLET ORAL at 11:59

## 2020-11-30 RX ADMIN — Medication 40 MILLIEQUIVALENT(S): at 13:17

## 2020-11-30 RX ADMIN — AMIODARONE HYDROCHLORIDE 200 MILLIGRAM(S): 400 TABLET ORAL at 23:45

## 2020-11-30 RX ADMIN — Medication 81 MILLIGRAM(S): at 12:03

## 2020-11-30 RX ADMIN — BUMETANIDE 1 MILLIGRAM(S): 0.25 INJECTION INTRAMUSCULAR; INTRAVENOUS at 08:01

## 2020-11-30 RX ADMIN — BUMETANIDE 1 MILLIGRAM(S): 0.25 INJECTION INTRAMUSCULAR; INTRAVENOUS at 16:46

## 2020-11-30 RX ADMIN — Medication 36500 MICROGRAM(S): at 08:15

## 2020-11-30 RX ADMIN — Medication 62.5 MILLIMOLE(S): at 04:43

## 2020-11-30 RX ADMIN — Medication 50 MILLIEQUIVALENT(S): at 23:45

## 2020-11-30 RX ADMIN — Medication 100 GRAM(S): at 21:58

## 2020-11-30 RX ADMIN — Medication 21.9 MICROGRAM(S)/KG/MIN: at 08:44

## 2020-11-30 RX ADMIN — HEPARIN SODIUM 5000 UNIT(S): 5000 INJECTION INTRAVENOUS; SUBCUTANEOUS at 07:56

## 2020-11-30 RX ADMIN — AMIODARONE HYDROCHLORIDE 16.7 MG/MIN: 400 TABLET ORAL at 07:00

## 2020-11-30 RX ADMIN — FENTANYL CITRATE 25 MICROGRAM(S): 50 INJECTION INTRAVENOUS at 21:02

## 2020-11-30 RX ADMIN — PIPERACILLIN AND TAZOBACTAM 25 GRAM(S): 4; .5 INJECTION, POWDER, LYOPHILIZED, FOR SOLUTION INTRAVENOUS at 02:00

## 2020-11-30 RX ADMIN — Medication 21.9 MICROGRAM(S)/KG/MIN: at 08:11

## 2020-11-30 RX ADMIN — Medication 21.9 MICROGRAM(S)/KG/MIN: at 08:46

## 2020-11-30 RX ADMIN — CHLORHEXIDINE GLUCONATE 1 APPLICATION(S): 213 SOLUTION TOPICAL at 05:40

## 2020-11-30 RX ADMIN — FENTANYL CITRATE 25 MICROGRAM(S): 50 INJECTION INTRAVENOUS at 21:30

## 2020-11-30 RX ADMIN — Medication 50 GRAM(S): at 13:17

## 2020-11-30 RX ADMIN — Medication 36500 MICROGRAM(S): at 08:45

## 2020-11-30 RX ADMIN — PANTOPRAZOLE SODIUM 40 MILLIGRAM(S): 20 TABLET, DELAYED RELEASE ORAL at 08:23

## 2020-11-30 RX ADMIN — Medication 21.9 MICROGRAM(S)/KG/MIN: at 08:45

## 2020-11-30 NOTE — PROGRESS NOTE ADULT - SUBJECTIVE AND OBJECTIVE BOX
MARCOS AGUILAR  MRN-[id number]  Patient is a 85y old  Male who presents with a chief complaint of edema (30 Nov 2020 14:23)    HPI:    >>>>>>Medical Attending Initial / Admission note<<<<<<  -------------------------------------------------------------------------------  CHIEF COMPLAINT & HISTORY OF PRESENT ILLNESS:      pt is an 86 y/o man with pmhx of htn, hld, hiatal hernia / GERD, OA post Lt TKR x1 year ago presenting with concern for increasing redness of left leg and swelling for 2 months.   Patient reports that he has been having increasing swelling of both of his legs for approximately 2-3 months; however, the left leg has been more swollen and has been getting increasingly more red. He states that he went to his podiatrist when he noted some rash on the lower portion of his left just above his foot, was given a 'cream of some kind, and feels that the redness and swelling got worse after starting the cream.'   He states that he was having chest pain approximately 1 mo ago, went to his PMD and was diagnosed with a hiatal hernia with an xray and prescribed omeprazole.   Patient reports that he has been becoming increasingly more short of breath for approximately 2 months as well. Feels that when he takes just a few steps he has to stop and catch his breath, and is so short of breath that he cannot speak in complete sentences without resting for a period of time first.   pt also states was given amoxicillin a few days ago but only took one dose  He denies any fevers, chills, cough, nausea, vomiting, sweating, chest pain  pt admitted for likely acute CHF with elevated troponin / NSTEMI (18 Nov 2020 16:09)      Surgery/Hospital course:  11/25 CABG x3   11/27 Rapid afib with RVR     Today/Overnight:    Vital Signs Last 24 Hrs  T(C): 36.4 (30 Nov 2020 20:00), Max: 37.9 (30 Nov 2020 16:00)  T(F): 97.6 (30 Nov 2020 20:00), Max: 100.2 (30 Nov 2020 16:00)  HR: 143 (30 Nov 2020 21:00) (106 - 149)  BP: 129/79 (30 Nov 2020 19:45) (90/67 - 137/86)  BP(mean): 94 (30 Nov 2020 19:45) (74 - 101)  RR: 22 (30 Nov 2020 21:00) (17 - 36)  SpO2: 100% (30 Nov 2020 21:00) (95% - 100%)  ============================I/O===========================  I&O's Detail    29 Nov 2020 07:01  -  30 Nov 2020 07:00  --------------------------------------------------------  IN:    Amiodarone: 33.3 mL    Amiodarone: 333 mL    Diltiazem: 55 mL    Diltiazem: 75 mL    IV PiggyBack: 200 mL    Milrinone: 199.2 mL    Oral Fluid: 680 mL    sodium chloride 0.9%: 240 mL    Vasopressin: 11 mL  Total IN: 1826.5 mL    OUT:    Indwelling Catheter - Urethral (mL): 2155 mL  Total OUT: 2155 mL    Total NET: -328.5 mL      30 Nov 2020 07:01  -  30 Nov 2020 21:42  --------------------------------------------------------  IN:    Amiodarone: 233.7 mL    Amiodarone: 33 mL    Esmolol: 65.7 mL    Esmolol: 788.4 mL    Heparin: 33 mL    Heparin: 98 mL    IV PiggyBack: 237.5 mL    Milrinone: 61.6 mL    Oral Fluid: 450 mL  Total IN: 2000.9 mL    OUT:    Indwelling Catheter - Urethral (mL): 2125 mL  Total OUT: 2125 mL    Total NET: -124.1 mL        ============================ LABS =========================                        8.4    8.67  )-----------( 113      ( 30 Nov 2020 00:25 )             25.2     11-30    128<L>  |  93<L>  |  21  ----------------------------<  103<H>  4.2   |  25  |  0.88    Ca    8.3<L>      30 Nov 2020 20:53  Phos  2.7     11-30  Mg     2.0     11-30    TPro  5.8<L>  /  Alb  3.3  /  TBili  1.2  /  DBili  x   /  AST  355<H>  /  ALT  763<H>  /  AlkPhos  122<H>  11-30    LIVER FUNCTIONS - ( 30 Nov 2020 20:53 )  Alb: 3.3 g/dL / Pro: 5.8 g/dL / ALK PHOS: 122 U/L / ALT: 763 U/L / AST: 355 U/L / GGT: x           PTT - ( 30 Nov 2020 17:28 )  PTT:61.4 sec  ABG - ( 30 Nov 2020 20:49 )  pH, Arterial: 7.50  pH, Blood: x     /  pCO2: 36    /  pO2: 103   / HCO3: 28    / Base Excess: 4.7   /  SaO2: 99        ======================Micro/Rad/Cardio=================  Culture: Reviewed   CXR: Reviewed  Echo: Reviewed  ======================================================  PAST MEDICAL & SURGICAL HISTORY:  Mitral prolapse    Hypercholesteremia    Hypertension    History of hernia repair      ========================ASSESSMENT ================  CAD s/p CABG on 11/25  Rapid afib with RVR   Hypovolemia   Delirium   Hypertension   Hyperlipidemia     Plan:  ====================== NEUROLOGY=====================  Nonfocal, continue to monitor neuro status per ICU protocol.   Oxycodone PRN for analgesia    oxyCODONE    IR 5 milliGRAM(s) Oral every 4 hours PRN Moderate Pain (4 - 6)  oxyCODONE    IR 10 milliGRAM(s) Oral every 4 hours PRN Severe Pain (7 - 10)  ==================== RESPIRATORY======================  Comfortable on room air, SpO2 100%  Continue to monitor SpO2 via pulse oximetry  Encourage bedside spirometry     ====================CARDIOVASCULAR==================  CAD s/p CABG on 11/25  Continue invasive hemodynamic monitoring   Recalcitrant post op atrial fibrillation resistant to all pharmaceutical interventions  Afib with RVR, continue rate control with IV Amiodarone and IV Heparin, in addition to IV Esmolol drip  Monitor continuous telemetry  Inotropic support with IV Primacor drip  Continue ASA for graft patency    aMIOdarone    Tablet 200 milliGRAM(s) Oral every 8 hours  aMIOdarone Infusion 1 mG/Min (33.3 mL/Hr) IV Continuous <Continuous>  esMOLOL  Infusion 150 MICROgram(s)/kG/Min (65.7 mL/Hr) IV Continuous <Continuous>  milrinone Infusion 0.2 MICROgram(s)/kG/Min (4.38 mL/Hr) IV Continuous <Continuous>  aspirin enteric coated 81 milliGRAM(s) Oral daily  ===================HEMATOLOGIC/ONC ===================  Anemia, monitor H&H/Plts   Continue AC therapy with heparin drip for afib  and VTE prophylaxis     heparin  Infusion 1100 Unit(s)/Hr (11 mL/Hr) IV Continuous <Continuous>  ===================== RENAL =========================  Continue monitoring urine output, I&OS, BUN/Cr   Diuresis with IVP Bumex, maintain net neg fluid balance     buMETAnide Injectable 1 milliGRAM(s) IV Push every 12 hours  ==================== GASTROINTESTINAL===================  Tolerating consistent carb diet   NPO after midnight for LISA and possible DC cardioversion.   Continue Protonix for stress ulcer prophylaxis.   Bowel regimen with Miralax     magnesium sulfate  IVPB 1 Gram(s) IV Intermittent once  pantoprazole    Tablet 40 milliGRAM(s) Oral before breakfast  polyethylene glycol 3350 17 Gram(s) Oral daily  potassium chloride  10 mEq/50 mL IVPB 10 milliEquivalent(s) IV Intermittent once  =======================    ENDOCRINE  =====================  Stress hyperglycemia, continue glucose control with Admelog sliding scale.  Monitor blood glucose levels.     insulin lispro (ADMELOG) corrective regimen sliding scale   SubCutaneous three times a day before meals  ========================INFECTIOUS DISEASE================  Afebrile, WBC within normal limits.  Monitor for leukocytosis / fever. Monitor off abx.       Patient requires continuous monitoring with bedside rhythm monitoring, pulse oximetry monitoring, and continuous central venous and arterial pressure monitoring; and intermittent blood gas analysis.  Care plan discussed with ICU care team.    Patient remained critical, at risk for life threatening decompensation.   I have spent 35 minutes providing acute care with multiple re-evaluations throughout the evening.     By signing my name below, I, Tona Rosas, attest that this documentation has been prepared under the direction and in the presence of Richard Calderon NP.  Electronically signed: Lillie Mascorro, 11-30-20 @ 21:42    Richard HIDALGO NP, personally performed the services described in this documentation. All medical record entries made by the scribe were at my direction and in my presence. I have reviewed the chart and agree that the record reflects my personal performance and is accurate and complete  Electronically signed: Richard Calderon NP, 11-30-20 @ 21:42       MARCOS AGUILAR  MRN-[id number]  Patient is a 85y old  Male who presents with a chief complaint of edema (30 Nov 2020 14:23)    HPI:    >>>>>>Medical Attending Initial / Admission note<<<<<<  -------------------------------------------------------------------------------  CHIEF COMPLAINT & HISTORY OF PRESENT ILLNESS:      pt is an 84 y/o man with pmhx of htn, hld, hiatal hernia / GERD, OA post Lt TKR x1 year ago presenting with concern for increasing redness of left leg and swelling for 2 months.   Patient reports that he has been having increasing swelling of both of his legs for approximately 2-3 months; however, the left leg has been more swollen and has been getting increasingly more red. He states that he went to his podiatrist when he noted some rash on the lower portion of his left just above his foot, was given a 'cream of some kind, and feels that the redness and swelling got worse after starting the cream.'   He states that he was having chest pain approximately 1 mo ago, went to his PMD and was diagnosed with a hiatal hernia with an xray and prescribed omeprazole.   Patient reports that he has been becoming increasingly more short of breath for approximately 2 months as well. Feels that when he takes just a few steps he has to stop and catch his breath, and is so short of breath that he cannot speak in complete sentences without resting for a period of time first.   pt also states was given amoxicillin a few days ago but only took one dose  He denies any fevers, chills, cough, nausea, vomiting, sweating, chest pain  pt admitted for likely acute CHF with elevated troponin / NSTEMI (18 Nov 2020 16:09)      Surgery/Hospital course:  11/25 CABG x3   11/27 Rapid afib with RVR     Today/Overnight:  Pt remains in Afib with RVR, multiple modalities  used in attempt to rate control / convert the Afib.  pt is NPO for DCCV in am , A/C with heparin gtt. ptt goal 60-80  Primacor gtt used for +/- low flow , liver enzymes recovery.   pt is net neg with lasix prn, lytes replaced.      Vital Signs Last 24 Hrs  T(C): 36.4 (30 Nov 2020 20:00), Max: 37.9 (30 Nov 2020 16:00)  T(F): 97.6 (30 Nov 2020 20:00), Max: 100.2 (30 Nov 2020 16:00)  HR: 143 (30 Nov 2020 21:00) (106 - 149)  BP: 129/79 (30 Nov 2020 19:45) (90/67 - 137/86)  BP(mean): 94 (30 Nov 2020 19:45) (74 - 101)  RR: 22 (30 Nov 2020 21:00) (17 - 36)  SpO2: 100% (30 Nov 2020 21:00) (95% - 100%)  ============================I/O===========================  I&O's Detail    29 Nov 2020 07:01  -  30 Nov 2020 07:00  --------------------------------------------------------  IN:    Amiodarone: 33.3 mL    Amiodarone: 333 mL    Diltiazem: 55 mL    Diltiazem: 75 mL    IV PiggyBack: 200 mL    Milrinone: 199.2 mL    Oral Fluid: 680 mL    sodium chloride 0.9%: 240 mL    Vasopressin: 11 mL  Total IN: 1826.5 mL    OUT:    Indwelling Catheter - Urethral (mL): 2155 mL  Total OUT: 2155 mL    Total NET: -328.5 mL      30 Nov 2020 07:01  -  30 Nov 2020 21:42  --------------------------------------------------------  IN:    Amiodarone: 233.7 mL    Amiodarone: 33 mL    Esmolol: 65.7 mL    Esmolol: 788.4 mL    Heparin: 33 mL    Heparin: 98 mL    IV PiggyBack: 237.5 mL    Milrinone: 61.6 mL    Oral Fluid: 450 mL  Total IN: 2000.9 mL    OUT:    Indwelling Catheter - Urethral (mL): 2125 mL  Total OUT: 2125 mL    Total NET: -124.1 mL        ============================ LABS =========================                        8.4    8.67  )-----------( 113      ( 30 Nov 2020 00:25 )             25.2     11-30    128<L>  |  93<L>  |  21  ----------------------------<  103<H>  4.2   |  25  |  0.88    Ca    8.3<L>      30 Nov 2020 20:53  Phos  2.7     11-30  Mg     2.0     11-30    TPro  5.8<L>  /  Alb  3.3  /  TBili  1.2  /  DBili  x   /  AST  355<H>  /  ALT  763<H>  /  AlkPhos  122<H>  11-30    LIVER FUNCTIONS - ( 30 Nov 2020 20:53 )  Alb: 3.3 g/dL / Pro: 5.8 g/dL / ALK PHOS: 122 U/L / ALT: 763 U/L / AST: 355 U/L / GGT: x           PTT - ( 30 Nov 2020 17:28 )  PTT:61.4 sec  ABG - ( 30 Nov 2020 20:49 )  pH, Arterial: 7.50  pH, Blood: x     /  pCO2: 36    /  pO2: 103   / HCO3: 28    / Base Excess: 4.7   /  SaO2: 99        ======================Micro/Rad/Cardio=================  Culture: Reviewed   CXR: Reviewed  Echo: Reviewed  ======================================================  PAST MEDICAL & SURGICAL HISTORY:  Mitral prolapse    Hypercholesteremia    Hypertension    History of hernia repair      ========================ASSESSMENT ================  CAD s/p CABG on 11/25  Rapid afib with RVR   Hypovolemia   Delirium   Hypertension   Hyperlipidemia     Plan:  ====================== NEUROLOGY=====================  Nonfocal, continue to monitor neuro status per ICU protocol.   Oxycodone PRN for analgesia    oxyCODONE    IR 5 milliGRAM(s) Oral every 4 hours PRN Moderate Pain (4 - 6)  oxyCODONE    IR 10 milliGRAM(s) Oral every 4 hours PRN Severe Pain (7 - 10)  ==================== RESPIRATORY======================  Comfortable on room air, SpO2 100%  Continue to monitor SpO2 via pulse oximetry  Encourage bedside spirometry     ====================CARDIOVASCULAR==================  CAD s/p CABG on 11/25  Continue invasive hemodynamic monitoring   Recalcitrant post op atrial fibrillation resistant to all pharmaceutical interventions  Afib with RVR, continue rate control with IV Amiodarone and IV Heparin, in addition to IV Esmolol drip  Monitor continuous telemetry  Inotropic support with IV Primacor drip  Continue ASA for graft patency    aMIOdarone    Tablet 200 milliGRAM(s) Oral every 8 hours  aMIOdarone Infusion 1 mG/Min (33.3 mL/Hr) IV Continuous <Continuous>  esMOLOL  Infusion 150 MICROgram(s)/kG/Min (65.7 mL/Hr) IV Continuous <Continuous>  milrinone Infusion 0.2 MICROgram(s)/kG/Min (4.38 mL/Hr) IV Continuous <Continuous>  aspirin enteric coated 81 milliGRAM(s) Oral daily  ===================HEMATOLOGIC/ONC ===================  Anemia, monitor H&H/Plts   Continue AC therapy with heparin drip for afib  and VTE prophylaxis     heparin  Infusion 1100 Unit(s)/Hr (11 mL/Hr) IV Continuous <Continuous>  ===================== RENAL =========================  Continue monitoring urine output, I&OS, BUN/Cr   Diuresis with IVP Bumex, maintain net neg fluid balance     buMETAnide Injectable 1 milliGRAM(s) IV Push every 12 hours  ==================== GASTROINTESTINAL===================  Tolerating consistent carb diet   NPO after midnight for LISA and possible DC cardioversion.   Continue Protonix for stress ulcer prophylaxis.   Bowel regimen with Miralax     magnesium sulfate  IVPB 1 Gram(s) IV Intermittent once  pantoprazole    Tablet 40 milliGRAM(s) Oral before breakfast  polyethylene glycol 3350 17 Gram(s) Oral daily  potassium chloride  10 mEq/50 mL IVPB 10 milliEquivalent(s) IV Intermittent once  =======================    ENDOCRINE  =====================  Stress hyperglycemia, continue glucose control with Admelog sliding scale.  Monitor blood glucose levels.     insulin lispro (ADMELOG) corrective regimen sliding scale   SubCutaneous three times a day before meals  ========================INFECTIOUS DISEASE================  Afebrile, WBC within normal limits.  Monitor for leukocytosis / fever. Monitor off abx.       Patient requires continuous monitoring with bedside rhythm monitoring, pulse oximetry monitoring, and continuous central venous and arterial pressure monitoring; and intermittent blood gas analysis.  Care plan discussed with ICU care team.    Patient remained critical, at risk for life threatening decompensation.   I have spent 35 minutes providing acute care with multiple re-evaluations throughout the evening.     By signing my name below, I, Tona Rosas, attest that this documentation has been prepared under the direction and in the presence of Richard Calderon NP.  Electronically signed: Lillie Mascorro, 11-30-20 @ 21:42    I, Richard Calderon NP, personally performed the services described in this documentation. All medical record entries made by the scribe were at my direction and in my presence. I have reviewed the chart and agree that the record reflects my personal performance and is accurate and complete  Electronically signed: Richard Calderon NP, 11-30-20 @ 21:42

## 2020-11-30 NOTE — PROGRESS NOTE ADULT - SUBJECTIVE AND OBJECTIVE BOX
PAST MEDICAL & SURGICAL HISTORY:  Mitral prolapse    Hypercholesteremia    Hypertension    History of hernia repair    HPI:                  PREVIOUS DIAGNOSTIC TESTING:      ECHO  FINDINGS  intraop:     Conclusions:  All prebypass findings dw surgeon  VS as above.   ------------------------------  Post bypass observations:  S/p CABG x 3  All observations d/w surgeon  VS - 125/75 HR 70,  on drips vaso/levo/dobutamine  LV: improvd fxn,  45-50%  RV: unchanged, nml fxn  AV: unchanged  MV: unchanged  TV: unchanged  Aorta: unchanged    STRESS  FINDINGS:    CATHETERIZATION  FINDINGS   VENTRICLES: No left ventriculogram was performed.  CORONARY VESSELS: The coronary circulation is right dominant.  LM:   --  Distal left main: There was a 50 % stenosis.  LAD:   --  Ostial LAD: There was a 95 % stenosis.  CX:   --  Ostial circumflex: There was a 50 % stenosis.  --  OM1: There was a 70 % stenosis.  RCA:   --  RCA: Angiography showed minor luminal irregularities with no  flow limiting lesions.  COMPLICATIONS: There were no complications.  DIAGNOSTIC RECOMMENDATIONS: Severe multivessel disease, involving distal  LM, osital LAD, ostial Cx trifurcation with moderate calcification.  Reccomend CTS evaluation for CABG. Continue ASA, hold P2Y12 inhibitor.  INTERVENTIONAL RECOMMENDATIONS: Severe multivessel disease, involving  distal LM, osital LAD, ostial Cx trifurcation with moderate calcification.  Reccomend CTS evaluation for CABG. Continue ASA, hold P2Y12 inhibitor.  Prepared and signed by    ELECTROPHYSIOLOGY STUDY  FINDINGS:    CAROTID ULTRASOUND:  FINDINGS    VENOUS DUPLEX SCAN:  FINDINGS:    CHEST CT PULMONARY ANGIO with IV Contrast:  FINDINGS:  MEDICATIONS  (STANDING):  aMIOdarone    Tablet 200 milliGRAM(s) Oral every 8 hours  aMIOdarone Infusion 0.5 mG/Min (16.7 mL/Hr) IV Continuous <Continuous>  aspirin enteric coated 81 milliGRAM(s) Oral daily  buMETAnide Injectable 1 milliGRAM(s) IV Push every 12 hours  chlorhexidine 2% Cloths 1 Application(s) Topical <User Schedule>  esMOLOL  Infusion 150 MICROgram(s)/kG/Min (65.7 mL/Hr) IV Continuous <Continuous>  heparin  Infusion 800 Unit(s)/Hr (10 mL/Hr) IV Continuous <Continuous>  insulin lispro (ADMELOG) corrective regimen sliding scale   SubCutaneous three times a day before meals  milrinone Infusion 0.2 MICROgram(s)/kG/Min (4.38 mL/Hr) IV Continuous <Continuous>  pantoprazole    Tablet 40 milliGRAM(s) Oral before breakfast  polyethylene glycol 3350 17 Gram(s) Oral daily    MEDICATIONS  (PRN):  oxyCODONE    IR 5 milliGRAM(s) Oral every 4 hours PRN Moderate Pain (4 - 6)  oxyCODONE    IR 10 milliGRAM(s) Oral every 4 hours PRN Severe Pain (7 - 10)      FAMILY HISTORY:      SOCIAL HISTORY:    CIGARETTES:    ALCOHOL:    REVIEW OF SYSTEMS:      Vital Signs Last 24 Hrs  T(C): 37.5 (2020 12:00), Max: 37.5 (2020 12:00)  T(F): 99.5 (2020 12:00), Max: 99.5 (2020 12:00)  HR: 130 (2020 13:45) (123 - 162)  BP: 137/86 (2020 02:30) (92/65 - 137/86)  BP(mean): 101 (2020 02:30) (75 - 101)  RR: 20 (2020 13:45) (17 - 36)  SpO2: 97% (2020 13:45) (95% - 99%)    PHYSICAL EXAM:        INTERPRETATION OF TELEMETRY: Afib with RVR    ECG:    I&O's Detail    2020 07:  -  2020 07:00  --------------------------------------------------------  IN:    Amiodarone: 33.3 mL    Amiodarone: 333 mL    Diltiazem: 55 mL    Diltiazem: 75 mL    IV PiggyBack: 200 mL    Milrinone: 199.2 mL    Oral Fluid: 680 mL    sodium chloride 0.9%: 240 mL    Vasopressin: 11 mL  Total IN: 1826.5 mL    OUT:    Indwelling Catheter - Urethral (mL): 2155 mL  Total OUT: 2155 mL    Total NET: -328.5 mL      2020 07:01  -  2020 14:11  --------------------------------------------------------  IN:    Amiodarone: 116.8 mL    Esmolol: 65.7 mL    Esmolol: 262.8 mL    Heparin: 48 mL    IV PiggyBack: 237.5 mL    Milrinone: 26.4 mL    Oral Fluid: 300 mL  Total IN: 1057.2 mL    OUT:    Indwelling Catheter - Urethral (mL): 1035 mL  Total OUT: 1035 mL    Total NET: 22.2 mL      LABS:                        8.4    8.67  )-----------( 113      ( 2020 00:25 )             25.2     11-30    130<L>  |  94<L>  |  28<H>  ----------------------------<  130<H>  4.1   |  25  |  0.88    Ca    8.3<L>      2020 00:25  Phos  1.8       Mg     2.0         TPro  5.5<L>  /  Alb  3.3  /  TBili  1.1  /  DBili  x   /  AST  565<H>  /  ALT  926<H>  /  AlkPhos  103  11-30        PTT - ( 2020 12:22 )  PTT:35.3 sec  Urinalysis Basic - ( 2020 17:43 )    Color: Yellow / Appearance: Turbid / S.022 / pH: x  Gluc: x / Ketone: Negative  / Bili: Negative / Urobili: Negative   Blood: x / Protein: 30 mg/dL / Nitrite: Negative   Leuk Esterase: Negative / RBC: 147 /hpf / WBC 9 /HPF   Sq Epi: x / Non Sq Epi: 2 /hpf / Bacteria: Negative      BNP  I&O's Detail    2020 07:01  -  2020 07:00  --------------------------------------------------------  IN:    Amiodarone: 33.3 mL    Amiodarone: 333 mL    Diltiazem: 55 mL    Diltiazem: 75 mL    IV PiggyBack: 200 mL    Milrinone: 199.2 mL    Oral Fluid: 680 mL    sodium chloride 0.9%: 240 mL    Vasopressin: 11 mL  Total IN: 1826.5 mL    OUT:    Indwelling Catheter - Urethral (mL): 2155 mL  Total OUT: 2155 mL    Total NET: -328.5 mL      2020 07:01  -  2020 14:11  --------------------------------------------------------  IN:    Amiodarone: 116.8 mL    Esmolol: 65.7 mL    Esmolol: 262.8 mL    Heparin: 48 mL    IV PiggyBack: 237.5 mL    Milrinone: 26.4 mL    Oral Fluid: 300 mL  Total IN: 1057.2 mL    OUT:    Indwelling Catheter - Urethral (mL): 1035 mL  Total OUT: 1035 mL    Total NET: 22.2 mL      Daily     Daily Weight in k.5 (2020 00:00)    RADIOLOGY & ADDITIONAL STUDIES: PAST MEDICAL & SURGICAL HISTORY:  Mitral prolapse    Hypercholesteremia    Hypertension    History of hernia repair    HPI:   85 M with HTN, HLD, s/p TKR, presented on  with L leg erythema and swelling for 2 months, worse after applying topical cream. Also reported increased CRUZ for 2 months. HS trop elevated to 130s, found to have multivessel disease on [facility]. No s/p CABG x 3 v on  (x 3 LIMA / LIMA to LAD, SVG to Diagonal, SVG to OM). Course c/b post-operative Afib with RVR on POD 5.    PREVIOUS DIAGNOSTIC TESTING:      ECHO  FINDINGS  intraop:     Conclusions:  All prebypass findings dw surgeon  VS as above.   ------------------------------  Post bypass observations:  S/p CABG x 3  All observations d/w surgeon  VS - 125/75 HR 70,  on drips vaso/levo/dobutamine  LV: improvd fxn,  45-50%  RV: unchanged, nml fxn  AV: unchanged  MV: unchanged  TV: unchanged  Aorta: unchanged    STRESS  FINDINGS:    CATHETERIZATION  FINDINGS   VENTRICLES: No left ventriculogram was performed.  CORONARY VESSELS: The coronary circulation is right dominant.  LM:   --  Distal left main: There was a 50 % stenosis.  LAD:   --  Ostial LAD: There was a 95 % stenosis.  CX:   --  Ostial circumflex: There was a 50 % stenosis.  --  OM1: There was a 70 % stenosis.  RCA:   --  RCA: Angiography showed minor luminal irregularities with no  flow limiting lesions.  COMPLICATIONS: There were no complications.  DIAGNOSTIC RECOMMENDATIONS: Severe multivessel disease, involving distal  LM, osital LAD, ostial Cx trifurcation with moderate calcification.  Reccomend CTS evaluation for CABG. Continue ASA, hold P2Y12 inhibitor.  INTERVENTIONAL RECOMMENDATIONS: Severe multivessel disease, involving  distal LM, osital LAD, ostial Cx trifurcation with moderate calcification.  Reccomend CTS evaluation for CABG. Continue ASA, hold P2Y12 inhibitor.  Prepared and signed by    ELECTROPHYSIOLOGY STUDY  FINDINGS:    CAROTID ULTRASOUND:  FINDINGS    VENOUS DUPLEX SCAN:  FINDINGS:    CHEST CT PULMONARY ANGIO with IV Contrast:  FINDINGS:  MEDICATIONS  (STANDING):  aMIOdarone    Tablet 200 milliGRAM(s) Oral every 8 hours  aMIOdarone Infusion 0.5 mG/Min (16.7 mL/Hr) IV Continuous <Continuous>  aspirin enteric coated 81 milliGRAM(s) Oral daily  buMETAnide Injectable 1 milliGRAM(s) IV Push every 12 hours  chlorhexidine 2% Cloths 1 Application(s) Topical <User Schedule>  esMOLOL  Infusion 150 MICROgram(s)/kG/Min (65.7 mL/Hr) IV Continuous <Continuous>  heparin  Infusion 800 Unit(s)/Hr (10 mL/Hr) IV Continuous <Continuous>  insulin lispro (ADMELOG) corrective regimen sliding scale   SubCutaneous three times a day before meals  milrinone Infusion 0.2 MICROgram(s)/kG/Min (4.38 mL/Hr) IV Continuous <Continuous>  pantoprazole    Tablet 40 milliGRAM(s) Oral before breakfast  polyethylene glycol 3350 17 Gram(s) Oral daily    MEDICATIONS  (PRN):  oxyCODONE    IR 5 milliGRAM(s) Oral every 4 hours PRN Moderate Pain (4 - 6)  oxyCODONE    IR 10 milliGRAM(s) Oral every 4 hours PRN Severe Pain (7 - 10)      FAMILY HISTORY:      SOCIAL HISTORY:    CIGARETTES:    ALCOHOL:    REVIEW OF SYSTEMS:      Vital Signs Last 24 Hrs  T(C): 37.5 (2020 12:00), Max: 37.5 (2020 12:00)  T(F): 99.5 (2020 12:00), Max: 99.5 (2020 12:00)  HR: 130 (2020 13:45) (123 - 162)  BP: 137/86 (2020 02:30) (92/65 - 137/86)  BP(mean): 101 (2020 02:30) (75 - 101)  RR: 20 (2020 13:45) (17 - 36)  SpO2: 97% (2020 13:45) (95% - 99%)    PHYSICAL EXAM:  GENERAL: NAD, well-developed, pleasant elderly man  HEAD:  Atraumatic, Normocephalic  EYES: EOMI, conjunctiva and sclera clear  NECK: Supple  CHEST/LUNG: Clear to auscultation bilaterally; No wheeze, ronchi or rales  HEART:irregularly irregular, tachycardic; No murmurs, rubs, or gallops  ABDOMEN: Soft, Nontender, Nondistended; Bowel sounds present  EXTREMITIES:  2+ Peripheral Pulses, No clubbing, cyanosis, LLE swelling and erythema  PSYCH: AAOx3  NEUROLOGY: non-focal  SKIN: LLE cellulitis as above      INTERPRETATION OF TELEMETRY: Afib with RVR    ECG: Afib with RVR    I&O's Detail    2020 07:01  -  2020 07:00  --------------------------------------------------------  IN:    Amiodarone: 33.3 mL    Amiodarone: 333 mL    Diltiazem: 55 mL    Diltiazem: 75 mL    IV PiggyBack: 200 mL    Milrinone: 199.2 mL    Oral Fluid: 680 mL    sodium chloride 0.9%: 240 mL    Vasopressin: 11 mL  Total IN: 1826.5 mL    OUT:    Indwelling Catheter - Urethral (mL): 2155 mL  Total OUT: 2155 mL    Total NET: -328.5 mL      2020 07:  -  2020 14:11  --------------------------------------------------------  IN:    Amiodarone: 116.8 mL    Esmolol: 65.7 mL    Esmolol: 262.8 mL    Heparin: 48 mL    IV PiggyBack: 237.5 mL    Milrinone: 26.4 mL    Oral Fluid: 300 mL  Total IN: 1057.2 mL    OUT:    Indwelling Catheter - Urethral (mL): 1035 mL  Total OUT: 1035 mL    Total NET: 22.2 mL      LABS:                        8.4    8.67  )-----------( 113      ( 2020 00:25 )             25.2     1130    130<L>  |  94<L>  |  28<H>  ----------------------------<  130<H>  4.1   |  25  |  0.88    Ca    8.3<L>      2020 00:25  Phos  1.8       Mg     2.0         TPro  5.5<L>  /  Alb  3.3  /  TBili  1.1  /  DBili  x   /  AST  565<H>  /  ALT  926<H>  /  AlkPhos  103          PTT - ( 2020 12:22 )  PTT:35.3 sec  Urinalysis Basic - ( 2020 17:43 )    Color: Yellow / Appearance: Turbid / S.022 / pH: x  Gluc: x / Ketone: Negative  / Bili: Negative / Urobili: Negative   Blood: x / Protein: 30 mg/dL / Nitrite: Negative   Leuk Esterase: Negative / RBC: 147 /hpf / WBC 9 /HPF   Sq Epi: x / Non Sq Epi: 2 /hpf / Bacteria: Negative      BNP  I&O's Detail    2020 07:01  -  2020 07:00  --------------------------------------------------------  IN:    Amiodarone: 33.3 mL    Amiodarone: 333 mL    Diltiazem: 55 mL    Diltiazem: 75 mL    IV PiggyBack: 200 mL    Milrinone: 199.2 mL    Oral Fluid: 680 mL    sodium chloride 0.9%: 240 mL    Vasopressin: 11 mL  Total IN: 1826.5 mL    OUT:    Indwelling Catheter - Urethral (mL): 2155 mL  Total OUT: 2155 mL    Total NET: -328.5 mL      2020 07:01  -  2020 14:11  --------------------------------------------------------  IN:    Amiodarone: 116.8 mL    Esmolol: 65.7 mL    Esmolol: 262.8 mL    Heparin: 48 mL    IV PiggyBack: 237.5 mL    Milrinone: 26.4 mL    Oral Fluid: 300 mL  Total IN: 1057.2 mL    OUT:    Indwelling Catheter - Urethral (mL): 1035 mL  Total OUT: 1035 mL    Total NET: 22.2 mL      Daily     Daily Weight in k.5 (2020 00:00)    RADIOLOGY & ADDITIONAL STUDIES:

## 2020-11-30 NOTE — CONSULT NOTE ADULT - SUBJECTIVE AND OBJECTIVE BOX
For all Cardiology service contact information, go to [email or web address] and use "[email or web address]" to login.    HPI:    85 F with HTN, HLD presented with new-onset decompensated HF, found to have moderate AS, severe TVD s/p CABG 11/25 (POD 5), complicated by new onset atrial fibrillation post-operatively as well as cardiogenic shock requiring inotrope therapy. Patient was noted to have atrial fibrillation on POD 1 which self resolved, and went into atrial fibrillation again 11/29, and has been persistently in ventricular rates of 130s-400s, despite AV ruth agents and amiodarone (which has been stopped due to concern for transaminitis)      --------------------------------------------------------------------------------  PAST MEDICAL HISTORY:    HTN  HLD  GERD / Hiatal hernia   OA    --------------------------------------------------------------------------------  PAST SURGICAL HISTORY:    left knee replacement   inguinal hernia repair    --------------------------------------------------------------------------------  FAMILY HISTORY:     sister: breast CA   Mother: gastric cancer   no known heart disease    --------------------------------------------------------------------------------    SOCIAL HISTORY:  Alcohol: None reported  Smoking: None reported     --------------------------------------------------------------------------------  ALLERGIES:     listed to have allergies to statins , valsartan, naprosyn ( none with rash or swelling )      --------------------------------------------------------------------------------  MEDICATIONS:   [As silvana, reviewed]    --------------------------------------------------------------------------------  REVIEW OF SYSTEM:    GEN: no fever, no chills, no pain  RESP: + SOB with minimal exertion , no cough, no sputum  CVS: no chest pain, no palpitations, ++ edema with redness of left leg as above   GI: no abdominal pain, no nausea, no vomiting, no constipation, no diarrhea  : no dysuria, no frequency, no hematuria  Neuro: no headache, no dizziness  PSYCH: no anxiety, no depression  Derm : no itching, no rash, redness as above     --------------------------------------------------------------------------------  VITAL SIGNS:     T(C): 36.4 (11-18-20 @ 10:32), Max: 36.4 (11-18-20 @ 10:32)  HR: 55 (11-18-20 @ 16:23) (55 - 63)  BP: 167/76 (11-18-20 @ 16:23) (155/92 - 167/76)  RR: 18 (11-18-20 @ 16:23) (18 - 18)  SpO2: 100% (11-18-20 @ 16:23) (96% - 100%)       PAST MEDICAL & SURGICAL HISTORY:  Mitral prolapse    Hypercholesteremia    Hypertension    History of hernia repair      SHx: smoking [ ], alcohol [ ], recreational drugs [ ]     Cardiology Data:  -------------------------------------------------------------------------------------------  ROS:  CV: chest pain (-), palpitation (-), orthopnea (-), PND (-), edema (-)  PULM: SOB (-), cough (-), wheezing (-), hemoptysis (-).   CONST: fever (-), chills (-) or fatigue (-)  GI: abdominal distension (-), abdominal pain (-) , nausea/vomiting (-), hematemesis, (-), melena (-), hematochezia (-)  : dysuria (-), frequency (-), hematuria (-).   NEURO: numbness (-), weakness (-), dizziness (-)  SKIN: itching (-), rash (-)  HEENT:  visual changes (-); vertigo or throat pain (-);  neck stiffness (-)     All other review of systems is negative unless indicated above.   -------------------------------------------------------------------------------------------  VS:  T(C): 37.5 (11-30-20 @ 12:00), Max: 37.5 (11-30-20 @ 12:00)  HR: 126 (11-30-20 @ 12:30) (123 - 162)  BP: 137/86 (11-30-20 @ 02:30) (92/65 - 137/86)  RR: 21 (11-30-20 @ 12:30) (17 - 36)  SpO2: 98% (11-30-20 @ 12:30) (95% - 100%)  I&O's Summary    29 Nov 2020 07:01 - 30 Nov 2020 07:00  --------------------------------------------------------  IN: 1826.5 mL / OUT: 2155 mL / NET: -328.5 mL    30 Nov 2020 07:01 - 30 Nov 2020 13:20  --------------------------------------------------------  IN: 612.4 mL / OUT: 960 mL / NET: -347.6 mL      -------------------------------------------------------------------------------------------  EXAM:   PHYSICAL EXAM:  GENERAL: NAD, lying in bed comfortably  HEAD:  Atraumatic, Normocephalic  EYES: EOMI, PERRLA, conjunctiva and sclera clear  ENT: Moist mucous membranes  NECK: Supple, No JVD  CHEST/LUNG: Clear to auscultation bilaterally; No rales, rhonchi, wheezing, or rubs. Unlabored respirations  HEART: Regular rate and rhythm; No murmurs, rubs, or gallops  ABDOMEN: Bowel sounds present; Soft, Nontender, Nondistended.   EXTREMITIES:  2+ Peripheral Pulses, brisk capillary refill. No clubbing, cyanosis, or edema  NERVOUS SYSTEM:  Alert & Oriented X3, speech clear. No deficits   MSK: FROM all 4 extremities, full and equal strength  SKIN: No rashes or lesions  -------------------------------------------------------------------------------------------  LABS:                        8.4    8.67  )-----------( 113      ( 30 Nov 2020 00:25 )             25.2      11-30    130<L>  |  94<L>  |  28<H>  ----------------------------<  130<H>  4.1   |  25  |  0.88    Ca    8.3<L>      30 Nov 2020 00:25  Phos  1.8     11-30  Mg     2.0     11-30    TPro  5.5<L>  /  Alb  3.3  /  TBili  1.1  /  DBili  x   /  AST  565<H>  /  ALT  926<H>  /  AlkPhos  103  11-30     PTT - ( 30 Nov 2020 12:22 )  PTT:35.3 sec       Troponin trend:  Creatine Kinase, Serum: 245 U/L (11-28-20 @ 01:36)  Creatine Kinase, Serum: 742 U/L (11-25-20 @ 13:38)  Creatine Kinase, Serum: 118 U/L (11-19-20 @ 05:42)    -------------------------------------------------------------------------------------------  Current Meds:  aMIOdarone    Tablet 200 milliGRAM(s) Oral every 8 hours  aMIOdarone Infusion 0.5 mG/Min IV Continuous <Continuous>  aspirin enteric coated 81 milliGRAM(s) Oral daily  buMETAnide Injectable 1 milliGRAM(s) IV Push every 12 hours  chlorhexidine 2% Cloths 1 Application(s) Topical <User Schedule>  esMOLOL  Infusion 150 MICROgram(s)/kG/Min IV Continuous <Continuous>  heparin  Infusion 800 Unit(s)/Hr IV Continuous <Continuous>  insulin lispro (ADMELOG) corrective regimen sliding scale   SubCutaneous three times a day before meals  milrinone Infusion 0.2 MICROgram(s)/kG/Min IV Continuous <Continuous>  oxyCODONE    IR 5 milliGRAM(s) Oral every 4 hours PRN  oxyCODONE    IR 10 milliGRAM(s) Oral every 4 hours PRN  pantoprazole    Tablet 40 milliGRAM(s) Oral before breakfast  polyethylene glycol 3350 17 Gram(s) Oral daily    -------------------------------------------------------------------------------------------

## 2020-11-30 NOTE — PROGRESS NOTE ADULT - SUBJECTIVE AND OBJECTIVE BOX
CC: f/u for LLE cellulitis    Patient reports: he is comfortable in bed, still in ICU, a fib being managed by ICU    REVIEW OF SYSTEMS:  All other review of systems negative (Comprehensive ROS)    Antimicrobials Day #  :off    Other Medications Reviewed  MEDICATIONS  (STANDING):  aMIOdarone Infusion 0.5 mG/Min (16.7 mL/Hr) IV Continuous <Continuous>  aspirin enteric coated 81 milliGRAM(s) Oral daily  buMETAnide Injectable 1 milliGRAM(s) IV Push every 12 hours  chlorhexidine 2% Cloths 1 Application(s) Topical <User Schedule>  esMOLOL  Infusion 150 MICROgram(s)/kG/Min (65.7 mL/Hr) IV Continuous <Continuous>  heparin  Infusion 800 Unit(s)/Hr (8 mL/Hr) IV Continuous <Continuous>  insulin lispro (ADMELOG) corrective regimen sliding scale   SubCutaneous three times a day before meals  milrinone Infusion 0.2 MICROgram(s)/kG/Min (4.38 mL/Hr) IV Continuous <Continuous>  pantoprazole    Tablet 40 milliGRAM(s) Oral before breakfast  polyethylene glycol 3350 17 Gram(s) Oral daily    T(F): 98.4 (20 @ 08:00), Max: 99.1 (20 @ 17:00)  HR: 128 (20 @ 09:30)  BP: 137/86 (20 @ 02:30)  RR: 22 (20 @ 09:30)  SpO2: 98% (20 @ 09:30)  Wt(kg): --    PHYSICAL EXAM:  General: alert, no acute distress  Eyes:  anicteric, no conjunctival injection, no discharge  Oropharynx: no lesions or injection 	  Neck: supple, without adenopathy  Lungs: clear to auscultation, chest incision dressed  Heart: regular rate and rhythm; no murmur, rubs or gallops  Abdomen: soft, nondistended, nontender, without mass or organomegaly  Skin: no lesions  Extremities: no clubbing, cyanosis,+ edema, left >rt side  Neurologic: alert, oriented, moves all extremities  : mays  LAB RESULTS:                        8.4    8.67  )-----------( 113      ( 2020 00:25 )             25.2         130<L>  |  94<L>  |  28<H>  ----------------------------<  130<H>  4.1   |  25  |  0.88    Ca    8.3<L>      2020 00:25  Phos  1.8     11-  Mg     2.0         TPro  5.5<L>  /  Alb  3.3  /  TBili  1.1  /  DBili  x   /  AST  565<H>  /  ALT  926<H>  /  AlkPhos  103  30    LIVER FUNCTIONS - ( 2020 00:25 )  Alb: 3.3 g/dL / Pro: 5.5 g/dL / ALK PHOS: 103 U/L / ALT: 926 U/L / AST: 565 U/L / GGT: x           Urinalysis Basic - ( 2020 17:43 )    Color: Yellow / Appearance: Turbid / S.022 / pH: x  Gluc: x / Ketone: Negative  / Bili: Negative / Urobili: Negative   Blood: x / Protein: 30 mg/dL / Nitrite: Negative   Leuk Esterase: Negative / RBC: 147 /hpf / WBC 9 /HPF   Sq Epi: x / Non Sq Epi: 2 /hpf / Bacteria: Negative      MICROBIOLOGY:  RECENT CULTURES:      RADIOLOGY REVIEWED:    < from: Xray Chest 1 View- PORTABLE-Routine (Xray Chest 1 View- PORTABLE-Routine in AM.) (20 @ 02:59) >  INTERPRETATION:  A single chest x-ray was obtained on 2020.    Indication: Status post cardiac surgery.    Impression:    The heart is enlarged. Bilateral pleural effusion. Left lower lobe pneumonia and/or atelectasis. Mild pulmonary vascular congestion. A central line is seen on the right and the tip is in superior vena cava. No pneumothorax. Status post sternotomy.

## 2020-11-30 NOTE — PROGRESS NOTE ADULT - SUBJECTIVE AND OBJECTIVE BOX
MARCOS AGUILAR  MRN-[id number]  Patient is a 85y old  Male who presents with a chief complaint of edema (2020 09:15)    HPI:  pt is an 84 y/o man with pmhx of htn, hld, hiatal hernia / GERD, OA post Lt TKR x1 year ago presenting with concern for increasing redness of left leg and swelling for 2 months.   Patient reports that he has been having increasing swelling of both of his legs for approximately 2-3 months; however, the left leg has been more swollen and has been getting increasingly more red. He states that he went to his podiatrist when he noted some rash on the lower portion of his left just above his foot, was given a 'cream of some kind, and feels that the redness and swelling got worse after starting the cream.'   He states that he was having chest pain approximately 1 mo ago, went to his PMD and was diagnosed with a hiatal hernia with an xray and prescribed omeprazole.   Patient reports that he has been becoming increasingly more short of breath for approximately 2 months as well. Feels that when he takes just a few steps he has to stop and catch his breath, and is so short of breath that he cannot speak in complete sentences without resting for a period of time first.   pt also states was given amoxicillin a few days ago but only took one dose  He denies any fevers, chills, cough, nausea, vomiting, sweating, chest pain  pt admitted for likely acute CHF with elevated troponin / NSTEMI    (2020 16:09)      Surgery/Hospital Course:   CABG x3    Rapid afib with RVR     Today:  Recalcitrant post op atrial fibrillation resistant to all pharmaceutical interventions, therefore patient received IV heparin bolus and started on heparin drip to prevent thromboembolism from new onset atrial fibrillation. Pt is also started on IV amiodarone drip and had the addition of IV esmolol to attempt rate control. NPO after midnight for LISA and possible DC cardioversion.     ICU Vital Signs Last 24 Hrs  T(C): 36.9 (2020 08:00), Max: 37.3 (2020 17:00)  T(F): 98.4 (2020 08:00), Max: 99.1 (2020 17:00)  HR: 124 (2020 09:00) (124 - 162)  BP: 137/86 (2020 02:30) (92/65 - 137/86)  BP(mean): 101 (2020 02:30) (72 - 101)  ABP: 103/63 (2020 09:00) (40/39 - 133/65)  ABP(mean): 78 (2020 09:00) (40 - 95)  RR: 20 (2020 09:00) (16 - 40)  SpO2: 98% (2020 09:00) (91% - 100%)      Physical Exam:  Gen:  NAD   CNS: nonfocal  Neck: no JVD  RES : clear , no wheezing              CVS: Tachycardic, irregularly irregular rhythm  Chest: +chest tubes  Abd: Soft, non-distended. Bowel sounds present.  Skin: No rash.  Ext:  no edema    ============================I/O===========================   I&O's Detail    2020 07:01  -  2020 07:00  --------------------------------------------------------  IN:    Amiodarone: 333 mL    Diltiazem: 55 mL    Diltiazem: 75 mL    IV PiggyBack: 200 mL    Milrinone: 199.2 mL    Oral Fluid: 680 mL    sodium chloride 0.9%: 240 mL    Vasopressin: 11 mL  Total IN: 1793.2 mL    OUT:    Indwelling Catheter - Urethral (mL): 2155 mL  Total OUT: 2155 mL    Total NET: -361.8 mL        ============================ LABS =========================                        8.4    8.67  )-----------( 113      ( 2020 00:25 )             25.2     11-30    130<L>  |  94<L>  |  28<H>  ----------------------------<  130<H>  4.1   |  25  |  0.88    Ca    8.3<L>      2020 00:25  Phos  1.8       Mg     2.0         TPro  5.5<L>  /  Alb  3.3  /  TBili  1.1  /  DBili  x   /  AST  565<H>  /  ALT  926<H>  /  AlkPhos  103  30    LIVER FUNCTIONS - ( 2020 00:25 )  Alb: 3.3 g/dL / Pro: 5.5 g/dL / ALK PHOS: 103 U/L / ALT: 926 U/L / AST: 565 U/L / GGT: x           PTT - ( 2020 07:52 )  PTT:35.2 sec  ABG - ( 2020 00:01 )  pH, Arterial: 7.48  pH, Blood: x     /  pCO2: 37    /  pO2: 88    / HCO3: 27    / Base Excess: 3.8   /  SaO2: 97                Urinalysis Basic - ( 2020 17:43 )    Color: Yellow / Appearance: Turbid / S.022 / pH: x  Gluc: x / Ketone: Negative  / Bili: Negative / Urobili: Negative   Blood: x / Protein: 30 mg/dL / Nitrite: Negative   Leuk Esterase: Negative / RBC: 147 /hpf / WBC 9 /HPF   Sq Epi: x / Non Sq Epi: 2 /hpf / Bacteria: Negative      ======================Micro/Rad/Cardio=================  Culture: Reviewed   CXR: Reviewed  Echo:Reviewed  ======================================================  PAST MEDICAL & SURGICAL HISTORY:  Mitral prolapse    Hypercholesteremia    Hypertension    History of hernia repair      ====================ASSESSMENT ==============  CAD s/p CABG   Rapid afib with RVR   Hypovolemia   Delirium   Hypertension   Hyperlipidemia     Plan:  ====================== NEUROLOGY=====================  Continue monitoring neuro status   Oxycodone prn for analgesia     oxyCODONE    IR 5 milliGRAM(s) Oral every 4 hours PRN Moderate Pain (4 - 6)  oxyCODONE    IR 10 milliGRAM(s) Oral every 4 hours PRN Severe Pain (7 - 10)    ==================== RESPIRATORY======================  Stable on RA   Encourage incentive spirometry, continue pulse ox monitoring, follow ABGs     ====================CARDIOVASCULAR==================  CAD s/p CABG   Continue invasive hemodynamic monitoring   Recalcitrant post op atrial fibrillation resistant to all pharmaceutical interventions  Afib with RVR, continue rate control with IV Amiodarone and IV Heparin, in addition to IV Esmolol   Inotropic support with IV Primacor   ASA for CAD     aspirin enteric coated 81 milliGRAM(s) Oral daily  aMIOdarone Infusion 0.5 mG/Min (16.7 mL/Hr) IV Continuous <Continuous>  esMOLOL  Infusion 50 MICROgram(s)/kG/Min (21.9 mL/Hr) IV Continuous <Continuous>  milrinone Infusion 0.2 MICROgram(s)/kG/Min (4.38 mL/Hr) IV Continuous <Continuous>    ===================HEMATOLOGIC/ONC ===================  Anemia, monitor H&H/Plts   Continue AC therapy with heparin drip for afib  and VTE prophylaxis     heparin  Infusion 800 Unit(s)/Hr (8 mL/Hr) IV Continuous <Continuous>    ===================== RENAL =========================  Continue monitoring urine output, I&OS, BUN/Cr   Diuresis with Bumex, maintain net neg fluid balance     buMETAnide Injectable 1 milliGRAM(s) IV Push every 12 hours    ==================== GASTROINTESTINAL===================  Tolerating consistent carb diet   NPO after midnight for LISA and possible DC cardioversion.   Bowel regimen with Miralax     GI prophylaxis, pantoprazole    Tablet 40 milliGRAM(s) Oral before breakfast  polyethylene glycol 3350 17 Gram(s) Oral daily    =======================    ENDOCRINE  =====================  Continue glucose control with admelog sliding scale     insulin lispro (ADMELOG) corrective regimen sliding scale   SubCutaneous three times a day before meals    ========================INFECTIOUS DISEASE================  Afebrile, WBC within normal limits       Patient requires continuous monitoring with bedside rhythm monitoring, pulse ox monitoring, and intermittent blood gas analysis. Care plan discussed with ICU care team. Patient remained critical and at risk for life threatening decompensation.     By signing my name below, I, Katelyn Guadalupe, attest that this documentation has been prepared under the direction and in the presence of Uriel Wesley MD   Electronically signed: Lillie Cordero, 20 @ 09:19    I, Uriel Wesley, personally performed the services described in this documentation. all medical record entries made by the dannyibpedro were at my direction and in my presence. I have reviewed the chart and agree that the record reflects my personal performance and is accurate and complete  Electronically signed: Uriel Wesley MD

## 2020-11-30 NOTE — CONSULT NOTE ADULT - ASSESSMENT
85F with HTN, HLD presented with new-onset decompensated HF, found to have moderate AS, severe TVD s/p CABG 11/25 (POD 5), c/b post-operative atrial fibrillation     continue heparin infusion  okay to continue low-dose amiodarone 200 mg TID

## 2020-11-30 NOTE — PROGRESS NOTE ADULT - SUBJECTIVE AND OBJECTIVE BOX
CARDIOLOGY     PROGRESS  NOTE   ________________________________________________    CHIEF COMPLAINT:Patient is a 85y old  Male who presents with a chief complaint of edema (29 Nov 2020 23:54)  no comoplain.  	  REVIEW OF SYSTEMS:  CONSTITUTIONAL: No fever, weight loss, or fatigue  EYES: No eye pain, visual disturbances, or discharge  ENT:  No difficulty hearing, tinnitus, vertigo; No sinus or throat pain  NECK: No pain or stiffness  RESPIRATORY: No cough, wheezing, chills or hemoptysis; No Shortness of Breath  CARDIOVASCULAR: No chest pain, palpitations, passing out, dizziness, or leg swelling  GASTROINTESTINAL: No abdominal or epigastric pain. No nausea, vomiting, or hematemesis; No diarrhea or constipation. No melena or hematochezia.  GENITOURINARY: No dysuria, frequency, hematuria, or incontinence  NEUROLOGICAL: No headaches, memory loss, loss of strength, numbness, or tremors  SKIN: No itching, burning, rashes, or lesions   LYMPH Nodes: No enlarged glands  ENDOCRINE: No heat or cold intolerance; No hair loss  MUSCULOSKELETAL: No joint pain or swelling; No muscle, back, or extremity pain  PSYCHIATRIC: No depression, anxiety, mood swings, or difficulty sleeping  HEME/LYMPH: No easy bruising, or bleeding gums  ALLERGY AND IMMUNOLOGIC: No hives or eczema	    [ ] All others negative	  [ ] Unable to obtain    PHYSICAL EXAM:  T(C): 36.9 (11-30-20 @ 08:00), Max: 37.3 (11-29-20 @ 17:00)  HR: 124 (11-30-20 @ 09:00) (124 - 162)  BP: 137/86 (11-30-20 @ 02:30) (92/65 - 137/86)  RR: 20 (11-30-20 @ 09:00) (16 - 40)  SpO2: 98% (11-30-20 @ 09:00) (91% - 100%)  Wt(kg): --  I&O's Summary    29 Nov 2020 07:01  -  30 Nov 2020 07:00  --------------------------------------------------------  IN: 1793.2 mL / OUT: 2155 mL / NET: -361.8 mL        Appearance: Normal	  HEENT:   Normal oral mucosa, PERRL, EOMI	  Lymphatic: No lymphadenopathy  Cardiovascular: Normal S1 S2, No JVD, + murmurs, No edema  Respiratory: Lungs clear to auscultation	  Psychiatry: A & O x 3, Mood & affect appropriate  Gastrointestinal:  Soft, Non-tender, + BS	  Skin: No rashes, No ecchymoses, No cyanosis	  Neurologic: Non-focal  Extremities: Normal range of motion, No clubbing, cyanosis or edema  Vascular: Peripheral pulses palpable 2+ bilaterally    MEDICATIONS  (STANDING):  aMIOdarone Infusion 0.5 mG/Min (16.7 mL/Hr) IV Continuous <Continuous>  aspirin enteric coated 81 milliGRAM(s) Oral daily  buMETAnide Injectable 1 milliGRAM(s) IV Push every 12 hours  chlorhexidine 2% Cloths 1 Application(s) Topical <User Schedule>  esMOLOL  Infusion 50 MICROgram(s)/kG/Min (21.9 mL/Hr) IV Continuous <Continuous>  esMOLOL Bolus 99940 MICROGram(s) IV Push once  heparin  Infusion 800 Unit(s)/Hr (8 mL/Hr) IV Continuous <Continuous>  insulin lispro (ADMELOG) corrective regimen sliding scale   SubCutaneous three times a day before meals  milrinone Infusion 0.2 MICROgram(s)/kG/Min (4.38 mL/Hr) IV Continuous <Continuous>  pantoprazole    Tablet 40 milliGRAM(s) Oral before breakfast  polyethylene glycol 3350 17 Gram(s) Oral daily      TELEMETRY: 	    ECG:  	  RADIOLOGY:  OTHER: 	  	  LABS:	 	    CARDIAC MARKERS:                                8.4    8.67  )-----------( 113      ( 30 Nov 2020 00:25 )             25.2     11-30    130<L>  |  94<L>  |  28<H>  ----------------------------<  130<H>  4.1   |  25  |  0.88    Ca    8.3<L>      30 Nov 2020 00:25  Phos  1.8     11-30  Mg     2.0     11-30    TPro  5.5<L>  /  Alb  3.3  /  TBili  1.1  /  DBili  x   /  AST  565<H>  /  ALT  926<H>  /  AlkPhos  103  11-30    proBNP: Serum Pro-Brain Natriuretic Peptide: 49266 pg/mL (11-18 @ 12:45)    Lipid Profile: Cholesterol 106  LDL --  HDL 40  TG 73    HgA1c:   TSH: Thyroid Stimulating Hormone, Serum: 1.46 uIU/mL (11-19 @ 09:24)  Thyroid Stimulating Hormone, Serum: 1.51 uIU/mL (11-19 @ 08:41)    PTT - ( 30 Nov 2020 07:52 )  PTT:35.2 sec      Assessment and plan  ---------------------------  pt is an 86 y/o man with pmhx of htn, hld, hiatal hernia / GERD, OA post Lt TKR x1 year ago presenting with concern for increasing redness of left leg and swelling for 2 months.   Patient reports that he has been having increasing swelling of both of his legs for approximately 2-3 months; however, the left leg has been more swollen and has been getting increasingly more red. He states that he went to his podiatrist when he noted some rash on the lower portion of his left just above his foot, was given a 'cream of some kind, and feels that the redness and swelling got worse after starting the cream.'   He states that he was having chest pain approximately 1 mo ago, went to his PMD and was diagnosed with a hiatal hernia with an xray and prescribed omeprazole.   Patient reports that he has been becoming increasingly more short of breath for approximately 2 months as well. Feels that when he takes just a few steps he has to stop and catch his breath, and is so short of breath that he cannot speak in complete sentences without rstill in rapid a,fib  continue amio  continue Esmolol  ac  ?hanna /cardioversion if hr not well controlled  fu lft/haley  suspect SSS

## 2020-11-30 NOTE — PROGRESS NOTE ADULT - ASSESSMENT
_________________________________________________________________________________________  ========>>  M E D I C A L   A T T E N D I N G    F O L L O W  U P  N O T E  <<=========  -----------------------------------------------------------------------------------------------------    - Patient seen and examined by me earlier today.   - In summary,  MARCOS AGUILAR is a 85y year old man who originally presented with edema   - Patient today overall doing ok post op, remains in the CTICU, comfortable, taking PO, no significant pain, no SOB / cough       pt with remains in AF with RVR, on IV drip Rx.. no symptoms of SOB, CP, palpitation, CP, dizziness..     ==================>> REVIEW OF SYSTEM <<=================    GEN: no fever, no chills, no significant pain  RESP: no SOB at rest , no cough, no sputum  CVS: no chest pain, no palpitations   GI: no abdominal pain, no nausea  : no dysuria, no discomfort with mays   Neuro: no headache, no dizziness  Derm : no itching, no rash    ==================>> PHYSICAL EXAM <<=================    GEN: A&O X 3 , NAD , comfortable in recliner with legs down   HEENT: NCAT, PERRL, MMM, hearing intact  Neck: supple , no JVD appreciated  CVS: S1S2 , No M/R/G appreciated, tachycardic   PULM: mild scattered rhonchi + chest tube out  ABD.: soft. non tender, non distended,  bowel sounds decreased   Extrem: legs improved overall,  mild bilateral LE edema     + mays with goof urine output   PSYCH : normal mood,  not anxious      ==================>> MEDICATIONS <<====================    aMIOdarone    Tablet 200 milliGRAM(s) Oral every 8 hours  aMIOdarone Infusion 0.5 mG/Min IV Continuous <Continuous>  aspirin enteric coated 81 milliGRAM(s) Oral daily  buMETAnide Injectable 1 milliGRAM(s) IV Push every 12 hours  chlorhexidine 2% Cloths 1 Application(s) Topical <User Schedule>  esMOLOL  Infusion 150 MICROgram(s)/kG/Min IV Continuous <Continuous>  heparin  Infusion 800 Unit(s)/Hr IV Continuous <Continuous>  insulin lispro (ADMELOG) corrective regimen sliding scale   SubCutaneous three times a day before meals  milrinone Infusion 0.2 MICROgram(s)/kG/Min IV Continuous <Continuous>  pantoprazole    Tablet 40 milliGRAM(s) Oral before breakfast  polyethylene glycol 3350 17 Gram(s) Oral daily    MEDICATIONS  (PRN):  oxyCODONE    IR 5 milliGRAM(s) Oral every 4 hours PRN Moderate Pain (4 - 6)  oxyCODONE    IR 10 milliGRAM(s) Oral every 4 hours PRN Severe Pain (7 - 10)    ___________  Active diet:  Diet, NPO after Midnight:      NPO Start Date: 30-Nov-2020,   NPO Start Time: 23:59  Diet, Regular:   Consistent Carbohydrate No Snacks (CSTCHO)  ___________________    ==================>> VITAL SIGNS <<==================    Vital Signs Last 24 HrsT(C): 37.5 (11-30-20 @ 12:00)  T(F): 99.5 (11-30-20 @ 12:00), Max: 99.5 (11-30-20 @ 12:00)  HR: 126 (11-30-20 @ 12:30) (123 - 162)  BP: 137/86 (11-30-20 @ 02:30)  RR: 21 (11-30-20 @ 12:30) (17 - 36)  SpO2: 98% (11-30-20 @ 12:30) (95% - 100%)      POCT Blood Glucose.: 124 mg/dL (30 Nov 2020 07:40)  POCT Blood Glucose.: 116 mg/dL (29 Nov 2020 16:21)     ==================>> LAB AND IMAGING <<==================                        8.4    8.67  )-----------( 113      ( 30 Nov 2020 00:25 )             25.2     Hemoglobin:   8.4 <<==,  9.3 <<==,  10.9 <<==,  9.7 <<==,  9.7 <<==,  9.6 <<==       130<L>  |  94<L>  |  28<H>  ----------------------------<  130<H>  4.1   |  25  |  0.88    Ca    8.3<L>      30 Nov 2020 00:25  Phos  1.8     11-30  Mg     2.0     11-30    TPro  5.5<L>  /  Alb  3.3  /  TBili  1.1  /  DBili  x   /  AST  565<H>  /  ALT  926<H>  /  AlkPhos  103  11-30    WBC count:   8.67 <<== ,  10.24 <<== ,  16.95 <<== ,  14.49 <<== ,  12.90 <<== ,  9.92 <<==   Hemoglobin:   8.4 <<==,  9.3 <<==,  10.9 <<==,  9.7 <<==,  9.7 <<==,  9.6 <<==  platelets:  113 <==, 82 <==, 109 <==, 94 <==, 79 <==, 96 <==, 133 <==    Creatinine:  0.88  <<==, 0.92  <<==, 1.00  <<==, 1.12  <<==, 0.92  <<==, 0.74  <<==  Sodium:   130  <==, 131  <==, 135  <==, 133  <==, 134  <==, 137  <==, 139  <==       AST:          565 <== , 942 <== , 1592 <== , 213 <== , 93 <==      ALT:        926  <== , 982  <== , 1135  <== , 178  <== , 69  <==      AP:        103  <=, 103  <=, 97  <=, 98  <=, 92  <=     Bili:        1.1  <=, 1.1  <=, 1.0  <=, 1.3  <=, 0.8  <=    ___________________________________________________________________________________  ===============>>  A S S E S S M E N T   A N D   P L A N <<===============  ------------------------------------------------------------------------------------------  pt is an 86 y/o man with pmhx of htn, hld, hiatal hernia / GERD, OA post Lt TKR x1 year ago presenting with concern for increasing redness of left leg and swelling for 2 months.     Problem/Plan - 1:  ·  Problem: NSTEMI in pt with significant CAD    pt doing overall well post CABG X3  appreciated CTU / CC/ CTS follow up and mgmt     new AF with RVR  on Amio and esmolol drips  monitor on tele  med mgmt per cardio / EP / CC    PT / OOB, IS, nutritional support   Continue Current medications otherwise and monitor.   supportive care  pain mgmt as needed   monitor post op anemia    Problem/Plan - 2:  ·  Problem:  HTN / HLD  Continue Current medications otherwise and monitor.   cardio f/u. and further optimization    Problem/Plan - 3:  ·  Problem: Venous stasis.    no DVT on duplex  likely venous stasis changes on left leg and cellulitis  post course of abx treatment   elevation reiterated   diuresis  ID f/u     GI / DVT PPX per surgical protocol   PT / OOB    --------------------------------------------  Case discussed with Pt  Education given on findings and plan of care  ___________________________  H. INEZ Phipps.  Pager: [phone]

## 2020-11-30 NOTE — PROGRESS NOTE ADULT - ASSESSMENT
84 yo male with HTN,HLD,GERD,OA, left TKR admitted 11/18 with leg  edema, shortness of breath, and possible LLE cellulitis.  He completed a limited antibiotic course for possible LLE cellulitis although most of changes may have reflected fluid overload. No signs of active cellulitis now.  He is s/p CABG 11/25.  No signs of active infection.  ?LISA with cardioversion.  Appreciate close medical and cardiology f/u

## 2020-12-01 LAB
ALBUMIN SERPL ELPH-MCNC: 3.3 G/DL — SIGNIFICANT CHANGE UP (ref 3.3–5)
ALP SERPL-CCNC: 114 U/L — SIGNIFICANT CHANGE UP (ref 40–120)
ALT FLD-CCNC: 680 U/L — HIGH (ref 10–45)
ANION GAP SERPL CALC-SCNC: 10 MMOL/L — SIGNIFICANT CHANGE UP (ref 5–17)
APTT BLD: 48.7 SEC — HIGH (ref 27.5–35.5)
APTT BLD: 64.2 SEC — HIGH (ref 27.5–35.5)
APTT BLD: 77.9 SEC — HIGH (ref 27.5–35.5)
AST SERPL-CCNC: 287 U/L — HIGH (ref 10–40)
BILIRUB SERPL-MCNC: 1.1 MG/DL — SIGNIFICANT CHANGE UP (ref 0.2–1.2)
BUN SERPL-MCNC: 21 MG/DL — SIGNIFICANT CHANGE UP (ref 7–23)
CALCIUM SERPL-MCNC: 8.4 MG/DL — SIGNIFICANT CHANGE UP (ref 8.4–10.5)
CHLORIDE SERPL-SCNC: 96 MMOL/L — SIGNIFICANT CHANGE UP (ref 96–108)
CO2 SERPL-SCNC: 24 MMOL/L — SIGNIFICANT CHANGE UP (ref 22–31)
CREAT SERPL-MCNC: 0.88 MG/DL — SIGNIFICANT CHANGE UP (ref 0.5–1.3)
GAS PNL BLDA: SIGNIFICANT CHANGE UP
GLUCOSE BLDC GLUCOMTR-MCNC: 106 MG/DL — HIGH (ref 70–99)
GLUCOSE SERPL-MCNC: 122 MG/DL — HIGH (ref 70–99)
HCT VFR BLD CALC: 26.1 % — LOW (ref 39–50)
HGB BLD-MCNC: 8.9 G/DL — LOW (ref 13–17)
INR BLD: 1.39 RATIO — HIGH (ref 0.88–1.16)
MAGNESIUM SERPL-MCNC: 2.4 MG/DL — SIGNIFICANT CHANGE UP (ref 1.6–2.6)
MCHC RBC-ENTMCNC: 32.8 PG — SIGNIFICANT CHANGE UP (ref 27–34)
MCHC RBC-ENTMCNC: 34.1 GM/DL — SIGNIFICANT CHANGE UP (ref 32–36)
MCV RBC AUTO: 96.3 FL — SIGNIFICANT CHANGE UP (ref 80–100)
NRBC # BLD: 0 /100 WBCS — SIGNIFICANT CHANGE UP (ref 0–0)
PHOSPHATE SERPL-MCNC: 2.7 MG/DL — SIGNIFICANT CHANGE UP (ref 2.5–4.5)
PLATELET # BLD AUTO: 135 K/UL — LOW (ref 150–400)
POTASSIUM BLDA-SCNC: 4.1 MMOL/L — SIGNIFICANT CHANGE UP (ref 3.5–5.3)
POTASSIUM SERPL-MCNC: 4.4 MMOL/L — SIGNIFICANT CHANGE UP (ref 3.5–5.3)
POTASSIUM SERPL-SCNC: 4.4 MMOL/L — SIGNIFICANT CHANGE UP (ref 3.5–5.3)
PROT SERPL-MCNC: 5.5 G/DL — LOW (ref 6–8.3)
PROTHROM AB SERPL-ACNC: 16.4 SEC — HIGH (ref 10.6–13.6)
RBC # BLD: 2.71 M/UL — LOW (ref 4.2–5.8)
RBC # FLD: 14.3 % — SIGNIFICANT CHANGE UP (ref 10.3–14.5)
SODIUM SERPL-SCNC: 130 MMOL/L — LOW (ref 135–145)
WBC # BLD: 8.7 K/UL — SIGNIFICANT CHANGE UP (ref 3.8–10.5)
WBC # FLD AUTO: 8.7 K/UL — SIGNIFICANT CHANGE UP (ref 3.8–10.5)

## 2020-12-01 PROCEDURE — 99292 CRITICAL CARE ADDL 30 MIN: CPT

## 2020-12-01 PROCEDURE — 93320 DOPPLER ECHO COMPLETE: CPT | Mod: 26

## 2020-12-01 PROCEDURE — 99291 CRITICAL CARE FIRST HOUR: CPT

## 2020-12-01 PROCEDURE — 76376 3D RENDER W/INTRP POSTPROCES: CPT | Mod: 26

## 2020-12-01 PROCEDURE — 93325 DOPPLER ECHO COLOR FLOW MAPG: CPT | Mod: 26

## 2020-12-01 PROCEDURE — 93312 ECHO TRANSESOPHAGEAL: CPT | Mod: 26

## 2020-12-01 PROCEDURE — 71045 X-RAY EXAM CHEST 1 VIEW: CPT | Mod: 26

## 2020-12-01 RX ORDER — APIXABAN 2.5 MG/1
5 TABLET, FILM COATED ORAL EVERY 12 HOURS
Refills: 0 | Status: DISCONTINUED | OUTPATIENT
Start: 2020-12-01 | End: 2020-12-02

## 2020-12-01 RX ORDER — AMIODARONE HYDROCHLORIDE 400 MG/1
200 TABLET ORAL EVERY 8 HOURS
Refills: 0 | Status: DISCONTINUED | OUTPATIENT
Start: 2020-12-01 | End: 2020-12-02

## 2020-12-01 RX ORDER — FUROSEMIDE 40 MG
20 TABLET ORAL ONCE
Refills: 0 | Status: COMPLETED | OUTPATIENT
Start: 2020-12-01 | End: 2020-12-01

## 2020-12-01 RX ORDER — POTASSIUM CHLORIDE 20 MEQ
40 PACKET (EA) ORAL ONCE
Refills: 0 | Status: COMPLETED | OUTPATIENT
Start: 2020-12-01 | End: 2020-12-01

## 2020-12-01 RX ORDER — FUROSEMIDE 40 MG
5 TABLET ORAL
Qty: 500 | Refills: 0 | Status: DISCONTINUED | OUTPATIENT
Start: 2020-12-01 | End: 2020-12-02

## 2020-12-01 RX ORDER — PHENYLEPHRINE HYDROCHLORIDE 10 MG/ML
0.4 INJECTION INTRAVENOUS
Qty: 40 | Refills: 0 | Status: DISCONTINUED | OUTPATIENT
Start: 2020-12-01 | End: 2020-12-01

## 2020-12-01 RX ORDER — FENTANYL CITRATE 50 UG/ML
25 INJECTION INTRAVENOUS ONCE
Refills: 0 | Status: DISCONTINUED | OUTPATIENT
Start: 2020-12-01 | End: 2020-12-01

## 2020-12-01 RX ADMIN — Medication 81 MILLIGRAM(S): at 12:09

## 2020-12-01 RX ADMIN — Medication 5 MG/HR: at 12:34

## 2020-12-01 RX ADMIN — AMIODARONE HYDROCHLORIDE 200 MILLIGRAM(S): 400 TABLET ORAL at 14:08

## 2020-12-01 RX ADMIN — CHLORHEXIDINE GLUCONATE 1 APPLICATION(S): 213 SOLUTION TOPICAL at 06:06

## 2020-12-01 RX ADMIN — Medication 40 MILLIEQUIVALENT(S): at 15:38

## 2020-12-01 RX ADMIN — FENTANYL CITRATE 25 MICROGRAM(S): 50 INJECTION INTRAVENOUS at 14:00

## 2020-12-01 RX ADMIN — BUMETANIDE 1 MILLIGRAM(S): 0.25 INJECTION INTRAMUSCULAR; INTRAVENOUS at 06:05

## 2020-12-01 RX ADMIN — Medication 40 MILLIEQUIVALENT(S): at 12:09

## 2020-12-01 RX ADMIN — PANTOPRAZOLE SODIUM 40 MILLIGRAM(S): 20 TABLET, DELAYED RELEASE ORAL at 06:05

## 2020-12-01 RX ADMIN — POLYETHYLENE GLYCOL 3350 17 GRAM(S): 17 POWDER, FOR SOLUTION ORAL at 12:09

## 2020-12-01 RX ADMIN — FENTANYL CITRATE 25 MICROGRAM(S): 50 INJECTION INTRAVENOUS at 14:15

## 2020-12-01 RX ADMIN — APIXABAN 5 MILLIGRAM(S): 2.5 TABLET, FILM COATED ORAL at 22:04

## 2020-12-01 RX ADMIN — APIXABAN 5 MILLIGRAM(S): 2.5 TABLET, FILM COATED ORAL at 12:09

## 2020-12-01 RX ADMIN — Medication 20 MILLIGRAM(S): at 12:12

## 2020-12-01 RX ADMIN — AMIODARONE HYDROCHLORIDE 200 MILLIGRAM(S): 400 TABLET ORAL at 06:05

## 2020-12-01 RX ADMIN — AMIODARONE HYDROCHLORIDE 200 MILLIGRAM(S): 400 TABLET ORAL at 22:04

## 2020-12-01 NOTE — CHART NOTE - NSCHARTNOTEFT_GEN_A_CORE
LISA no evidence ot thrombus  consent obtained  200 j /sync to NSR  av paced at 90  continue AC  dc esmolol  continue amio  ?tachy renée syndrom will observe  continue with diuresis

## 2020-12-01 NOTE — PROGRESS NOTE ADULT - SUBJECTIVE AND OBJECTIVE BOX
CARDIOLOGY     PROGRESS  NOTE   ________________________________________________    CHIEF COMPLAINT:Patient is a 85y old  Male who presents with a chief complaint of Edema (30 Nov 2020 21:42)  no complain.  	  REVIEW OF SYSTEMS:  CONSTITUTIONAL: No fever, weight loss, or fatigue  EYES: No eye pain, visual disturbances, or discharge  ENT:  No difficulty hearing, tinnitus, vertigo; No sinus or throat pain  NECK: No pain or stiffness  RESPIRATORY: No cough, wheezing, chills or hemoptysis; No Shortness of Breath  CARDIOVASCULAR: No chest pain, palpitations, passing out, dizziness, or leg swelling  GASTROINTESTINAL: No abdominal or epigastric pain. No nausea, vomiting, or hematemesis; No diarrhea or constipation. No melena or hematochezia.  GENITOURINARY: No dysuria, frequency, hematuria, or incontinence  NEUROLOGICAL: No headaches, memory loss, loss of strength, numbness, or tremors  SKIN: No itching, burning, rashes, or lesions   LYMPH Nodes: No enlarged glands  ENDOCRINE: No heat or cold intolerance; No hair loss  MUSCULOSKELETAL: No joint pain or swelling; No muscle, back, or extremity pain  PSYCHIATRIC: No depression, anxiety, mood swings, or difficulty sleeping  HEME/LYMPH: No easy bruising, or bleeding gums  ALLERGY AND IMMUNOLOGIC: No hives or eczema	    [ ] All others negative	  [ ] Unable to obtain    PHYSICAL EXAM:  T(C): 36.6 (12-01-20 @ 08:00), Max: 37.9 (11-30-20 @ 16:00)  HR: 129 (12-01-20 @ 08:00) (106 - 143)  BP: 129/79 (11-30-20 @ 19:45) (90/67 - 129/79)  RR: 16 (12-01-20 @ 08:00) (0 - 35)  SpO2: 98% (12-01-20 @ 08:00) (92% - 100%)  Wt(kg): --  I&O's Summary    30 Nov 2020 07:01  -  01 Dec 2020 07:00  --------------------------------------------------------  IN: 2820.7 mL / OUT: 3170 mL / NET: -349.3 mL    01 Dec 2020 07:01  -  01 Dec 2020 08:53  --------------------------------------------------------  IN: 54 mL / OUT: 175 mL / NET: -121 mL        Appearance: Normal	  HEENT:   Normal oral mucosa, PERRL, EOMI	  Lymphatic: No lymphadenopathy  Cardiovascular: Normal S1 S2, No JVD, + murmurs, No edema  Respiratory: Lungs clear to auscultation	  Psychiatry: A & O x 3, Mood & affect appropriate  Gastrointestinal:  Soft, Non-tender, + BS	  Skin: No rashes, No ecchymoses, No cyanosis	  Neurologic: Non-focal  Extremities: Normal range of motion, No clubbing, cyanosis or edema  Vascular: Peripheral pulses palpable 2+ bilaterally    MEDICATIONS  (STANDING):  aMIOdarone    Tablet 200 milliGRAM(s) Oral every 8 hours  aMIOdarone Infusion 1 mG/Min (33.3 mL/Hr) IV Continuous <Continuous>  aspirin enteric coated 81 milliGRAM(s) Oral daily  buMETAnide Injectable 1 milliGRAM(s) IV Push every 12 hours  chlorhexidine 2% Cloths 1 Application(s) Topical <User Schedule>  esMOLOL  Infusion 150 MICROgram(s)/kG/Min (65.7 mL/Hr) IV Continuous <Continuous>  heparin  Infusion 1100 Unit(s)/Hr (11 mL/Hr) IV Continuous <Continuous>  insulin lispro (ADMELOG) corrective regimen sliding scale   SubCutaneous three times a day before meals  milrinone Infusion 0.2 MICROgram(s)/kG/Min (4.38 mL/Hr) IV Continuous <Continuous>  pantoprazole    Tablet 40 milliGRAM(s) Oral before breakfast  polyethylene glycol 3350 17 Gram(s) Oral daily      TELEMETRY: 	    ECG:  	  RADIOLOGY:  OTHER: 	  	  LABS:	 	    CARDIAC MARKERS:                                8.9    8.70  )-----------( 135      ( 01 Dec 2020 01:51 )             26.1     12-01    130<L>  |  96  |  21  ----------------------------<  122<H>  4.4   |  24  |  0.88    Ca    8.4      01 Dec 2020 01:51  Phos  2.7     12-01  Mg     2.4     12-01    TPro  5.5<L>  /  Alb  3.3  /  TBili  1.1  /  DBili  x   /  AST  287<H>  /  ALT  680<H>  /  AlkPhos  114  12-01    proBNP: Serum Pro-Brain Natriuretic Peptide: 50160 pg/mL (11-18 @ 12:45)    Lipid Profile: Cholesterol 106  LDL --  HDL 40  TG 73    HgA1c:   TSH: Thyroid Stimulating Hormone, Serum: 1.69 uIU/mL (11-30 @ 11:01)  Thyroid Stimulating Hormone, Serum: 1.46 uIU/mL (11-19 @ 09:24)  Thyroid Stimulating Hormone, Serum: 1.51 uIU/mL (11-19 @ 08:41)    PT/INR - ( 01 Dec 2020 00:01 )   PT: 16.4 sec;   INR: 1.39 ratio         PTT - ( 01 Dec 2020 05:13 )  PTT:77.9 sec      Assessment and plan  ---------------------------  pt is an 86 y/o man with pmhx of htn, hld, hiatal hernia / GERD, OA post Lt TKR x1 year ago presenting with concern for increasing redness of left leg and swelling for 2 months.   Patient reports that he has been having increasing swelling of both of his legs for approximately 2-3 months; however, the left leg has been more swollen and has been getting increasingly more red. He states that he went to his podiatrist when he noted some rash on the lower portion of his left just above his foot, was given a 'cream of some kind, and feels that the redness and swelling got worse after starting the cream.'   He states that he was having chest pain approximately 1 mo ago, went to his PMD and was diagnosed with a hiatal hernia with an xray and prescribed omeprazole.   Patient reports that he has been becoming increasingly more short of breath for approximately 2 months as well. Feels that when he takes just a few steps he has to stop and catch his breath, and is so short of breath that he cannot speak in complete sentences without rstill in rapid a,fib  continue amio  continue Esmolol  ac  ?hanna /cardioversion if hr not well controlled  fu lft/haley  suspect SSS  plan for hanna/ cardioversion today

## 2020-12-01 NOTE — PROGRESS NOTE ADULT - SUBJECTIVE AND OBJECTIVE BOX
MARCOS AGUILAR  MRN-[id number]  Patient is a 85y old  Male who presents with a chief complaint of edema (01 Dec 2020 08:51)    HPI:  pt is an 84 y/o man with pmhx of htn, hld, hiatal hernia / GERD, OA post Lt TKR x1 year ago presenting with concern for increasing redness of left leg and swelling for 2 months.   Patient reports that he has been having increasing swelling of both of his legs for approximately 2-3 months; however, the left leg has been more swollen and has been getting increasingly more red. He states that he went to his podiatrist when he noted some rash on the lower portion of his left just above his foot, was given a 'cream of some kind, and feels that the redness and swelling got worse after starting the cream.'   He states that he was having chest pain approximately 1 mo ago, went to his PMD and was diagnosed with a hiatal hernia with an xray and prescribed omeprazole.   Patient reports that he has been becoming increasingly more short of breath for approximately 2 months as well. Feels that when he takes just a few steps he has to stop and catch his breath, and is so short of breath that he cannot speak in complete sentences without resting for a period of time first.   pt also states was given amoxicillin a few days ago but only took one dose  He denies any fevers, chills, cough, nausea, vomiting, sweating, chest pain  pt admitted for likely acute CHF with elevated troponin / NSTEMI    (18 Nov 2020 16:09)      Surgery/Hospital Course:  11/25 CABG x3   11/27 Rapid afib with RVR     Today:  No acute events     ICU Vital Signs Last 24 Hrs  T(C): 36.6 (01 Dec 2020 08:00), Max: 37.9 (30 Nov 2020 16:00)  T(F): 97.9 (01 Dec 2020 08:00), Max: 100.2 (30 Nov 2020 16:00)  HR: 129 (01 Dec 2020 08:00) (106 - 143)  BP: 129/79 (30 Nov 2020 19:45) (90/67 - 129/79)  BP(mean): 94 (30 Nov 2020 19:45) (74 - 94)  ABP: 99/60 (01 Dec 2020 08:00) (39/30 - 146/98)  ABP(mean): 74 (01 Dec 2020 08:00) (35 - 117)  RR: 16 (01 Dec 2020 08:00) (0 - 35)  SpO2: 98% (01 Dec 2020 08:00) (92% - 100%)      Physical Exam:  Gen:  NAD   CNS: nonfocal  Neck: no JVD  RES : clear , no wheezing              CVS: Tachycardic, irregularly irregular rhythm  Abd: Soft, non-distended. Bowel sounds present.  Skin: No rash.  Ext:  no edema    ============================I/O===========================   I&O's Detail    30 Nov 2020 07:01  -  01 Dec 2020 07:00  --------------------------------------------------------  IN:    Amiodarone: 233.7 mL    Amiodarone: 210 mL    Esmolol: 1051.2 mL    Esmolol: 65.7 mL    Heparin: 149 mL    Heparin: 98 mL    IV PiggyBack: 337.5 mL    Milrinone: 105.6 mL    Oral Fluid: 570 mL  Total IN: 2820.7 mL    OUT:    Indwelling Catheter - Urethral (mL): 3170 mL  Total OUT: 3170 mL    Total NET: -349.3 mL      01 Dec 2020 07:01  -  01 Dec 2020 09:18  --------------------------------------------------------  IN:    Amiodarone: 16.7 mL    Esmolol: 21.9 mL    Heparin: 11 mL    Milrinone: 4.4 mL  Total IN: 54 mL    OUT:    Indwelling Catheter - Urethral (mL): 175 mL  Total OUT: 175 mL    Total NET: -121 mL        ============================ LABS =========================                        8.9    8.70  )-----------( 135      ( 01 Dec 2020 01:51 )             26.1     12-01    130<L>  |  96  |  21  ----------------------------<  122<H>  4.4   |  24  |  0.88    Ca    8.4      01 Dec 2020 01:51  Phos  2.7     12-01  Mg     2.4     12-01    TPro  5.5<L>  /  Alb  3.3  /  TBili  1.1  /  DBili  x   /  AST  287<H>  /  ALT  680<H>  /  AlkPhos  114  12-01    LIVER FUNCTIONS - ( 01 Dec 2020 01:51 )  Alb: 3.3 g/dL / Pro: 5.5 g/dL / ALK PHOS: 114 U/L / ALT: 680 U/L / AST: 287 U/L / GGT: x           PT/INR - ( 01 Dec 2020 00:01 )   PT: 16.4 sec;   INR: 1.39 ratio         PTT - ( 01 Dec 2020 05:13 )  PTT:77.9 sec  ABG - ( 01 Dec 2020 01:49 )  pH, Arterial: 7.46  pH, Blood: x     /  pCO2: 39    /  pO2: 157   / HCO3: 27    / Base Excess: 3.5   /  SaO2: 100                 ======================Micro/Rad/Cardio=================  Culture: Reviewed   CXR: Reviewed  Echo:Reviewed  ======================================================  PAST MEDICAL & SURGICAL HISTORY:  Mitral prolapse    Hypercholesteremia    Hypertension    History of hernia repair      ====================ASSESSMENT ==============  CAD s/p CABG   Rapid afib with RVR   Hypovolemia   Delirium, improved  Hypertension   Hyperlipidemia     Plan:  ====================== NEUROLOGY=====================  Oxycodone prn for analgesia   Continue monitoring neuro status     oxyCODONE    IR 5 milliGRAM(s) Oral every 4 hours PRN Moderate Pain (4 - 6)  oxyCODONE    IR 10 milliGRAM(s) Oral every 4 hours PRN Severe Pain (7 - 10)    ==================== RESPIRATORY======================  Supplemental O2 via 40L/min HFO2   Encourage incentive spirometry, continue pulse ox monitoring, follow ABGs     ====================CARDIOVASCULAR==================  CAD s/p CABG on 11/25  Continue invasive hemodynamic monitoring, telemetry monitoring   Recalcitrant post op atrial fibrillation resistant to all pharmaceutical interventions  Afib with RVR, continue rate control with IV Amiodarone and IV Heparin, in addition to IV Esmolol drip  IV Primacor for inotropic support   Continue ASA for graft patency    aspirin enteric coated 81 milliGRAM(s) Oral daily  aMIOdarone    Tablet 200 milliGRAM(s) Oral every 8 hours  aMIOdarone Infusion 1 mG/Min (33.3 mL/Hr) IV Continuous <Continuous>  esMOLOL  Infusion 150 MICROgram(s)/kG/Min (65.7 mL/Hr) IV Continuous <Continuous>  milrinone Infusion 0.2 MICROgram(s)/kG/Min (4.38 mL/Hr) IV Continuous <Continuous>    ===================HEMATOLOGIC/ONC ===================  Anemia, monitor H&H/Plts    Anticoagulation therapy with heparin drip for afib  and VTE prophylaxis     heparin  Infusion 1100 Unit(s)/Hr (11 mL/Hr) IV Continuous <Continuous>    ===================== RENAL =========================  Continue monitoring urine output, I&OS, BUN/Cr   Diuresis with Bumex, target net neg fluid balance     buMETAnide Injectable 1 milliGRAM(s) IV Push every 12 hours    ==================== GASTROINTESTINAL===================  NPO for DCCV  Bowel regimen with Miralax     GI prophylaxis, pantoprazole    Tablet 40 milliGRAM(s) Oral before breakfast  polyethylene glycol 3350 17 Gram(s) Oral daily    =======================    ENDOCRINE  =====================  Stress hyperglycemia, continue glycemic control with Admelog sliding scale.    insulin lispro (ADMELOG) corrective regimen sliding scale   SubCutaneous three times a day before meals    ========================INFECTIOUS DISEASE================  Afebrile, WBC within normal limits   Continue to trend WBC and monitor fever curve       Patient requires continuous monitoring with bedside rhythm monitoring, pulse ox monitoring, and intermittent blood gas analysis. Care plan discussed with ICU care team. Patient remained critical and at risk for life threatening decompensation.     By signing my name below, I, Katelyn Guadalupe, attest that this documentation has been prepared under the direction and in the presence of Uriel Wesley MD   Electronically signed: Lillie Cordero, 12-01-20 @ 09:18    I, Uriel Wesley, personally performed the services described in this documentation. all medical record entries made by the dannyibpedro were at my direction and in my presence. I have reviewed the chart and agree that the record reflects my personal performance and is accurate and complete  Electronically signed: Uriel Wesley MD        MARCOS AGUILAR  MRN-[id number]  Patient is a 85y old  Male who presents with a chief complaint of edema (01 Dec 2020 08:51)    HPI:  pt is an 86 y/o man with pmhx of htn, hld, hiatal hernia / GERD, OA post Lt TKR x1 year ago presenting with concern for increasing redness of left leg and swelling for 2 months.   Patient reports that he has been having increasing swelling of both of his legs for approximately 2-3 months; however, the left leg has been more swollen and has been getting increasingly more red. He states that he went to his podiatrist when he noted some rash on the lower portion of his left just above his foot, was given a 'cream of some kind, and feels that the redness and swelling got worse after starting the cream.'   He states that he was having chest pain approximately 1 mo ago, went to his PMD and was diagnosed with a hiatal hernia with an xray and prescribed omeprazole.   Patient reports that he has been becoming increasingly more short of breath for approximately 2 months as well. Feels that when he takes just a few steps he has to stop and catch his breath, and is so short of breath that he cannot speak in complete sentences without resting for a period of time first.   pt also states was given amoxicillin a few days ago but only took one dose  He denies any fevers, chills, cough, nausea, vomiting, sweating, chest pain  pt admitted for likely acute CHF with elevated troponin / NSTEMI    (18 Nov 2020 16:09)      Surgery/Hospital Course:  11/25 CABG x3   11/27 Rapid afib with RVR     Today:  Persistent recalcitrant atrial fibrillation on IV Esmolol. IV anticoagulation is being set up for LISA cardioversion this morning and then will be transitioned from IV Heparin to Eliquis.     ICU Vital Signs Last 24 Hrs  T(C): 36.6 (01 Dec 2020 08:00), Max: 37.9 (30 Nov 2020 16:00)  T(F): 97.9 (01 Dec 2020 08:00), Max: 100.2 (30 Nov 2020 16:00)  HR: 129 (01 Dec 2020 08:00) (106 - 143)  BP: 129/79 (30 Nov 2020 19:45) (90/67 - 129/79)  BP(mean): 94 (30 Nov 2020 19:45) (74 - 94)  ABP: 99/60 (01 Dec 2020 08:00) (39/30 - 146/98)  ABP(mean): 74 (01 Dec 2020 08:00) (35 - 117)  RR: 16 (01 Dec 2020 08:00) (0 - 35)  SpO2: 98% (01 Dec 2020 08:00) (92% - 100%)      Physical Exam:  Gen:  NAD   CNS: nonfocal  Neck: no JVD  RES : clear , no wheezing              CVS: Tachycardic, irregularly irregular rhythm  Abd: Soft, non-distended. Bowel sounds present.  Skin: No rash.  Ext:  no edema    ============================I/O===========================   I&O's Detail    30 Nov 2020 07:01  -  01 Dec 2020 07:00  --------------------------------------------------------  IN:    Amiodarone: 233.7 mL    Amiodarone: 210 mL    Esmolol: 1051.2 mL    Esmolol: 65.7 mL    Heparin: 149 mL    Heparin: 98 mL    IV PiggyBack: 337.5 mL    Milrinone: 105.6 mL    Oral Fluid: 570 mL  Total IN: 2820.7 mL    OUT:    Indwelling Catheter - Urethral (mL): 3170 mL  Total OUT: 3170 mL    Total NET: -349.3 mL      01 Dec 2020 07:01  -  01 Dec 2020 09:18  --------------------------------------------------------  IN:    Amiodarone: 16.7 mL    Esmolol: 21.9 mL    Heparin: 11 mL    Milrinone: 4.4 mL  Total IN: 54 mL    OUT:    Indwelling Catheter - Urethral (mL): 175 mL  Total OUT: 175 mL    Total NET: -121 mL        ============================ LABS =========================                        8.9    8.70  )-----------( 135      ( 01 Dec 2020 01:51 )             26.1     12-01    130<L>  |  96  |  21  ----------------------------<  122<H>  4.4   |  24  |  0.88    Ca    8.4      01 Dec 2020 01:51  Phos  2.7     12-01  Mg     2.4     12-01    TPro  5.5<L>  /  Alb  3.3  /  TBili  1.1  /  DBili  x   /  AST  287<H>  /  ALT  680<H>  /  AlkPhos  114  12-01    LIVER FUNCTIONS - ( 01 Dec 2020 01:51 )  Alb: 3.3 g/dL / Pro: 5.5 g/dL / ALK PHOS: 114 U/L / ALT: 680 U/L / AST: 287 U/L / GGT: x           PT/INR - ( 01 Dec 2020 00:01 )   PT: 16.4 sec;   INR: 1.39 ratio         PTT - ( 01 Dec 2020 05:13 )  PTT:77.9 sec  ABG - ( 01 Dec 2020 01:49 )  pH, Arterial: 7.46  pH, Blood: x     /  pCO2: 39    /  pO2: 157   / HCO3: 27    / Base Excess: 3.5   /  SaO2: 100                 ======================Micro/Rad/Cardio=================  Culture: Reviewed   CXR: Reviewed  Echo:Reviewed  ======================================================  PAST MEDICAL & SURGICAL HISTORY:  Mitral prolapse    Hypercholesteremia    Hypertension    History of hernia repair      ====================ASSESSMENT ==============  CAD s/p CABG   Rapid afib with RVR   Hypovolemia   Delirium, improved  Hypertension   Hyperlipidemia     Plan:  ====================== NEUROLOGY=====================  Oxycodone prn for analgesia   Continue monitoring neuro status     oxyCODONE    IR 5 milliGRAM(s) Oral every 4 hours PRN Moderate Pain (4 - 6)  oxyCODONE    IR 10 milliGRAM(s) Oral every 4 hours PRN Severe Pain (7 - 10)    ==================== RESPIRATORY======================  Supplemental O2 via 40L/min HFO2   Encourage incentive spirometry, continue pulse ox monitoring, follow ABGs     ====================CARDIOVASCULAR==================  CAD s/p CABG on 11/25  Continue invasive hemodynamic monitoring, telemetry monitoring   Recalcitrant post op atrial fibrillation resistant to all pharmaceutical interventions  Afib with RVR, continue rate control with IV Esmolol drip, in addition to IV Amiodarone and IV Heparin  IV anticoagulation is being set up for LISA cardioversion this morning and then will be transitioned from IV Heparin to Eliquis  IV Primacor for inotropic support   Continue ASA for graft patency    aspirin enteric coated 81 milliGRAM(s) Oral daily  aMIOdarone    Tablet 200 milliGRAM(s) Oral every 8 hours  aMIOdarone Infusion 1 mG/Min (33.3 mL/Hr) IV Continuous <Continuous>  esMOLOL  Infusion 150 MICROgram(s)/kG/Min (65.7 mL/Hr) IV Continuous <Continuous>  milrinone Infusion 0.2 MICROgram(s)/kG/Min (4.38 mL/Hr) IV Continuous <Continuous>    ===================HEMATOLOGIC/ONC ===================  Anemia, monitor H&H/Plts    Anticoagulation therapy with heparin drip for afib  and VTE prophylaxis     heparin  Infusion 1100 Unit(s)/Hr (11 mL/Hr) IV Continuous <Continuous>    ===================== RENAL =========================  Continue monitoring urine output, I&OS, BUN/Cr   Diuresis with Bumex, target net neg fluid balance     buMETAnide Injectable 1 milliGRAM(s) IV Push every 12 hours    ==================== GASTROINTESTINAL===================  NPO for DCCV  Bowel regimen with Miralax     GI prophylaxis, pantoprazole    Tablet 40 milliGRAM(s) Oral before breakfast  polyethylene glycol 3350 17 Gram(s) Oral daily    =======================    ENDOCRINE  =====================  Stress hyperglycemia, continue glycemic control with Admelog sliding scale.    insulin lispro (ADMELOG) corrective regimen sliding scale   SubCutaneous three times a day before meals    ========================INFECTIOUS DISEASE================  Afebrile, WBC within normal limits   Continue to trend WBC and monitor fever curve       Patient requires continuous monitoring with bedside rhythm monitoring, pulse ox monitoring, and intermittent blood gas analysis. Care plan discussed with ICU care team. Patient remained critical and at risk for life threatening decompensation.     By signing my name below, I, Katelyn Guadalupe, attest that this documentation has been prepared under the direction and in the presence of Uriel Wesley MD   Electronically signed: Lillie Cordero, 12-01-20 @ 09:18    I, Uriel Wesley, personally performed the services described in this documentation. all medical record entries made by the scribe were at my direction and in my presence. I have reviewed the chart and agree that the record reflects my personal performance and is accurate and complete  Electronically signed: Uriel Wesley MD

## 2020-12-01 NOTE — PROGRESS NOTE ADULT - ASSESSMENT
84 yo male with HTN,HLD,GERD,OA, left TKR admitted 11/18 with leg  edema, shortness of breath, and possible LLE cellulitis.  He completed a limited antibiotic course for possible LLE cellulitis although most of changes may have reflected fluid overload. No signs of active cellulitis now.  He is s/p CABG 11/25.  No signs of active infection.  S/P LISA and cardioversion  CXR with left base findings, likely atelectasis  With no signs of ongoing infection we will no longer actively follow.Please call if ID issues arise

## 2020-12-01 NOTE — PROGRESS NOTE ADULT - ASSESSMENT
_________________________________________________________________________________________  ========>>  M E D I C A L   A T T E N D I N G    F O L L O W  U P  N O T E  <<=========  -----------------------------------------------------------------------------------------------------    - Patient seen and examined by me earlier today.   - In summary,  MARCOS AGUILAR is a 85y year old man who originally presented with edema   - Patient today overall doing ok post op, remains in the CTICU, comfortable, taking PO, no significant pain, no SOB / cough       pt remained in AF with RVR, pt seen at bedside with cardiology team, sedated, getting LISA and DC cardioversion     ==================>> REVIEW OF SYSTEM <<=================    can not obtain     ==================>> PHYSICAL EXAM <<=================    GEN: sedated as above , NAD , comfortable   HEENT: NCAT  Neck: supple lines and tubes in place    CVS: S1S2 , tachycardic   PULM: mild scattered rhonchi + chest tube out  ABD.: soft. non tender, non distended,  bowel sounds decreased   Extrem: legs improved overall,  mild bilateral LE edema     + mays with good urine output       ==================>> MEDICATIONS <<====================    aMIOdarone    Tablet 200 milliGRAM(s) Oral every 8 hours  aMIOdarone Infusion 1 mG/Min IV Continuous <Continuous>  apixaban 5 milliGRAM(s) Oral every 12 hours  aspirin enteric coated 81 milliGRAM(s) Oral daily  buMETAnide Injectable 1 milliGRAM(s) IV Push every 12 hours  chlorhexidine 2% Cloths 1 Application(s) Topical <User Schedule>  furosemide Infusion 10 mG/Hr IV Continuous <Continuous>  insulin lispro (ADMELOG) corrective regimen sliding scale   SubCutaneous three times a day before meals  milrinone Infusion 0.2 MICROgram(s)/kG/Min IV Continuous <Continuous>  pantoprazole    Tablet 40 milliGRAM(s) Oral before breakfast  phenylephrine    Infusion 0.402 MICROgram(s)/kG/Min IV Continuous <Continuous>  polyethylene glycol 3350 17 Gram(s) Oral daily    MEDICATIONS  (PRN):  oxyCODONE    IR 5 milliGRAM(s) Oral every 4 hours PRN Moderate Pain (4 - 6)  oxyCODONE    IR 10 milliGRAM(s) Oral every 4 hours PRN Severe Pain (7 - 10)    ___________  Active diet:  Diet, Regular:   Consistent Carbohydrate No Snacks (CSTCHO)  Supplement Feeding Modality:  Oral  Ensure Enlive Cans or Servings Per Day:  2       Frequency:  Daily  Diet, NPO after Midnight:      NPO Start Date: 30-Nov-2020,   NPO Start Time: 23:59  ___________________    ==================>> VITAL SIGNS <<==================      Vital Signs Last 24 HrsT(C): 36.6 (12-01-20 @ 08:00)  T(F): 97.9 (12-01-20 @ 08:00), Max: 100.2 (11-30-20 @ 16:00)  HR: 90 (12-01-20 @ 11:15) (90 - 143)  BP: 117/93 (12-01-20 @ 09:45)  RR: 19 (12-01-20 @ 11:15) (0 - 35)  SpO2: 96% (12-01-20 @ 11:15) (92% - 100%)      POCT Blood Glucose.: 106 mg/dL (01 Dec 2020 08:50)  POCT Blood Glucose.: 121 mg/dL (30 Nov 2020 16:28)     ==================>> LAB AND IMAGING <<==================                        8.9    8.70  )-----------( 135      ( 01 Dec 2020 01:51 )             26.1        12-01    130<L>  |  96  |  21  ----------------------------<  122<H>  4.4   |  24  |  0.88    Ca    8.4      01 Dec 2020 01:51  Phos  2.7     12-01  Mg     2.4     12-01    TPro  5.5<L>  /  Alb  3.3  /  TBili  1.1  /  DBili  x   /  AST  287<H>  /  ALT  680<H>  /  AlkPhos  114  12-01    WBC count:   8.70 <<== ,  8.67 <<== ,  10.24 <<== ,  16.95 <<== ,  14.49 <<== ,  12.90 <<==   Hemoglobin:   8.9 <<==,  8.4 <<==,  9.3 <<==,  10.9 <<==,  9.7 <<==,  9.7 <<==  platelets:  135 <==, 113 <==, 82 <==, 109 <==, 94 <==, 79 <==, 96 <==    Creatinine:  0.88  <<==, 0.88  <<==, 0.88  <<==, 0.92  <<==, 1.00  <<==, 1.12  <<==  Sodium:   130  <==, 128  <==, 130  <==, 131  <==, 135  <==, 133  <==, 134  <==       AST:          287 <== , 355 <== , 565 <== , 942 <== , 1592 <==      ALT:        680  <== , 763  <== , 926  <== , 982  <== , 1135  <==      AP:        114  <=, 122  <=, 103  <=, 103  <=, 97  <=     Bili:        1.1  <=, 1.2  <=, 1.1  <=, 1.1  <=, 1.0  <=  ___________________________________________________________________________________  ===============>>  A S S E S S M E N T   A N D   P L A N <<===============  ------------------------------------------------------------------------------------------  pt is an 84 y/o man with pmhx of htn, hld, hiatal hernia / GERD, OA post Lt TKR x1 year ago presenting with concern for increasing redness of left leg and swelling for 2 months.     Problem/Plan - 1:  ·  Problem: NSTEMI in pt with significant CAD    pt doing overall well post CABG X3  appreciated CTU / CC/ CTS follow up and mgmt     new AF with RVR  on Amio and esmolol drips    >> ILSA + DCC as above   monitor on tele  med mgmt per cardio / EP / CC    PT / OOB, IS, nutritional support   Continue Current medications otherwise and monitor.   supportive care  pain mgmt as needed   monitor post op anemia    Problem/Plan - 2:  ·  Problem:  HTN / HLD  Continue Current medications otherwise and monitor.   cardio f/u. and further optimization    Problem/Plan - 3:  ·  Problem: Venous stasis.    no DVT on duplex  likely venous stasis changes on left leg and cellulitis  post course of abx treatment   elevation reiterated   diuresis  ID f/u     GI / DVT PPX per surgical protocol   PT / OOB    --------------------------------------------  Case discussed with cardio, RN  Education given on findings and plan of care  ___________________________  H. INEZ Phipps.  Pager: [phone]

## 2020-12-01 NOTE — PROGRESS NOTE ADULT - SUBJECTIVE AND OBJECTIVE BOX
CC: f/u for left leg cellulitis    Patient reports: he is comfortable in chair, s/p cardioversion earlier    REVIEW OF SYSTEMS:  All other review of systems negative (Comprehensive ROS)    Antimicrobials Day #  :off    Other Medications Reviewed  MEDICATIONS  (STANDING):  aMIOdarone    Tablet 200 milliGRAM(s) Oral every 8 hours  apixaban 5 milliGRAM(s) Oral every 12 hours  aspirin enteric coated 81 milliGRAM(s) Oral daily  buMETAnide Injectable 1 milliGRAM(s) IV Push every 12 hours  chlorhexidine 2% Cloths 1 Application(s) Topical <User Schedule>  furosemide Infusion 10 mG/Hr (5 mL/Hr) IV Continuous <Continuous>  insulin lispro (ADMELOG) corrective regimen sliding scale   SubCutaneous three times a day before meals  milrinone Infusion 0.2 MICROgram(s)/kG/Min (4.38 mL/Hr) IV Continuous <Continuous>  pantoprazole    Tablet 40 milliGRAM(s) Oral before breakfast  phenylephrine    Infusion 0.402 MICROgram(s)/kG/Min (11 mL/Hr) IV Continuous <Continuous>  polyethylene glycol 3350 17 Gram(s) Oral daily    T(F): 97.9 (12-01-20 @ 08:00), Max: 100.2 (11-30-20 @ 16:00)  HR: 90 (12-01-20 @ 13:00)  BP: 117/93 (12-01-20 @ 09:45)  RR: 19 (12-01-20 @ 13:00)  SpO2: 100% (12-01-20 @ 13:00)  Wt(kg): --    PHYSICAL EXAM:  General: alert, no acute distress  Eyes:  anicteric, no conjunctival injection, no discharge  Oropharynx: no lesions or injection 	  Neck: supple, without adenopathy  Lungs: decreased at bases  Heart: regular rate and rhythm;   Abdomen: soft, nondistended, nontender, without mass or organomegaly  Skin: no lesions  Extremities: no clubbing, cyanosis, + edema  Neurologic: alert, oriented, moves all extremities  chset wound C/D/I  LAB RESULTS:                        8.9    8.70  )-----------( 135      ( 01 Dec 2020 01:51 )             26.1     12-01    130<L>  |  96  |  21  ----------------------------<  122<H>  4.4   |  24  |  0.88    Ca    8.4      01 Dec 2020 01:51  Phos  2.7     12-01  Mg     2.4     12-01    TPro  5.5<L>  /  Alb  3.3  /  TBili  1.1  /  DBili  x   /  AST  287<H>  /  ALT  680<H>  /  AlkPhos  114  12-01    LIVER FUNCTIONS - ( 01 Dec 2020 01:51 )  Alb: 3.3 g/dL / Pro: 5.5 g/dL / ALK PHOS: 114 U/L / ALT: 680 U/L / AST: 287 U/L / GGT: x             MICROBIOLOGY:  RECENT CULTURES:      RADIOLOGY REVIEWED:    < from: Xray Chest 1 View- PORTABLE-Urgent (Xray Chest 1 View- PORTABLE-Urgent .) (12.01.20 @ 04:51) >  IMPRESSION:    Left lower lobe pneumonia and/or atelectasis.    < end of copied text >

## 2020-12-02 DIAGNOSIS — I48.91 UNSPECIFIED ATRIAL FIBRILLATION: ICD-10-CM

## 2020-12-02 LAB
ALBUMIN SERPL ELPH-MCNC: 3.3 G/DL — SIGNIFICANT CHANGE UP (ref 3.3–5)
ALP SERPL-CCNC: 112 U/L — SIGNIFICANT CHANGE UP (ref 40–120)
ALT FLD-CCNC: 489 U/L — HIGH (ref 10–45)
ANION GAP SERPL CALC-SCNC: 11 MMOL/L — SIGNIFICANT CHANGE UP (ref 5–17)
ANION GAP SERPL CALC-SCNC: 12 MMOL/L — SIGNIFICANT CHANGE UP (ref 5–17)
ANION GAP SERPL CALC-SCNC: 13 MMOL/L — SIGNIFICANT CHANGE UP (ref 5–17)
AST SERPL-CCNC: 117 U/L — HIGH (ref 10–40)
BASOPHILS # BLD AUTO: 0.02 K/UL — SIGNIFICANT CHANGE UP (ref 0–0.2)
BASOPHILS NFR BLD AUTO: 0.2 % — SIGNIFICANT CHANGE UP (ref 0–2)
BILIRUB SERPL-MCNC: 1 MG/DL — SIGNIFICANT CHANGE UP (ref 0.2–1.2)
BUN SERPL-MCNC: 16 MG/DL — SIGNIFICANT CHANGE UP (ref 7–23)
BUN SERPL-MCNC: 17 MG/DL — SIGNIFICANT CHANGE UP (ref 7–23)
BUN SERPL-MCNC: 19 MG/DL — SIGNIFICANT CHANGE UP (ref 7–23)
CALCIUM SERPL-MCNC: 8.3 MG/DL — LOW (ref 8.4–10.5)
CALCIUM SERPL-MCNC: 8.4 MG/DL — SIGNIFICANT CHANGE UP (ref 8.4–10.5)
CALCIUM SERPL-MCNC: 8.6 MG/DL — SIGNIFICANT CHANGE UP (ref 8.4–10.5)
CHLORIDE SERPL-SCNC: 95 MMOL/L — LOW (ref 96–108)
CO2 SERPL-SCNC: 25 MMOL/L — SIGNIFICANT CHANGE UP (ref 22–31)
CO2 SERPL-SCNC: 26 MMOL/L — SIGNIFICANT CHANGE UP (ref 22–31)
CO2 SERPL-SCNC: 26 MMOL/L — SIGNIFICANT CHANGE UP (ref 22–31)
CREAT SERPL-MCNC: 0.73 MG/DL — SIGNIFICANT CHANGE UP (ref 0.5–1.3)
CREAT SERPL-MCNC: 0.75 MG/DL — SIGNIFICANT CHANGE UP (ref 0.5–1.3)
CREAT SERPL-MCNC: 0.9 MG/DL — SIGNIFICANT CHANGE UP (ref 0.5–1.3)
EOSINOPHIL # BLD AUTO: 0.09 K/UL — SIGNIFICANT CHANGE UP (ref 0–0.5)
EOSINOPHIL NFR BLD AUTO: 0.9 % — SIGNIFICANT CHANGE UP (ref 0–6)
GAS PNL BLDA: SIGNIFICANT CHANGE UP
GLUCOSE BLDC GLUCOMTR-MCNC: 106 MG/DL — HIGH (ref 70–99)
GLUCOSE BLDC GLUCOMTR-MCNC: 110 MG/DL — HIGH (ref 70–99)
GLUCOSE BLDC GLUCOMTR-MCNC: 117 MG/DL — HIGH (ref 70–99)
GLUCOSE SERPL-MCNC: 111 MG/DL — HIGH (ref 70–99)
GLUCOSE SERPL-MCNC: 113 MG/DL — HIGH (ref 70–99)
GLUCOSE SERPL-MCNC: 129 MG/DL — HIGH (ref 70–99)
HCT VFR BLD CALC: 29.1 % — LOW (ref 39–50)
HGB BLD-MCNC: 9.7 G/DL — LOW (ref 13–17)
IMM GRANULOCYTES NFR BLD AUTO: 0.7 % — SIGNIFICANT CHANGE UP (ref 0–1.5)
LYMPHOCYTES # BLD AUTO: 0.7 K/UL — LOW (ref 1–3.3)
LYMPHOCYTES # BLD AUTO: 6.9 % — LOW (ref 13–44)
MAGNESIUM SERPL-MCNC: 2 MG/DL — SIGNIFICANT CHANGE UP (ref 1.6–2.6)
MCHC RBC-ENTMCNC: 32.7 PG — SIGNIFICANT CHANGE UP (ref 27–34)
MCHC RBC-ENTMCNC: 33.3 GM/DL — SIGNIFICANT CHANGE UP (ref 32–36)
MCV RBC AUTO: 98 FL — SIGNIFICANT CHANGE UP (ref 80–100)
MONOCYTES # BLD AUTO: 1.19 K/UL — HIGH (ref 0–0.9)
MONOCYTES NFR BLD AUTO: 11.7 % — SIGNIFICANT CHANGE UP (ref 2–14)
NEUTROPHILS # BLD AUTO: 8.12 K/UL — HIGH (ref 1.8–7.4)
NEUTROPHILS NFR BLD AUTO: 79.6 % — HIGH (ref 43–77)
NRBC # BLD: 0 /100 WBCS — SIGNIFICANT CHANGE UP (ref 0–0)
PHOSPHATE SERPL-MCNC: 1.9 MG/DL — LOW (ref 2.5–4.5)
PLATELET # BLD AUTO: 185 K/UL — SIGNIFICANT CHANGE UP (ref 150–400)
POTASSIUM SERPL-MCNC: 4 MMOL/L — SIGNIFICANT CHANGE UP (ref 3.5–5.3)
POTASSIUM SERPL-MCNC: 4.5 MMOL/L — SIGNIFICANT CHANGE UP (ref 3.5–5.3)
POTASSIUM SERPL-MCNC: 5.1 MMOL/L — SIGNIFICANT CHANGE UP (ref 3.5–5.3)
POTASSIUM SERPL-SCNC: 4 MMOL/L — SIGNIFICANT CHANGE UP (ref 3.5–5.3)
POTASSIUM SERPL-SCNC: 4.5 MMOL/L — SIGNIFICANT CHANGE UP (ref 3.5–5.3)
POTASSIUM SERPL-SCNC: 5.1 MMOL/L — SIGNIFICANT CHANGE UP (ref 3.5–5.3)
PROT SERPL-MCNC: 5.7 G/DL — LOW (ref 6–8.3)
RBC # BLD: 2.97 M/UL — LOW (ref 4.2–5.8)
RBC # FLD: 14.6 % — HIGH (ref 10.3–14.5)
SODIUM SERPL-SCNC: 132 MMOL/L — LOW (ref 135–145)
SODIUM SERPL-SCNC: 132 MMOL/L — LOW (ref 135–145)
SODIUM SERPL-SCNC: 134 MMOL/L — LOW (ref 135–145)
WBC # BLD: 10.19 K/UL — SIGNIFICANT CHANGE UP (ref 3.8–10.5)
WBC # FLD AUTO: 10.19 K/UL — SIGNIFICANT CHANGE UP (ref 3.8–10.5)

## 2020-12-02 PROCEDURE — 71045 X-RAY EXAM CHEST 1 VIEW: CPT | Mod: 26

## 2020-12-02 PROCEDURE — 99291 CRITICAL CARE FIRST HOUR: CPT

## 2020-12-02 RX ORDER — SPIRONOLACTONE 25 MG/1
25 TABLET, FILM COATED ORAL DAILY
Refills: 0 | Status: DISCONTINUED | OUTPATIENT
Start: 2020-12-02 | End: 2020-12-02

## 2020-12-02 RX ORDER — FUROSEMIDE 40 MG
20 TABLET ORAL EVERY 8 HOURS
Refills: 0 | Status: DISCONTINUED | OUTPATIENT
Start: 2020-12-02 | End: 2020-12-03

## 2020-12-02 RX ORDER — POTASSIUM CHLORIDE 20 MEQ
20 PACKET (EA) ORAL ONCE
Refills: 0 | Status: COMPLETED | OUTPATIENT
Start: 2020-12-02 | End: 2020-12-02

## 2020-12-02 RX ORDER — AMIODARONE HYDROCHLORIDE 400 MG/1
200 TABLET ORAL DAILY
Refills: 0 | Status: DISCONTINUED | OUTPATIENT
Start: 2020-12-04 | End: 2020-12-04

## 2020-12-02 RX ORDER — POTASSIUM CHLORIDE 20 MEQ
40 PACKET (EA) ORAL ONCE
Refills: 0 | Status: COMPLETED | OUTPATIENT
Start: 2020-12-02 | End: 2020-12-02

## 2020-12-02 RX ORDER — FUROSEMIDE 40 MG
20 TABLET ORAL EVERY 6 HOURS
Refills: 0 | Status: DISCONTINUED | OUTPATIENT
Start: 2020-12-02 | End: 2020-12-02

## 2020-12-02 RX ORDER — AMIODARONE HYDROCHLORIDE 400 MG/1
400 TABLET ORAL EVERY 8 HOURS
Refills: 0 | Status: COMPLETED | OUTPATIENT
Start: 2020-12-02 | End: 2020-12-03

## 2020-12-02 RX ADMIN — Medication 81 MILLIGRAM(S): at 13:16

## 2020-12-02 RX ADMIN — PANTOPRAZOLE SODIUM 40 MILLIGRAM(S): 20 TABLET, DELAYED RELEASE ORAL at 06:24

## 2020-12-02 RX ADMIN — CHLORHEXIDINE GLUCONATE 1 APPLICATION(S): 213 SOLUTION TOPICAL at 06:18

## 2020-12-02 RX ADMIN — Medication 20 MILLIEQUIVALENT(S): at 04:18

## 2020-12-02 RX ADMIN — AMIODARONE HYDROCHLORIDE 400 MILLIGRAM(S): 400 TABLET ORAL at 13:13

## 2020-12-02 RX ADMIN — Medication 20 MILLIGRAM(S): at 13:16

## 2020-12-02 RX ADMIN — AMIODARONE HYDROCHLORIDE 400 MILLIGRAM(S): 400 TABLET ORAL at 21:51

## 2020-12-02 RX ADMIN — Medication 40 MILLIEQUIVALENT(S): at 13:15

## 2020-12-02 RX ADMIN — Medication 20 MILLIGRAM(S): at 07:49

## 2020-12-02 RX ADMIN — SPIRONOLACTONE 25 MILLIGRAM(S): 25 TABLET, FILM COATED ORAL at 15:31

## 2020-12-02 RX ADMIN — AMIODARONE HYDROCHLORIDE 200 MILLIGRAM(S): 400 TABLET ORAL at 06:18

## 2020-12-02 RX ADMIN — APIXABAN 5 MILLIGRAM(S): 2.5 TABLET, FILM COATED ORAL at 06:18

## 2020-12-02 RX ADMIN — Medication 83.33 MILLIMOLE(S): at 06:23

## 2020-12-02 RX ADMIN — Medication 20 MILLIGRAM(S): at 21:51

## 2020-12-02 NOTE — PROGRESS NOTE ADULT - ASSESSMENT
84 y/o male with PMH of HTN, HLD, hiatal hernia, GERD, OA post Lt TKR x1 year ago presenting with concern for increasing redness of left leg and swelling for 2 months. Pt admitted for acute on chronic CHD with elevated troponins / NSTEMI. Cardiology following, Dr. De La Vega. Abnormal NST on , now s/p cardiac cath demonstrating multivessel CAD. CT Surgery consulted for CABG evaluation.    On  s/p CABG x 3 LIMA / LIMA to LAD, SVG to Diagonal, SVG to OM  Post op - Course:   Inotropic and pressor support required à weaned off.   Extubated POD # 1  ID following for prior LLE cellulitis àcompleted full course of Abx. Monitoring off Abx.     gtt weaned off. Started on Lopressor 25 mg PO BID.  Transferred to SDU; received patient Hypotensive SBP 70-90’s asymptomatic.  Lopressor decreased to 12.5 mg PO BID. Hypoglycemia BFG 58 à treated with 4 0z of apple juice; repeat .  Continue to monitor.      @ 2200 Rapid afib w/ -180. S/p IVP lopressor x3. S/p IV amio bolus x1, phenylephrine gtt started SBP 80-90. Remained persistent afib 140s after intervetions. Betablockers d/c'd. Intensivist at bedside, Pt transferred to CTU for further management.  Cardiology/eps following, increase lft ? sec to hypoperfusion/shock liver, hold on amio  atrial pcing at 90 bpm  pt with possible tachy renée syndrome/sss observe hr closely  Remained rapid afib/hypotension requiring inotropes/pressors. Cardizem IV for hr control- d/c , amio infusion  Prophylactic antibiotic coverage, ID following for LE cellulitis   Remained on primacor for perfusion  Amio loaded.  Lasix infusion for diuresis   s/p LISA DCCV - required pacing>80 for bp suppoert  Lasix infusion d/c, lasix 20iv q8  Eliquis started for afib  Eliquis held to remove a line in am  12/3  Primacor d/c  Transferred to sdu   84 y/o male with PMH of HTN, HLD, hiatal hernia, GERD, OA post Lt TKR x1 year ago presenting with concern for increasing redness of left leg and swelling for 2 months. Pt admitted for acute on chronic CHD with elevated troponins / NSTEMI. Cardiology following, Dr. De La Vega. Abnormal NST on , now s/p cardiac cath demonstrating multivessel CAD. CT Surgery consulted for CABG evaluation.    On  s/p CABG x 3 LIMA / LIMA to LAD, SVG to Diagonal, SVG to OM  Post op - Course:   Inotropic and pressor support required à weaned off.   Extubated POD # 1  ID following for prior LLE cellulitis àcompleted full course of Abx. Monitoring off Abx.     gtt weaned off. Started on Lopressor 25 mg PO BID.  Transferred to SDU; received patient Hypotensive SBP 70-90’s asymptomatic.  Lopressor decreased to 12.5 mg PO BID. Hypoglycemia BFG 58 à treated with 4 0z of apple juice; repeat .  Continue to monitor.      @ 2200 Rapid afib w/ -180. S/p IVP lopressor x3. S/p IV amio bolus x1, phenylephrine gtt started SBP 80-90. Remained persistent afib 140s after intervetions. Betablockers d/c'd. Intensivist at bedside, Pt transferred to CTU for further management.  Cardiology/eps following, increase lft ? 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 Cardizem IV for hr control- d/c , amio infusion,  Esmolol infusion-d/c  Prophylactic antibiotic coverage, ID following for LE cellulitis   Remained on primacor for perfusion  Amio loaded.  Lasix infusion for diuresis   s/p LISA DCCV - required pacing>80 for bp support/ maintain nsr  Lasix infusion d/c, lasix 20iv q8  Eliquis started for afib  Eliquis held to remove a line in am  12/3  Primacor d/c  Transferred to sdu

## 2020-12-02 NOTE — PROGRESS NOTE ADULT - SUBJECTIVE AND OBJECTIVE BOX
VITAL SIGNS    Telemetry:      Vital Signs Last 24 Hrs  T(C): 35.9 (20 @ 08:00), Max: 36.8 (20 @ 20:00)  T(F): 96.6 (20 @ 08:00), Max: 98.2 (20 @ 20:00)  HR: 80 (20 @ 10:40) (80 - 92)  BP: 129/72 (20 @ 10:40) (129/72 - 129/72)  RR: 18 (20 @ 10:40) (7 - 25)  SpO2: 99% (20 @ 10:40) (70% - 100%)                    @ 07:01  -   @ 07:00  --------------------------------------------------------  IN: 767.3 mL / OUT: 5305 mL / NET: -4537.7 mL     @ 07:01  -   @ 12:25  --------------------------------------------------------  IN: 734.3 mL / OUT: 1350 mL / NET: -615.7 mL          Daily     Daily Weight in k.4 (02 Dec 2020 00:00)            CAPILLARY BLOOD GLUCOSE      POCT Blood Glucose.: 117 mg/dL (02 Dec 2020 11:47)            Drains:     MS         [  ] Drainage:                 L Pleural  [  ]  Drainage:                R Pleural  [  ]  Drainage:    Pacing Wires        [  ]   Settings:                                  Isolated  [  ]    Coumadin    [ ] YES          [  ]      NO                                   PHYSICAL EXAM        Neurology: alert and oriented x 3, nonfocal, no gross deficits  CV : s1 s2 RRR  Sternal Wound :  CDI , Stable  Lungs: cta  Abdomen: soft, nontender, nondistended, positive bowel sounds, last bowel movement                       chest tubes  :    voiding / mays - sbd         Extremities:      edema   /  -   calve tenderness ,    L leg  /  R leg  incisions cdi          aMIOdarone    Tablet 400 milliGRAM(s) Oral every 8 hours  aspirin enteric coated 81 milliGRAM(s) Oral daily  chlorhexidine 2% Cloths 1 Application(s) Topical <User Schedule>  furosemide   Injectable 20 milliGRAM(s) IV Push every 8 hours  insulin lispro (ADMELOG) corrective regimen sliding scale   SubCutaneous three times a day before meals  oxyCODONE    IR 5 milliGRAM(s) Oral every 4 hours PRN  oxyCODONE    IR 10 milliGRAM(s) Oral every 4 hours PRN  pantoprazole    Tablet 40 milliGRAM(s) Oral before breakfast  polyethylene glycol 3350 17 Gram(s) Oral daily  potassium chloride   Solution 40 milliEquivalent(s) Oral once                    Physical Therapy Rec:   Home  [  ]   Home w/ PT  [  ]  Rehab  [  ]  Discussed with Cardiothoracic Team at AM rounds. im ok  VITAL SIGNS    Telemetry:  av paced 80  pacer vvi 50, nsr 70 under pacer    Vital Signs Last 24 Hrs  T(C): 35.9 (20 @ 08:00), Max: 36.8 (20 @ 20:00)  T(F): 96.6 (20 @ 08:00), Max: 98.2 (20 @ 20:00)  HR: 80 (20 @ 10:40) (80 - 92)  BP: 129/72 (20 @ 10:40) (129/72 - 129/72)  RR: 18 (20 @ 10:40) (7 - 25)  SpO2: 99% (20 @ 10:40) (70% - 100%)                    @ 07:01  -   @ 07:00  --------------------------------------------------------  IN: 767.3 mL / OUT: 5305 mL / NET: -4537.7 mL     07:01  -   @ 12:25  --------------------------------------------------------  IN: 734.3 mL / OUT: 1350 mL / NET: -615.7 mL          Daily     Daily Weight in k.4 (02 Dec 2020 00:00)            CAPILLARY BLOOD GLUCOSE      POCT Blood Glucose.: 117 mg/dL (02 Dec 2020 11:47)              Pacing Wires        [ x ]   Settings:           vvi                       Isolated  [  ]    Coumadin    [ ] YES          [ x ]      NO         eliquis                          PHYSICAL EXAM        Neurology: alert and oriented x 3, nonfocal, no gross deficits  CV : s1 s2 RRR  Sternal Wound :  CDI , Stable  Lungs: cta  Abdomen: soft, nontender, nondistended, positive bowel sounds, last bowel movement +                 :   mays - sbd         Extremities:    +  edema   /  -   calve tenderness ,   R leg  incisions cdi          aMIOdarone    Tablet 400 milliGRAM(s) Oral every 8 hours  aspirin enteric coated 81 milliGRAM(s) Oral daily  chlorhexidine 2% Cloths 1 Application(s) Topical <User Schedule>  furosemide   Injectable 20 milliGRAM(s) IV Push every 8 hours  insulin lispro (ADMELOG) corrective regimen sliding scale   SubCutaneous three times a day before meals  oxyCODONE    IR 5 milliGRAM(s) Oral every 4 hours PRN  oxyCODONE    IR 10 milliGRAM(s) Oral every 4 hours PRN  pantoprazole    Tablet 40 milliGRAM(s) Oral before breakfast  polyethylene glycol 3350 17 Gram(s) Oral daily  potassium chloride   Solution 40 milliEquivalent(s) Oral once                    Physical Therapy Rec:   Home  [  ]   Home w/ PT  [  ]  Rehab  [  ]  Discussed with Cardiothoracic Team at AM rounds.

## 2020-12-02 NOTE — PROGRESS NOTE ADULT - ASSESSMENT
_________________________________________________________________________________________  ========>>  M E D I C A L   A T T E N D I N G    F O L L O W  U P  N O T E  <<=========  -----------------------------------------------------------------------------------------------------    - Patient seen and examined by me earlier today.   - In summary,  MARCOS AGUILAR is a 85y year old man who originally presented with edema   - Patient today overall doing much better, out of CTICU, comfortable, taking PO, no significant pain, no SOB / cough, ambulating with walker         ==================>> REVIEW OF SYSTEM <<=================    GEN: no fever, no chills, no pain  RESP: no SOB, no cough, no sputum  CVS: no chest pain, no palpitations  GI: no abdominal pain, no nausea, no vomiting, no BM today ( ? + yesterday)   : no dysuria, no frequency, no hematuria  NEURO: no headache, no dizziness  PSYCH: no depression, not anxious  Derm : no itching, no rash    ==================>> PHYSICAL EXAM <<=================    GEN: A&O X 3 , NAD , comfortable, ambulating with PT   HEENT: NCAT, PERRL, MMM, hearing intact  Neck: supple , no JVD appreciated   CVS: S1S2 , regular , No M/R/G appreciated  PULM: CTA B/L,  no W/R/R appreciated  ABD.: soft. non tender, non distended,  bowel sounds present  Extrem: intact pulses , + B/L Le edema        + mays with good urine output   Derm: No rash , no ecchymoses, erythema of LEs, stable   PSYCH : normal mood,  no delusion not anxious      ==================>> MEDICATIONS <<====================    aMIOdarone    Tablet 400 milliGRAM(s) Oral every 8 hours  aspirin enteric coated 81 milliGRAM(s) Oral daily  chlorhexidine 2% Cloths 1 Application(s) Topical <User Schedule>  furosemide   Injectable 20 milliGRAM(s) IV Push every 8 hours  insulin lispro (ADMELOG) corrective regimen sliding scale   SubCutaneous three times a day before meals  pantoprazole    Tablet 40 milliGRAM(s) Oral before breakfast  polyethylene glycol 3350 17 Gram(s) Oral daily    MEDICATIONS  (PRN):  oxyCODONE    IR 5 milliGRAM(s) Oral every 4 hours PRN Moderate Pain (4 - 6)  oxyCODONE    IR 10 milliGRAM(s) Oral every 4 hours PRN Severe Pain (7 - 10)    ___________  Active diet:  Diet, Regular:   Consistent Carbohydrate No Snacks (CSTCHO)  Supplement Feeding Modality:  Oral  Ensure Enlive Cans or Servings Per Day:  2       Frequency:  Daily  ___________________    ==================>> VITAL SIGNS <<==================      Vital Signs Last 24 HrsT(C): 36.4 (12-02-20 @ 15:02)  T(F): 97.6 (12-02-20 @ 15:02), Max: 98.2 (12-01-20 @ 20:00)  HR: 81 (12-02-20 @ 15:30) (78 - 92)  BP: 107/53 (12-02-20 @ 15:02)  RR: 18 (12-02-20 @ 15:30) (7 - 25)  SpO2: 95% (12-02-20 @ 15:30) (77% - 100%)      POCT Blood Glucose.: 117 mg/dL (02 Dec 2020 11:47)     ==================>> LAB AND IMAGING <<==================                        9.7    10.19 )-----------( 185      ( 02 Dec 2020 00:17 )             29.1        12-02    132<L>  |  95<L>  |  16  ----------------------------<  129<H>  5.1   |  26  |  0.75    Ca    8.6      02 Dec 2020 14:59  Phos  1.9     12-02  Mg     2.0     12-02    TPro  5.7<L>  /  Alb  3.3  /  TBili  1.0  /  DBili  x   /  AST  117<H>  /  ALT  489<H>  /  AlkPhos  112  12-02    WBC count:   10.19 <<== ,  8.70 <<== ,  8.67 <<== ,  10.24 <<== ,  16.95 <<== ,  14.49 <<==   Hemoglobin:   9.7 <<==,  8.9 <<==,  8.4 <<==,  9.3 <<==,  10.9 <<==,  9.7 <<==  platelets:  185 <==, 135 <==, 113 <==, 82 <==, 109 <==, 94 <==, 79 <==    Creatinine:  0.75  <<==, 0.90  <<==, 0.88  <<==, 0.88  <<==, 0.88  <<==, 0.92  <<==  Sodium:   132  <==, 134  <==, 130  <==, 128  <==, 130  <==, 131  <==, 135  <==       AST:          117 <== , 287 <== , 355 <== , 565 <== , 942 <==      ALT:        489  <== , 680  <== , 763  <== , 926  <== , 982  <==      AP:        112  <=, 114  <=, 122  <=, 103  <=, 103  <=     Bili:        1.0  <=, 1.1  <=, 1.2  <=, 1.1  <=, 1.1  <=    ___________________________________________________________________________________  ===============>>  A S S E S S M E N T   A N D   P L A N <<===============  ------------------------------------------------------------------------------------------  pt is an 86 y/o man with pmhx of htn, hld, hiatal hernia / GERD, OA post Lt TKR x1 year ago presenting with concern for increasing redness of left leg and swelling for 2 months.     Problem/Plan - 1:  ·  Problem: NSTEMI in pt with significant CAD    pt doing overall well post CABG X3  appreciated CTU / CC/ CTS follow up and mgmt   new AF with RVR  >> post successful LISA + DCC   monitor on tele  med mgmt per cardio / EP / CC  PT / OOB, IS, nutritional support   Continue Current medications otherwise and monitor.   supportive care  pain mgmt as needed   monitor post op anemia  med optimization per Cardio  / CTS     Problem/Plan - 2:  ·  Problem:  HTN / HLD  Continue Current medications otherwise and monitor.   cardio f/u. and further optimization    Problem/Plan - 3:  ·  Problem: Venous stasis.    no DVT on duplex  likely venous stasis changes on left leg and cellulitis  post course of abx treatment   leg elevation, ? ACE wrapping   diuresis  ID f/u     GI / DVT PPX per surgical protocol   PT / OOB  ANGELINA mays / ANTONIO as able   --------------------------------------------  Case discussed with cardio, RN  Education given on findings and plan of care  ___________________________  H. INEZ Phipps.  Pager: [phone]

## 2020-12-02 NOTE — PROGRESS NOTE ADULT - SUBJECTIVE AND OBJECTIVE BOX
CARDIOLOGY     PROGRESS  NOTE   ________________________________________________    CHIEF COMPLAINT:Patient is a 85y old  Male who presents with a chief complaint of edema (02 Dec 2020 09:15)  no complain.  	  REVIEW OF SYSTEMS:  CONSTITUTIONAL: No fever, weight loss, or fatigue  EYES: No eye pain, visual disturbances, or discharge  ENT:  No difficulty hearing, tinnitus, vertigo; No sinus or throat pain  NECK: No pain or stiffness  RESPIRATORY: No cough, wheezing, chills or hemoptysis; No Shortness of Breath  CARDIOVASCULAR: No chest pain, palpitations, passing out, dizziness, or leg swelling  GASTROINTESTINAL: No abdominal or epigastric pain. No nausea, vomiting, or hematemesis; No diarrhea or constipation. No melena or hematochezia.  GENITOURINARY: No dysuria, frequency, hematuria, or incontinence  NEUROLOGICAL: No headaches, memory loss, loss of strength, numbness, or tremors  SKIN: No itching, burning, rashes, or lesions   LYMPH Nodes: No enlarged glands  ENDOCRINE: No heat or cold intolerance; No hair loss  MUSCULOSKELETAL: No joint pain or swelling; No muscle, back, or extremity pain  PSYCHIATRIC: No depression, anxiety, mood swings, or difficulty sleeping  HEME/LYMPH: No easy bruising, or bleeding gums  ALLERGY AND IMMUNOLOGIC: No hives or eczema	    [ ] All others negative	  [ ] Unable to obtain    PHYSICAL EXAM:  T(C): 35.9 (12-02-20 @ 08:00), Max: 36.8 (12-01-20 @ 20:00)  HR: 80 (12-02-20 @ 09:00) (80 - 129)  BP: 117/93 (12-01-20 @ 09:45) (117/93 - 117/93)  RR: 16 (12-02-20 @ 09:00) (7 - 31)  SpO2: 100% (12-02-20 @ 09:00) (70% - 100%)  Wt(kg): --  I&O's Summary    01 Dec 2020 07:01  -  02 Dec 2020 07:00  --------------------------------------------------------  IN: 767.3 mL / OUT: 5305 mL / NET: -4537.7 mL    02 Dec 2020 07:01  -  02 Dec 2020 09:31  --------------------------------------------------------  IN: 411 mL / OUT: 650 mL / NET: -239 mL        Appearance: Normal	  HEENT:   Normal oral mucosa, PERRL, EOMI	  Lymphatic: No lymphadenopathy  Cardiovascular: Normal S1 S2, No JVD, + murmurs, No edema  Respiratory: Lungs clear to auscultation	  Psychiatry: A & O x 3, Mood & affect appropriate  Gastrointestinal:  Soft, Non-tender, + BS	  Skin: No rashes, No ecchymoses, No cyanosis	  Neurologic: Non-focal  Extremities: Normal range of motion, No clubbing, cyanosis or edema  Vascular: Peripheral pulses palpable 2+ bilaterally    MEDICATIONS  (STANDING):  aMIOdarone    Tablet 400 milliGRAM(s) Oral every 8 hours  apixaban 5 milliGRAM(s) Oral every 12 hours  aspirin enteric coated 81 milliGRAM(s) Oral daily  chlorhexidine 2% Cloths 1 Application(s) Topical <User Schedule>  furosemide   Injectable 20 milliGRAM(s) IV Push every 6 hours  insulin lispro (ADMELOG) corrective regimen sliding scale   SubCutaneous three times a day before meals  milrinone Infusion 0.2 MICROgram(s)/kG/Min (4.38 mL/Hr) IV Continuous <Continuous>  pantoprazole    Tablet 40 milliGRAM(s) Oral before breakfast  polyethylene glycol 3350 17 Gram(s) Oral daily      TELEMETRY: 	    ECG:  	  RADIOLOGY:  OTHER: 	  	  LABS:	 	    CARDIAC MARKERS:                                9.7    10.19 )-----------( 185      ( 02 Dec 2020 00:17 )             29.1     12-02    134<L>  |  95<L>  |  19  ----------------------------<  113<H>  4.0   |  26  |  0.90    Ca    8.3<L>      02 Dec 2020 00:17  Phos  1.9     12-02  Mg     2.0     12-02    TPro  5.7<L>  /  Alb  3.3  /  TBili  1.0  /  DBili  x   /  AST  117<H>  /  ALT  489<H>  /  AlkPhos  112  12-02    proBNP: Serum Pro-Brain Natriuretic Peptide: 64451 pg/mL (11-18 @ 12:45)    Lipid Profile: Cholesterol 106  LDL --  HDL 40  TG 73    HgA1c:   TSH: Thyroid Stimulating Hormone, Serum: 1.69 uIU/mL (11-30 @ 11:01)  Thyroid Stimulating Hormone, Serum: 1.46 uIU/mL (11-19 @ 09:24)  Thyroid Stimulating Hormone, Serum: 1.51 uIU/mL (11-19 @ 08:41)    PT/INR - ( 01 Dec 2020 00:01 )   PT: 16.4 sec;   INR: 1.39 ratio         PTT - ( 01 Dec 2020 10:28 )  PTT:64.2 sec      Assessment and plan  ---------------------------  pt is an 86 y/o man with pmhx of htn, hld, hiatal hernia / GERD, OA post Lt TKR x1 year ago presenting with concern for increasing redness of left leg and swelling for 2 months.   Patient reports that he has been having increasing swelling of both of his legs for approximately 2-3 months; however, the left leg has been more swollen and has been getting increasingly more red. He states that he went to his podiatrist when he noted some rash on the lower portion of his left just above his foot, was given a 'cream of some kind, and feels that the redness and swelling got worse after starting the cream.'   He states that he was having chest pain approximately 1 mo ago, went to his PMD and was diagnosed with a hiatal hernia with an xray and prescribed omeprazole.   Patient reports that he has been becoming increasingly more short of breath for approximately 2 months as well. Feels that when he takes just a few steps he has to stop and catch his breath, and is so short of breath that he cannot speak in complete sentences without rstill in rapid a,fib  continue amio  continue Esmolol  ac  ?hanna /cardioversion if hr not well controlled  fu lft/haley  suspect SSS  s/p cardioversion, av paced  continue with amio  AC

## 2020-12-02 NOTE — PROGRESS NOTE ADULT - SUBJECTIVE AND OBJECTIVE BOX
MARCOS AGUILAR  MRN-[id number]  Patient is a 85y old  Male who presents with a chief complaint of edema (01 Dec 2020 13:59)    HPI:  pt is an 86 y/o man with pmhx of htn, hld, hiatal hernia / GERD, OA post Lt TKR x1 year ago presenting with concern for increasing redness of left leg and swelling for 2 months.   Patient reports that he has been having increasing swelling of both of his legs for approximately 2-3 months; however, the left leg has been more swollen and has been getting increasingly more red. He states that he went to his podiatrist when he noted some rash on the lower portion of his left just above his foot, was given a 'cream of some kind, and feels that the redness and swelling got worse after starting the cream.'   He states that he was having chest pain approximately 1 mo ago, went to his PMD and was diagnosed with a hiatal hernia with an xray and prescribed omeprazole.   Patient reports that he has been becoming increasingly more short of breath for approximately 2 months as well. Feels that when he takes just a few steps he has to stop and catch his breath, and is so short of breath that he cannot speak in complete sentences without resting for a period of time first.   pt also states was given amoxicillin a few days ago but only took one dose  He denies any fevers, chills, cough, nausea, vomiting, sweating, chest pain  pt admitted for likely acute CHF with elevated troponin / NSTEMI    (18 Nov 2020 16:09)      Surgery/Hospital Course:  11/25 CABG x3   11/27 Rapid afib with RVR   12/1 DCCV, LISA     Today:  No acute events     ICU Vital Signs Last 24 Hrs  T(C): 35.9 (02 Dec 2020 08:00), Max: 36.8 (01 Dec 2020 20:00)  T(F): 96.6 (02 Dec 2020 08:00), Max: 98.2 (01 Dec 2020 20:00)  HR: 80 (02 Dec 2020 09:00) (80 - 132)  BP: 117/93 (01 Dec 2020 09:45) (117/93 - 117/93)  BP(mean): 99 (01 Dec 2020 09:45) (99 - 99)  ABP: 114/47 (02 Dec 2020 09:00) (104/50 - 159/71)  ABP(mean): 71 (02 Dec 2020 09:00) (67 - 105)  RR: 16 (02 Dec 2020 09:00) (7 - 31)  SpO2: 100% (02 Dec 2020 09:00) (70% - 100%)      Physical Exam:  Gen:  NAD   CNS: nonfocal  Neck: no JVD  RES : clear , no wheezing              CVS: Tachycardic, irregularly irregular rhythm  Abd: Soft, non-distended. Bowel sounds present.  Skin: No rash.  Ext:  no edema    ============================I/O===========================   I&O's Detail    01 Dec 2020 07:01  -  02 Dec 2020 07:00  --------------------------------------------------------  IN:    Amiodarone: 100.2 mL    Esmolol: 65.7 mL    Furosemide: 15 mL    Furosemide: 22.5 mL    Heparin: 44 mL    IV PiggyBack: 83.3 mL    Milrinone: 105.6 mL    Oral Fluid: 310 mL    Phenylephrine: 21 mL  Total IN: 767.3 mL    OUT:    Indwelling Catheter - Urethral (mL): 5305 mL  Total OUT: 5305 mL    Total NET: -4537.7 mL      02 Dec 2020 07:01  -  02 Dec 2020 09:16  --------------------------------------------------------  IN:    IV PiggyBack: 166.6 mL    Milrinone: 4.4 mL    Oral Fluid: 240 mL  Total IN: 411 mL    OUT:    Indwelling Catheter - Urethral (mL): 650 mL  Total OUT: 650 mL    Total NET: -239 mL        ============================ LABS =========================                        9.7    10.19 )-----------( 185      ( 02 Dec 2020 00:17 )             29.1     12-02    134<L>  |  95<L>  |  19  ----------------------------<  113<H>  4.0   |  26  |  0.90    Ca    8.3<L>      02 Dec 2020 00:17  Phos  1.9     12-02  Mg     2.0     12-02    TPro  5.7<L>  /  Alb  3.3  /  TBili  1.0  /  DBili  x   /  AST  117<H>  /  ALT  489<H>  /  AlkPhos  112  12-02    LIVER FUNCTIONS - ( 02 Dec 2020 00:17 )  Alb: 3.3 g/dL / Pro: 5.7 g/dL / ALK PHOS: 112 U/L / ALT: 489 U/L / AST: 117 U/L / GGT: x           PT/INR - ( 01 Dec 2020 00:01 )   PT: 16.4 sec;   INR: 1.39 ratio         PTT - ( 01 Dec 2020 10:28 )  PTT:64.2 sec  ABG - ( 02 Dec 2020 06:09 )  pH, Arterial: 7.49  pH, Blood: x     /  pCO2: 40    /  pO2: 112   / HCO3: 30    / Base Excess: 6.3   /  SaO2: 99                  ======================Micro/Rad/Cardio=================  Culture: Reviewed   CXR: Reviewed  Echo:Reviewed  ======================================================  PAST MEDICAL & SURGICAL HISTORY:  Mitral prolapse    Hypercholesteremia    Hypertension    History of hernia repair      ====================ASSESSMENT ==============  CAD s/p CABG   Rapid afib with RVR   Hypovolemia   Delirium, improved  Hypertension   Hyperlipidemia     Plan:  ====================== NEUROLOGY=====================  Oxycodone prn for analgesia   Continue monitoring neuro stauts     oxyCODONE    IR 5 milliGRAM(s) Oral every 4 hours PRN Moderate Pain (4 - 6)  oxyCODONE    IR 10 milliGRAM(s) Oral every 4 hours PRN Severe Pain (7 - 10)    ==================== RESPIRATORY======================  Supplemental O2 via 40L/min HFO2   Encourage incentive spirometry, continue pulse ox monitoring, follow ABGs     ====================CARDIOVASCULAR==================  CAD s/p CABG on 11/25  Continue invasive hemodynamic monitoring, telemetry monitoring   Recalcitrant post op afib resistant to all pharmaceutical interventions s/p DCCV and LISA yesterday   Afib with RVR, continue rate control IV Amiodarone   Inotropic support with IV Primacor   DAPT for CAD     apixaban 5 milliGRAM(s) Oral every 12 hours  aspirin enteric coated 81 milliGRAM(s) Oral daily  aMIOdarone    Tablet 400 milliGRAM(s) Oral every 8 hours  milrinone Infusion 0.2 MICROgram(s)/kG/Min (4.38 mL/Hr) IV Continuous <Continuous>    ===================HEMATOLOGIC/ONC ===================  Anemia, monitor H&H/Plts     ===================== RENAL =========================  Continue monitoring urine output, I&OS, BUN/Cr   Diuresis with Lasix, goal net negative fluid balance     furosemide   Injectable 20 milliGRAM(s) IV Push every 6 hours    ==================== GASTROINTESTINAL===================  Tolerating consistent carb diet   Bowel regimen with Miralax     GI prophylaxis, pantoprazole    Tablet 40 milliGRAM(s) Oral before breakfast  polyethylene glycol 3350 17 Gram(s) Oral daily    =======================    ENDOCRINE  =====================  Glucose control with admelog sliding scale for stress hyperglycemia     insulin lispro (ADMELOG) corrective regimen sliding scale   SubCutaneous three times a day before meals    ========================INFECTIOUS DISEASE================  Afebrile, WBC rising 8.70->10.19  Continue to trend WBC and monitor fever curve         Patient requires continuous monitoring with bedside rhythm monitoring, pulse ox monitoring, and intermittent blood gas analysis. Care plan discussed with ICU care team. Patient remained critical and at risk for life threatening decompensation.     By signing my name below, I, Katelyn Guadalupe, attest that this documentation has been prepared under the direction and in the presence of Uriel Wesley MD   Electronically signed: Lillie Cordero, 12-02-20 @ 09:16    I, Uriel Wesley, personally performed the services described in this documentation. all medical record entries made by the scribe were at my direction and in my presence. I have reviewed the chart and agree that the record reflects my personal performance and is accurate and complete  Electronically signed: Uriel Wesley MD

## 2020-12-02 NOTE — PROGRESS NOTE ADULT - PROBLEM SELECTOR PLAN 1
Continue with  mg PO Daily.   Decrease Lopressor 12.5 mg PO bid.   Continue with Crestor 10 mg PO HS  Increase activity as tolerated.   Maintain PW x 2 --> EPM --> OFF   CXR in AM   Maintain Right IJ and Left Dana   Coffman --> SD   Encourage Chest PT / Pulmonary toileting and Incentive spirometry every 1 hour x 10 while awake.   Continue with PUD and DVT prophylaxis.   Shower on POD #5.   D/C plan awaiting PT evaluation   Plan of care discussed with attending Asa, Statin,  Chest PT,  Incentive spirometry, wound care and assessment.  Ambulate   Beta blocker held for hypotension

## 2020-12-03 LAB
ALBUMIN SERPL ELPH-MCNC: 3.3 G/DL — SIGNIFICANT CHANGE UP (ref 3.3–5)
ALP SERPL-CCNC: 97 U/L — SIGNIFICANT CHANGE UP (ref 40–120)
ALT FLD-CCNC: 293 U/L — HIGH (ref 10–45)
ANION GAP SERPL CALC-SCNC: 10 MMOL/L — SIGNIFICANT CHANGE UP (ref 5–17)
ANION GAP SERPL CALC-SCNC: 11 MMOL/L — SIGNIFICANT CHANGE UP (ref 5–17)
AST SERPL-CCNC: 61 U/L — HIGH (ref 10–40)
BASOPHILS # BLD AUTO: 0.01 K/UL — SIGNIFICANT CHANGE UP (ref 0–0.2)
BASOPHILS NFR BLD AUTO: 0.1 % — SIGNIFICANT CHANGE UP (ref 0–2)
BILIRUB DIRECT SERPL-MCNC: 0.3 MG/DL — HIGH (ref 0–0.2)
BILIRUB INDIRECT FLD-MCNC: 0.5 MG/DL — SIGNIFICANT CHANGE UP (ref 0.2–1)
BILIRUB SERPL-MCNC: 0.8 MG/DL — SIGNIFICANT CHANGE UP (ref 0.2–1.2)
BUN SERPL-MCNC: 15 MG/DL — SIGNIFICANT CHANGE UP (ref 7–23)
BUN SERPL-MCNC: 21 MG/DL — SIGNIFICANT CHANGE UP (ref 7–23)
CALCIUM SERPL-MCNC: 8.7 MG/DL — SIGNIFICANT CHANGE UP (ref 8.4–10.5)
CALCIUM SERPL-MCNC: 8.8 MG/DL — SIGNIFICANT CHANGE UP (ref 8.4–10.5)
CHLORIDE SERPL-SCNC: 94 MMOL/L — LOW (ref 96–108)
CHLORIDE SERPL-SCNC: 96 MMOL/L — SIGNIFICANT CHANGE UP (ref 96–108)
CO2 SERPL-SCNC: 26 MMOL/L — SIGNIFICANT CHANGE UP (ref 22–31)
CO2 SERPL-SCNC: 26 MMOL/L — SIGNIFICANT CHANGE UP (ref 22–31)
CREAT SERPL-MCNC: 0.73 MG/DL — SIGNIFICANT CHANGE UP (ref 0.5–1.3)
CREAT SERPL-MCNC: 0.74 MG/DL — SIGNIFICANT CHANGE UP (ref 0.5–1.3)
EOSINOPHIL # BLD AUTO: 0.1 K/UL — SIGNIFICANT CHANGE UP (ref 0–0.5)
EOSINOPHIL NFR BLD AUTO: 0.9 % — SIGNIFICANT CHANGE UP (ref 0–6)
GLUCOSE BLDC GLUCOMTR-MCNC: 108 MG/DL — HIGH (ref 70–99)
GLUCOSE BLDC GLUCOMTR-MCNC: 109 MG/DL — HIGH (ref 70–99)
GLUCOSE BLDC GLUCOMTR-MCNC: 115 MG/DL — HIGH (ref 70–99)
GLUCOSE BLDC GLUCOMTR-MCNC: 125 MG/DL — HIGH (ref 70–99)
GLUCOSE SERPL-MCNC: 117 MG/DL — HIGH (ref 70–99)
GLUCOSE SERPL-MCNC: 94 MG/DL — SIGNIFICANT CHANGE UP (ref 70–99)
HCT VFR BLD CALC: 29.9 % — LOW (ref 39–50)
HGB BLD-MCNC: 9.8 G/DL — LOW (ref 13–17)
IMM GRANULOCYTES NFR BLD AUTO: 0.6 % — SIGNIFICANT CHANGE UP (ref 0–1.5)
LYMPHOCYTES # BLD AUTO: 1.08 K/UL — SIGNIFICANT CHANGE UP (ref 1–3.3)
LYMPHOCYTES # BLD AUTO: 10.2 % — LOW (ref 13–44)
MCHC RBC-ENTMCNC: 32.1 PG — SIGNIFICANT CHANGE UP (ref 27–34)
MCHC RBC-ENTMCNC: 32.8 GM/DL — SIGNIFICANT CHANGE UP (ref 32–36)
MCV RBC AUTO: 98 FL — SIGNIFICANT CHANGE UP (ref 80–100)
MONOCYTES # BLD AUTO: 0.91 K/UL — HIGH (ref 0–0.9)
MONOCYTES NFR BLD AUTO: 8.6 % — SIGNIFICANT CHANGE UP (ref 2–14)
NEUTROPHILS # BLD AUTO: 8.46 K/UL — HIGH (ref 1.8–7.4)
NEUTROPHILS NFR BLD AUTO: 79.6 % — HIGH (ref 43–77)
NRBC # BLD: 0 /100 WBCS — SIGNIFICANT CHANGE UP (ref 0–0)
PLATELET # BLD AUTO: 228 K/UL — SIGNIFICANT CHANGE UP (ref 150–400)
POTASSIUM SERPL-MCNC: 4.4 MMOL/L — SIGNIFICANT CHANGE UP (ref 3.5–5.3)
POTASSIUM SERPL-MCNC: 5 MMOL/L — SIGNIFICANT CHANGE UP (ref 3.5–5.3)
POTASSIUM SERPL-SCNC: 4.4 MMOL/L — SIGNIFICANT CHANGE UP (ref 3.5–5.3)
POTASSIUM SERPL-SCNC: 5 MMOL/L — SIGNIFICANT CHANGE UP (ref 3.5–5.3)
PROT SERPL-MCNC: 5.9 G/DL — LOW (ref 6–8.3)
RBC # BLD: 3.05 M/UL — LOW (ref 4.2–5.8)
RBC # FLD: 15.1 % — HIGH (ref 10.3–14.5)
SODIUM SERPL-SCNC: 130 MMOL/L — LOW (ref 135–145)
SODIUM SERPL-SCNC: 133 MMOL/L — LOW (ref 135–145)
WBC # BLD: 10.62 K/UL — HIGH (ref 3.8–10.5)
WBC # FLD AUTO: 10.62 K/UL — HIGH (ref 3.8–10.5)

## 2020-12-03 PROCEDURE — 71045 X-RAY EXAM CHEST 1 VIEW: CPT | Mod: 26

## 2020-12-03 RX ORDER — ACETAMINOPHEN 500 MG
325 TABLET ORAL ONCE
Refills: 0 | Status: DISCONTINUED | OUTPATIENT
Start: 2020-12-03 | End: 2020-12-03

## 2020-12-03 RX ORDER — SPIRONOLACTONE 25 MG/1
25 TABLET, FILM COATED ORAL DAILY
Refills: 0 | Status: DISCONTINUED | OUTPATIENT
Start: 2020-12-03 | End: 2020-12-05

## 2020-12-03 RX ORDER — FUROSEMIDE 40 MG
20 TABLET ORAL
Refills: 0 | Status: DISCONTINUED | OUTPATIENT
Start: 2020-12-03 | End: 2020-12-04

## 2020-12-03 RX ORDER — APIXABAN 2.5 MG/1
5 TABLET, FILM COATED ORAL
Refills: 0 | Status: DISCONTINUED | OUTPATIENT
Start: 2020-12-03 | End: 2020-12-04

## 2020-12-03 RX ORDER — LANOLIN ALCOHOL/MO/W.PET/CERES
3 CREAM (GRAM) TOPICAL AT BEDTIME
Refills: 0 | Status: DISCONTINUED | OUTPATIENT
Start: 2020-12-03 | End: 2020-12-06

## 2020-12-03 RX ADMIN — Medication 3 MILLIGRAM(S): at 23:12

## 2020-12-03 RX ADMIN — APIXABAN 5 MILLIGRAM(S): 2.5 TABLET, FILM COATED ORAL at 18:34

## 2020-12-03 RX ADMIN — Medication 20 MILLIGRAM(S): at 18:34

## 2020-12-03 RX ADMIN — AMIODARONE HYDROCHLORIDE 400 MILLIGRAM(S): 400 TABLET ORAL at 06:03

## 2020-12-03 RX ADMIN — Medication 20 MILLIGRAM(S): at 06:03

## 2020-12-03 RX ADMIN — AMIODARONE HYDROCHLORIDE 400 MILLIGRAM(S): 400 TABLET ORAL at 14:13

## 2020-12-03 RX ADMIN — Medication 81 MILLIGRAM(S): at 14:13

## 2020-12-03 RX ADMIN — POLYETHYLENE GLYCOL 3350 17 GRAM(S): 17 POWDER, FOR SOLUTION ORAL at 14:14

## 2020-12-03 RX ADMIN — CHLORHEXIDINE GLUCONATE 1 APPLICATION(S): 213 SOLUTION TOPICAL at 06:05

## 2020-12-03 RX ADMIN — PANTOPRAZOLE SODIUM 40 MILLIGRAM(S): 20 TABLET, DELAYED RELEASE ORAL at 06:03

## 2020-12-03 RX ADMIN — SPIRONOLACTONE 25 MILLIGRAM(S): 25 TABLET, FILM COATED ORAL at 18:35

## 2020-12-03 NOTE — PROGRESS NOTE ADULT - SUBJECTIVE AND OBJECTIVE BOX
im ok  VITAL SIGNS    Telemetry:  nsr 70    Vital Signs Last 24 Hrs  T(C): 36.7 (20 @ 07:21), Max: 36.9 (20 @ 23:00)  T(F): 98.1 (20 @ 07:21), Max: 98.4 (20 @ 23:00)  HR: 71 (20 @ 08:40) (66 - 81)  BP: 115/74 (20 @ 08:40) (107/53 - 131/73)  RR: 16 (20 @ 08:39) (16 - 19)  SpO2: 98% (20 @ 08:40) (95% - 99%)                    07:01  -   @ 07:00  --------------------------------------------------------  IN: 1314.3 mL / OUT: 4150 mL / NET: -2835.7 mL     07:01  -   @ 10:02  --------------------------------------------------------  IN: 0 mL / OUT: 250 mL / NET: -250 mL          Daily     Daily Weight in k.9 (03 Dec 2020 06:00)            CAPILLARY BLOOD GLUCOSE      POCT Blood Glucose.: 109 mg/dL (03 Dec 2020 07:58)  POCT Blood Glucose.: 106 mg/dL (02 Dec 2020 21:13)  POCT Blood Glucose.: 110 mg/dL (02 Dec 2020 16:54)  POCT Blood Glucose.: 117 mg/dL (02 Dec 2020 11:47)            Drains:       Pacing Wires        [  x]   Settings:      vvi 50                            Isolated  [  ]    Coumadin    [ ] YES          [  x]      NO     eliquis                              PHYSICAL EXAM        Neurology: alert and oriented x 3, nonfocal, no gross deficits  CV : s1 s2 RRR  Sternal Wound :  CDI , Stable  Lungs: cta  Abdomen: soft, nontender, nondistended, positive bowel sounds, last bowel movement +                        :  mays - sbd         Extremities:      + edema  R >L  /  -   calve tenderness ,      R leg  incisions cdi          aMIOdarone    Tablet 400 milliGRAM(s) Oral every 8 hours  aspirin enteric coated 81 milliGRAM(s) Oral daily  chlorhexidine 2% Cloths 1 Application(s) Topical <User Schedule>  furosemide   Injectable 20 milliGRAM(s) IV Push two times a day  insulin lispro (ADMELOG) corrective regimen sliding scale   SubCutaneous three times a day before meals  oxyCODONE    IR 10 milliGRAM(s) Oral every 4 hours PRN  oxyCODONE    IR 5 milliGRAM(s) Oral every 4 hours PRN  pantoprazole    Tablet 40 milliGRAM(s) Oral before breakfast  polyethylene glycol 3350 17 Gram(s) Oral daily  spironolactone 25 milliGRAM(s) Oral daily                    Physical Therapy Rec:   Home  [  ]   Home w/ PT  [  ]  Rehab  [  ]  Discussed with Cardiothoracic Team at AM rounds.

## 2020-12-03 NOTE — PROGRESS NOTE ADULT - ASSESSMENT
_________________________________________________________________________________________  ========>>  M E D I C A L   A T T E N D I N G    F O L L O W  U P  N O T E  <<=========  -----------------------------------------------------------------------------------------------------    - Patient seen and examined by me earlier today.   - In summary,  MARCOS AGUILAR is a 85y year old man who originally presented with edema   - Patient today overall doing much better, comfortable, taking PO, no significant pain, no SOB / cough, ambulating with walker         ==================>> REVIEW OF SYSTEM <<=================    GEN: no fever, no chills, no pain  RESP: no SOB, no cough, no sputum  CVS: no chest pain, no palpitations  GI: no abdominal pain, no nausea  : no dysuria, still with mays   NEURO: no headache, no dizziness  PSYCH: no depression, not anxious  Derm : no itching, no rash    ==================>> PHYSICAL EXAM <<=================    GEN: A&O X 3 , NAD , comfortable, ambulating with PT   HEENT: NCAT, PERRL, MMM, hearing intact  Neck: supple , no JVD appreciated   CVS: S1S2 , regular , No M/R/G appreciated, wound dressed, clean   PULM: CTA B/L,  no W/R/R appreciated  ABD.: soft. non tender, non distended,  bowel sounds present  Extrem: intact pulses , + B/L Le edema        + mays with good urine output   Derm: No rash , no ecchymoses, erythema of LEs, stable   PSYCH : normal mood,  no delusion not anxious      ==================>> MEDICATIONS <<====================    apixaban 5 milliGRAM(s) Oral two times a day  aspirin enteric coated 81 milliGRAM(s) Oral daily  chlorhexidine 2% Cloths 1 Application(s) Topical <User Schedule>  furosemide   Injectable 20 milliGRAM(s) IV Push two times a day  insulin lispro (ADMELOG) corrective regimen sliding scale   SubCutaneous three times a day before meals  pantoprazole    Tablet 40 milliGRAM(s) Oral before breakfast  polyethylene glycol 3350 17 Gram(s) Oral daily  spironolactone 25 milliGRAM(s) Oral daily    MEDICATIONS  (PRN):  oxyCODONE    IR 10 milliGRAM(s) Oral every 4 hours PRN Severe Pain (7 - 10)  oxyCODONE    IR 5 milliGRAM(s) Oral every 4 hours PRN Moderate Pain (4 - 6)    ___________  Active diet:  Diet, Regular:   Consistent Carbohydrate No Snacks (CSTCHO)  Supplement Feeding Modality:  Oral  Ensure Enlive Cans or Servings Per Day:  2       Frequency:  Daily  ___________________    ==================>> VITAL SIGNS <<==================    Vital Signs Last 24 HrsT(C): 36.7 (12-03-20 @ 11:09)  T(F): 98.1 (12-03-20 @ 11:09), Max: 98.4 (12-02-20 @ 23:00)  HR: 75 (12-03-20 @ 18:35) (66 - 80)  BP: 142/67 (12-03-20 @ 18:35)  RR: 18 (12-03-20 @ 15:18) (16 - 18)  SpO2: 97% (12-03-20 @ 18:35) (95% - 98%)      POCT Blood Glucose.: 115 mg/dL (03 Dec 2020 17:00)  POCT Blood Glucose.: 125 mg/dL (03 Dec 2020 11:51)  POCT Blood Glucose.: 109 mg/dL (03 Dec 2020 07:58)  POCT Blood Glucose.: 106 mg/dL (02 Dec 2020 21:13)     ==================>> LAB AND IMAGING <<==================                        9.8    10.62 )-----------( 228      ( 03 Dec 2020 06:01 )             29.9        12-03    130<L>  |  94<L>  |  21  ----------------------------<  117<H>  5.0   |  26  |  0.74    Ca    8.8      03 Dec 2020 16:30  Phos  1.9     12-02  Mg     2.0     12-02    TPro  5.9<L>  /  Alb  3.3  /  TBili  0.8  /  DBili  0.3<H>  /  AST  61<H>  /  ALT  293<H>  /  AlkPhos  97  12-03    WBC count:   10.62 <<== ,  10.19 <<== ,  8.70 <<== ,  8.67 <<== ,  10.24 <<==   Hemoglobin:   9.8 <<==,  9.7 <<==,  8.9 <<==,  8.4 <<==,  9.3 <<==  platelets:  228 <==, 185 <==, 135 <==, 113 <==, 82 <==, 109 <==, 94 <==    Creatinine:  0.74  <<==, 0.73  <<==, 0.73  <<==, 0.75  <<==, 0.90  <<==, 0.88  <<==  Sodium:   130  <==, 133  <==, 132  <==, 132  <==, 134  <==, 130  <==, 128  <==       AST:          61 <== , 117 <== , 287 <== , 355 <== , 565 <==      ALT:        293  <== , 489  <== , 680  <== , 763  <== , 926  <==      AP:        97  <=, 112  <=, 114  <=, 122  <=, 103  <=     Bili:        0.8  <=, 1.0  <=, 1.1  <=, 1.2  <=, 1.1  <=    ___________________________________________________________________________________  ===============>>  A S S E S S M E N T   A N D   P L A N <<===============  ------------------------------------------------------------------------------------------  pt is an 84 y/o man with pmhx of htn, hld, hiatal hernia / GERD, OA post Lt TKR x1 year ago presenting with concern for increasing redness of left leg and swelling for 2 months.     Problem/Plan - 1:  ·  Problem: NSTEMI in pt with significant CAD     pt doing overall well post CABG X3  appreciated CTU / CC/ CTS follow up and mgmt   new AF with RVR  >> post successful LISA + DCC   monitor on tele  med mgmt per cardio / EP / CC  PT / OOB, IS, nutritional support   Continue Current medications otherwise and monitor.   supportive care  pain mgmt as needed   monitor post op anemia  med optimization per Cardio  / CTS   trial of void     Problem/Plan - 2:  ·  Problem:  HTN / HLD  Continue Current medications otherwise and monitor.   cardio f/u. and further optimization    Problem/Plan - 3:  ·  Problem: Venous stasis.    no DVT on duplex  likely venous stasis changes on left leg and cellulitis  post course of abx treatment   leg elevation, ? ACE wrapping   diuresis  ID f/u     GI / DVT PPX per surgical protocol   PT / OOB  DC davon / ANTONIO as able   --------------------------------------------  Case discussed with pt  Education given on findings and plan of care  ___________________________  H. INEZ Phipps.  Pager: [phone]

## 2020-12-03 NOTE — PROGRESS NOTE ADULT - SUBJECTIVE AND OBJECTIVE BOX
CARDIOLOGY     PROGRESS  NOTE   ________________________________________________    CHIEF COMPLAINT:Patient is a 85y old  Male who presents with a chief complaint of edema (02 Dec 2020 16:33)  no complain.  	  REVIEW OF SYSTEMS:  CONSTITUTIONAL: No fever, weight loss, or fatigue  EYES: No eye pain, visual disturbances, or discharge  ENT:  No difficulty hearing, tinnitus, vertigo; No sinus or throat pain  NECK: No pain or stiffness  RESPIRATORY: No cough, wheezing, chills or hemoptysis; No Shortness of Breath  CARDIOVASCULAR: No chest pain, palpitations, passing out, dizziness, or leg swelling  GASTROINTESTINAL: No abdominal or epigastric pain. No nausea, vomiting, or hematemesis; No diarrhea or constipation. No melena or hematochezia.  GENITOURINARY: No dysuria, frequency, hematuria, or incontinence  NEUROLOGICAL: No headaches, memory loss, loss of strength, numbness, or tremors  SKIN: No itching, burning, rashes, or lesions   LYMPH Nodes: No enlarged glands  ENDOCRINE: No heat or cold intolerance; No hair loss  MUSCULOSKELETAL: No joint pain or swelling; No muscle, back, or extremity pain  PSYCHIATRIC: No depression, anxiety, mood swings, or difficulty sleeping  HEME/LYMPH: No easy bruising, or bleeding gums  ALLERGY AND IMMUNOLOGIC: No hives or eczema	    [ ] All others negative	  [ ] Unable to obtain    PHYSICAL EXAM:  T(C): 36.7 (12-03-20 @ 07:21), Max: 36.9 (12-02-20 @ 23:00)  HR: 66 (12-03-20 @ 07:21) (66 - 92)  BP: 119/59 (12-03-20 @ 07:21) (107/53 - 131/73)  RR: 18 (12-03-20 @ 07:21) (16 - 20)  SpO2: 96% (12-03-20 @ 07:21) (95% - 100%)  Wt(kg): --  I&O's Summary    02 Dec 2020 07:01  -  03 Dec 2020 07:00  --------------------------------------------------------  IN: 1314.3 mL / OUT: 4000 mL / NET: -2685.7 mL        Appearance: Normal	  HEENT:   Normal oral mucosa, PERRL, EOMI	  Lymphatic: No lymphadenopathy  Cardiovascular: Normal S1 S2, No JVD, + murmurs, No edema  Respiratory: Lungs clear to auscultation	  Psychiatry: A & O x 3, Mood & affect appropriate  Gastrointestinal:  Soft, Non-tender, + BS	  Skin: No rashes, No ecchymoses, No cyanosis	  Neurologic: Non-focal  Extremities: Normal range of motion, No clubbing, cyanosis or edema  Vascular: Peripheral pulses palpable 2+ bilaterally    MEDICATIONS  (STANDING):  aMIOdarone    Tablet 400 milliGRAM(s) Oral every 8 hours  aspirin enteric coated 81 milliGRAM(s) Oral daily  chlorhexidine 2% Cloths 1 Application(s) Topical <User Schedule>  furosemide   Injectable 20 milliGRAM(s) IV Push two times a day  insulin lispro (ADMELOG) corrective regimen sliding scale   SubCutaneous three times a day before meals  pantoprazole    Tablet 40 milliGRAM(s) Oral before breakfast  polyethylene glycol 3350 17 Gram(s) Oral daily  spironolactone 25 milliGRAM(s) Oral daily      TELEMETRY: 	    ECG:  	  RADIOLOGY:  OTHER: 	  	  LABS:	 	    CARDIAC MARKERS:                                9.8    10.62 )-----------( 228      ( 03 Dec 2020 06:01 )             29.9     12-03    133<L>  |  96  |  15  ----------------------------<  94  4.4   |  26  |  0.73    Ca    8.7      03 Dec 2020 06:01  Phos  1.9     12-02  Mg     2.0     12-02    TPro  5.7<L>  /  Alb  3.3  /  TBili  1.0  /  DBili  x   /  AST  117<H>  /  ALT  489<H>  /  AlkPhos  112  12-02    proBNP: Serum Pro-Brain Natriuretic Peptide: 81040 pg/mL (11-18 @ 12:45)    Lipid Profile: Cholesterol 106  LDL --  HDL 40  TG 73    HgA1c:   TSH: Thyroid Stimulating Hormone, Serum: 1.69 uIU/mL (11-30 @ 11:01)  Thyroid Stimulating Hormone, Serum: 1.46 uIU/mL (11-19 @ 09:24)  Thyroid Stimulating Hormone, Serum: 1.51 uIU/mL (11-19 @ 08:41)    PTT - ( 01 Dec 2020 10:28 )  PTT:64.2 sec      Assessment and plan  ---------------------------  pt is an 84 y/o man with pmhx of htn, hld, hiatal hernia / GERD, OA post Lt TKR x1 year ago presenting with concern for increasing redness of left leg and swelling for 2 months.   Patient reports that he has been having increasing swelling of both of his legs for approximately 2-3 months; however, the left leg has been more swollen and has been getting increasingly more red. He states that he went to his podiatrist when he noted some rash on the lower portion of his left just above his foot, was given a 'cream of some kind, and feels that the redness and swelling got worse after starting the cream.'   He states that he was having chest pain approximately 1 mo ago, went to his PMD and was diagnosed with a hiatal hernia with an xray and prescribed omeprazole.   Patient reports that he has been becoming increasingly more short of breath for approximately 2 months as well. Feels that when he takes just a few steps he has to stop and catch his breath, and is so short of breath that he cannot speak in complete sentences without rstill in rapid a,fib  continue Amio  po loading  s/p hanna cardioversion, remains in NSR not pacing  fu lft/haley  suspect SSS  AC  will add small dose of metoprolol if tolerated  physical therapy

## 2020-12-03 NOTE — PROGRESS NOTE ADULT - ASSESSMENT
84 y/o male with PMH of HTN, HLD, hiatal hernia, GERD, OA post Lt TKR x1 year ago presenting with concern for increasing redness of left leg and swelling for 2 months. Pt admitted for acute on chronic CHD with elevated troponins / NSTEMI. Cardiology following, Dr. De La Vega. Abnormal NST on , now s/p cardiac cath demonstrating multivessel CAD. CT Surgery consulted for CABG evaluation.    On  s/p CABG x 3 LIMA / LIMA to LAD, SVG to Diagonal, SVG to OM  Post op - Course:   Inotropic and pressor support required à weaned off.   Extubated POD # 1  ID following for prior LLE cellulitis àcompleted full course of Abx. Monitoring off Abx.     gtt weaned off. Started on Lopressor 25 mg PO BID.  Transferred to SDU; received patient Hypotensive SBP 70-90’s asymptomatic.  Lopressor decreased to 12.5 mg PO BID. Hypoglycemia BFG 58 à treated with 4 0z of apple juice; repeat .  Continue to monitor.      @ 2200 Rapid afib w/ -180. S/p IVP lopressor x3. S/p IV amio bolus x1, phenylephrine gtt started SBP 80-90. Remained persistent afib 140s after intervetions. Betablockers d/c'd. Intensivist at bedside, Pt transferred to CTU for further management.  Cardiology/eps following, increase lft ? 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 Cardizem IV for hr control- d/c , amio infusion,  Esmolol infusion-d/c  Prophylactic antibiotic coverage, ID following for LE cellulitis   Remained on primacor for perfusion  Amio loaded.  Lasix infusion for diuresis   s/p LISA DCCV - required pacing>80 for bp support/ maintain nsr  Lasix infusion d/c, lasix 20iv q8  Eliquis started for afib  Eliquis held to remove a line in am  12/3  Primacor d/c  Transferred to sdu  12/3 [place] out. Resume Eliquis  Lasix bid, ald qd, repeat bmp later today

## 2020-12-03 NOTE — PROGRESS NOTE ADULT - PROBLEM SELECTOR PLAN 1
Asa, Statin,  Chest PT,  Incentive spirometry, wound care and assessment.  Ambulate   Beta blocker held for hypotension

## 2020-12-04 LAB
ANION GAP SERPL CALC-SCNC: 9 MMOL/L — SIGNIFICANT CHANGE UP (ref 5–17)
BUN SERPL-MCNC: 16 MG/DL — SIGNIFICANT CHANGE UP (ref 7–23)
CALCIUM SERPL-MCNC: 9.1 MG/DL — SIGNIFICANT CHANGE UP (ref 8.4–10.5)
CHLORIDE SERPL-SCNC: 94 MMOL/L — LOW (ref 96–108)
CO2 SERPL-SCNC: 27 MMOL/L — SIGNIFICANT CHANGE UP (ref 22–31)
CREAT SERPL-MCNC: 0.88 MG/DL — SIGNIFICANT CHANGE UP (ref 0.5–1.3)
GLUCOSE BLDC GLUCOMTR-MCNC: 78 MG/DL — SIGNIFICANT CHANGE UP (ref 70–99)
GLUCOSE SERPL-MCNC: 88 MG/DL — SIGNIFICANT CHANGE UP (ref 70–99)
HCT VFR BLD CALC: 32.1 % — LOW (ref 39–50)
HGB BLD-MCNC: 10.5 G/DL — LOW (ref 13–17)
MAGNESIUM SERPL-MCNC: 2.1 MG/DL — SIGNIFICANT CHANGE UP (ref 1.6–2.6)
MCHC RBC-ENTMCNC: 32.4 PG — SIGNIFICANT CHANGE UP (ref 27–34)
MCHC RBC-ENTMCNC: 32.7 GM/DL — SIGNIFICANT CHANGE UP (ref 32–36)
MCV RBC AUTO: 99.1 FL — SIGNIFICANT CHANGE UP (ref 80–100)
NRBC # BLD: 0 /100 WBCS — SIGNIFICANT CHANGE UP (ref 0–0)
PLATELET # BLD AUTO: 270 K/UL — SIGNIFICANT CHANGE UP (ref 150–400)
POTASSIUM SERPL-MCNC: 5 MMOL/L — SIGNIFICANT CHANGE UP (ref 3.5–5.3)
POTASSIUM SERPL-SCNC: 5 MMOL/L — SIGNIFICANT CHANGE UP (ref 3.5–5.3)
RBC # BLD: 3.24 M/UL — LOW (ref 4.2–5.8)
RBC # FLD: 15.4 % — HIGH (ref 10.3–14.5)
SODIUM SERPL-SCNC: 130 MMOL/L — LOW (ref 135–145)
WBC # BLD: 11.15 K/UL — HIGH (ref 3.8–10.5)
WBC # FLD AUTO: 11.15 K/UL — HIGH (ref 3.8–10.5)

## 2020-12-04 PROCEDURE — 71045 X-RAY EXAM CHEST 1 VIEW: CPT | Mod: 26

## 2020-12-04 PROCEDURE — 93010 ELECTROCARDIOGRAM REPORT: CPT

## 2020-12-04 RX ORDER — AMIODARONE HYDROCHLORIDE 400 MG/1
400 TABLET ORAL THREE TIMES A DAY
Refills: 0 | Status: DISCONTINUED | OUTPATIENT
Start: 2020-12-04 | End: 2020-12-05

## 2020-12-04 RX ORDER — FUROSEMIDE 40 MG
40 TABLET ORAL DAILY
Refills: 0 | Status: DISCONTINUED | OUTPATIENT
Start: 2020-12-04 | End: 2020-12-06

## 2020-12-04 RX ORDER — METOPROLOL TARTRATE 50 MG
12.5 TABLET ORAL
Refills: 0 | Status: DISCONTINUED | OUTPATIENT
Start: 2020-12-05 | End: 2020-12-06

## 2020-12-04 RX ORDER — METOPROLOL TARTRATE 50 MG
12.5 TABLET ORAL ONCE
Refills: 0 | Status: COMPLETED | OUTPATIENT
Start: 2020-12-04 | End: 2020-12-04

## 2020-12-04 RX ADMIN — Medication 3 MILLIGRAM(S): at 23:09

## 2020-12-04 RX ADMIN — OXYCODONE HYDROCHLORIDE 10 MILLIGRAM(S): 5 TABLET ORAL at 03:29

## 2020-12-04 RX ADMIN — SPIRONOLACTONE 25 MILLIGRAM(S): 25 TABLET, FILM COATED ORAL at 05:57

## 2020-12-04 RX ADMIN — AMIODARONE HYDROCHLORIDE 400 MILLIGRAM(S): 400 TABLET ORAL at 14:40

## 2020-12-04 RX ADMIN — APIXABAN 5 MILLIGRAM(S): 2.5 TABLET, FILM COATED ORAL at 05:57

## 2020-12-04 RX ADMIN — AMIODARONE HYDROCHLORIDE 400 MILLIGRAM(S): 400 TABLET ORAL at 21:05

## 2020-12-04 RX ADMIN — CHLORHEXIDINE GLUCONATE 1 APPLICATION(S): 213 SOLUTION TOPICAL at 14:08

## 2020-12-04 RX ADMIN — OXYCODONE HYDROCHLORIDE 10 MILLIGRAM(S): 5 TABLET ORAL at 23:42

## 2020-12-04 RX ADMIN — PANTOPRAZOLE SODIUM 40 MILLIGRAM(S): 20 TABLET, DELAYED RELEASE ORAL at 05:57

## 2020-12-04 RX ADMIN — OXYCODONE HYDROCHLORIDE 10 MILLIGRAM(S): 5 TABLET ORAL at 23:10

## 2020-12-04 RX ADMIN — AMIODARONE HYDROCHLORIDE 200 MILLIGRAM(S): 400 TABLET ORAL at 05:57

## 2020-12-04 RX ADMIN — POLYETHYLENE GLYCOL 3350 17 GRAM(S): 17 POWDER, FOR SOLUTION ORAL at 11:06

## 2020-12-04 RX ADMIN — OXYCODONE HYDROCHLORIDE 10 MILLIGRAM(S): 5 TABLET ORAL at 02:46

## 2020-12-04 RX ADMIN — Medication 12.5 MILLIGRAM(S): at 14:40

## 2020-12-04 RX ADMIN — Medication 20 MILLIGRAM(S): at 05:57

## 2020-12-04 RX ADMIN — Medication 81 MILLIGRAM(S): at 11:06

## 2020-12-04 NOTE — PROGRESS NOTE ADULT - ASSESSMENT
84 y/o male with PMH of HTN, HLD, hiatal hernia, GERD, OA post Lt TKR x1 year ago presenting with concern for increasing redness of left leg and swelling for 2 months. Pt admitted for acute on chronic CHD with elevated troponins / NSTEMI. Cardiology following, Dr. De La Vega. Abnormal NST on , now s/p cardiac cath demonstrating multivessel CAD. CT Surgery consulted for CABG evaluation.    On  s/p CABG x 3 LIMA / LIMA to LAD, SVG to Diagonal, SVG to OM  Post op - Course:   Inotropic and pressor support required à weaned off.   Extubated POD # 1  ID following for prior LLE cellulitis àcompleted full course of Abx. Monitoring off Abx.     gtt weaned off. Started on Lopressor 25 mg PO BID.  Transferred to SDU; received patient Hypotensive SBP 70-90’s asymptomatic.  Lopressor decreased to 12.5 mg PO BID. Hypoglycemia BFG 58 à treated with 4 0z of apple juice; repeat .  Continue to monitor.      @ 2200 Rapid afib w/ -180. S/p IVP lopressor x3. S/p IV amio bolus x1, phenylephrine gtt started SBP 80-90. Remained persistent afib 140s after intervetions. Betablockers d/c'd. Intensivist at bedside, Pt transferred to CTU for further management.  Cardiology/eps following, increase lft ? 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 Cardizem IV for hr control- d/c , amio infusion,  Esmolol infusion-d/c  Prophylactic antibiotic coverage, ID following for LE cellulitis   Remained on primacor for perfusion  Amio loaded.  Lasix infusion for diuresis   s/p LISA DCCV - required pacing>80 for bp support/ maintain nsr  Lasix infusion d/c, lasix 20iv q8  Eliquis started for afib  Eliquis held to remove a line in am  12/3  Primacor d/c  Transferred to sdu  12/3 [place] out. Resume Eliquis  Lasix bid, ald qd, repeat bmp later today   84 y/o male with PMH of HTN, HLD, hiatal hernia, GERD, OA post Lt TKR x1 year ago presenting with concern for increasing redness of left leg and swelling for 2 months. Pt admitted for acute on chronic CHD with elevated troponins / NSTEMI. Cardiology following, Dr. De La Vega. Abnormal NST on , now s/p cardiac cath demonstrating multivessel CAD. CT Surgery consulted for CABG evaluation.  On  s/p CABG x 3 LIMA / LIMA to LAD, SVG to Diagonal, SVG to OM  Post op - Course:   Inotropic and pressor support required à weaned off.   Extubated POD # 1  ID following for prior LLE cellulitis àcompleted full course of Abx. Monitoring off Abx.     gtt weaned off. Started on Lopressor 25 mg PO BID.  Transferred to SDU; received patient Hypotensive SBP 70-90’s asymptomatic.  Lopressor decreased to 12.5 mg PO BID. Hypoglycemia BFG 58 à treated with 4 0z of apple juice; repeat .  Continue to monitor.      @ 2200 Rapid afib w/ -180. S/p IVP lopressor x3. S/p IV amio bolus x1, phenylephrine gtt started SBP 80-90. Remained persistent afib 140s after intervetions. Betablockers d/c'd. Intensivist at bedside, Pt transferred to CTU for further management.  Cardiology/eps following, increase lft ? 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 Cardizem IV for hr control- d/c , amio infusion,  Esmolol infusion-d/c  Prophylactic antibiotic coverage, ID following for LE cellulitis   Remained on primacor for perfusion  Amio loaded.  Lasix infusion for diuresis   s/p LISA DCCV - required pacing>80 for bp support/ maintain nsr  Lasix infusion d/c, lasix 20iv q8  Eliquis started for afib  Eliquis held to remove a line in am  12/3  Primacor d/c  Transferred to sdu  12/3 [place] out. Resume Eliquis  Lasix bid, ald qd, repeat bmp later today   VSS; diuretics decrease lasix 40 mg po qd / aldactone 25 mg po qd; hold eliquis this evening and in am for PW removal  tx floor today; ekg qtc 481; continue to monitor on amio 200qd; no bb due to hypotension postop; statin d/c due to elevated lft's- repeat in am   Discharge planning- home sun as per Dr. Blanca

## 2020-12-04 NOTE — PROGRESS NOTE ADULT - PROBLEM SELECTOR PLAN 2
Amio load  s/p DCCV  Pacing.  Anticoagulation with Eliquis continue postop care  continue amio 200qd; no bb- due to hypotension postop  statin d/c due to elevated lft's- repeat in am 12/5  hold eliquis for pw removal in am 12/5  pulm toilet  pain management  increase activity as tolerated  Discharge planning- home sun if stable as per Dr. Turner

## 2020-12-04 NOTE — PROGRESS NOTE ADULT - SUBJECTIVE AND OBJECTIVE BOX
VITAL SIGNS    Telemetry:    Vital Signs Last 24 Hrs  T(C): 36.4 (20 @ 07:09), Max: 36.7 (20 @ 11:09)  T(F): 97.5 (20 @ 07:09), Max: 98.1 (20 @ 11:09)  HR: 65 (20 07:09) (65 - 80)  BP: 113/59 (20 @ 07:09) (113/59 - 142/67)  RR: 18 (20 @ 07:09) (16 - 18)  SpO2: 93% (20 @ 07:09) (93% - 97%)             @ 07:01  -   @ 07:00  --------------------------------------------------------  IN: 720 mL / OUT: 3600 mL / NET: -2880 mL     @ 07:01  -   @ 10:19  --------------------------------------------------------  IN: 0 mL / OUT: 550 mL / NET: -550 mL       Daily     Daily Weight in k.8 (04 Dec 2020 08:26)  Admit Wt: Drug Dosing Weight  Height (cm): 160 (2020 06:35)  Weight (kg): 73 (2020 06:35)  BMI (kg/m2): 28.5 (2020 06:35)  BSA (m2): 1.76 (2020 06:35)      CAPILLARY BLOOD GLUCOSE      POCT Blood Glucose.: 78 mg/dL (04 Dec 2020 07:47)  POCT Blood Glucose.: 108 mg/dL (03 Dec 2020 21:32)  POCT Blood Glucose.: 115 mg/dL (03 Dec 2020 17:00)  POCT Blood Glucose.: 125 mg/dL (03 Dec 2020 11:51)          aMIOdarone    Tablet 200 milliGRAM(s) Oral daily  aspirin enteric coated 81 milliGRAM(s) Oral daily  chlorhexidine 2% Cloths 1 Application(s) Topical <User Schedule>  furosemide    Tablet 40 milliGRAM(s) Oral daily  insulin lispro (ADMELOG) corrective regimen sliding scale   SubCutaneous three times a day before meals  melatonin 3 milliGRAM(s) Oral at bedtime PRN  oxyCODONE    IR 5 milliGRAM(s) Oral every 4 hours PRN  oxyCODONE    IR 10 milliGRAM(s) Oral every 4 hours PRN  pantoprazole    Tablet 40 milliGRAM(s) Oral before breakfast  polyethylene glycol 3350 17 Gram(s) Oral daily  spironolactone 25 milliGRAM(s) Oral daily      PHYSICAL EXAM    Subjective: "Hi.   Neurology: alert and oriented x 3, nonfocal, no gross deficits  CV : tele:  RSR  Sternal Wound :  CDI with dressing , Stable  Lungs: clear. RR easy, unlabored   Abdomen: soft, nontender, nondistended, positive bowel sounds, bowel movement   Neg N/V/D   :  pt voiding without difficulty   Extremities:   TRAYLOR; edema, neg calf tenderness.   PPP bilaterally      PW:  Chest tubes:                 VITAL SIGNS    Telemetry:  rsr 60-80   Vital Signs Last 24 Hrs  T(C): 36.4 (20 @ 07:09), Max: 36.7 (20 @ 11:09)  T(F): 97.5 (20 @ 07:09), Max: 98.1 (20 @ 11:09)  HR: 65 (20 07:09) (65 - 80)  BP: 113/59 (20 @ 07:09) (113/59 - 142/67)  RR: 18 (20 @ 07:09) (16 - 18)  SpO2: 93% (20 @ 07:09) (93% - 97%)             07:01  -   @ 07:00  --------------------------------------------------------  IN: 720 mL / OUT: 3600 mL / NET: -2880 mL     @ 07:01  -   @ 10:19  --------------------------------------------------------  IN: 0 mL / OUT: 550 mL / NET: -550 mL       Daily     Daily Weight in k.8 (04 Dec 2020 08:26)  Admit Wt: Drug Dosing Weight  Height (cm): 160 (2020 06:35)  Weight (kg): 73 (2020 06:35)  BMI (kg/m2): 28.5 (2020 06:35)  BSA (m2): 1.76 (2020 06:35)      CAPILLARY BLOOD GLUCOSE      POCT Blood Glucose.: 78 mg/dL (04 Dec 2020 07:47)  POCT Blood Glucose.: 108 mg/dL (03 Dec 2020 21:32)  POCT Blood Glucose.: 115 mg/dL (03 Dec 2020 17:00)  POCT Blood Glucose.: 125 mg/dL (03 Dec 2020 11:51)          aMIOdarone    Tablet 200 milliGRAM(s) Oral daily  aspirin enteric coated 81 milliGRAM(s) Oral daily  chlorhexidine 2% Cloths 1 Application(s) Topical <User Schedule>  furosemide    Tablet 40 milliGRAM(s) Oral daily  insulin lispro (ADMELOG) corrective regimen sliding scale   SubCutaneous three times a day before meals  melatonin 3 milliGRAM(s) Oral at bedtime PRN  oxyCODONE    IR 5 milliGRAM(s) Oral every 4 hours PRN  oxyCODONE    IR 10 milliGRAM(s) Oral every 4 hours PRN  pantoprazole    Tablet 40 milliGRAM(s) Oral before breakfast  polyethylene glycol 3350 17 Gram(s) Oral daily  spironolactone 25 milliGRAM(s) Oral daily      PHYSICAL EXAM    Subjective: "I"m ok."    Neurology: alert and oriented x 3, nonfocal, no gross deficits  CV : tele:  RSR 60-80   Sternal Wound :  CDI with dressing , Stable; +PW-VVI   Lungs: clear. RR easy, unlabored   Abdomen: soft, nontender, nondistended, positive bowel sounds, + bowel movement   Neg N/V/D   :  pt voiding s/p mays removal   Extremities:   TRAYLOR; +1 LE edema, neg calf tenderness.   PPP bilaterally      PW:  +PW-VVI   Chest tubes: none                 VITAL SIGNS    Telemetry:  rsr 60-80   Vital Signs Last 24 Hrs  T(C): 36.4 (20 @ 07:09), Max: 36.7 (20 @ 11:09)  T(F): 97.5 (20 @ 07:09), Max: 98.1 (20 @ 11:09)  HR: 65 (20 07:09) (65 - 80)  BP: 113/59 (20 @ 07:09) (113/59 - 142/67)  RR: 18 (20 @ 07:09) (16 - 18)  SpO2: 93% (20 @ 07:09) (93% - 97%)             07:01  -   @ 07:00  --------------------------------------------------------  IN: 720 mL / OUT: 3600 mL / NET: -2880 mL     @ 07:01  -   @ 10:19  --------------------------------------------------------  IN: 0 mL / OUT: 550 mL / NET: -550 mL       Daily     Daily Weight in k.8 (04 Dec 2020 08:26)  Admit Wt: Drug Dosing Weight  Height (cm): 160 (2020 06:35)  Weight (kg): 73 (2020 06:35)  BMI (kg/m2): 28.5 (2020 06:35)  BSA (m2): 1.76 (2020 06:35)      CAPILLARY BLOOD GLUCOSE      POCT Blood Glucose.: 78 mg/dL (04 Dec 2020 07:47)  POCT Blood Glucose.: 108 mg/dL (03 Dec 2020 21:32)  POCT Blood Glucose.: 115 mg/dL (03 Dec 2020 17:00)  POCT Blood Glucose.: 125 mg/dL (03 Dec 2020 11:51)          aMIOdarone    Tablet 200 milliGRAM(s) Oral daily  aspirin enteric coated 81 milliGRAM(s) Oral daily  chlorhexidine 2% Cloths 1 Application(s) Topical <User Schedule>  furosemide    Tablet 40 milliGRAM(s) Oral daily  insulin lispro (ADMELOG) corrective regimen sliding scale   SubCutaneous three times a day before meals  melatonin 3 milliGRAM(s) Oral at bedtime PRN  oxyCODONE    IR 5 milliGRAM(s) Oral every 4 hours PRN  oxyCODONE    IR 10 milliGRAM(s) Oral every 4 hours PRN  pantoprazole    Tablet 40 milliGRAM(s) Oral before breakfast  polyethylene glycol 3350 17 Gram(s) Oral daily  spironolactone 25 milliGRAM(s) Oral daily      PHYSICAL EXAM    Subjective: "I"m ok."    Neurology: alert and oriented x 3, nonfocal, no gross deficits  CV : tele:  RSR 60-80   Sternal Wound :  CDI with dressing , Stable; +PW-VVI   Lungs: clear. RR easy, unlabored   Abdomen: soft, nontender, nondistended, positive bowel sounds, + bowel movement   Neg N/V/D   :  pt voiding s/p mays removal   Extremities:   TRAYLOR; +1 LE edema, neg calf tenderness.   PPP bilaterally; rt svg site cdi w/ dressing     PW:  +PW-VVI   Chest tubes: none

## 2020-12-04 NOTE — PROGRESS NOTE ADULT - SUBJECTIVE AND OBJECTIVE BOX
CARDIOLOGY     PROGRESS  NOTE   ________________________________________________    CHIEF COMPLAINT:Patient is a 85y old  Male who presents with a chief complaint of edema (03 Dec 2020 18:31)  no complain.  	  REVIEW OF SYSTEMS:  CONSTITUTIONAL: No fever, weight loss, or fatigue  EYES: No eye pain, visual disturbances, or discharge  ENT:  No difficulty hearing, tinnitus, vertigo; No sinus or throat pain  NECK: No pain or stiffness  RESPIRATORY: No cough, wheezing, chills or hemoptysis; No Shortness of Breath  CARDIOVASCULAR: No chest pain, palpitations, passing out, dizziness, or leg swelling  GASTROINTESTINAL: No abdominal or epigastric pain. No nausea, vomiting, or hematemesis; No diarrhea or constipation. No melena or hematochezia.  GENITOURINARY: No dysuria, frequency, hematuria, or incontinence  NEUROLOGICAL: No headaches, memory loss, loss of strength, numbness, or tremors  SKIN: No itching, burning, rashes, or lesions   LYMPH Nodes: No enlarged glands  ENDOCRINE: No heat or cold intolerance; No hair loss  MUSCULOSKELETAL: No joint pain or swelling; No muscle, back, or extremity pain  PSYCHIATRIC: No depression, anxiety, mood swings, or difficulty sleeping  HEME/LYMPH: No easy bruising, or bleeding gums  ALLERGY AND IMMUNOLOGIC: No hives or eczema	    [ ] All others negative	  [ ] Unable to obtain    PHYSICAL EXAM:  T(C): 36.2 (12-04-20 @ 02:45), Max: 36.7 (12-03-20 @ 07:21)  HR: 70 (12-04-20 @ 02:45) (66 - 80)  BP: 113/62 (12-04-20 @ 02:45) (108/60 - 142/67)  RR: 16 (12-04-20 @ 02:45) (16 - 18)  SpO2: 95% (12-04-20 @ 02:45) (95% - 98%)  Wt(kg): --  I&O's Summary    02 Dec 2020 07:01  -  03 Dec 2020 07:00  --------------------------------------------------------  IN: 1314.3 mL / OUT: 4150 mL / NET: -2835.7 mL    03 Dec 2020 07:01  -  04 Dec 2020 05:59  --------------------------------------------------------  IN: 720 mL / OUT: 2200 mL / NET: -1480 mL        Appearance: Normal	  HEENT:   Normal oral mucosa, PERRL, EOMI	  Lymphatic: No lymphadenopathy  Cardiovascular: Normal S1 S2, No JVD, + murmurs, No edema  Respiratory: Lungs clear to auscultation	  Psychiatry: A & O x 3, Mood & affect appropriate  Gastrointestinal:  Soft, Non-tender, + BS	  Skin: No rashes, No ecchymoses, No cyanosis	  Neurologic: Non-focal  Extremities: Normal range of motion, No clubbing, cyanosis or edema  Vascular: Peripheral pulses palpable 2+ bilaterally    MEDICATIONS  (STANDING):  aMIOdarone    Tablet 200 milliGRAM(s) Oral daily  apixaban 5 milliGRAM(s) Oral two times a day  aspirin enteric coated 81 milliGRAM(s) Oral daily  chlorhexidine 2% Cloths 1 Application(s) Topical <User Schedule>  furosemide   Injectable 20 milliGRAM(s) IV Push two times a day  insulin lispro (ADMELOG) corrective regimen sliding scale   SubCutaneous three times a day before meals  pantoprazole    Tablet 40 milliGRAM(s) Oral before breakfast  polyethylene glycol 3350 17 Gram(s) Oral daily  spironolactone 25 milliGRAM(s) Oral daily      TELEMETRY: 	    ECG:  	  RADIOLOGY:  OTHER: 	  	  LABS:	 	    CARDIAC MARKERS:                                9.8    10.62 )-----------( 228      ( 03 Dec 2020 06:01 )             29.9     12-03    130<L>  |  94<L>  |  21  ----------------------------<  117<H>  5.0   |  26  |  0.74    Ca    8.8      03 Dec 2020 16:30    TPro  5.9<L>  /  Alb  3.3  /  TBili  0.8  /  DBili  0.3<H>  /  AST  61<H>  /  ALT  293<H>  /  AlkPhos  97  12-03    proBNP: Serum Pro-Brain Natriuretic Peptide: 19481 pg/mL (11-18 @ 12:45)    Lipid Profile: Cholesterol 106  LDL --  HDL 40  TG 73    HgA1c:   TSH: Thyroid Stimulating Hormone, Serum: 1.69 uIU/mL (11-30 @ 11:01)  Thyroid Stimulating Hormone, Serum: 1.46 uIU/mL (11-19 @ 09:24)  Thyroid Stimulating Hormone, Serum: 1.51 uIU/mL (11-19 @ 08:41)          Assessment and plan  ---------------------------  pt is an 84 y/o man with pmhx of htn, hld, hiatal hernia / GERD, OA post Lt TKR x1 year ago presenting with concern for increasing redness of left leg and swelling for 2 months.   Patient reports that he has been having increasing swelling of both of his legs for approximately 2-3 months; however, the left leg has been more swollen and has been getting increasingly more red. He states that he went to his podiatrist when he noted some rash on the lower portion of his left just above his foot, was given a 'cream of some kind, and feels that the redness and swelling got worse after starting the cream.'   He states that he was having chest pain approximately 1 mo ago, went to his PMD and was diagnosed with a hiatal hernia with an xray and prescribed omeprazole.   Patient reports that he has been becoming increasingly more short of breath for approximately 2 months as well. Feels that when he takes just a few steps he has to stop and catch his breath, and is so short of breath that he cannot speak in complete sentences without rstill in rapid a,fib  continue Amio  po loading  s/p hanna cardioversion, remains in NSR not pacing  fu lft/haley lft are improving  AC  will add small dose of metoprolol if tolerated  physical therapy  continue diuresis

## 2020-12-04 NOTE — PROGRESS NOTE ADULT - PROBLEM SELECTOR PLAN 1
Asa, Statin,  Chest PT,  Incentive spirometry, wound care and assessment.  Ambulate   Beta blocker held for hypotension continue to monitor  pt currently off abx

## 2020-12-04 NOTE — PROGRESS NOTE ADULT - ASSESSMENT
_________________________________________________________________________________________  ========>>  M E D I C A L   A T T E N D I N G    F O L L O W  U P  N O T E  <<=========  -----------------------------------------------------------------------------------------------------    - Patient seen and examined by me earlier today.   - In summary,  MARCOS AGUILAR is a 85y year old man who originally presented with edema   - Patient today overall doing much better, comfortable, taking PO, no significant pain, no SOB / cough, ambulating with walker         mays out, urinating ok        ==================>> REVIEW OF SYSTEM <<=================    GEN: no fever, no chills, no pain  RESP: no SOB, no cough, no sputum  CVS: no chest pain, no palpitations  GI: no abdominal pain, no nausea  : no dysuria,    NEURO: no headache, no dizziness  PSYCH: no depression, not anxious  Derm : no itching, no rash    ==================>> PHYSICAL EXAM <<=================    GEN: A&O X 3 , NAD , comfortable, in recliner   HEENT: NCAT, PERRL, MMM, hearing intact  Neck: supple , no JVD appreciated   CVS: S1S2 , regular , No M/R/G appreciated, wound dressed, clean   PULM: CTA B/L,  no W/R/R appreciated  ABD.: soft. non tender, non distended,  bowel sounds present  Extrem: intact pulses , + B/L Le edema   Derm: No rash , no ecchymoses, erythema of LEs, stable   PSYCH : normal mood,  no delusion not anxious      ==================>> MEDICATIONS <<====================    aMIOdarone    Tablet 400 milliGRAM(s) Oral three times a day  aspirin enteric coated 81 milliGRAM(s) Oral daily  chlorhexidine 2% Cloths 1 Application(s) Topical <User Schedule>  furosemide    Tablet 40 milliGRAM(s) Oral daily  pantoprazole    Tablet 40 milliGRAM(s) Oral before breakfast  polyethylene glycol 3350 17 Gram(s) Oral daily  spironolactone 25 milliGRAM(s) Oral daily    MEDICATIONS  (PRN):  melatonin 3 milliGRAM(s) Oral at bedtime PRN Sleep  oxyCODONE    IR 5 milliGRAM(s) Oral every 4 hours PRN Moderate Pain (4 - 6)  oxyCODONE    IR 10 milliGRAM(s) Oral every 4 hours PRN Severe Pain (7 - 10)    ___________  Active diet:  Diet, Regular:   Consistent Carbohydrate No Snacks (CSTCHO)  Supplement Feeding Modality:  Oral  Ensure Enlive Cans or Servings Per Day:  2       Frequency:  Daily  ___________________    ==================>> VITAL SIGNS <<==================    Vital Signs Last 24 HrsT(C): 36.7 (12-04-20 @ 15:15)  T(F): 98 (12-04-20 @ 15:15), Max: 98 (12-04-20 @ 15:15)  HR: 76 (12-04-20 @ 15:15) (65 - 77)  BP: 116/58 (12-04-20 @ 15:15)  RR: 18 (12-04-20 @ 15:15) (16 - 18)  SpO2: 95% (12-04-20 @ 15:15) (93% - 97%)      POCT Blood Glucose.: 78 mg/dL (04 Dec 2020 07:47)  POCT Blood Glucose.: 108 mg/dL (03 Dec 2020 21:32)     ==================>> LAB AND IMAGING <<==================                        10.5   11.15 )-----------( 270      ( 04 Dec 2020 06:47 )             32.1        12-04    130<L>  |  94<L>  |  16  ----------------------------<  88  5.0   |  27  |  0.88    Ca    9.1      04 Dec 2020 06:47  Mg     2.1     12-04    TPro  5.9<L>  /  Alb  3.3  /  TBili  0.8  /  DBili  0.3<H>  /  AST  61<H>  /  ALT  293<H>  /  AlkPhos  97  12-03    WBC count:   11.15 <<== ,  10.62 <<== ,  10.19 <<== ,  8.70 <<== ,  8.67 <<==   Hemoglobin:   10.5 <<==,  9.8 <<==,  9.7 <<==,  8.9 <<==,  8.4 <<==  platelets:  270 <==, 228 <==, 185 <==, 135 <==, 113 <==, 82 <==    Creatinine:  0.88  <<==, 0.74  <<==, 0.73  <<==, 0.73  <<==, 0.75  <<==, 0.90  <<==  Sodium:   130  <==, 130  <==, 133  <==, 132  <==, 132  <==, 134  <==, 130  <==       AST:          61 <== , 117 <== , 287 <== , 355 <== , 565 <==      ALT:        293  <== , 489  <== , 680  <== , 763  <== , 926  <==      AP:        97  <=, 112  <=, 114  <=, 122  <=, 103  <=     Bili:        0.8  <=, 1.0  <=, 1.1  <=, 1.2  <=, 1.1  <=    ___________________________________________________________________________________  ===============>>  A S S E S S M E N T   A N D   P L A N <<===============  ------------------------------------------------------------------------------------------  pt is an 84 y/o man with pmhx of htn, hld, hiatal hernia / GERD, OA post Lt TKR x1 year ago presenting with concern for increasing redness of left leg and swelling for 2 months.     Problem/Plan - 1:  ·  Problem: NSTEMI in pt with significant CAD     pt doing overall well post CABG X3  appreciated CTS / cardio follow up and mgmt   AF with RVR  >> post successful LISA + DCC   monitor on tele  med mgmt per cardio / EP  PT / OOB, IS, nutritional support   Continue Current medications otherwise and monitor.   supportive care  pain mgmt as needed   monitor post op anemia  med optimization per Cardio  / CTS   overall otherwise doing well     Problem/Plan - 2:  ·  Problem:  HTN / HLD  Continue Current medications otherwise and monitor.   cardio f/u. and further optimization    Problem/Plan - 3:  ·  Problem: Venous stasis.    no DVT on duplex  likely venous stasis changes on left leg and cellulitis  post course of abx treatment   leg elevation, ? ACE wrapping   diuresis  ID f/u     GI / DVT PPX per surgical protocol   PT / OOB  --------------------------------------------  Case discussed with pt  Education given on findings and plan of care  ___________________________  H. INEZ Phipps.  Pager: [phone]

## 2020-12-05 DIAGNOSIS — I45.2 BIFASCICULAR BLOCK: ICD-10-CM

## 2020-12-05 LAB
ALBUMIN SERPL ELPH-MCNC: 3.5 G/DL — SIGNIFICANT CHANGE UP (ref 3.3–5)
ALP SERPL-CCNC: 97 U/L — SIGNIFICANT CHANGE UP (ref 40–120)
ALT FLD-CCNC: 173 U/L — HIGH (ref 10–45)
ANION GAP SERPL CALC-SCNC: 10 MMOL/L — SIGNIFICANT CHANGE UP (ref 5–17)
ANION GAP SERPL CALC-SCNC: 10 MMOL/L — SIGNIFICANT CHANGE UP (ref 5–17)
AST SERPL-CCNC: 40 U/L — SIGNIFICANT CHANGE UP (ref 10–40)
BILIRUB DIRECT SERPL-MCNC: 0.3 MG/DL — HIGH (ref 0–0.2)
BILIRUB INDIRECT FLD-MCNC: 0.5 MG/DL — SIGNIFICANT CHANGE UP (ref 0.2–1)
BILIRUB SERPL-MCNC: 0.8 MG/DL — SIGNIFICANT CHANGE UP (ref 0.2–1.2)
BUN SERPL-MCNC: 22 MG/DL — SIGNIFICANT CHANGE UP (ref 7–23)
BUN SERPL-MCNC: 22 MG/DL — SIGNIFICANT CHANGE UP (ref 7–23)
CALCIUM SERPL-MCNC: 9.2 MG/DL — SIGNIFICANT CHANGE UP (ref 8.4–10.5)
CALCIUM SERPL-MCNC: 9.4 MG/DL — SIGNIFICANT CHANGE UP (ref 8.4–10.5)
CHLORIDE SERPL-SCNC: 93 MMOL/L — LOW (ref 96–108)
CHLORIDE SERPL-SCNC: 93 MMOL/L — LOW (ref 96–108)
CO2 SERPL-SCNC: 27 MMOL/L — SIGNIFICANT CHANGE UP (ref 22–31)
CO2 SERPL-SCNC: 27 MMOL/L — SIGNIFICANT CHANGE UP (ref 22–31)
CREAT SERPL-MCNC: 0.81 MG/DL — SIGNIFICANT CHANGE UP (ref 0.5–1.3)
CREAT SERPL-MCNC: 0.88 MG/DL — SIGNIFICANT CHANGE UP (ref 0.5–1.3)
GLUCOSE BLDC GLUCOMTR-MCNC: 120 MG/DL — HIGH (ref 70–99)
GLUCOSE SERPL-MCNC: 92 MG/DL — SIGNIFICANT CHANGE UP (ref 70–99)
GLUCOSE SERPL-MCNC: 94 MG/DL — SIGNIFICANT CHANGE UP (ref 70–99)
HCT VFR BLD CALC: 30.5 % — LOW (ref 39–50)
HGB BLD-MCNC: 9.9 G/DL — LOW (ref 13–17)
MCHC RBC-ENTMCNC: 31.9 PG — SIGNIFICANT CHANGE UP (ref 27–34)
MCHC RBC-ENTMCNC: 32.5 GM/DL — SIGNIFICANT CHANGE UP (ref 32–36)
MCV RBC AUTO: 98.4 FL — SIGNIFICANT CHANGE UP (ref 80–100)
NRBC # BLD: 0 /100 WBCS — SIGNIFICANT CHANGE UP (ref 0–0)
PLATELET # BLD AUTO: 306 K/UL — SIGNIFICANT CHANGE UP (ref 150–400)
POTASSIUM SERPL-MCNC: 5.1 MMOL/L — SIGNIFICANT CHANGE UP (ref 3.5–5.3)
POTASSIUM SERPL-MCNC: 5.6 MMOL/L — HIGH (ref 3.5–5.3)
POTASSIUM SERPL-SCNC: 5.1 MMOL/L — SIGNIFICANT CHANGE UP (ref 3.5–5.3)
POTASSIUM SERPL-SCNC: 5.6 MMOL/L — HIGH (ref 3.5–5.3)
PROT SERPL-MCNC: 6.4 G/DL — SIGNIFICANT CHANGE UP (ref 6–8.3)
RBC # BLD: 3.1 M/UL — LOW (ref 4.2–5.8)
RBC # FLD: 15.2 % — HIGH (ref 10.3–14.5)
SODIUM SERPL-SCNC: 130 MMOL/L — LOW (ref 135–145)
SODIUM SERPL-SCNC: 130 MMOL/L — LOW (ref 135–145)
WBC # BLD: 11.61 K/UL — HIGH (ref 3.8–10.5)
WBC # FLD AUTO: 11.61 K/UL — HIGH (ref 3.8–10.5)

## 2020-12-05 PROCEDURE — 93010 ELECTROCARDIOGRAM REPORT: CPT

## 2020-12-05 RX ORDER — AMIODARONE HYDROCHLORIDE 400 MG/1
200 TABLET ORAL DAILY
Refills: 0 | Status: DISCONTINUED | OUTPATIENT
Start: 2020-12-05 | End: 2020-12-06

## 2020-12-05 RX ORDER — APIXABAN 2.5 MG/1
5 TABLET, FILM COATED ORAL
Refills: 0 | Status: DISCONTINUED | OUTPATIENT
Start: 2020-12-05 | End: 2020-12-06

## 2020-12-05 RX ADMIN — POLYETHYLENE GLYCOL 3350 17 GRAM(S): 17 POWDER, FOR SOLUTION ORAL at 12:38

## 2020-12-05 RX ADMIN — PANTOPRAZOLE SODIUM 40 MILLIGRAM(S): 20 TABLET, DELAYED RELEASE ORAL at 05:45

## 2020-12-05 RX ADMIN — SPIRONOLACTONE 25 MILLIGRAM(S): 25 TABLET, FILM COATED ORAL at 05:45

## 2020-12-05 RX ADMIN — AMIODARONE HYDROCHLORIDE 400 MILLIGRAM(S): 400 TABLET ORAL at 05:45

## 2020-12-05 RX ADMIN — Medication 40 MILLIGRAM(S): at 05:46

## 2020-12-05 RX ADMIN — APIXABAN 5 MILLIGRAM(S): 2.5 TABLET, FILM COATED ORAL at 14:17

## 2020-12-05 RX ADMIN — Medication 81 MILLIGRAM(S): at 12:38

## 2020-12-05 RX ADMIN — CHLORHEXIDINE GLUCONATE 1 APPLICATION(S): 213 SOLUTION TOPICAL at 05:30

## 2020-12-05 RX ADMIN — Medication 12.5 MILLIGRAM(S): at 17:02

## 2020-12-05 RX ADMIN — Medication 3 MILLIGRAM(S): at 23:34

## 2020-12-05 RX ADMIN — Medication 12.5 MILLIGRAM(S): at 05:45

## 2020-12-05 NOTE — PROGRESS NOTE ADULT - ASSESSMENT
86 y/o male with PMH of HTN, HLD, hiatal hernia, GERD, OA post Lt TKR x1 year ago presenting with concern for increasing redness of left leg and swelling for 2 months. Pt admitted for acute on chronic CHD with elevated troponins / NSTEMI. Cardiology following, Dr. De La Vega. Abnormal NST on , now s/p cardiac cath demonstrating multivessel CAD. CT Surgery consulted for CABG evaluation.  On  s/p CABG x 3 LIMA / LIMA to LAD, SVG to Diagonal, SVG to OM  Post op - Course:   Inotropic and pressor support required à weaned off.   Extubated POD # 1  ID following for prior LLE cellulitis àcompleted full course of Abx. Monitoring off Abx.     gtt weaned off. Started on Lopressor 25 mg PO BID.  Transferred to SDU; received patient Hypotensive SBP 70-90’s asymptomatic.  Lopressor decreased to 12.5 mg PO BID. Hypoglycemia BFG 58 à treated with 4 0z of apple juice; repeat .  Continue to monitor.      @ 2200 Rapid afib w/ -180. S/p IVP lopressor x3. S/p IV amio bolus x1, phenylephrine gtt started SBP 80-90. Remained persistent afib 140s after intervetions. Betablockers d/c'd. Intensivist at bedside, Pt transferred to CTU for further management.  Cardiology/eps following, increase lft ? 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 Cardizem IV for hr control- d/c , amio infusion,  Esmolol infusion-d/c  Prophylactic antibiotic coverage, ID following for LE cellulitis   Remained on primacor for perfusion  Amio loaded.  Lasix infusion for diuresis   s/p LISA DCCV - required pacing>80 for bp support/ maintain nsr  Lasix infusion d/c, lasix 20iv q8  Eliquis started for afib  Eliquis held to remove a line in am  12/3  Primacor d/c  Transferred to sdu  12/3 Dana out. Resume Eliquis  Lasix bid, ald qd, repeat bmp later today   VSS; diuretics decrease lasix 40 mg po qd / aldactone 25 mg po qd; hold eliquis this evening and in am for PW removal  tx floor today; ekg qtc 481; continue to monitor on amio 200qd; no bb due to hypotension postop; statin d/c due to elevated lft's- repeat in am   Discharge planning- home sun as per Dr. Blanca    86 y/o male with PMH of HTN, HLD, hiatal hernia, GERD, OA post Lt TKR x1 year ago presenting with concern for increasing redness of left leg and swelling for 2 months. Pt admitted for acute on chronic CHD with elevated troponins / NSTEMI. Cardiology following, Dr. De La Vega. Abnormal NST on , now s/p cardiac cath demonstrating multivessel CAD. CT Surgery consulted for CABG evaluation.  On  s/p CABG x 3 LIMA / LIMA to LAD, SVG to Diagonal, SVG to OM  Post op - Course:   Inotropic and pressor support required à weaned off.   Extubated POD # 1  ID following for prior LLE cellulitis àcompleted full course of Abx. Monitoring off Abx.     gtt weaned off. Started on Lopressor 25 mg PO BID.  Transferred to SDU; received patient Hypotensive SBP 70-90’s asymptomatic.  Lopressor decreased to 12.5 mg PO BID. Hypoglycemia BFG 58 à treated with 4 0z of apple juice; repeat .  Continue to monitor.      @ 2200 Rapid afib w/ -180. S/p IVP lopressor x3. S/p IV amio bolus x1, phenylephrine gtt started SBP 80-90. Remained persistent afib 140s after intervetions. Betablockers d/c'd. Intensivist at bedside, Pt transferred to CTU for further management.  Cardiology/eps following, increase lft ? 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 Cardizem IV for hr control- d/c , amio infusion,  Esmolol infusion-d/c  Prophylactic antibiotic coverage, ID following for LE cellulitis   Remained on primacor for perfusion  Amio loaded.  Lasix infusion for diuresis   s/p LISA DCCV - required pacing>80 for bp support/ maintain nsr  Lasix infusion d/c, lasix 20iv q8  Eliquis started for afib  Eliquis held to remove a line in am  12/3  Primacor d/c  Transferred to sdu  12/3 Dana out. Resume Eliquis  Lasix bid, ald qd, repeat bmp later today   VSS; diuretics decrease lasix 40 mg po qd / aldactone 25 mg po qd; hold eliquis this evening and in am for PW removal  tx floor today; ekg qtc 481; continue to monitor on amio 200qd; no bb due to hypotension postop; statin d/c due to elevated lft's- repeat in am  VSS: bifasicular block - ep following-Dr. De La Vega-  continue to monitor HR on amio 200 qd and low dose bb lop 12.5 bid; maintain pw; resume eliquis; possible PPM monday as per EP  d/c aldactone secondary to hyperkalmia this am  no statin due to statin intolerance   Discharge planning- home when stable

## 2020-12-05 NOTE — PROGRESS NOTE ADULT - SUBJECTIVE AND OBJECTIVE BOX
VITAL SIGNS    Telemetry:    Vital Signs Last 24 Hrs  T(C): 36.7 (20 @ 07:00), Max: 36.7 (20 @ 15:15)  T(F): 98 (20 @ 07:00), Max: 98.1 (20 @ 05:44)  HR: 51 (20 @ 07:00) (51 - 77)  BP: 113/56 (20 @ 07:00) (105/54 - 135/68)  RR: 18 (20 @ 07:00) (18 - 18)  SpO2: 96% (20 @ 07:00) (95% - 99%)             07:01  -   @ 07:00  --------------------------------------------------------  IN: 600 mL / OUT: 1475 mL / NET: -875 mL     07:01  -   @ 09:53  --------------------------------------------------------  IN: 240 mL / OUT: 250 mL / NET: -10 mL       Daily     Daily Weight in k.1 (05 Dec 2020 09:00)  Admit Wt: Drug Dosing Weight  Height (cm): 160 (2020 06:35)  Weight (kg): 73 (2020 06:35)  BMI (kg/m2): 28.5 (2020 06:35)  BSA (m2): 1.76 (2020 06:35)    Bilirubin Direct, Serum: 0.3 mg/dL ( 05:52)  Bilirubin Total, Serum: 0.8 mg/dL ( 05:52)    CAPILLARY BLOOD GLUCOSE              aMIOdarone    Tablet 200 milliGRAM(s) Oral daily  aspirin enteric coated 81 milliGRAM(s) Oral daily  chlorhexidine 2% Cloths 1 Application(s) Topical <User Schedule>  furosemide    Tablet 40 milliGRAM(s) Oral daily  melatonin 3 milliGRAM(s) Oral at bedtime PRN  metoprolol tartrate 12.5 milliGRAM(s) Oral two times a day  oxyCODONE    IR 5 milliGRAM(s) Oral every 4 hours PRN  oxyCODONE    IR 10 milliGRAM(s) Oral every 4 hours PRN  pantoprazole    Tablet 40 milliGRAM(s) Oral before breakfast  polyethylene glycol 3350 17 Gram(s) Oral daily      PHYSICAL EXAM    Subjective: "Hi.   Neurology: alert and oriented x 3, nonfocal, no gross deficits  CV : tele:  RSR  Sternal Wound :  CDI with dressing , Stable  Lungs: clear. RR easy, unlabored   Abdomen: soft, nontender, nondistended, positive bowel sounds, bowel movement   Neg N/V/D   :  pt voiding without difficulty   Extremities:   TRAYLOR; edema, neg calf tenderness.   PPP bilaterally      PW:  Chest tubes:                 VITAL SIGNS    Telemetry:  rsr 58-80   Vital Signs Last 24 Hrs  T(C): 36.7 (20 @ 07:00), Max: 36.7 (20 @ 15:15)  T(F): 98 (20 @ 07:00), Max: 98.1 (20 @ 05:44)  HR: 51 (20 @ 07:00) (51 - 77)  BP: 113/56 (20 @ 07:00) (105/54 - 135/68)  RR: 18 (20 @ 07:00) (18 - 18)  SpO2: 96% (20 @ 07:00) (95% - 99%)             07:01  -   @ 07:00  --------------------------------------------------------  IN: 600 mL / OUT: 1475 mL / NET: -875 mL     07:01  -   @ 09:53  --------------------------------------------------------  IN: 240 mL / OUT: 250 mL / NET: -10 mL       Daily     Daily Weight in k.1 (05 Dec 2020 09:00)  Admit Wt: Drug Dosing Weight  Height (cm): 160 (2020 06:35)  Weight (kg): 73 (2020 06:35)  BMI (kg/m2): 28.5 (2020 06:35)  BSA (m2): 1.76 (2020 06:35)    Bilirubin Direct, Serum: 0.3 mg/dL ( 05:52)  Bilirubin Total, Serum: 0.8 mg/dL ( 05:52)            aMIOdarone    Tablet 200 milliGRAM(s) Oral daily  aspirin enteric coated 81 milliGRAM(s) Oral daily  chlorhexidine 2% Cloths 1 Application(s) Topical <User Schedule>  furosemide    Tablet 40 milliGRAM(s) Oral daily  melatonin 3 milliGRAM(s) Oral at bedtime PRN  metoprolol tartrate 12.5 milliGRAM(s) Oral two times a day  oxyCODONE    IR 5 milliGRAM(s) Oral every 4 hours PRN  oxyCODONE    IR 10 milliGRAM(s) Oral every 4 hours PRN  pantoprazole    Tablet 40 milliGRAM(s) Oral before breakfast  polyethylene glycol 3350 17 Gram(s) Oral daily      PHYSICAL EXAM    Subjective: "I feel ok."   Neurology: alert and oriented x 3, nonfocal, no gross deficits  CV : tele:  RSR 58-80   Sternal Wound :  CDI with dressing , Stable; + pw isolated  Lungs: clear. RR easy, unlabored   Abdomen: soft, nontender, nondistended, positive bowel sounds, + bowel movement   Neg N/V/D   :  pt voiding without difficulty   Extremities:   TRAYLOR; trace LE edema, neg calf tenderness.   PPP bilaterally; +LLE errythema; rt svg site cdi karthikeyan       PW: + isolated  Chest tubes: none

## 2020-12-05 NOTE — PROGRESS NOTE ADULT - PROBLEM SELECTOR PLAN 3
diuresis with lasix 40 qd and aldactone 25 qd  strict I & O's  daily weight  daily bmp diuresis with lasix 40 qd   d/c aldactone due to hyperkalmia this am   strict I & O's  daily weight  daily bmp

## 2020-12-05 NOTE — PROGRESS NOTE ADULT - SUBJECTIVE AND OBJECTIVE BOX
CARDIOLOGY     PROGRESS  NOTE   ________________________________________________    CHIEF COMPLAINT:Patient is a 85y old  Male who presents with a chief complaint of edema (04 Dec 2020 17:28)  no complain.  	  REVIEW OF SYSTEMS:  CONSTITUTIONAL: No fever, weight loss, or fatigue  EYES: No eye pain, visual disturbances, or discharge  ENT:  No difficulty hearing, tinnitus, vertigo; No sinus or throat pain  NECK: No pain or stiffness  RESPIRATORY: No cough, wheezing, chills or hemoptysis; No Shortness of Breath  CARDIOVASCULAR: No chest pain, palpitations, passing out, dizziness, or leg swelling  GASTROINTESTINAL: No abdominal or epigastric pain. No nausea, vomiting, or hematemesis; No diarrhea or constipation. No melena or hematochezia.  GENITOURINARY: No dysuria, frequency, hematuria, or incontinence  NEUROLOGICAL: No headaches, memory loss, loss of strength, numbness, or tremors  SKIN: No itching, burning, rashes, or lesions   LYMPH Nodes: No enlarged glands  ENDOCRINE: No heat or cold intolerance; No hair loss  MUSCULOSKELETAL: No joint pain or swelling; No muscle, back, or extremity pain  PSYCHIATRIC: No depression, anxiety, mood swings, or difficulty sleeping  HEME/LYMPH: No easy bruising, or bleeding gums  ALLERGY AND IMMUNOLOGIC: No hives or eczema	    [ ] All others negative	  [ ] Unable to obtain    PHYSICAL EXAM:  T(C): 36.7 (12-05-20 @ 07:00), Max: 36.7 (12-04-20 @ 15:15)  HR: 51 (12-05-20 @ 07:00) (51 - 77)  BP: 113/56 (12-05-20 @ 07:00) (105/54 - 135/68)  RR: 18 (12-05-20 @ 07:00) (18 - 18)  SpO2: 96% (12-05-20 @ 07:00) (95% - 99%)  Wt(kg): --  I&O's Summary    04 Dec 2020 07:01  -  05 Dec 2020 07:00  --------------------------------------------------------  IN: 600 mL / OUT: 1475 mL / NET: -875 mL    05 Dec 2020 07:01  -  05 Dec 2020 09:35  --------------------------------------------------------  IN: 240 mL / OUT: 250 mL / NET: -10 mL        Appearance: Normal	  HEENT:   Normal oral mucosa, PERRL, EOMI	  Lymphatic: No lymphadenopathy  Cardiovascular: Normal S1 S2, No JVD, + murmurs, No edema  Respiratory: Lungs clear to auscultation	  Psychiatry: A & O x 3, Mood & affect appropriate  Gastrointestinal:  Soft, Non-tender, + BS	  Skin: No rashes, No ecchymoses, No cyanosis	  Neurologic: Non-focal  Extremities: Normal range of motion, No clubbing, cyanosis or edema  Vascular: Peripheral pulses palpable 2+ bilaterally    MEDICATIONS  (STANDING):  aMIOdarone    Tablet 200 milliGRAM(s) Oral daily  aspirin enteric coated 81 milliGRAM(s) Oral daily  chlorhexidine 2% Cloths 1 Application(s) Topical <User Schedule>  furosemide    Tablet 40 milliGRAM(s) Oral daily  metoprolol tartrate 12.5 milliGRAM(s) Oral two times a day  pantoprazole    Tablet 40 milliGRAM(s) Oral before breakfast  polyethylene glycol 3350 17 Gram(s) Oral daily  spironolactone 25 milliGRAM(s) Oral daily      TELEMETRY: 	    ECG:  	  RADIOLOGY:  OTHER: 	  	  LABS:	 	    CARDIAC MARKERS:                                9.9    11.61 )-----------( 306      ( 05 Dec 2020 05:52 )             30.5     12-05    130<L>  |  93<L>  |  22  ----------------------------<  94  5.1   |  27  |  0.81    Ca    9.4      05 Dec 2020 07:57  Mg     2.1     12-04    TPro  6.4  /  Alb  3.5  /  TBili  0.8  /  DBili  0.3<H>  /  AST  40  /  ALT  173<H>  /  AlkPhos  97  12-05    proBNP: Serum Pro-Brain Natriuretic Peptide: 57242 pg/mL (11-18 @ 12:45)    Lipid Profile: Cholesterol 106  LDL --  HDL 40  TG 73    HgA1c:   TSH: Thyroid Stimulating Hormone, Serum: 1.69 uIU/mL (11-30 @ 11:01)  Thyroid Stimulating Hormone, Serum: 1.46 uIU/mL (11-19 @ 09:24)  Thyroid Stimulating Hormone, Serum: 1.51 uIU/mL (11-19 @ 08:41)    < from: 12 Lead ECG (12.04.20 @ 10:00) >  Diagnosis Line SINUS RHYTHM WITH SHORT NM  RIGHT BUNDLE BRANCH BLOCK  LEFT ANTERIOR FASCICULAR BLOCK  *** BIFASCICULAR BLOCK ***  POSSIBLE INFERIOR INFARCT , AGE UNDETERMINED  ANTERIOR INFARCT (CITED ON OR BEFORE 30-NOV-2020)  T WAVE ABNORMALITY, CONSIDER LATERAL ISCHEMIA  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   Patient reports that he has been having increasing swelling of both of his legs for approximately 2-3 months; however, the left leg has been more swollen and has been getting increasingly more red. He states that he went to his podiatrist when he noted some rash on the lower portion of his left just above his foot, was given a 'cream of some kind, and feels that the redness and swelling got worse after starting the cream.'   He states that he was having chest pain approximately 1 mo ago, went to his PMD and was diagnosed with a hiatal hernia with an xray and prescribed omeprazole.   Patient reports that he has been becoming increasingly more short of breath for approximately 2 months as well. Feels that when he takes just a few steps he has to stop and catch his breath, and is so short of breath that he cannot speak in complete sentences without rstill in rapid a,fib  continue Amio  po loading  s/p hanna cardioversion, remains in NSR not pacing  fu lft/haley lft are improving  AC  will add small dose of metoprolol if tolerated  physical therapy  continue diuresis  sinus bradycardia, asymptomatic 50 to 60  increase ambulation  fu renal function if increase k will dc aldactone    	                    CARDIOLOGY     PROGRESS  NOTE   ________________________________________________    CHIEF COMPLAINT:Patient is a 85y old  Male who presents with a chief complaint of edema (04 Dec 2020 17:28)  no complain.  	  REVIEW OF SYSTEMS:  CONSTITUTIONAL: No fever, weight loss, or fatigue  EYES: No eye pain, visual disturbances, or discharge  ENT:  No difficulty hearing, tinnitus, vertigo; No sinus or throat pain  NECK: No pain or stiffness  RESPIRATORY: No cough, wheezing, chills or hemoptysis; No Shortness of Breath  CARDIOVASCULAR: No chest pain, palpitations, passing out, dizziness, or leg swelling  GASTROINTESTINAL: No abdominal or epigastric pain. No nausea, vomiting, or hematemesis; No diarrhea or constipation. No melena or hematochezia.  GENITOURINARY: No dysuria, frequency, hematuria, or incontinence  NEUROLOGICAL: No headaches, memory loss, loss of strength, numbness, or tremors  SKIN: No itching, burning, rashes, or lesions   LYMPH Nodes: No enlarged glands  ENDOCRINE: No heat or cold intolerance; No hair loss  MUSCULOSKELETAL: No joint pain or swelling; No muscle, back, or extremity pain  PSYCHIATRIC: No depression, anxiety, mood swings, or difficulty sleeping  HEME/LYMPH: No easy bruising, or bleeding gums  ALLERGY AND IMMUNOLOGIC: No hives or eczema	    [ ] All others negative	  [ ] Unable to obtain    PHYSICAL EXAM:  T(C): 36.7 (12-05-20 @ 07:00), Max: 36.7 (12-04-20 @ 15:15)  HR: 51 (12-05-20 @ 07:00) (51 - 77)  BP: 113/56 (12-05-20 @ 07:00) (105/54 - 135/68)  RR: 18 (12-05-20 @ 07:00) (18 - 18)  SpO2: 96% (12-05-20 @ 07:00) (95% - 99%)  Wt(kg): --  I&O's Summary    04 Dec 2020 07:01  -  05 Dec 2020 07:00  --------------------------------------------------------  IN: 600 mL / OUT: 1475 mL / NET: -875 mL    05 Dec 2020 07:01  -  05 Dec 2020 09:35  --------------------------------------------------------  IN: 240 mL / OUT: 250 mL / NET: -10 mL        Appearance: Normal	  HEENT:   Normal oral mucosa, PERRL, EOMI	  Lymphatic: No lymphadenopathy  Cardiovascular: Normal S1 S2, No JVD, + murmurs, No edema  Respiratory: Lungs clear to auscultation	  Psychiatry: A & O x 3, Mood & affect appropriate  Gastrointestinal:  Soft, Non-tender, + BS	  Skin: No rashes, No ecchymoses, No cyanosis	  Neurologic: Non-focal  Extremities: Normal range of motion, No clubbing, cyanosis or edema  Vascular: Peripheral pulses palpable 2+ bilaterally    MEDICATIONS  (STANDING):  aMIOdarone    Tablet 200 milliGRAM(s) Oral daily  aspirin enteric coated 81 milliGRAM(s) Oral daily  chlorhexidine 2% Cloths 1 Application(s) Topical <User Schedule>  furosemide    Tablet 40 milliGRAM(s) Oral daily  metoprolol tartrate 12.5 milliGRAM(s) Oral two times a day  pantoprazole    Tablet 40 milliGRAM(s) Oral before breakfast  polyethylene glycol 3350 17 Gram(s) Oral daily  spironolactone 25 milliGRAM(s) Oral daily      TELEMETRY: 	    ECG:  	  RADIOLOGY:  OTHER: 	  	  LABS:	 	    CARDIAC MARKERS:                                9.9    11.61 )-----------( 306      ( 05 Dec 2020 05:52 )             30.5     12-05    130<L>  |  93<L>  |  22  ----------------------------<  94  5.1   |  27  |  0.81    Ca    9.4      05 Dec 2020 07:57  Mg     2.1     12-04    TPro  6.4  /  Alb  3.5  /  TBili  0.8  /  DBili  0.3<H>  /  AST  40  /  ALT  173<H>  /  AlkPhos  97  12-05    proBNP: Serum Pro-Brain Natriuretic Peptide: 81088 pg/mL (11-18 @ 12:45)    Lipid Profile: Cholesterol 106  LDL --  HDL 40  TG 73    HgA1c:   TSH: Thyroid Stimulating Hormone, Serum: 1.69 uIU/mL (11-30 @ 11:01)  Thyroid Stimulating Hormone, Serum: 1.46 uIU/mL (11-19 @ 09:24)  Thyroid Stimulating Hormone, Serum: 1.51 uIU/mL (11-19 @ 08:41)    < from: 12 Lead ECG (12.04.20 @ 10:00) >  Diagnosis Line SINUS RHYTHM WITH SHORT OK  RIGHT BUNDLE BRANCH BLOCK  LEFT ANTERIOR FASCICULAR BLOCK  *** BIFASCICULAR BLOCK ***  POSSIBLE INFERIOR INFARCT , AGE UNDETERMINED  ANTERIOR INFARCT (CITED ON OR BEFORE 30-NOV-2020)  T WAVE ABNORMALITY, CONSIDER LATERAL ISCHEMIA  Posterior MI (old)  ABNORMAL ECG    < end of copied text >        Assessment and plan  ---------------------------  pt is an 86 y/o man with pmhx of htn, hld, hiatal hernia / GERD, OA post Lt TKR x1 year ago presenting with concern for increasing redness of left leg and swelling for 2 months.   Patient reports that he has been having increasing swelling of both of his legs for approximately 2-3 months; however, the left leg has been more swollen and has been getting increasingly more red. He states that he went to his podiatrist when he noted some rash on the lower portion of his left just above his foot, was given a 'cream of some kind, and feels that the redness and swelling got worse after starting the cream.'   He states that he was having chest pain approximately 1 mo ago, went to his PMD and was diagnosed with a hiatal hernia with an xray and prescribed omeprazole.   Patient reports that he has been becoming increasingly more short of breath for approximately 2 months as well. Feels that when he takes just a few steps he has to stop and catch his breath, and is so short of breath that he cannot speak in complete sentences without rstill in rapid a,fib  continue Amio  po loading  s/p hanna cardioversion, remains in NSR not pacing  fu lft/haley lft are improving  AC  will add small dose of metoprolol if tolerated  physical therapy  continue diuresis  sinus bradycardia, asymptomatic 50 to 60  increase ambulation  fu renal function if increase k will dc aldactone  events noted with run of pat at rate of 140, SSS  ecg with bifasicular block and bradycardia, pt may need a ppm will continue to observe, discussed with ct surgry

## 2020-12-05 NOTE — PROGRESS NOTE ADULT - PROBLEM SELECTOR PLAN 2
continue postop care  continue amio 200qd; no bb- due to hypotension postop  statin d/c due to elevated lft's- repeat in am 12/5  hold eliquis for pw removal in am 12/5  pulm toilet  pain management  increase activity as tolerated  Discharge planning- home sun if stable as per Dr. Turner continue postop care  continue amio 200qd; continue lopressor 12.5 bid; resume eliquis 5 bid   no statin due to patient intolerance   maintain pw   pulm toilet  pain management  increase activity as tolerated  Discharge planning- continue to observe HR at this time; ? PPM monday as per Dr. De La Vega

## 2020-12-06 ENCOUNTER — TRANSCRIPTION ENCOUNTER (OUTPATIENT)
Age: 85
End: 2020-12-06

## 2020-12-06 VITALS
RESPIRATION RATE: 16 BRPM | HEART RATE: 61 BPM | WEIGHT: 142.64 LBS | TEMPERATURE: 98 F | OXYGEN SATURATION: 96 % | DIASTOLIC BLOOD PRESSURE: 56 MMHG | SYSTOLIC BLOOD PRESSURE: 105 MMHG

## 2020-12-06 LAB
ANION GAP SERPL CALC-SCNC: 11 MMOL/L — SIGNIFICANT CHANGE UP (ref 5–17)
BUN SERPL-MCNC: 20 MG/DL — SIGNIFICANT CHANGE UP (ref 7–23)
CALCIUM SERPL-MCNC: 9.3 MG/DL — SIGNIFICANT CHANGE UP (ref 8.4–10.5)
CHLORIDE SERPL-SCNC: 95 MMOL/L — LOW (ref 96–108)
CO2 SERPL-SCNC: 23 MMOL/L — SIGNIFICANT CHANGE UP (ref 22–31)
CREAT SERPL-MCNC: 0.78 MG/DL — SIGNIFICANT CHANGE UP (ref 0.5–1.3)
GLUCOSE SERPL-MCNC: 83 MG/DL — SIGNIFICANT CHANGE UP (ref 70–99)
HCT VFR BLD CALC: 32.9 % — LOW (ref 39–50)
HGB BLD-MCNC: 11 G/DL — LOW (ref 13–17)
MAGNESIUM SERPL-MCNC: 2.2 MG/DL — SIGNIFICANT CHANGE UP (ref 1.6–2.6)
MCHC RBC-ENTMCNC: 32.8 PG — SIGNIFICANT CHANGE UP (ref 27–34)
MCHC RBC-ENTMCNC: 33.4 GM/DL — SIGNIFICANT CHANGE UP (ref 32–36)
MCV RBC AUTO: 98.2 FL — SIGNIFICANT CHANGE UP (ref 80–100)
NRBC # BLD: 0 /100 WBCS — SIGNIFICANT CHANGE UP (ref 0–0)
PHOSPHATE SERPL-MCNC: 3.8 MG/DL — SIGNIFICANT CHANGE UP (ref 2.5–4.5)
PLATELET # BLD AUTO: 331 K/UL — SIGNIFICANT CHANGE UP (ref 150–400)
POTASSIUM SERPL-MCNC: 5.1 MMOL/L — SIGNIFICANT CHANGE UP (ref 3.5–5.3)
POTASSIUM SERPL-SCNC: 5.1 MMOL/L — SIGNIFICANT CHANGE UP (ref 3.5–5.3)
RBC # BLD: 3.35 M/UL — LOW (ref 4.2–5.8)
RBC # FLD: 15.1 % — HIGH (ref 10.3–14.5)
SODIUM SERPL-SCNC: 129 MMOL/L — LOW (ref 135–145)
WBC # BLD: 13.15 K/UL — HIGH (ref 3.8–10.5)
WBC # FLD AUTO: 13.15 K/UL — HIGH (ref 3.8–10.5)

## 2020-12-06 PROCEDURE — 93010 ELECTROCARDIOGRAM REPORT: CPT

## 2020-12-06 RX ORDER — METOPROLOL TARTRATE 50 MG
1 TABLET ORAL
Qty: 30 | Refills: 0
Start: 2020-12-06 | End: 2021-01-04

## 2020-12-06 RX ORDER — APIXABAN 2.5 MG/1
1 TABLET, FILM COATED ORAL
Qty: 60 | Refills: 0
Start: 2020-12-06 | End: 2021-01-04

## 2020-12-06 RX ORDER — FUROSEMIDE 40 MG
1 TABLET ORAL
Qty: 7 | Refills: 0
Start: 2020-12-06 | End: 2020-12-12

## 2020-12-06 RX ORDER — FUROSEMIDE 40 MG
1 TABLET ORAL
Qty: 30 | Refills: 0
Start: 2020-12-06 | End: 2021-01-04

## 2020-12-06 RX ORDER — ASPIRIN/CALCIUM CARB/MAGNESIUM 324 MG
1 TABLET ORAL
Qty: 30 | Refills: 0
Start: 2020-12-06 | End: 2021-01-04

## 2020-12-06 RX ORDER — ATENOLOL 25 MG/1
1 TABLET ORAL
Qty: 0 | Refills: 0 | DISCHARGE

## 2020-12-06 RX ORDER — AMIODARONE HYDROCHLORIDE 400 MG/1
1 TABLET ORAL
Qty: 30 | Refills: 0
Start: 2020-12-06 | End: 2021-01-04

## 2020-12-06 RX ADMIN — AMIODARONE HYDROCHLORIDE 200 MILLIGRAM(S): 400 TABLET ORAL at 05:17

## 2020-12-06 RX ADMIN — Medication 12.5 MILLIGRAM(S): at 05:17

## 2020-12-06 RX ADMIN — Medication 12.5 MILLIGRAM(S): at 13:22

## 2020-12-06 RX ADMIN — Medication 40 MILLIGRAM(S): at 05:17

## 2020-12-06 RX ADMIN — Medication 81 MILLIGRAM(S): at 11:15

## 2020-12-06 RX ADMIN — CHLORHEXIDINE GLUCONATE 1 APPLICATION(S): 213 SOLUTION TOPICAL at 09:30

## 2020-12-06 RX ADMIN — APIXABAN 5 MILLIGRAM(S): 2.5 TABLET, FILM COATED ORAL at 05:17

## 2020-12-06 RX ADMIN — PANTOPRAZOLE SODIUM 40 MILLIGRAM(S): 20 TABLET, DELAYED RELEASE ORAL at 05:17

## 2020-12-06 NOTE — PROGRESS NOTE ADULT - PROBLEM SELECTOR PLAN 3
diuresis with lasix 40 qd   d/c aldactone due to hyperkalmia this am   strict I & O's  daily weight  daily bmp

## 2020-12-06 NOTE — PROGRESS NOTE ADULT - SUBJECTIVE AND OBJECTIVE BOX
VITAL SIGNS-Telemetry:  SR 50-70  Vital Signs Last 24 Hrs  T(C): 36.4 (20 @ 03:00), Max: 36.9 (20 @ 15:34)  T(F): 97.6 (20 @ 03:00), Max: 98.4 (20 @ 15:34)  HR: 55 (20 @ 03:00) (51 - 64)  BP: 117/57 (20 @ 03:00) (106/52 - 145/67)  RR: 18 (20 @ 03:00) (18 - 18)  SpO2: 97% (20 @ 03:00) (96% - 97%)          @ 07:01  -   @ 07:00  --------------------------------------------------------  IN: 600 mL / OUT: 1475 mL / NET: -875 mL     @ 07:01  -   @ 06:48  --------------------------------------------------------  IN: 850 mL / OUT: 2025 mL / NET: -1175 mL    Daily     Daily Weight in k.1 (05 Dec 2020 09:00)    CAPILLARY BLOOD GLUCOSE  POCT Blood Glucose.: 120 mg/dL (05 Dec 2020 11:34)     Pacing Wires        [  ]   Settings:                                  Isolated  [  x]       PHYSICAL EXAM:  Neurology: alert and oriented x 3, nonfocal, no gross deficits  CV : S1S2  Sternal Wound :  CDI , Stable  Lungs: CTa  Abdomen: soft, nontender, nondistended, positive bowel sounds, last bowel movement         Extremities:   no edema no calf tenderness      aMIOdarone    Tablet 200 milliGRAM(s) Oral daily  apixaban 5 milliGRAM(s) Oral two times a day  aspirin enteric coated 81 milliGRAM(s) Oral daily  chlorhexidine 2% Cloths 1 Application(s) Topical <User Schedule>  furosemide    Tablet 40 milliGRAM(s) Oral daily  melatonin 3 milliGRAM(s) Oral at bedtime PRN  metoprolol tartrate 12.5 milliGRAM(s) Oral two times a day  oxyCODONE    IR 5 milliGRAM(s) Oral every 4 hours PRN  oxyCODONE    IR 10 milliGRAM(s) Oral every 4 hours PRN  pantoprazole    Tablet 40 milliGRAM(s) Oral before breakfast  polyethylene glycol 3350 17 Gram(s) Oral daily    Physical Therapy Rec:   Home  [  ]   Home w/ PT  [ x ]  Rehab  [  ]  Discussed with Cardiothoracic Team at AM rounds. VITAL SIGNS-Telemetry:  SR 50-70  Vital Signs Last 24 Hrs  T(C): 36.4 (20 @ 03:00), Max: 36.9 (20 @ 15:34)  T(F): 97.6 (20 @ 03:00), Max: 98.4 (20 @ 15:34)  HR: 55 (20 @ 03:00) (51 - 64)  BP: 117/57 (20 @ 03:00) (106/52 - 145/67)  RR: 18 (20 @ 03:00) (18 - 18)  SpO2: 97% (20 @ 03:00) (96% - 97%)          @ 07:01  -   @ 07:00  --------------------------------------------------------  IN: 600 mL / OUT: 1475 mL / NET: -875 mL     @ 07:01  -   @ 06:48  --------------------------------------------------------  IN: 850 mL / OUT: 2025 mL / NET: -1175 mL    Daily     Daily Weight in k.1 (05 Dec 2020 09:00)    CAPILLARY BLOOD GLUCOSE  POCT Blood Glucose.: 120 mg/dL (05 Dec 2020 11:34)     Pacing Wires        [  ]   Settings:                                  Isolated  [  x]       PHYSICAL EXAM:  Neurology: alert and oriented x 3, nonfocal, no gross deficits  CV : S1S2  Sternal Wound :  CDI , Stable  Lungs: CTa  Abdomen: soft, nontender, nondistended, positive bowel sounds, last bowel movement         Extremities:   + edema b/l no calf tenderness, LLE pinkened, blanching - improved- Right LE inc cdi      aMIOdarone    Tablet 200 milliGRAM(s) Oral daily  apixaban 5 milliGRAM(s) Oral two times a day  aspirin enteric coated 81 milliGRAM(s) Oral daily  chlorhexidine 2% Cloths 1 Application(s) Topical <User Schedule>  furosemide    Tablet 40 milliGRAM(s) Oral daily  melatonin 3 milliGRAM(s) Oral at bedtime PRN  metoprolol tartrate 12.5 milliGRAM(s) Oral two times a day  oxyCODONE    IR 5 milliGRAM(s) Oral every 4 hours PRN  oxyCODONE    IR 10 milliGRAM(s) Oral every 4 hours PRN  pantoprazole    Tablet 40 milliGRAM(s) Oral before breakfast  polyethylene glycol 3350 17 Gram(s) Oral daily    Physical Therapy Rec:   Home  [  ]   Home w/ PT  [ x ]  Rehab  [  ]  Discussed with Cardiothoracic Team at AM rounds.

## 2020-12-06 NOTE — DISCHARGE NOTE NURSING/CASE MANAGEMENT/SOCIAL WORK - PATIENT PORTAL LINK FT
You can access the FollowMyHealth Patient Portal offered by [facility] by registering at the following website: [email or web address]. By joining [facility]’s FollowMyHealth portal, you will also be able to view your health information using other applications (apps) compatible with our system.

## 2020-12-06 NOTE — PROGRESS NOTE ADULT - PROBLEM SELECTOR PLAN 2
continue postop care  continue amio 200qd; continue lopressor 12.5 bid; resume eliquis 5 bid   no statin due to patient intolerance   maintain pw   pulm toilet  pain management  increase activity as tolerated  Discharge planning- continue to observe HR at this time; ? PPM monday as per Dr. De La Vega

## 2020-12-06 NOTE — PROGRESS NOTE ADULT - ASSESSMENT
86 y/o male with PMH of HTN, HLD, hiatal hernia, GERD, OA post Lt TKR x1 year ago presenting with concern for increasing redness of left leg and swelling for 2 months. Pt admitted for acute on chronic CHD with elevated troponins / NSTEMI. Cardiology following, Dr. De La Vega. Abnormal NST on , now s/p cardiac cath demonstrating multivessel CAD. CT Surgery consulted for CABG evaluation.  On  s/p CABG x 3 LIMA / LIMA to LAD, SVG to Diagonal, SVG to OM  Post op - Course:   Inotropic and pressor support required à weaned off.   Extubated POD # 1  ID following for prior LLE cellulitis àcompleted full course of Abx. Monitoring off Abx.     gtt weaned off. Started on Lopressor 25 mg PO BID.  Transferred to SDU; received patient Hypotensive SBP 70-90’s asymptomatic.  Lopressor decreased to 12.5 mg PO BID. Hypoglycemia BFG 58 à treated with 4 0z of apple juice; repeat .  Continue to monitor.      @ 2200 Rapid afib w/ -180. S/p IVP lopressor x3. S/p IV amio bolus x1, phenylephrine gtt started SBP 80-90. Remained persistent afib 140s after intervetions. Betablockers d/c'd. Intensivist at bedside, Pt transferred to CTU for further management.  Cardiology/eps following, increase lft ? 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 Cardizem IV for hr control- d/c , amio infusion,  Esmolol infusion-d/c  Prophylactic antibiotic coverage, ID following for LE cellulitis   Remained on primacor for perfusion  Amio loaded.  Lasix infusion for diuresis   s/p LISA DCCV - required pacing>80 for bp support/ maintain nsr  Lasix infusion d/c, lasix 20iv q8  Eliquis started for afib  Eliquis held to remove a line in am  12/3  Primacor d/c  Transferred to sdu  12/3 Dana out. Resume Eliquis  Lasix bid, ald qd, repeat bmp later today   VSS; diuretics decrease lasix 40 mg po qd / aldactone 25 mg po qd; hold eliquis this evening and in am for PW removal  tx floor today; ekg qtc 481; continue to monitor on amio 200qd; no bb due to hypotension postop; statin d/c due to elevated lft's- repeat in am  VSS: bifasicular block - ep following-Dr. De La Vega-  continue to monitor HR on amio 200 qd and low dose bb lop 12.5 bid; maintain pw; resume eliquis; possible PPM monday as per EP  d/c aldactone secondary to hyperkalmia this am  no statin due to statin intolerance    VSS tele stable overnight. ?home monday w/ mcot vs PPM - most likely mcot  Discharge planning- home when stable

## 2020-12-06 NOTE — PROGRESS NOTE ADULT - REASON FOR ADMISSION
Edema
edema; coronary artery disease
edema

## 2020-12-06 NOTE — PROGRESS NOTE ADULT - PROBLEM SELECTOR PLAN 4
bifasicular block - ep following-Dr. De La Vega  continue to monitor HR on amio 200 qd and low dose bb lop 12.5 bid;   maintain pw;   resume eliquis;   possible PPM monday as per EP
bifasicular block - ep following-Dr. De La Vega  continue to monitor HR on amio 200 qd and low dose bb lop 12.5 bid;   maintain pw;   resume eliquis;   possible PPM monday as per EP

## 2020-12-07 ENCOUNTER — NON-APPOINTMENT (OUTPATIENT)
Age: 85
End: 2020-12-07

## 2020-12-08 ENCOUNTER — TRANSCRIPTION ENCOUNTER (OUTPATIENT)
Age: 85
End: 2020-12-08

## 2020-12-10 ENCOUNTER — APPOINTMENT (OUTPATIENT)
Dept: CARE COORDINATION | Facility: HOME HEALTH | Age: 85
End: 2020-12-10

## 2020-12-10 DIAGNOSIS — L03.119 CELLULITIS OF UNSPECIFIED PART OF LIMB: ICD-10-CM

## 2020-12-10 DIAGNOSIS — E87.70 FLUID OVERLOAD, UNSPECIFIED: ICD-10-CM

## 2020-12-16 ENCOUNTER — APPOINTMENT (OUTPATIENT)
Dept: CARE COORDINATION | Facility: HOME HEALTH | Age: 85
End: 2020-12-16
Payer: MEDICARE

## 2020-12-16 VITALS
SYSTOLIC BLOOD PRESSURE: 120 MMHG | HEART RATE: 54 BPM | OXYGEN SATURATION: 98 % | DIASTOLIC BLOOD PRESSURE: 70 MMHG | RESPIRATION RATE: 16 BRPM

## 2020-12-16 PROCEDURE — 99024 POSTOP FOLLOW-UP VISIT: CPT

## 2020-12-16 RX ORDER — METOPROLOL SUCCINATE 25 MG/1
25 TABLET, EXTENDED RELEASE ORAL
Refills: 0 | Status: ACTIVE | COMMUNITY

## 2020-12-16 RX ORDER — IBUPROFEN 600 MG/1
600 TABLET, FILM COATED ORAL
Qty: 30 | Refills: 0 | Status: DISCONTINUED | COMMUNITY
Start: 2018-02-15 | End: 2020-12-16

## 2020-12-16 RX ORDER — ASCORBIC ACID 500 MG
TABLET ORAL
Refills: 0 | Status: ACTIVE | COMMUNITY

## 2020-12-16 RX ORDER — CEPHALEXIN 500 MG/1
500 CAPSULE ORAL 3 TIMES DAILY
Qty: 15 | Refills: 0 | Status: DISCONTINUED | COMMUNITY
Start: 2019-07-08 | End: 2020-12-16

## 2020-12-16 RX ORDER — CHROMIUM 200 MCG
TABLET ORAL
Refills: 0 | Status: ACTIVE | COMMUNITY

## 2020-12-16 RX ORDER — FUROSEMIDE 20 MG/1
20 TABLET ORAL
Refills: 0 | Status: ACTIVE | COMMUNITY

## 2020-12-16 RX ORDER — DOCUSATE SODIUM 100 MG/1
CAPSULE ORAL
Refills: 0 | Status: ACTIVE | COMMUNITY

## 2020-12-16 RX ORDER — CEPHALEXIN 500 MG/1
500 CAPSULE ORAL 3 TIMES DAILY
Qty: 30 | Refills: 0 | Status: DISCONTINUED | COMMUNITY
Start: 2019-06-26 | End: 2020-12-16

## 2020-12-16 RX ORDER — AMIODARONE HYDROCHLORIDE 200 MG/1
200 TABLET ORAL
Refills: 0 | Status: ACTIVE | COMMUNITY

## 2020-12-16 RX ORDER — ACETAMINOPHEN 500 MG/1
500 TABLET ORAL
Refills: 0 | Status: ACTIVE | COMMUNITY

## 2020-12-16 RX ORDER — EZETIMIBE 10 MG/1
TABLET ORAL
Refills: 0 | Status: ACTIVE | COMMUNITY

## 2020-12-16 RX ORDER — PANTOPRAZOLE 40 MG/1
40 TABLET, DELAYED RELEASE ORAL DAILY
Qty: 30 | Refills: 0 | Status: ACTIVE | COMMUNITY
Start: 2019-06-26

## 2020-12-16 RX ORDER — APIXABAN 5 MG/1
5 TABLET, FILM COATED ORAL
Refills: 0 | Status: ACTIVE | COMMUNITY

## 2020-12-16 RX ORDER — CELECOXIB 200 MG/1
200 CAPSULE ORAL DAILY
Qty: 30 | Refills: 1 | Status: DISCONTINUED | COMMUNITY
Start: 2019-06-26 | End: 2020-12-16

## 2020-12-16 NOTE — ASSESSMENT
[FreeTextEntry1] : 86 y/o male with PMH of HTN, HLD, hiatal hernia, GERD, OA post Lt TKR x1 year ago.\par 11/25 s/p CABG x 3 LIMA / LIMA to LAD, SVG to Diagonal, SVG to OM with Blanca.

## 2020-12-16 NOTE — HISTORY OF PRESENT ILLNESS
[FreeTextEntry1] : FOLLOW YOUR HEART HOME VISIT-[employer]\[facility] Home Visit made Claudio AGUILAR ] .  A patient of Dr Sonal Blanca  S/P CABGx3   on 11/25. Discharged from [facility]\par \par TCM COVID-19 screening protocol reviewed with patient and/or caregiver and denies any signs and symptoms.  Patient and/or care giver denies any contact with a suspect or known COVID-19 case within the last 14 days.  Patient and/or caregiver have tested negative for COVID-19. Patient and/or caregiver does not live in an assisted living or skilled nursing facility.  Patient and/or care giver [place] 14 [place]. \par \par \par \par Visiting patient for post discharge transitional care management and post surgical follow up. \par Arrived to  home, he was accompanied by his wife .  He is in no distress\par \par

## 2020-12-16 NOTE — PHYSICAL EXAM
[Sclera] : the sclera and conjunctiva were normal [PERRL With Normal Accommodation] : pupils were equal in size, round, and reactive to light [Neck Appearance] : the appearance of the neck was normal [] : no respiratory distress [Auscultation Breath Sounds / Voice Sounds] : lungs were clear to auscultation bilaterally [Apical Impulse] : the apical impulse was normal [Heart Sounds] : normal S1 and S2 [1+] : left 1+ [Bowel Sounds] : normal bowel sounds [Abdomen Soft] : soft [No CVA Tenderness] : no ~M costovertebral angle tenderness [Abnormal Walk] : normal gait [Skin Color & Pigmentation] : normal skin color and pigmentation [FreeTextEntry1] : MTI- approximated , RT leg incision -approximated  [No Focal Deficits] : no focal deficits [Oriented To Time, Place, And Person] : oriented to person, place, and time

## 2020-12-21 PROBLEM — Z95.1 S/P CABG X 3: Status: ACTIVE | Noted: 2020-12-16

## 2020-12-22 ENCOUNTER — APPOINTMENT (OUTPATIENT)
Dept: CARDIOTHORACIC SURGERY | Facility: CLINIC | Age: 85
End: 2020-12-22
Payer: MEDICARE

## 2020-12-22 VITALS
DIASTOLIC BLOOD PRESSURE: 64 MMHG | OXYGEN SATURATION: 98 % | TEMPERATURE: 98.2 F | HEART RATE: 58 BPM | SYSTOLIC BLOOD PRESSURE: 119 MMHG | RESPIRATION RATE: 13 BRPM

## 2020-12-22 VITALS — HEIGHT: 63 IN | WEIGHT: 148 LBS | BODY MASS INDEX: 26.22 KG/M2

## 2020-12-22 DIAGNOSIS — Z95.1 PRESENCE OF AORTOCORONARY BYPASS GRAFT: ICD-10-CM

## 2020-12-22 PROCEDURE — 99024 POSTOP FOLLOW-UP VISIT: CPT

## 2020-12-28 PROCEDURE — 80053 COMPREHEN METABOLIC PANEL: CPT

## 2020-12-28 PROCEDURE — 85025 COMPLETE CBC W/AUTO DIFF WBC: CPT

## 2020-12-28 PROCEDURE — 82607 VITAMIN B-12: CPT

## 2020-12-28 PROCEDURE — 84145 PROCALCITONIN (PCT): CPT

## 2020-12-28 PROCEDURE — 94010 BREATHING CAPACITY TEST: CPT

## 2020-12-28 PROCEDURE — 93320 DOPPLER ECHO COMPLETE: CPT

## 2020-12-28 PROCEDURE — 83880 ASSAY OF NATRIURETIC PEPTIDE: CPT

## 2020-12-28 PROCEDURE — 80048 BASIC METABOLIC PNL TOTAL CA: CPT

## 2020-12-28 PROCEDURE — 85014 HEMATOCRIT: CPT

## 2020-12-28 PROCEDURE — 82947 ASSAY GLUCOSE BLOOD QUANT: CPT

## 2020-12-28 PROCEDURE — 83735 ASSAY OF MAGNESIUM: CPT

## 2020-12-28 PROCEDURE — 86901 BLOOD TYPING SEROLOGIC RH(D): CPT

## 2020-12-28 PROCEDURE — A9505: CPT

## 2020-12-28 PROCEDURE — 97116 GAIT TRAINING THERAPY: CPT

## 2020-12-28 PROCEDURE — 84134 ASSAY OF PREALBUMIN: CPT

## 2020-12-28 PROCEDURE — 86923 COMPATIBILITY TEST ELECTRIC: CPT

## 2020-12-28 PROCEDURE — 82330 ASSAY OF CALCIUM: CPT

## 2020-12-28 PROCEDURE — C1769: CPT

## 2020-12-28 PROCEDURE — 93005 ELECTROCARDIOGRAM TRACING: CPT

## 2020-12-28 PROCEDURE — P9047: CPT

## 2020-12-28 PROCEDURE — 84443 ASSAY THYROID STIM HORMONE: CPT

## 2020-12-28 PROCEDURE — C1729: CPT

## 2020-12-28 PROCEDURE — 83605 ASSAY OF LACTIC ACID: CPT

## 2020-12-28 PROCEDURE — 85610 PROTHROMBIN TIME: CPT

## 2020-12-28 PROCEDURE — C1889: CPT

## 2020-12-28 PROCEDURE — 82565 ASSAY OF CREATININE: CPT

## 2020-12-28 PROCEDURE — 85730 THROMBOPLASTIN TIME PARTIAL: CPT

## 2020-12-28 PROCEDURE — 86900 BLOOD TYPING SEROLOGIC ABO: CPT

## 2020-12-28 PROCEDURE — 87641 MR-STAPH DNA AMP PROBE: CPT

## 2020-12-28 PROCEDURE — C1887: CPT

## 2020-12-28 PROCEDURE — A9500: CPT

## 2020-12-28 PROCEDURE — 93325 DOPPLER ECHO COLOR FLOW MAPG: CPT

## 2020-12-28 PROCEDURE — 94660 CPAP INITIATION&MGMT: CPT

## 2020-12-28 PROCEDURE — 36430 TRANSFUSION BLD/BLD COMPNT: CPT

## 2020-12-28 PROCEDURE — 82803 BLOOD GASES ANY COMBINATION: CPT

## 2020-12-28 PROCEDURE — 84295 ASSAY OF SERUM SODIUM: CPT

## 2020-12-28 PROCEDURE — P9016: CPT

## 2020-12-28 PROCEDURE — 80162 ASSAY OF DIGOXIN TOTAL: CPT

## 2020-12-28 PROCEDURE — 99291 CRITICAL CARE FIRST HOUR: CPT

## 2020-12-28 PROCEDURE — 85018 HEMOGLOBIN: CPT

## 2020-12-28 PROCEDURE — 84100 ASSAY OF PHOSPHORUS: CPT

## 2020-12-28 PROCEDURE — P9045: CPT

## 2020-12-28 PROCEDURE — 93454 CORONARY ARTERY ANGIO S&I: CPT

## 2020-12-28 PROCEDURE — 85652 RBC SED RATE AUTOMATED: CPT

## 2020-12-28 PROCEDURE — 85384 FIBRINOGEN ACTIVITY: CPT

## 2020-12-28 PROCEDURE — 82553 CREATINE MB FRACTION: CPT

## 2020-12-28 PROCEDURE — U0003: CPT

## 2020-12-28 PROCEDURE — 82550 ASSAY OF CK (CPK): CPT

## 2020-12-28 PROCEDURE — 93926 LOWER EXTREMITY STUDY: CPT

## 2020-12-28 PROCEDURE — 82962 GLUCOSE BLOOD TEST: CPT

## 2020-12-28 PROCEDURE — 84480 ASSAY TRIIODOTHYRONINE (T3): CPT

## 2020-12-28 PROCEDURE — 86850 RBC ANTIBODY SCREEN: CPT

## 2020-12-28 PROCEDURE — 83036 HEMOGLOBIN GLYCOSYLATED A1C: CPT

## 2020-12-28 PROCEDURE — 87640 STAPH A DNA AMP PROBE: CPT

## 2020-12-28 PROCEDURE — 78452 HT MUSCLE IMAGE SPECT MULT: CPT

## 2020-12-28 PROCEDURE — 93312 ECHO TRANSESOPHAGEAL: CPT

## 2020-12-28 PROCEDURE — C1730: CPT

## 2020-12-28 PROCEDURE — 97530 THERAPEUTIC ACTIVITIES: CPT

## 2020-12-28 PROCEDURE — 85027 COMPLETE CBC AUTOMATED: CPT

## 2020-12-28 PROCEDURE — 99152 MOD SED SAME PHYS/QHP 5/>YRS: CPT

## 2020-12-28 PROCEDURE — 81001 URINALYSIS AUTO W/SCOPE: CPT

## 2020-12-28 PROCEDURE — 80076 HEPATIC FUNCTION PANEL: CPT

## 2020-12-28 PROCEDURE — C1894: CPT

## 2020-12-28 PROCEDURE — 80061 LIPID PANEL: CPT

## 2020-12-28 PROCEDURE — 97161 PT EVAL LOW COMPLEX 20 MIN: CPT

## 2020-12-28 PROCEDURE — 84132 ASSAY OF SERUM POTASSIUM: CPT

## 2020-12-28 PROCEDURE — 81003 URINALYSIS AUTO W/O SCOPE: CPT

## 2020-12-28 PROCEDURE — 93017 CV STRESS TEST TRACING ONLY: CPT

## 2020-12-28 PROCEDURE — P9059: CPT

## 2020-12-28 PROCEDURE — 93880 EXTRACRANIAL BILAT STUDY: CPT

## 2020-12-28 PROCEDURE — 94002 VENT MGMT INPAT INIT DAY: CPT

## 2020-12-28 PROCEDURE — 71045 X-RAY EXAM CHEST 1 VIEW: CPT

## 2020-12-28 PROCEDURE — 84436 ASSAY OF TOTAL THYROXINE: CPT

## 2020-12-28 PROCEDURE — 84484 ASSAY OF TROPONIN QUANT: CPT

## 2020-12-28 PROCEDURE — 82435 ASSAY OF BLOOD CHLORIDE: CPT

## 2020-12-28 PROCEDURE — 76376 3D RENDER W/INTRP POSTPROCES: CPT

## 2020-12-28 PROCEDURE — 93970 EXTREMITY STUDY: CPT

## 2020-12-28 PROCEDURE — 93306 TTE W/DOPPLER COMPLETE: CPT

## 2020-12-28 PROCEDURE — 86891 AUTOLOGOUS BLOOD OP SALVAGE: CPT

## 2020-12-28 PROCEDURE — 80202 ASSAY OF VANCOMYCIN: CPT

## 2020-12-28 PROCEDURE — 86140 C-REACTIVE PROTEIN: CPT

## 2020-12-28 PROCEDURE — 71275 CT ANGIOGRAPHY CHEST: CPT

## 2021-01-06 ENCOUNTER — TRANSCRIPTION ENCOUNTER (OUTPATIENT)
Age: 86
End: 2021-01-06

## 2021-03-09 NOTE — PATIENT PROFILE ADULT - CENTRAL VENOUS CATHETER/PICC LINE
What Type Of Note Output Would You Prefer (Optional)?: Standard Output Hpi Title: Evaluation of Skin Lesions How Severe Are Your Spot(S)?: mild Have Your Spot(S) Been Treated In The Past?: has not been treated Additional History: He is concerned about a new dark spot on his right cheek. He states he does not know how long it has been there. no

## 2021-04-29 NOTE — PRE-ANESTHESIA EVALUATION ADULT - RESPIRATORY RATE (BREATHS/MIN)
Spoke to pt regarding normal heart monitor results . Pt understood and dr james  will discuss further at her follow up in May.     
18

## 2021-05-10 ENCOUNTER — RX RENEWAL (OUTPATIENT)
Age: 86
End: 2021-05-10

## 2021-06-14 NOTE — H&P PST ADULT - VENOUS THROMBOEMBOLISM CURRENT LABS/TEST RESULTS
Patient c/o sudden hearing change. States, \"It sounds like I am underwater\".  PA notified     Edi Ribeiro RN  06/14/21 8098 none

## 2021-06-30 ENCOUNTER — APPOINTMENT (OUTPATIENT)
Dept: OPHTHALMOLOGY | Facility: CLINIC | Age: 86
End: 2021-06-30

## 2021-10-11 NOTE — BRIEF OPERATIVE NOTE - BRIEF OP NOTE DRAINS
CC:  Norma Reyes-Nieves is here today for Follow-up       Medications: Reviewed.     Refills needed today?  NO    denies Latex allergy or sensitivity    Patient would like communication of their results via:  [employer]    Tobacco verified .    Pharmacy information loaded.    Health Maintenance Due   Topic Date Due   • Diabetes Foot Exam  Never done   • Colorectal Cancer Screen-  Never done   • Osteoporosis Screening  Never done   • COVID-19 Vaccine (3 - Moderna risk 3-dose series) 05/28/2021   • Pneumococcal Vaccine 65+ (2 of 4 - PPSV23) 10/08/2021       Patient will discuss topic with MD.      Unaddressed Risk Adjusted HCC Categories and Diagnoses  HCC 48 - Coagulation Defects and Other Specified Hematological Disorders  - Unaddressed Dx:Thrombocytopenia, Unspecified (Cms/Hcc)             none

## 2022-02-11 NOTE — PRE-ANESTHESIA EVALUATION ADULT - BP NONINVASIVE SYSTOLIC (MM HG)
Additional Notes: Patient consent was obtained to proceed with the visit and recommended plan of care after discussion of all risks and benefits, including the risks of COVID-19 exposure. Render Risk Assessment In Note?: no Detail Level: Simple 107 Additional Notes: Lip wart only treated with liquid nitrogen once\\Ranjeet lesions were treated with monsel’s solution

## 2022-06-21 ENCOUNTER — RX RENEWAL (OUTPATIENT)
Age: 87
End: 2022-06-21

## 2022-06-21 RX ORDER — AMOXICILLIN 500 MG/1
500 CAPSULE ORAL
Qty: 20 | Refills: 4 | Status: ACTIVE | COMMUNITY
Start: 2019-06-10 | End: 1900-01-01

## 2023-02-23 NOTE — PHYSICAL THERAPY INITIAL EVALUATION ADULT - DISCHARGE DISPOSITION, PT EVAL
well developed, well nourished , in no acute distress , ambulating without difficulty , normal communication ability
WILD vs HCPT - pending progress.

## 2023-02-28 NOTE — PHYSICAL THERAPY INITIAL EVALUATION ADULT - ASSISTIVE DEVICE: SCOOT/BRIDGE, REHAB EVAL
Well Teen Visit at 13 to 25 Years Handout for Parents   AMBULATORY CARE:   A well teen visit  is when your teen sees a healthcare provider to prevent health problems  It is a different type of visit than when your teen sees a healthcare provider because he or she is sick  Well teen visits are used to track your teen's growth and development  It is also a time for you to ask questions and to get information on how to keep your teen safe  Write down your questions so you remember to ask them  Your teen should have regular well teen visits from birth to 25 years  Development milestones your teen may reach at 13 to 18 years:  Every teen develops at his or her own pace  Your teen might have already reached the following milestones, or he or she may reach them later:  Menstruation by 12 years for girls    Start driving    Develop a desire to have sex, start dating, and identify sexual orientation    Start working or planning for college or [employer]    Help your teen get the right nutrition:   Teach your teen about a healthy meal plan by setting a good example  Your teen still learns from your eating habits  Buy healthy foods for your family  Eat healthy meals together as a family as often as possible  Talk with your teen about why it is important to choose healthy foods  Encourage your teen to eat regular meals and snacks, even if he or she is busy  He or she should eat 3 meals and 2 snacks each day to help meet his or her calorie needs  He or she should also eat a variety of healthy foods to get the nutrients he or she needs, and to maintain a healthy weight  You may need to help your teen plan his or her meals and snacks  Suggest healthy food choices that your teen can make when he or she eats out  He or she could order a chicken sandwich instead of a large burger or choose a side salad instead of [place] fries  Praise your teen's good food choices whenever you can      Provide a variety of fruits and vegetables  Half of your teen's plate should contain fruits and vegetables  He or she should eat about 5 servings of fruits and vegetables each day  Buy fresh, canned, or dried fruit instead of fruit juice as often as possible  Offer more dark green, red, and orange vegetables  Dark green vegetables include broccoli, spinach, magdalena lettuce, and shirlene greens  Examples of orange and red vegetables are carrots, sweet potatoes, winter squash, and red peppers  Provide whole-grain foods  Half of the grains your teen eats each day should be whole grains  Whole grains include brown rice, whole wheat pasta, and whole grain cereals and breads  Provide low-fat dairy foods  Dairy foods are a good source of calcium  Your teen needs 1,300 milligrams (mg) of calcium each day  Dairy foods include milk, cheese, cottage cheese, and yogurt  Provide lean meats, poultry, fish, and other healthy protein foods  Other healthy protein foods include legumes (such as beans), soy foods (such as tofu), and peanut butter  Bake, broil, and grill meat instead of frying it to reduce the amount of fat  Use healthy fats to prepare your teen's food  Unsaturated fat is a healthy fat  It is found in foods such as soybean, canola, olive, and sunflower oils  It is also found in soft tub margarine that is made with liquid vegetable oil  Limit unhealthy fats such as saturated fat, trans fat, and cholesterol  These are found in shortening, butter, margarine, and animal fat  Help your teen limit his or her intake of fat, sugar, and caffeine  Foods high in fat and sugar include snack foods (potato chips, candy, and other sweets), juice, fruit drinks, and soda  If your teen eats these foods too often, he or she may eat fewer healthy foods during mealtimes  He or she may also gain too much weight  Caffeine is found in soft drinks, energy drinks, tea, coffee, and some over-the-counter medicines   Your teen should limit his or her intake of caffeine to 100 mg or less each day  Caffeine can cause your teen to feel jittery, anxious, or dizzy  It can also cause headaches and trouble sleeping  Encourage your teen to talk to you or a healthcare provider about safe weight loss, if needed  Adolescents may want to follow a fad diet if they see their friends or famous people following such a diet  Fad diets usually do not have all the nutrients your teen needs to grow and stay healthy  Diets may also lead to eating disorders such as anorexia and bulimia  Anorexia is refusal to eat  Bulimia is binge eating followed by vomiting, using laxative medicine, not eating at all, or heavy exercise  Let your teen decide how much to eat  Let your teen have another serving if he or she asks for one  He or she will be very hungry on some days and want to eat more  For example, your teen may want to eat more on days when he or she is more active  Your teen may also eat more if he or she is going through a growth spurt  There may be days when he or she eats less than usual        Keep your teen safe:   Encourage your teen to do safe and healthy activities  Encourage your teen to play sports or join an after school program  Conception M[place]quez can also encourage your teen to volunteer in the community  Volunteer with your teen if possible  Create strict rules for driving  Do not let your teen drink and drive  Explain that it is unsafe and illegal to drink and drive  Encourage your teen to wear his or her seat belt  Also encourage him or her to make other people in his or her car wear their seat belts  Set limits for the number of people your teen can have in the car, and limit his or her driving at night  Encourage your teen not to use his or her phone to talk or text while driving  Store and lock all weapons  Lock ammunition in a separate place  Do not show or tell your teen where you keep the key  Make sure all guns are unloaded before you store them      Teach your teen how to deal with conflict without using violence  Encourage your teen not to get into fights or bully anyone  Explain other ways he or she can solve conflicts  Encourage your teen to use safety equipment  Encourage him or her to wear helmets, protective sports gear, and life jackets  Support your teen:   Praise your teen for good behavior  Do this any time he or she does well in school or makes safe and healthy choices  Encourage your teen to get 1 hour of physical activity each day  Examples of physical activities include sports, running, walking, swimming, and riding bikes  The hour of physical activity does not need to be done all at once  It can be done in shorter blocks of time  Your teen can fit in more physical activity by limiting the amount of time he or she spends watching television or on the computer  Monitor your teen's progress at school  Go to [employer]  Ask your teen to let you see his or her report card  Help your teen solve problems and make decisions  Ask your teen about any problems or concerns that he or she has  Make time to listen to your teen's hopes and concerns  Find ways to help him or her work through problems and make healthy decisions  Help your teen set goals for school, other activities, and his or her future  Help your teen find ways to deal with stress  Be a good example of how to handle stress  Help your teen find activities that help him or her manage stress  Examples include exercising, reading, or listening to music  Encourage your teen to talk to you when he or she is feeling stressed, sad, angry, hopeless, or depressed  Encourage your teen to create healthy relationships  Know your teen's friends and their parents  Know where your teen is and what he or she is doing at all times  Help your teen and his or her friends find fun and safe activities to do  Talk with your teen about healthy dating relationships   Tell them it is okay to say "no" and to respect when someone else tells him or her "no "    Talk to your teen about sex, drugs, tobacco, and alcohol: Be prepared to talk about these issues  Read about these subjects so you can answer your teen's questions  Ask your teen's healthcare provider where you can get more information  Encourage your teen to ask questions  Make time to listen to your teen's questions and concerns about sex, drugs, alcohol, and tobacco     Encourage your teen not to use drugs, tobacco, nicotine, or alcohol  Explain that these substances are dangerous and that you care about his or her health  Nicotine and other chemicals in cigarettes, cigars, and e-cigarettes can cause lung damage  Nicotine and alcohol can also affect brain development  This can lead to trouble thinking, learning, or paying attention  Help your teen understand that vaping is not safer than smoking regular cigarettes or cigars  Talk to him or her about the importance of healthy brain and body development during the teen years  Choices during these years can help him or her become a healthy adult  Encourage your teen never to get in a car with someone who has used drugs or alcohol  Tell him or her that he or she can call you if he or she needs a ride  Encourage your teen to make healthy decisions about sexual behavior  Encourage your teen to practice abstinence  Abstinence means not having sex  If your teen chooses to have sex, encourage the use of condoms or barrier methods  Explain that condoms and barriers prevent sexually transmitted infections and pregnancy  Get more information  For more information about how to talk to your teen you can visit the following:  Healthy Children  org/How to talk to your teen about sex  Phone: 6- 341 - 290-5750  Web Address: Edelmira saul/English/ages-stages/teen/dating-sex/Pages/Inr-wz-Xsqu-About-Sex-With-Your-Teen  aspx  [facility]  org/Talk to your Teen about Drugs and Alcohol  Phone: 7- 278 - 816-5268  Web Address: Edelmira Agitar/English/ages-stages/teen/substance-abuse/Pages/Talking-to-Teens-About-Drugs-and-Alcohol  aspx  Vaccines and screenings your teen may get during this well child visit:   Vaccines  include influenza (flu) each year  Your teen may also need HPV (human papillomavirus), MMR (measles, mumps, rubella), varicella (chickenpox), or meningococcal vaccines  This depends on the vaccines your teen got during the last few well child visits  Screening  may be needed to check for sexually transmitted infections (STIs)  Anxiety or depression screening may also be recommended  Your teen's healthcare provider will tell you more about any screenings, follow-up tests, and treatments for your teen, if needed  Future medical care for your teen: Your teen's healthcare provider will talk to you about where your teen should go for medical care after 18 years  Your teen may continue to see the same healthcare providers until he or she is 24years old  © Copyright Ana Laura Holman 2022 Information is for End User's use only and may not be sold, redistributed or otherwise used for commercial purposes  The above information is an  only  It is not intended as medical advice for individual conditions or treatments  Talk to your doctor, nurse or pharmacist before following any medical regimen to see if it is safe and effective for you  bed rails

## 2023-08-03 NOTE — PRE-OP CHECKLIST - WARM FLUIDS/WARM BLANKETS
[FreeTextEntry1] : 88 year old retired   wife for testicular discomfort 
no

## 2023-10-04 NOTE — BRIEF OPERATIVE NOTE - LEG CLOSURE PROCEDURE PERFORMED BY
[facility] Well  1 (176) [facility] ([phone])  Text "WELL" to 49706  Website: www.[facility].Stalkthis/.National Suicide Prevention Lifeline 9 (937) 435-6932/.  [employer]  1 (692) LIFENET (485-0382)/988 Suicide and Crisis Lifeline [place]

## 2023-10-09 NOTE — PROGRESS NOTE ADULT - NSTELEHEALTH_GEN_ALL_CORE
No Assistance OOB with selected safe patient handling equipment if applicable/Assistance with ambulation/Communicate fall risk and risk factors to all staff, patient, and family/Monitor gait and stability/Provide visual cue: yellow wristband, yellow gown, etc/Reinforce activity limits and safety measures with patient and family/Call bell, personal items and telephone in reach/Instruct patient to call for assistance before getting out of bed/chair/stretcher/Non-slip footwear applied when patient is off stretcher/[place] to call system/Physically safe environment - no spills, clutter or unnecessary equipment/Purposeful Proactive Rounding/Room/bathroom lighting operational, light cord in reach

## 2024-11-14 NOTE — PROGRESS NOTE ADULT - ASSESSMENT
Follow-up pending test results.      _________________________________________________________________________________________  ========>>  M E D I C A L   A T T E N D I N G    F O L L O W  U P  N O T E  <<=========  -----------------------------------------------------------------------------------------------------    - Patient seen and examined by me earlier    - In summary,  MARCOS AGUILAR is a 85y year old man who originally presented with edema   - Patient today overall doing ok, comfortable, NPO , CP free    ==================>> REVIEW OF SYSTEM <<=================    GEN: no fever, no chills, no pain  RESP: no SOB, no cough, no sputum  CVS: no chest pain, no palpitations, edema better   GI: no abdominal pain, no nausea  : no dysuria, no frequency, no hematuria  Neuro: no headache, no dizziness  Derm : no itching, no rash    ==================>> PHYSICAL EXAM <<=================    GEN: A&O X 3 , NAD , comfortable  HEENT: NCAT, PERRL, MMM, hearing intact  Neck: supple , no JVD appreciated  CVS: S1S2 , regular , No M/R/G appreciated  PULM: CTA B/L,  no W/R/R appreciated  ABD.: soft. non tender, non distended,  bowel sounds present  Extrem: intact pulses , edema and erythema ( stasis changes) of b/L LE improved Lt>Rt  PSYCH : normal mood,  not anxious       ==================>> MEDICATIONS <<====================    aspirin enteric coated 81 milliGRAM(s) Oral daily  cefuroxime  IVPB 1500 milliGRAM(s) IV Intermittent once  cefuroxime  IVPB 1500 milliGRAM(s) IV Intermittent once  chlorhexidine 0.12% Liquid 30 milliLiter(s) Swish and Spit once  chlorhexidine 4% Liquid 1 Application(s) Topical two times a day  furosemide    Tablet 20 milliGRAM(s) Oral daily  metoprolol succinate ER 25 milliGRAM(s) Oral daily  multivitamin 1 Tablet(s) Oral daily  pantoprazole    Tablet 40 milliGRAM(s) Oral before breakfast  rosuvastatin 20 milliGRAM(s) Oral at bedtime    ___________  Active diet:  Diet, NPO after Midnight:      NPO Start Date: 24-Nov-2020,   NPO Start Time: 23:59  Except Medications  Diet, Regular:   DASH/TLC Sodium & Cholesterol Restricted (DASH)  Low Sodium  ___________________    ==================>> VITAL SIGNS <<==================    Height (cm): 160  Weight (kg): 73  BMI (kg/m2): 28.5  Vital Signs Last 24 HrsT(C): 36.5 (11-25-20 @ 06:35)  T(F): 97.9 (11-25-20 @ 06:30), Max: 98 (11-24-20 @ 19:47)  HR: 72 (11-25-20 @ 06:35) (60 - 72)  BP: 107/72 (11-25-20 @ 06:35)  RR: 18 (11-25-20 @ 06:35) (17 - 18)  SpO2: 96% (11-25-20 @ 06:35) (96% - 99%)       ==================>> LAB AND IMAGING <<==================                        13.6   8.84  )-----------( 184      ( 25 Nov 2020 05:43 )             41.6     139  |  98  |  25<H>  ----------------------------<  85  4.1   |  30  |  0.99    Ca    9.6      25 Nov 2020 05:42  Phos  4.0     11-24  Mg     2.1     11-24    WBC count:   8.84 <<== ,  8.63 <<== ,  7.90 <<== ,  9.34 <<== ,  8.01 <<==   Hemoglobin:   13.6 <<==,  12.8 <<==,  13.9 <<==,  13.8 <<==,  13.3 <<==  platelets:  184 <==, 174 <==, 174 <==, 179 <==, 176 <==, 123 <==    Creatinine:  0.99  <<==, 1.11  <<==, 0.98  <<==, 1.02  <<==, 0.96  <<==  Sodium:   139  <==, 139  <==, 141  <==, 140  <==, 140  <==      < from: Cardiac Cath Lab - Adult (11.23.20 @ 13:07) >  CORONARY VESSELS: The coronary circulation is right dominant.  LM:   --  Distal left main: There was a 50 % stenosis.  LAD:   --  Ostial LAD: There was a 95 % stenosis.  CX:   --  Ostial circumflex: There was a 50 % stenosis.  --  OM1: There was a 70 % stenosis.  RCA:   --  RCA: Angiography showed minor luminal irregularities with no  flow limiting lesions.  COMPLICATIONS: There were no complications.  DIAGNOSTIC RECOMMENDATIONS: Severe multivessel disease, involving distal  LM, osital LAD, ostial Cx trifurcation with moderate calcification.  Reccomend CTS evaluation for CABG. Continue ASA, hold P2Y12 inhibitor.  INTERVENTIONAL RECOMMENDATIONS: Severe multivessel disease, involving  distal LM, osital LAD, ostial Cx trifurcation with moderate calcification.  Reccomend CTS evaluation for CABG. Continue ASA, hold P2Y12 inhibitor.  Prepared and signed by  Tyshawn Diaz M.D.  < end of copied text >      < from: Nuclear Stress Test-Pharmacologic (Nuclear Stress Test-Pharmacologic .) (11.22.20 @ 10:58) >  IMPRESSIONS:Abnormal Study  * Chest Pain: No chest pain with administration of  Regadenoson.  * Symptom: No Symptom.  * HR Response: Appropriate.  * BP Response: Appropriate.  * Heart Rhythm: Sinus Bradycardia - 58 BPM.  * Conduction defects: incomplete right bundle brach block.  * Q Waves: Cannot Rule Out Inferior wall MI.  * Baseline ECG: T wave abnormality I, AVL.  * ECG Changes: ST Depression: 1 mm horizontal in leads V5,  V6 started at 02:00 min of post infusion at HR of 77 and  persisted 05:00 min into post infusion.  * Arrhythmia: Rare VPDs occurred during stress and  recovery.  * The left ventricle was enlarged. There are medium sized,  moderate to severe defects in anterior and anterolateral,  anteroseptal, apical, distal inferolateral walls that are  predominantly reversible consistent with  ischemia.Transient ischemic dilation of the LV was noted  with a ratio of: 1.31  * Post-stress gated wall motion analysis was performed  (LVEF = 39 %;LVEDV = 165 ml.), revealing hypokinesis in  anterior and anterolateral, anteroseptal, apical, distal  inferolateral walls.  ------------------------------------------------------------------------  Confirmed on11/22/2020 - 14:26:58 by Shan Mercado M.D.  < end of copied text >      < from: TTE with Doppler (w/3D Echo) (Transthoracic Echocardiogram) (11.19.20 @ 08:27) >  Conclusions:  Normal left ventricular systolic function. No segmental  wall motion abnormalities.  Mild-moderate aortic stenosis.  Left ventricular filling pressure is severely elevated.  Severe pulmonary hypertension.  ------------------------------------------------------------------------  Confirmed on  11/19/2020 - 10:35:23 by Tulio Merrill MD,  [facility]  < end of copied text >    ___________________________________________________________________________________  ===============>>  A S S E S S M E N T   A N D   P L A N <<===============  ------------------------------------------------------------------------------------------  pt is an 86 y/o man with pmhx of htn, hld, hiatal hernia / GERD, OA post Lt TKR x1 year ago presenting with concern for increasing redness of left leg and swelling for 2 months.     Problem/Plan - 1:  ·  Problem: pt likely with acute exacerbation of chronic diastolic congestive heart failure     pt with bilateral edema, pleural effusion, pulmonary edema, and elevated pro-BNP     pt with also severe pulmonary HTN and aortic stenosis  above      pt with multivessel Coronary artery disease on Cath   appreciated cardio and CTS notes  CABG today   Lovenox per CTS  lasix as needed  monitor vitals, labs, I/O closely marcos op   leg elevation advised     Problem/Plan - 2:  ·  Problem: NSTEMI in pt with significant CAD    elevated troponin with CHF picture  as above  monitor on tele  aspirin.   otherwise as above     Problem/Plan - 3:  ·  Problem: HTN / HLD  Continue Current medications otherwise and monitor.   cardio f/u. and further optimization  pt and wife adamant against statins due to intolerance ( no allergic reaction ) >  pharmacy added Zetia !     Problem/Plan - 4:  ·  Problem: Venous stasis.    no DVT on duplex  likely venous stasis changes on left leg and cellulitis  post course of abx treatment   elevation  diuresis  ID appreciated     GI / DVT PPX per surgical protocol     --------------------------------------------  Case discussed with Pt re Sx already  Education given on findings and plan of care  ___________________________  H. INEZ Phipps.  Pager: [phone]
